# Patient Record
Sex: FEMALE | Race: BLACK OR AFRICAN AMERICAN | NOT HISPANIC OR LATINO | Employment: OTHER | RURAL
[De-identification: names, ages, dates, MRNs, and addresses within clinical notes are randomized per-mention and may not be internally consistent; named-entity substitution may affect disease eponyms.]

---

## 2017-11-13 ENCOUNTER — HISTORICAL (OUTPATIENT)
Dept: ADMINISTRATIVE | Facility: HOSPITAL | Age: 62
End: 2017-11-13

## 2017-11-14 LAB
LAB AP CLINICAL INFORMATION: NORMAL
LAB AP DIAGNOSIS - HISTORICAL: NORMAL
LAB AP GROSS PATHOLOGY - HISTORICAL: NORMAL
LAB AP SPECIMEN SUBMITTED - HISTORICAL: NORMAL

## 2018-10-23 ENCOUNTER — HISTORICAL (OUTPATIENT)
Dept: ADMINISTRATIVE | Facility: HOSPITAL | Age: 63
End: 2018-10-23

## 2018-10-25 LAB
LAB AP GENERAL CAT - HISTORICAL: NORMAL
LAB AP INTERPRETATION/RESULT - HISTORICAL: NEGATIVE
LAB AP SPECIMEN ADEQUACY - HISTORICAL: NORMAL
LAB AP SPECIMEN SUBMITTED - HISTORICAL: NORMAL

## 2020-01-30 LAB
CHOLEST SERPL-MSCNC: 137 MG/DL (ref 0–200)
HDLC SERPL-MCNC: 37 MG/DL
LDLC SERPL CALC-MCNC: 67 MG/DL
TRIGL SERPL-MCNC: 165 MG/DL

## 2020-06-16 ENCOUNTER — HISTORICAL (OUTPATIENT)
Dept: ADMINISTRATIVE | Facility: HOSPITAL | Age: 65
End: 2020-06-16

## 2020-12-03 LAB — MICROALBUMIN/CREATININE RATIO: 31 UG/MG

## 2021-03-10 ENCOUNTER — HISTORICAL (OUTPATIENT)
Dept: ADMINISTRATIVE | Facility: HOSPITAL | Age: 66
End: 2021-03-10

## 2021-04-16 ENCOUNTER — OFFICE VISIT (OUTPATIENT)
Dept: GASTROENTEROLOGY | Facility: CLINIC | Age: 66
End: 2021-04-16
Payer: MEDICARE

## 2021-04-16 VITALS
BODY MASS INDEX: 30.36 KG/M2 | HEART RATE: 85 BPM | SYSTOLIC BLOOD PRESSURE: 123 MMHG | WEIGHT: 205 LBS | DIASTOLIC BLOOD PRESSURE: 80 MMHG | OXYGEN SATURATION: 95 % | HEIGHT: 69 IN

## 2021-04-16 DIAGNOSIS — K31.84 GASTROPARESIS: ICD-10-CM

## 2021-04-16 DIAGNOSIS — E11.69 TYPE 2 DIABETES MELLITUS WITH OTHER SPECIFIED COMPLICATION, WITHOUT LONG-TERM CURRENT USE OF INSULIN: ICD-10-CM

## 2021-04-16 DIAGNOSIS — R74.8 ELEVATED LIVER ENZYMES: Primary | ICD-10-CM

## 2021-04-16 PROBLEM — E11.9 TYPE 2 DIABETES MELLITUS: Status: ACTIVE | Noted: 2021-04-16

## 2021-04-16 PROCEDURE — 99214 PR OFFICE/OUTPT VISIT, EST, LEVL IV, 30-39 MIN: ICD-10-PCS | Mod: ,,, | Performed by: NURSE PRACTITIONER

## 2021-04-16 PROCEDURE — 99214 OFFICE O/P EST MOD 30 MIN: CPT | Mod: ,,, | Performed by: NURSE PRACTITIONER

## 2021-04-16 RX ORDER — ESTRADIOL 0.1 MG/D
1 FILM, EXTENDED RELEASE TRANSDERMAL
COMMUNITY
End: 2021-07-01 | Stop reason: SDUPTHER

## 2021-04-16 RX ORDER — METFORMIN HYDROCHLORIDE 1000 MG/1
1 TABLET ORAL 2 TIMES DAILY
COMMUNITY
Start: 2021-04-05 | End: 2021-05-27

## 2021-04-16 RX ORDER — LOSARTAN POTASSIUM 25 MG/1
1 TABLET ORAL DAILY
COMMUNITY
Start: 2021-02-22 | End: 2022-07-25

## 2021-04-16 RX ORDER — PREGABALIN 100 MG/1
100 CAPSULE ORAL 2 TIMES DAILY
COMMUNITY
End: 2021-12-07

## 2021-04-16 RX ORDER — ALBUTEROL SULFATE 90 UG/1
2 AEROSOL, METERED RESPIRATORY (INHALATION) 2 TIMES DAILY
COMMUNITY
Start: 2021-02-24 | End: 2022-03-29 | Stop reason: SDUPTHER

## 2021-04-16 RX ORDER — ASPIRIN 81 MG/1
81 TABLET ORAL DAILY
COMMUNITY
End: 2021-12-07

## 2021-04-16 RX ORDER — EZETIMIBE 10 MG/1
1 TABLET ORAL NIGHTLY
COMMUNITY
Start: 2021-02-24

## 2021-04-16 RX ORDER — CLOPIDOGREL BISULFATE 75 MG/1
1 TABLET ORAL DAILY
Status: ON HOLD | COMMUNITY
Start: 2021-02-24 | End: 2022-06-24 | Stop reason: SINTOL

## 2021-04-16 RX ORDER — PANTOPRAZOLE SODIUM 40 MG/1
1 TABLET, DELAYED RELEASE ORAL DAILY
COMMUNITY
Start: 2021-04-09 | End: 2021-11-15 | Stop reason: SDUPTHER

## 2021-04-16 RX ORDER — METOPROLOL SUCCINATE 25 MG/1
1 TABLET, EXTENDED RELEASE ORAL DAILY
COMMUNITY
Start: 2021-03-01

## 2021-04-19 ENCOUNTER — HOSPITAL ENCOUNTER (OUTPATIENT)
Dept: RADIOLOGY | Facility: HOSPITAL | Age: 66
Discharge: HOME | End: 2021-04-19
Attending: NURSE PRACTITIONER
Payer: MEDICARE

## 2021-04-19 DIAGNOSIS — R74.8 ELEVATED LIVER ENZYMES: ICD-10-CM

## 2021-04-19 PROCEDURE — 76700 US ABDOMEN COMPLETE: ICD-10-PCS | Mod: 26,59,ICN, | Performed by: RADIOLOGY

## 2021-04-19 PROCEDURE — 91200 LIVER ELASTOGRAPHY: CPT | Mod: TC

## 2021-04-19 PROCEDURE — 76700 US EXAM ABDOM COMPLETE: CPT | Mod: TC

## 2021-04-19 PROCEDURE — 76700 US EXAM ABDOM COMPLETE: CPT | Mod: 26,59,ICN, | Performed by: RADIOLOGY

## 2021-04-19 PROCEDURE — 91200 LIVER ELASTOGRAPHY: CPT | Mod: 26,,, | Performed by: RADIOLOGY

## 2021-04-19 PROCEDURE — 91200 US ELASTOGRAPHY LIVER: ICD-10-PCS | Mod: 26,,, | Performed by: RADIOLOGY

## 2021-04-20 ENCOUNTER — TELEPHONE (OUTPATIENT)
Dept: GASTROENTEROLOGY | Facility: CLINIC | Age: 66
End: 2021-04-20

## 2021-04-20 DIAGNOSIS — R93.89 ABNORMAL ULTRASOUND: Primary | ICD-10-CM

## 2021-04-22 ENCOUNTER — HOSPITAL ENCOUNTER (OUTPATIENT)
Dept: RADIOLOGY | Facility: HOSPITAL | Age: 66
Discharge: HOME | End: 2021-04-22
Payer: MEDICARE

## 2021-04-22 VITALS — BODY MASS INDEX: 30.36 KG/M2 | WEIGHT: 205 LBS | HEIGHT: 69 IN

## 2021-04-22 DIAGNOSIS — Z12.31 VISIT FOR SCREENING MAMMOGRAM: ICD-10-CM

## 2021-04-22 PROCEDURE — 77067 SCR MAMMO BI INCL CAD: CPT | Mod: 26,,, | Performed by: RADIOLOGY

## 2021-04-22 PROCEDURE — 77067 MAMMO DIGITAL SCREENING BILAT: ICD-10-PCS | Mod: 26,,, | Performed by: RADIOLOGY

## 2021-04-22 PROCEDURE — 77067 SCR MAMMO BI INCL CAD: CPT | Mod: TC

## 2021-04-29 ENCOUNTER — TELEPHONE (OUTPATIENT)
Dept: GASTROENTEROLOGY | Facility: CLINIC | Age: 66
End: 2021-04-29

## 2021-05-27 ENCOUNTER — OFFICE VISIT (OUTPATIENT)
Dept: DIABETES SERVICES | Facility: CLINIC | Age: 66
End: 2021-05-27
Payer: MEDICARE

## 2021-05-27 VITALS
HEIGHT: 69 IN | SYSTOLIC BLOOD PRESSURE: 112 MMHG | WEIGHT: 202.38 LBS | RESPIRATION RATE: 16 BRPM | HEART RATE: 83 BPM | DIASTOLIC BLOOD PRESSURE: 74 MMHG | OXYGEN SATURATION: 96 % | BODY MASS INDEX: 29.98 KG/M2

## 2021-05-27 DIAGNOSIS — K31.84 GASTROPARESIS: ICD-10-CM

## 2021-05-27 DIAGNOSIS — I10 HYPERTENSION, UNSPECIFIED TYPE: ICD-10-CM

## 2021-05-27 DIAGNOSIS — E11.9 TYPE 2 DIABETES MELLITUS WITHOUT COMPLICATION, WITHOUT LONG-TERM CURRENT USE OF INSULIN: Primary | ICD-10-CM

## 2021-05-27 DIAGNOSIS — I25.10 CORONARY ARTERY DISEASE, ANGINA PRESENCE UNSPECIFIED, UNSPECIFIED VESSEL OR LESION TYPE, UNSPECIFIED WHETHER NATIVE OR TRANSPLANTED HEART: ICD-10-CM

## 2021-05-27 LAB
GLUCOSE SERPL-MCNC: 106 MG/DL (ref 70–110)
HBA1C MFR BLD: 7 % (ref 4.5–6.6)

## 2021-05-27 PROCEDURE — 99213 OFFICE O/P EST LOW 20 MIN: CPT | Mod: S$PBB,,, | Performed by: NURSE PRACTITIONER

## 2021-05-27 PROCEDURE — 99213 PR OFFICE/OUTPT VISIT, EST, LEVL III, 20-29 MIN: ICD-10-PCS | Mod: S$PBB,,, | Performed by: NURSE PRACTITIONER

## 2021-05-27 PROCEDURE — 83036 HEMOGLOBIN GLYCOSYLATED A1C: CPT | Mod: PBBFAC | Performed by: NURSE PRACTITIONER

## 2021-05-27 PROCEDURE — 82962 GLUCOSE BLOOD TEST: CPT | Mod: PBBFAC | Performed by: NURSE PRACTITIONER

## 2021-05-27 PROCEDURE — 99215 OFFICE O/P EST HI 40 MIN: CPT | Mod: PBBFAC | Performed by: NURSE PRACTITIONER

## 2021-05-27 RX ORDER — GLIPIZIDE 2.5 MG/1
5 TABLET, EXTENDED RELEASE ORAL
Qty: 180 TABLET | Refills: 3 | Status: SHIPPED | OUTPATIENT
Start: 2021-05-27 | End: 2021-12-07 | Stop reason: SDUPTHER

## 2021-06-08 ENCOUNTER — HOSPITAL ENCOUNTER (OUTPATIENT)
Dept: RADIOLOGY | Facility: HOSPITAL | Age: 66
Discharge: HOME | End: 2021-06-08
Attending: INTERNAL MEDICINE
Payer: MEDICARE

## 2021-06-08 ENCOUNTER — OFFICE VISIT (OUTPATIENT)
Dept: INTERNAL MEDICINE | Facility: CLINIC | Age: 66
End: 2021-06-08
Payer: MEDICARE

## 2021-06-08 VITALS
BODY MASS INDEX: 30.66 KG/M2 | SYSTOLIC BLOOD PRESSURE: 136 MMHG | HEART RATE: 84 BPM | HEIGHT: 69 IN | DIASTOLIC BLOOD PRESSURE: 82 MMHG | RESPIRATION RATE: 16 BRPM | OXYGEN SATURATION: 97 % | WEIGHT: 207 LBS

## 2021-06-08 DIAGNOSIS — G89.4 CHRONIC PAIN SYNDROME: ICD-10-CM

## 2021-06-08 DIAGNOSIS — E11.42 DIABETIC POLYNEUROPATHY ASSOCIATED WITH TYPE 2 DIABETES MELLITUS: ICD-10-CM

## 2021-06-08 DIAGNOSIS — E11.9 TYPE 2 DIABETES MELLITUS WITHOUT COMPLICATION, WITH LONG-TERM CURRENT USE OF INSULIN: Primary | Chronic | ICD-10-CM

## 2021-06-08 DIAGNOSIS — M25.531 RIGHT WRIST PAIN: ICD-10-CM

## 2021-06-08 DIAGNOSIS — Z95.1 S/P CABG (CORONARY ARTERY BYPASS GRAFT): ICD-10-CM

## 2021-06-08 DIAGNOSIS — J30.9 ALLERGIC RHINITIS, UNSPECIFIED SEASONALITY, UNSPECIFIED TRIGGER: Chronic | ICD-10-CM

## 2021-06-08 DIAGNOSIS — I10 ESSENTIAL HYPERTENSION: Chronic | ICD-10-CM

## 2021-06-08 DIAGNOSIS — K21.9 GASTROESOPHAGEAL REFLUX DISEASE, UNSPECIFIED WHETHER ESOPHAGITIS PRESENT: Chronic | ICD-10-CM

## 2021-06-08 DIAGNOSIS — Z79.4 TYPE 2 DIABETES MELLITUS WITHOUT COMPLICATION, WITH LONG-TERM CURRENT USE OF INSULIN: Primary | Chronic | ICD-10-CM

## 2021-06-08 PROCEDURE — 99215 OFFICE O/P EST HI 40 MIN: CPT | Mod: PBBFAC,25 | Performed by: INTERNAL MEDICINE

## 2021-06-08 PROCEDURE — 99214 PR OFFICE/OUTPT VISIT, EST, LEVL IV, 30-39 MIN: ICD-10-PCS | Mod: S$PBB,,, | Performed by: INTERNAL MEDICINE

## 2021-06-08 PROCEDURE — 73110 XR WRIST COMPLETE 3 VIEWS RIGHT: ICD-10-PCS | Mod: 26,RT,, | Performed by: RADIOLOGY

## 2021-06-08 PROCEDURE — 99214 OFFICE O/P EST MOD 30 MIN: CPT | Mod: S$PBB,,, | Performed by: INTERNAL MEDICINE

## 2021-06-08 PROCEDURE — 73110 X-RAY EXAM OF WRIST: CPT | Mod: TC,RT

## 2021-06-08 PROCEDURE — 73110 X-RAY EXAM OF WRIST: CPT | Mod: 26,RT,, | Performed by: RADIOLOGY

## 2021-06-08 RX ORDER — PSEUDOEPHEDRINE HCL 120 MG/1
120 TABLET, FILM COATED, EXTENDED RELEASE ORAL DAILY
Qty: 30 TABLET | Refills: 2 | Status: CANCELLED | OUTPATIENT
Start: 2021-06-08

## 2021-06-08 RX ORDER — CYCLOBENZAPRINE HCL 10 MG
10 TABLET ORAL
Status: ON HOLD | COMMUNITY
Start: 2021-04-19 | End: 2022-06-26 | Stop reason: HOSPADM

## 2021-06-08 RX ORDER — ALBUTEROL SULFATE 90 UG/1
2 AEROSOL, METERED RESPIRATORY (INHALATION) 2 TIMES DAILY
Qty: 1 G | Refills: 1 | Status: CANCELLED | OUTPATIENT
Start: 2021-06-08

## 2021-07-01 ENCOUNTER — OFFICE VISIT (OUTPATIENT)
Dept: GASTROENTEROLOGY | Facility: CLINIC | Age: 66
End: 2021-07-01
Payer: MEDICARE

## 2021-07-01 ENCOUNTER — OFFICE VISIT (OUTPATIENT)
Dept: INTERNAL MEDICINE | Facility: CLINIC | Age: 66
End: 2021-07-01
Payer: MEDICARE

## 2021-07-01 VITALS
WEIGHT: 199 LBS | BODY MASS INDEX: 29.47 KG/M2 | OXYGEN SATURATION: 98 % | DIASTOLIC BLOOD PRESSURE: 72 MMHG | HEART RATE: 90 BPM | RESPIRATION RATE: 16 BRPM | SYSTOLIC BLOOD PRESSURE: 122 MMHG | HEIGHT: 69 IN

## 2021-07-01 VITALS
DIASTOLIC BLOOD PRESSURE: 69 MMHG | HEART RATE: 86 BPM | SYSTOLIC BLOOD PRESSURE: 114 MMHG | HEIGHT: 69 IN | OXYGEN SATURATION: 97 % | WEIGHT: 202 LBS | BODY MASS INDEX: 29.92 KG/M2

## 2021-07-01 DIAGNOSIS — Z79.890 HORMONE REPLACEMENT THERAPY: ICD-10-CM

## 2021-07-01 DIAGNOSIS — R74.8 ELEVATED LIVER ENZYMES: ICD-10-CM

## 2021-07-01 DIAGNOSIS — E11.9 TYPE 2 DIABETES MELLITUS WITHOUT COMPLICATION, WITHOUT LONG-TERM CURRENT USE OF INSULIN: ICD-10-CM

## 2021-07-01 DIAGNOSIS — R19.7 DIARRHEA, UNSPECIFIED TYPE: ICD-10-CM

## 2021-07-01 DIAGNOSIS — K31.84 GASTROPARESIS: Primary | ICD-10-CM

## 2021-07-01 DIAGNOSIS — K83.8 DILATED CBD, ACQUIRED: ICD-10-CM

## 2021-07-01 DIAGNOSIS — M19.90 OSTEOARTHRITIS, UNSPECIFIED OSTEOARTHRITIS TYPE, UNSPECIFIED SITE: Primary | ICD-10-CM

## 2021-07-01 PROCEDURE — 99214 OFFICE O/P EST MOD 30 MIN: CPT | Mod: ,,, | Performed by: NURSE PRACTITIONER

## 2021-07-01 PROCEDURE — 99214 PR OFFICE/OUTPT VISIT, EST, LEVL IV, 30-39 MIN: ICD-10-PCS | Mod: ,,, | Performed by: NURSE PRACTITIONER

## 2021-07-01 PROCEDURE — 99213 PR OFFICE/OUTPT VISIT, EST, LEVL III, 20-29 MIN: ICD-10-PCS | Mod: S$PBB,,, | Performed by: INTERNAL MEDICINE

## 2021-07-01 PROCEDURE — 99213 OFFICE O/P EST LOW 20 MIN: CPT | Mod: S$PBB,,, | Performed by: INTERNAL MEDICINE

## 2021-07-01 PROCEDURE — 99215 OFFICE O/P EST HI 40 MIN: CPT | Mod: PBBFAC | Performed by: INTERNAL MEDICINE

## 2021-07-01 RX ORDER — ESTRADIOL 0.1 MG/D
1 FILM, EXTENDED RELEASE TRANSDERMAL
Qty: 8 PATCH | Refills: 0 | Status: SHIPPED | OUTPATIENT
Start: 2021-07-01 | End: 2021-09-14

## 2021-07-08 ENCOUNTER — OFFICE VISIT (OUTPATIENT)
Dept: FAMILY MEDICINE | Facility: CLINIC | Age: 66
End: 2021-07-08
Payer: MEDICARE

## 2021-07-08 VITALS
HEART RATE: 97 BPM | DIASTOLIC BLOOD PRESSURE: 80 MMHG | HEIGHT: 69 IN | RESPIRATION RATE: 18 BRPM | OXYGEN SATURATION: 96 % | WEIGHT: 205 LBS | SYSTOLIC BLOOD PRESSURE: 132 MMHG | BODY MASS INDEX: 30.36 KG/M2 | TEMPERATURE: 97 F

## 2021-07-08 DIAGNOSIS — G89.29 CHRONIC PAIN OF RIGHT KNEE: ICD-10-CM

## 2021-07-08 DIAGNOSIS — R09.81 NASAL CONGESTION: ICD-10-CM

## 2021-07-08 DIAGNOSIS — J32.9 SINUSITIS, UNSPECIFIED CHRONICITY, UNSPECIFIED LOCATION: Primary | ICD-10-CM

## 2021-07-08 DIAGNOSIS — M25.561 CHRONIC PAIN OF RIGHT KNEE: ICD-10-CM

## 2021-07-08 LAB
CTP QC/QA: YES
FLUAV AG NPH QL: NEGATIVE
FLUBV AG NPH QL: NEGATIVE
SARS-COV-2 AG RESP QL IA.RAPID: NEGATIVE

## 2021-07-08 PROCEDURE — 99213 PR OFFICE/OUTPT VISIT, EST, LEVL III, 20-29 MIN: ICD-10-PCS | Mod: ,,, | Performed by: NURSE PRACTITIONER

## 2021-07-08 PROCEDURE — 87428 SARSCOV & INF VIR A&B AG IA: CPT | Mod: RHCUB | Performed by: NURSE PRACTITIONER

## 2021-07-08 PROCEDURE — 99213 OFFICE O/P EST LOW 20 MIN: CPT | Mod: ,,, | Performed by: NURSE PRACTITIONER

## 2021-07-08 RX ORDER — AMOXICILLIN 500 MG/1
500 TABLET, FILM COATED ORAL EVERY 12 HOURS
Qty: 20 TABLET | Refills: 0 | Status: SHIPPED | OUTPATIENT
Start: 2021-07-08 | End: 2021-07-18

## 2021-07-21 ENCOUNTER — HOSPITAL ENCOUNTER (OUTPATIENT)
Dept: RADIOLOGY | Facility: HOSPITAL | Age: 66
Discharge: HOME | End: 2021-07-21
Attending: ORTHOPAEDIC SURGERY
Payer: MEDICARE

## 2021-07-21 DIAGNOSIS — M25.561 RIGHT KNEE PAIN, UNSPECIFIED CHRONICITY: ICD-10-CM

## 2021-07-21 PROBLEM — M17.11 ARTHRITIS OF RIGHT KNEE: Status: ACTIVE | Noted: 2021-07-21

## 2021-07-21 PROBLEM — M18.11 ARTHRITIS OF CARPOMETACARPAL (CMC) JOINT OF RIGHT THUMB: Status: ACTIVE | Noted: 2021-07-21

## 2021-07-21 PROCEDURE — 73564 X-RAY EXAM KNEE 4 OR MORE: CPT | Mod: TC,RT

## 2021-08-17 ENCOUNTER — TELEPHONE (OUTPATIENT)
Dept: DIABETES SERVICES | Facility: CLINIC | Age: 66
End: 2021-08-17

## 2021-08-17 RX ORDER — DAPAGLIFLOZIN 10 MG/1
10 TABLET, FILM COATED ORAL DAILY
Qty: 90 TABLET | Refills: 1 | Status: SHIPPED | OUTPATIENT
Start: 2021-08-17 | End: 2022-01-31

## 2021-08-18 ENCOUNTER — TELEPHONE (OUTPATIENT)
Dept: DIABETES SERVICES | Facility: CLINIC | Age: 66
End: 2021-08-18

## 2021-08-18 RX ORDER — FLUCONAZOLE 100 MG/1
100 TABLET ORAL DAILY
Qty: 3 TABLET | Refills: 0 | Status: SHIPPED | OUTPATIENT
Start: 2021-08-18 | End: 2021-08-21

## 2021-08-18 RX ORDER — FLUCONAZOLE 100 MG/1
100 TABLET ORAL DAILY
Qty: 3 TABLET | Refills: 0 | Status: SHIPPED | OUTPATIENT
Start: 2021-08-18 | End: 2021-08-18

## 2021-09-09 DIAGNOSIS — E11.69 TYPE 2 DIABETES MELLITUS WITH OTHER SPECIFIED COMPLICATION, WITHOUT LONG-TERM CURRENT USE OF INSULIN: Primary | ICD-10-CM

## 2021-11-01 ENCOUNTER — HOSPITAL ENCOUNTER (OUTPATIENT)
Dept: RADIOLOGY | Facility: HOSPITAL | Age: 66
Discharge: HOME | End: 2021-11-01
Attending: NURSE PRACTITIONER
Payer: MEDICARE

## 2021-11-01 ENCOUNTER — OFFICE VISIT (OUTPATIENT)
Dept: GASTROENTEROLOGY | Facility: CLINIC | Age: 66
End: 2021-11-01
Payer: MEDICARE

## 2021-11-01 VITALS
HEART RATE: 72 BPM | HEIGHT: 69 IN | OXYGEN SATURATION: 96 % | BODY MASS INDEX: 30.21 KG/M2 | DIASTOLIC BLOOD PRESSURE: 83 MMHG | WEIGHT: 204 LBS | SYSTOLIC BLOOD PRESSURE: 140 MMHG

## 2021-11-01 DIAGNOSIS — R74.8 ELEVATED LIVER ENZYMES: ICD-10-CM

## 2021-11-01 DIAGNOSIS — R74.8 ELEVATED LIVER ENZYMES: Primary | ICD-10-CM

## 2021-11-01 DIAGNOSIS — K31.84 GASTROPARESIS: ICD-10-CM

## 2021-11-01 DIAGNOSIS — R19.7 DIARRHEA, UNSPECIFIED TYPE: ICD-10-CM

## 2021-11-01 DIAGNOSIS — R16.0 HEPATOMEGALY: ICD-10-CM

## 2021-11-01 PROCEDURE — 99214 OFFICE O/P EST MOD 30 MIN: CPT | Mod: ,,, | Performed by: NURSE PRACTITIONER

## 2021-11-01 PROCEDURE — 76705 ECHO EXAM OF ABDOMEN: CPT | Mod: 26,,, | Performed by: RADIOLOGY

## 2021-11-01 PROCEDURE — 76705 US ABDOMEN LIMITED: ICD-10-PCS | Mod: 26,,, | Performed by: RADIOLOGY

## 2021-11-01 PROCEDURE — 99214 PR OFFICE/OUTPT VISIT, EST, LEVL IV, 30-39 MIN: ICD-10-PCS | Mod: ,,, | Performed by: NURSE PRACTITIONER

## 2021-11-01 PROCEDURE — 76705 ECHO EXAM OF ABDOMEN: CPT | Mod: TC

## 2021-11-08 ENCOUNTER — OFFICE VISIT (OUTPATIENT)
Dept: FAMILY MEDICINE | Facility: CLINIC | Age: 66
End: 2021-11-08
Payer: MEDICARE

## 2021-11-08 ENCOUNTER — APPOINTMENT (OUTPATIENT)
Dept: RADIOLOGY | Facility: CLINIC | Age: 66
End: 2021-11-08
Attending: INTERNAL MEDICINE
Payer: MEDICARE

## 2021-11-08 VITALS
WEIGHT: 208 LBS | SYSTOLIC BLOOD PRESSURE: 161 MMHG | HEART RATE: 78 BPM | BODY MASS INDEX: 30.81 KG/M2 | DIASTOLIC BLOOD PRESSURE: 96 MMHG | RESPIRATION RATE: 18 BRPM | OXYGEN SATURATION: 94 % | HEIGHT: 69 IN

## 2021-11-08 DIAGNOSIS — Z12.11 ENCOUNTER FOR SCREENING COLONOSCOPY: ICD-10-CM

## 2021-11-08 DIAGNOSIS — I10 HYPERTENSION, UNSPECIFIED TYPE: Chronic | ICD-10-CM

## 2021-11-08 DIAGNOSIS — N39.0 URINARY TRACT INFECTION WITHOUT HEMATURIA, SITE UNSPECIFIED: Primary | ICD-10-CM

## 2021-11-08 DIAGNOSIS — M79.602 LEFT ARM PAIN: ICD-10-CM

## 2021-11-08 DIAGNOSIS — Z23 ENCOUNTER FOR IMMUNIZATION: ICD-10-CM

## 2021-11-08 DIAGNOSIS — R10.9 ABDOMINAL PAIN, UNSPECIFIED ABDOMINAL LOCATION: ICD-10-CM

## 2021-11-08 DIAGNOSIS — M54.10 RADICULOPATHY, UNSPECIFIED SPINAL REGION: ICD-10-CM

## 2021-11-08 LAB
BILIRUB UR QL STRIP: NEGATIVE
CLARITY UR: CLEAR
COLOR UR: YELLOW
GLUCOSE UR STRIP-MCNC: >=1000 MG/DL
KETONES UR STRIP-SCNC: NEGATIVE MG/DL
LEUKOCYTE ESTERASE UR QL STRIP: NEGATIVE
NITRITE UR QL STRIP: NEGATIVE
PH UR STRIP: 6 PH UNITS
PROT UR QL STRIP: NEGATIVE
RBC # UR STRIP: NEGATIVE /UL
SP GR UR STRIP: 1.01
UROBILINOGEN UR STRIP-ACNC: 0.2 MG/DL

## 2021-11-08 PROCEDURE — G0008 FLU VACCINE - QUADRIVALENT - HIGH DOSE (65+) PRESERVATIVE FREE IM: ICD-10-PCS | Mod: ,,, | Performed by: INTERNAL MEDICINE

## 2021-11-08 PROCEDURE — 90662 IIV NO PRSV INCREASED AG IM: CPT | Mod: ,,, | Performed by: INTERNAL MEDICINE

## 2021-11-08 PROCEDURE — 99214 PR OFFICE/OUTPT VISIT, EST, LEVL IV, 30-39 MIN: ICD-10-PCS | Mod: ,,, | Performed by: INTERNAL MEDICINE

## 2021-11-08 PROCEDURE — 81003 URINALYSIS, REFLEX TO URINE CULTURE: ICD-10-PCS | Mod: QW,,, | Performed by: CLINICAL MEDICAL LABORATORY

## 2021-11-08 PROCEDURE — 90662 FLU VACCINE - QUADRIVALENT - HIGH DOSE (65+) PRESERVATIVE FREE IM: ICD-10-PCS | Mod: ,,, | Performed by: INTERNAL MEDICINE

## 2021-11-08 PROCEDURE — 73030 X-RAY EXAM OF SHOULDER: CPT | Mod: TC,RHCUB,LT | Performed by: INTERNAL MEDICINE

## 2021-11-08 PROCEDURE — G0008 ADMIN INFLUENZA VIRUS VAC: HCPCS | Mod: ,,, | Performed by: INTERNAL MEDICINE

## 2021-11-08 PROCEDURE — 81003 URINALYSIS AUTO W/O SCOPE: CPT | Mod: QW,,, | Performed by: CLINICAL MEDICAL LABORATORY

## 2021-11-08 PROCEDURE — 99214 OFFICE O/P EST MOD 30 MIN: CPT | Mod: ,,, | Performed by: INTERNAL MEDICINE

## 2021-11-08 RX ORDER — NITROFURANTOIN 25; 75 MG/1; MG/1
100 CAPSULE ORAL 2 TIMES DAILY
Qty: 14 CAPSULE | Refills: 0 | Status: SHIPPED | OUTPATIENT
Start: 2021-11-08 | End: 2021-12-07

## 2021-11-08 RX ORDER — NAPROXEN 500 MG/1
500 TABLET ORAL 2 TIMES DAILY PRN
Qty: 20 TABLET | Refills: 1 | Status: SHIPPED | OUTPATIENT
Start: 2021-11-08 | End: 2021-12-07

## 2021-11-15 ENCOUNTER — OFFICE VISIT (OUTPATIENT)
Dept: FAMILY MEDICINE | Facility: CLINIC | Age: 66
End: 2021-11-15
Payer: MEDICARE

## 2021-11-15 VITALS
SYSTOLIC BLOOD PRESSURE: 156 MMHG | WEIGHT: 205 LBS | DIASTOLIC BLOOD PRESSURE: 78 MMHG | BODY MASS INDEX: 30.36 KG/M2 | OXYGEN SATURATION: 98 % | HEIGHT: 69 IN | RESPIRATION RATE: 18 BRPM | HEART RATE: 82 BPM

## 2021-11-15 DIAGNOSIS — R10.9 ABDOMINAL PAIN, UNSPECIFIED ABDOMINAL LOCATION: ICD-10-CM

## 2021-11-15 DIAGNOSIS — M50.30 DDD (DEGENERATIVE DISC DISEASE), CERVICAL: Primary | ICD-10-CM

## 2021-11-15 DIAGNOSIS — E11.69 TYPE 2 DIABETES MELLITUS WITH OTHER SPECIFIED COMPLICATION, WITHOUT LONG-TERM CURRENT USE OF INSULIN: ICD-10-CM

## 2021-11-15 DIAGNOSIS — M15.9 OSTEOARTHRITIS OF MULTIPLE JOINTS, UNSPECIFIED OSTEOARTHRITIS TYPE: ICD-10-CM

## 2021-11-15 DIAGNOSIS — R35.0 URINARY FREQUENCY: ICD-10-CM

## 2021-11-15 DIAGNOSIS — I10 PRIMARY HYPERTENSION: ICD-10-CM

## 2021-11-15 PROCEDURE — 99214 OFFICE O/P EST MOD 30 MIN: CPT | Mod: ,,, | Performed by: INTERNAL MEDICINE

## 2021-11-15 PROCEDURE — 99214 PR OFFICE/OUTPT VISIT, EST, LEVL IV, 30-39 MIN: ICD-10-PCS | Mod: ,,, | Performed by: INTERNAL MEDICINE

## 2021-11-15 RX ORDER — PANTOPRAZOLE SODIUM 40 MG/1
40 TABLET, DELAYED RELEASE ORAL DAILY
Qty: 90 TABLET | Refills: 1 | Status: SHIPPED | OUTPATIENT
Start: 2021-11-15 | End: 2022-03-14 | Stop reason: SDUPTHER

## 2021-11-19 ENCOUNTER — HOSPITAL ENCOUNTER (OUTPATIENT)
Dept: RADIOLOGY | Facility: HOSPITAL | Age: 66
Discharge: HOME | End: 2021-11-19
Attending: INTERNAL MEDICINE
Payer: MEDICARE

## 2021-11-19 DIAGNOSIS — R10.9 ABDOMINAL PAIN, UNSPECIFIED ABDOMINAL LOCATION: ICD-10-CM

## 2021-11-19 LAB — CREAT SERPL-MCNC: 0.8 MG/DL (ref 0.6–1.3)

## 2021-11-19 PROCEDURE — 25500020 PHARM REV CODE 255: Performed by: INTERNAL MEDICINE

## 2021-11-19 PROCEDURE — 82565 ASSAY OF CREATININE: CPT

## 2021-11-19 PROCEDURE — 74177 CT ABDOMEN PELVIS WITH CONTRAST: ICD-10-PCS | Mod: 26,,, | Performed by: RADIOLOGY

## 2021-11-19 PROCEDURE — 74177 CT ABD & PELVIS W/CONTRAST: CPT | Mod: TC

## 2021-11-19 PROCEDURE — 74177 CT ABD & PELVIS W/CONTRAST: CPT | Mod: 26,,, | Performed by: RADIOLOGY

## 2021-11-19 RX ADMIN — IOPAMIDOL 100 ML: 755 INJECTION, SOLUTION INTRAVENOUS at 10:11

## 2021-11-29 DIAGNOSIS — Z12.11 ENCOUNTER FOR SCREENING COLONOSCOPY: Primary | ICD-10-CM

## 2021-12-07 ENCOUNTER — OFFICE VISIT (OUTPATIENT)
Dept: DIABETES SERVICES | Facility: CLINIC | Age: 66
End: 2021-12-07
Payer: MEDICARE

## 2021-12-07 VITALS
OXYGEN SATURATION: 98 % | SYSTOLIC BLOOD PRESSURE: 112 MMHG | WEIGHT: 203.81 LBS | BODY MASS INDEX: 30.19 KG/M2 | RESPIRATION RATE: 16 BRPM | HEART RATE: 87 BPM | DIASTOLIC BLOOD PRESSURE: 78 MMHG | HEIGHT: 69 IN

## 2021-12-07 DIAGNOSIS — E11.9 TYPE 2 DIABETES MELLITUS WITHOUT COMPLICATION, WITHOUT LONG-TERM CURRENT USE OF INSULIN: Primary | ICD-10-CM

## 2021-12-07 DIAGNOSIS — I10 PRIMARY HYPERTENSION: ICD-10-CM

## 2021-12-07 DIAGNOSIS — I25.10 CORONARY ARTERY DISEASE, UNSPECIFIED VESSEL OR LESION TYPE, UNSPECIFIED WHETHER ANGINA PRESENT, UNSPECIFIED WHETHER NATIVE OR TRANSPLANTED HEART: ICD-10-CM

## 2021-12-07 LAB
GLUCOSE SERPL-MCNC: 165 MG/DL (ref 70–110)
HBA1C MFR BLD: 7.8 % (ref 4.5–6.6)

## 2021-12-07 PROCEDURE — 99213 OFFICE O/P EST LOW 20 MIN: CPT | Mod: S$PBB,,, | Performed by: NURSE PRACTITIONER

## 2021-12-07 PROCEDURE — 99215 OFFICE O/P EST HI 40 MIN: CPT | Mod: PBBFAC | Performed by: NURSE PRACTITIONER

## 2021-12-07 PROCEDURE — 83036 HEMOGLOBIN GLYCOSYLATED A1C: CPT | Mod: PBBFAC | Performed by: NURSE PRACTITIONER

## 2021-12-07 PROCEDURE — 99213 PR OFFICE/OUTPT VISIT, EST, LEVL III, 20-29 MIN: ICD-10-PCS | Mod: S$PBB,,, | Performed by: NURSE PRACTITIONER

## 2021-12-07 PROCEDURE — 82962 GLUCOSE BLOOD TEST: CPT | Mod: PBBFAC | Performed by: NURSE PRACTITIONER

## 2021-12-07 RX ORDER — GLIPIZIDE 2.5 MG/1
5 TABLET, EXTENDED RELEASE ORAL
Qty: 180 TABLET | Refills: 3 | Status: SHIPPED | OUTPATIENT
Start: 2021-12-07 | End: 2022-07-25

## 2022-01-04 ENCOUNTER — PATIENT OUTREACH (OUTPATIENT)
Dept: FAMILY MEDICINE | Facility: CLINIC | Age: 67
End: 2022-01-04
Payer: MEDICARE

## 2022-02-18 ENCOUNTER — OFFICE VISIT (OUTPATIENT)
Dept: FAMILY MEDICINE | Facility: CLINIC | Age: 67
End: 2022-02-18
Payer: MEDICARE

## 2022-02-18 VITALS
BODY MASS INDEX: 30.86 KG/M2 | OXYGEN SATURATION: 98 % | HEART RATE: 83 BPM | SYSTOLIC BLOOD PRESSURE: 153 MMHG | HEIGHT: 69 IN | TEMPERATURE: 97 F | RESPIRATION RATE: 18 BRPM | WEIGHT: 208.38 LBS | DIASTOLIC BLOOD PRESSURE: 94 MMHG

## 2022-02-18 DIAGNOSIS — J30.89 SEASONAL ALLERGIC RHINITIS DUE TO OTHER ALLERGIC TRIGGER: ICD-10-CM

## 2022-02-18 DIAGNOSIS — A60.04 HERPES SIMPLEX VULVOVAGINITIS: ICD-10-CM

## 2022-02-18 DIAGNOSIS — R51.9 ACUTE NONINTRACTABLE HEADACHE, UNSPECIFIED HEADACHE TYPE: ICD-10-CM

## 2022-02-18 DIAGNOSIS — R49.0 HOARSENESS: ICD-10-CM

## 2022-02-18 DIAGNOSIS — I10 PRIMARY HYPERTENSION: Primary | ICD-10-CM

## 2022-02-18 PROCEDURE — 99214 OFFICE O/P EST MOD 30 MIN: CPT | Mod: ,,, | Performed by: INTERNAL MEDICINE

## 2022-02-18 PROCEDURE — 99214 PR OFFICE/OUTPT VISIT, EST, LEVL IV, 30-39 MIN: ICD-10-PCS | Mod: ,,, | Performed by: INTERNAL MEDICINE

## 2022-02-18 RX ORDER — LORATADINE 10 MG/1
10 TABLET ORAL DAILY
Qty: 20 TABLET | Refills: 1 | Status: SHIPPED | OUTPATIENT
Start: 2022-02-18 | End: 2022-02-26 | Stop reason: SDUPTHER

## 2022-02-18 RX ORDER — VALACYCLOVIR HYDROCHLORIDE 500 MG/1
500 TABLET, FILM COATED ORAL 3 TIMES DAILY
Qty: 15 TABLET | Refills: 6 | Status: SHIPPED | OUTPATIENT
Start: 2022-02-18 | End: 2022-06-26

## 2022-02-18 NOTE — PROGRESS NOTES
Subjective:       Patient ID: Olga Stephenson is a 66 y.o. female.    Chief Complaint: Headache, Sinus Problem, and Anxiety    Patient is here today for check up evaluation. Patient is complaining of pressure headache and hoarseness at night. No pain on palpation of sinuses. Patient also states her bilateral ears have felt 'itchy' and has noticed pressure in them. Bilateral ears normal on exam. Pressure is increased today and states she has not been following a healthy diet. Will order Claritin 10 mg PO QD for allergies and follow in 3 weeks for pressure recheck.       Current Medications:    Current Outpatient Medications:     albuterol (PROVENTIL/VENTOLIN HFA) 90 mcg/actuation inhaler, Inhale 2 puffs into the lungs 2 (two) times a day., Disp: , Rfl:     blood sugar diagnostic (FREESTYLE LITE STRIPS) Strp, Use one strip daily to monitor glucose, Disp: 100 each, Rfl: 3    clopidogreL (PLAVIX) 75 mg tablet, Take 1 tablet by mouth once daily., Disp: , Rfl:     cyclobenzaprine (FLEXERIL) 10 MG tablet, Take 10 mg by mouth. Twice a week, Disp: , Rfl:     estradioL (VIVELLE-DOT) 0.025 mg/24 hr, Place 1 patch onto the skin twice a week., Disp: , Rfl:     ezetimibe (ZETIA) 10 mg tablet, Take 1 tablet by mouth once daily., Disp: , Rfl:     FARXIGA 10 mg tablet, TAKE 1 TABLET DAILY, Disp: 90 tablet, Rfl: 3    fluconazole (DIFLUCAN) 100 MG tablet, Take 1 tablet (100 mg total) by mouth once daily., Disp: 3 tablet, Rfl: 0    glipiZIDE (GLUCOTROL) 2.5 MG TR24, Take 2 tablets (5 mg total) by mouth daily with breakfast., Disp: 180 tablet, Rfl: 3    loratadine (CLARITIN) 10 mg tablet, Take 1 tablet (10 mg total) by mouth once daily., Disp: 20 tablet, Rfl: 1    losartan (COZAAR) 25 MG tablet, Take 1 tablet by mouth once daily., Disp: , Rfl:     metoprolol succinate (TOPROL-XL) 25 MG 24 hr tablet, Take 1 tablet by mouth once daily., Disp: , Rfl:     pantoprazole (PROTONIX) 40 MG tablet, Take 1 tablet (40 mg total) by  mouth once daily., Disp: 90 tablet, Rfl: 1    pregabalin (LYRICA) 75 MG capsule, Take 75 mg by mouth. 1 in the morning and 2 at night, Disp: , Rfl:     valACYclovir (VALTREX) 500 MG tablet, Take 1 tablet (500 mg total) by mouth 3 (three) times daily., Disp: 15 tablet, Rfl: 6    Last Labs:     No visits with results within 1 Month(s) from this visit.   Latest known visit with results is:   Lab Visit on 01/05/2022   Component Date Value    Triglycerides 01/05/2022 132     Cholesterol 01/05/2022 153     HDL Cholesterol 01/05/2022 35*    Cholesterol/HDL Ratio (R* 01/05/2022 4.4     Non-HDL 01/05/2022 118     LDL Calculated 01/05/2022 92     LDL/HDL 01/05/2022 2.6     VLDL 01/05/2022 26        Last Imaging:  CT Abdomen Pelvis With Contrast  Narrative: EXAMINATION:  CT ABDOMEN PELVIS WITH CONTRAST    CLINICAL HISTORY:  Abdominal pain, acute, nonlocalized; Unspecified abdominal pain    TECHNIQUE:  Axial CT imaging of the abdomen and pelvis is performed with intravenous and oral contrast. Contrast dose is 100 cc Isovue 370.    CT dose reduction technique used - Dose Rite and tube current modulation.    COMPARISON:  16 June 2020    FINDINGS:  Cardiac and lung bases are within normal limits    CT abdomen: The liver is diffusely decreased in density without focal abnormality.  The gallbladder has been removed.  Spleen, pancreas and adrenal glands are normal in size and enhancement.  No evidence of focal lesion is demonstrated in these solid organs.    Kidneys are normal in size and enhancement.  No evidence of hydronephrosis or nephrolithiasis is seen.    The bowel caliber is normal and no wall thickening or adjacent inflammatory change is seen.  No evidence of free fluid or free air is present.  Appendix is not seen..    CT pelvis: The pelvic bowel appears within normal limits.  Bladder shows no evidence of abnormality.  The uterus and ovaries are not identified.  Impression: No evidence of acute process.  Fatty  liver infiltration.    Electronically signed by: Ervin Fonseca  Date:    11/19/2021  Time:    10:17         Review of Systems   HENT: Positive for sinus pressure/congestion and voice change.    Genitourinary: Positive for genital sores.   All other systems reviewed and are negative.        Objective:      Physical Exam  Vitals reviewed.   Constitutional:       Appearance: Normal appearance.   Cardiovascular:      Rate and Rhythm: Normal rate and regular rhythm.      Pulses: Normal pulses.      Heart sounds: Normal heart sounds.   Pulmonary:      Effort: Pulmonary effort is normal.      Breath sounds: Normal breath sounds.   Abdominal:      General: Abdomen is flat. Bowel sounds are normal.      Palpations: Abdomen is soft.   Musculoskeletal:         General: Normal range of motion.      Cervical back: Normal range of motion and neck supple.   Skin:     General: Skin is warm and dry.   Neurological:      General: No focal deficit present.      Mental Status: She is alert and oriented to person, place, and time. Mental status is at baseline.         Assessment:       1. Primary hypertension      unstable   2. Acute nonintractable headache, unspecified headache type     3. Hoarseness     4. Seasonal allergic rhinitis due to other allergic trigger     5. Herpes simplex vulvovaginitis          Plan:         Olga was seen today for headache, sinus problem and anxiety.    Diagnoses and all orders for this visit:    Primary hypertension  Comments:  unstable    Acute nonintractable headache, unspecified headache type    Hoarseness    Seasonal allergic rhinitis due to other allergic trigger    Herpes simplex vulvovaginitis    Other orders  -     loratadine (CLARITIN) 10 mg tablet; Take 1 tablet (10 mg total) by mouth once daily.  -     valACYclovir (VALTREX) 500 MG tablet; Take 1 tablet (500 mg total) by mouth 3 (three) times daily.

## 2022-02-18 NOTE — PATIENT INSTRUCTIONS
Olga was seen today for headache, sinus problem and anxiety.    Diagnoses and all orders for this visit:    Primary hypertension  Comments:  unstable    Acute nonintractable headache, unspecified headache type    Hoarseness    Seasonal allergic rhinitis due to other allergic trigger    Herpes simplex vulvovaginitis    Other orders  -     loratadine (CLARITIN) 10 mg tablet; Take 1 tablet (10 mg total) by mouth once daily.  -     valACYclovir (VALTREX) 500 MG tablet; Take 1 tablet (500 mg total) by mouth 3 (three) times daily.

## 2022-02-26 ENCOUNTER — HOSPITAL ENCOUNTER (EMERGENCY)
Facility: HOSPITAL | Age: 67
Discharge: HOME | End: 2022-02-26
Payer: MEDICARE

## 2022-02-26 VITALS
HEIGHT: 69 IN | OXYGEN SATURATION: 93 % | SYSTOLIC BLOOD PRESSURE: 133 MMHG | HEART RATE: 78 BPM | RESPIRATION RATE: 20 BRPM | WEIGHT: 204 LBS | BODY MASS INDEX: 30.21 KG/M2 | DIASTOLIC BLOOD PRESSURE: 80 MMHG | TEMPERATURE: 98 F

## 2022-02-26 DIAGNOSIS — M94.0 COSTOCHONDRITIS: Primary | ICD-10-CM

## 2022-02-26 DIAGNOSIS — R07.9 CHEST PAIN: ICD-10-CM

## 2022-02-26 LAB
ALBUMIN SERPL BCP-MCNC: 3.8 G/DL (ref 3.5–5)
ALBUMIN/GLOB SERPL: 0.8 {RATIO}
ALP SERPL-CCNC: 81 U/L (ref 55–142)
ALT SERPL W P-5'-P-CCNC: 36 U/L (ref 13–56)
ANION GAP SERPL CALCULATED.3IONS-SCNC: 14 MMOL/L (ref 7–16)
APTT PPP: 25.4 SECONDS (ref 25.2–37.3)
AST SERPL W P-5'-P-CCNC: 23 U/L (ref 15–37)
BASOPHILS # BLD AUTO: 0.06 K/UL (ref 0–0.2)
BASOPHILS NFR BLD AUTO: 0.7 % (ref 0–1)
BILIRUB SERPL-MCNC: 0.3 MG/DL (ref 0–1.2)
BUN SERPL-MCNC: 11 MG/DL (ref 7–18)
BUN/CREAT SERPL: 10 (ref 6–20)
CALCIUM SERPL-MCNC: 9.4 MG/DL (ref 8.5–10.1)
CHLORIDE SERPL-SCNC: 103 MMOL/L (ref 98–107)
CO2 SERPL-SCNC: 26 MMOL/L (ref 21–32)
CREAT SERPL-MCNC: 1.06 MG/DL (ref 0.55–1.02)
DIFFERENTIAL METHOD BLD: ABNORMAL
EOSINOPHIL # BLD AUTO: 0.08 K/UL (ref 0–0.5)
EOSINOPHIL NFR BLD AUTO: 0.9 % (ref 1–4)
ERYTHROCYTE [DISTWIDTH] IN BLOOD BY AUTOMATED COUNT: 13.9 % (ref 11.5–14.5)
GLOBULIN SER-MCNC: 4.6 G/DL (ref 2–4)
GLUCOSE SERPL-MCNC: 145 MG/DL (ref 74–106)
HCT VFR BLD AUTO: 43.7 % (ref 38–47)
HGB BLD-MCNC: 14.4 G/DL (ref 12–16)
INR BLD: 1 (ref 0.9–1.1)
LYMPHOCYTES # BLD AUTO: 3.84 K/UL (ref 1–4.8)
LYMPHOCYTES NFR BLD AUTO: 44.7 % (ref 27–41)
MAGNESIUM SERPL-MCNC: 2.4 MG/DL (ref 1.7–2.3)
MCH RBC QN AUTO: 29.5 PG (ref 27–31)
MCHC RBC AUTO-ENTMCNC: 33 G/DL (ref 32–36)
MCV RBC AUTO: 89.5 FL (ref 80–96)
MONOCYTES # BLD AUTO: 0.79 K/UL (ref 0–0.8)
MONOCYTES NFR BLD AUTO: 9.2 % (ref 2–6)
MPC BLD CALC-MCNC: 12.5 FL (ref 9.4–12.4)
NEUTROPHILS # BLD AUTO: 3.83 K/UL (ref 1.8–7.7)
NEUTROPHILS NFR BLD AUTO: 44.5 % (ref 53–65)
NT-PROBNP SERPL-MCNC: 20 PG/ML (ref 1–125)
PLATELET # BLD AUTO: 241 K/UL (ref 150–400)
POTASSIUM SERPL-SCNC: 4.2 MMOL/L (ref 3.5–5.1)
PROT SERPL-MCNC: 8.4 G/DL (ref 6.4–8.2)
PROTHROMBIN TIME: 13 SECONDS (ref 11.7–14.7)
RBC # BLD AUTO: 4.88 M/UL (ref 4.2–5.4)
SODIUM SERPL-SCNC: 139 MMOL/L (ref 136–145)
TROPONIN I SERPL HS-MCNC: <4 PG/ML
WBC # BLD AUTO: 8.6 K/UL (ref 4.5–11)

## 2022-02-26 PROCEDURE — 99285 EMERGENCY DEPT VISIT HI MDM: CPT | Mod: 25

## 2022-02-26 PROCEDURE — 83735 ASSAY OF MAGNESIUM: CPT | Performed by: NURSE PRACTITIONER

## 2022-02-26 PROCEDURE — 36415 COLL VENOUS BLD VENIPUNCTURE: CPT | Performed by: NURSE PRACTITIONER

## 2022-02-26 PROCEDURE — 93010 ELECTROCARDIOGRAM REPORT: CPT | Performed by: FAMILY MEDICINE

## 2022-02-26 PROCEDURE — 99283 EMERGENCY DEPT VISIT LOW MDM: CPT | Mod: GF | Performed by: NURSE PRACTITIONER

## 2022-02-26 PROCEDURE — 85610 PROTHROMBIN TIME: CPT | Performed by: NURSE PRACTITIONER

## 2022-02-26 PROCEDURE — 63600175 PHARM REV CODE 636 W HCPCS: Performed by: NURSE PRACTITIONER

## 2022-02-26 PROCEDURE — 80053 COMPREHEN METABOLIC PANEL: CPT | Performed by: NURSE PRACTITIONER

## 2022-02-26 PROCEDURE — 84484 ASSAY OF TROPONIN QUANT: CPT | Performed by: NURSE PRACTITIONER

## 2022-02-26 PROCEDURE — 85730 THROMBOPLASTIN TIME PARTIAL: CPT | Performed by: NURSE PRACTITIONER

## 2022-02-26 PROCEDURE — 96374 THER/PROPH/DIAG INJ IV PUSH: CPT

## 2022-02-26 PROCEDURE — 85025 COMPLETE CBC W/AUTO DIFF WBC: CPT | Performed by: NURSE PRACTITIONER

## 2022-02-26 PROCEDURE — 83880 ASSAY OF NATRIURETIC PEPTIDE: CPT | Performed by: NURSE PRACTITIONER

## 2022-02-26 PROCEDURE — 93005 ELECTROCARDIOGRAM TRACING: CPT

## 2022-02-26 RX ORDER — EZETIMIBE 10 MG/1
TABLET ORAL
Status: ON HOLD | COMMUNITY
End: 2022-06-26 | Stop reason: HOSPADM

## 2022-02-26 RX ORDER — DAPAGLIFLOZIN 10 MG/1
TABLET, FILM COATED ORAL
Status: ON HOLD | COMMUNITY
End: 2022-06-26 | Stop reason: HOSPADM

## 2022-02-26 RX ORDER — CLOPIDOGREL BISULFATE 75 MG/1
TABLET ORAL
Status: ON HOLD | COMMUNITY
End: 2022-06-24 | Stop reason: SINTOL

## 2022-02-26 RX ORDER — PREGABALIN 75 MG/1
1 CAPSULE ORAL
Status: ON HOLD | COMMUNITY
End: 2022-06-26 | Stop reason: HOSPADM

## 2022-02-26 RX ORDER — KETOROLAC TROMETHAMINE 30 MG/ML
30 INJECTION, SOLUTION INTRAMUSCULAR; INTRAVENOUS
Status: COMPLETED | OUTPATIENT
Start: 2022-02-26 | End: 2022-02-26

## 2022-02-26 RX ORDER — HYDROCORTISONE 25 MG/G
CREAM TOPICAL
COMMUNITY
Start: 2021-10-14 | End: 2022-06-26

## 2022-02-26 RX ORDER — METFORMIN HYDROCHLORIDE 1000 MG/1
1000 TABLET ORAL NIGHTLY
COMMUNITY
Start: 2022-01-02 | End: 2023-09-05 | Stop reason: SDUPTHER

## 2022-02-26 RX ORDER — NAPROXEN 500 MG/1
500 TABLET ORAL 2 TIMES DAILY WITH MEALS
Qty: 60 TABLET | Refills: 0 | Status: SHIPPED | OUTPATIENT
Start: 2022-02-26 | End: 2022-06-08 | Stop reason: HOSPADM

## 2022-02-26 RX ORDER — METOPROLOL SUCCINATE 25 MG/1
TABLET, EXTENDED RELEASE ORAL
Status: ON HOLD | COMMUNITY
End: 2022-06-26 | Stop reason: HOSPADM

## 2022-02-26 RX ORDER — CYCLOBENZAPRINE HCL 10 MG
TABLET ORAL
Status: ON HOLD | COMMUNITY
End: 2022-06-26 | Stop reason: HOSPADM

## 2022-02-26 RX ORDER — PANTOPRAZOLE SODIUM 40 MG/1
TABLET, DELAYED RELEASE ORAL
COMMUNITY
End: 2022-03-14 | Stop reason: SDUPTHER

## 2022-02-26 RX ADMIN — KETOROLAC TROMETHAMINE 30 MG: 30 INJECTION, SOLUTION INTRAMUSCULAR at 01:02

## 2022-02-26 NOTE — ED PROVIDER NOTES
Encounter Date: 2022       History     Chief Complaint   Patient presents with    Chest Pain    Nausea     ONSET 2 DAYS AGO. CONSTANT MIDSTERNAL CHEST PAIN WITH OUT RADIATION, PAOSITIVE NAUSEA AND SOB, REST MAKES EASIER ACTIVITY MAKES WORSE     67 y/o BF with a PMH HTN, HLD, DM, CAD, and previous MI in  presents to the ED with complaints of a 2-3 day history of mid sternal constant chest pain. Reports pain is worsened with movement such as twisting into her backseat. She reports some intermittent shortness of breath that only last a few seconds at a time. Denies any shortness of breath with walking or ambulating. States the chest pain has not interfered with her day to day life. States this pain feels like the typical pain she has when she twists her upper body the wrong way. She sees Dr. De Jesus.         Review of patient's allergies indicates:   Allergen Reactions    Jardiance [empagliflozin] Anaphylaxis     Headaches     Glipizide (bulk)     Trulicity [dulaglutide]      Abdominal pain     Past Medical History:   Diagnosis Date    Asthma     Coronary artery disease     Diabetes mellitus     Heart attack     Hypertension     Thyroid disease      Past Surgical History:   Procedure Laterality Date    CARDIAC SURGERY       SECTION      COLONOSCOPY  2017    repeat in 3 years    ESOPHAGOGASTRODUODENOSCOPY  03/10/2021    HYSTERECTOMY       Family History   Problem Relation Age of Onset    Diabetes Mother     Heart disease Mother     Dementia Mother     No Known Problems Father     Diabetes Brother      Social History     Tobacco Use    Smoking status: Former Smoker    Smokeless tobacco: Never Used   Substance Use Topics    Alcohol use: Not Currently    Drug use: Never     Review of Systems   Constitutional: Negative.    HENT: Negative.    Eyes: Negative.    Respiratory: Positive for shortness of breath.    Cardiovascular: Positive for chest pain. Negative for  palpitations and leg swelling.   Gastrointestinal: Negative.  Negative for diarrhea, nausea and vomiting.   Endocrine: Negative.    Genitourinary: Negative.    Musculoskeletal: Negative.  Negative for arthralgias, back pain and neck pain.   Skin: Negative.    Neurological: Negative.    Psychiatric/Behavioral: Negative.    All other systems reviewed and are negative.      Physical Exam     Initial Vitals [02/26/22 1227]   BP Pulse Resp Temp SpO2   (!) 157/88 82 18 98.4 °F (36.9 °C) 96 %      MAP       --         Physical Exam    Nursing note and vitals reviewed.  Constitutional: Vital signs are normal. She appears well-developed and well-nourished.   HENT:   Head: Normocephalic and atraumatic.   Eyes: Conjunctivae, EOM and lids are normal. Pupils are equal, round, and reactive to light.   Neck: Trachea normal. Neck supple.   Normal range of motion.  Cardiovascular: Normal rate, regular rhythm, S1 normal, S2 normal and normal heart sounds.   Pulmonary/Chest: Breath sounds normal. No respiratory distress. She has no wheezes. She has no rales. She exhibits tenderness (over stenum).   Abdominal: Abdomen is soft. Bowel sounds are normal.   Musculoskeletal:         General: Normal range of motion.      Cervical back: Normal range of motion and neck supple.      Comments: FROM     Neurological: She is alert.   Skin: Skin is warm and dry.   Psychiatric: She has a normal mood and affect.         Medical Screening Exam   See Full Note    ED Course   Procedures  Labs Reviewed   COMPREHENSIVE METABOLIC PANEL - Abnormal; Notable for the following components:       Result Value    Glucose 145 (*)     Creatinine 1.06 (*)     Total Protein 8.4 (*)     Globulin 4.6 (*)     eGFR 55 (*)     All other components within normal limits   MAGNESIUM - Abnormal; Notable for the following components:    Magnesium 2.4 (*)     All other components within normal limits   CBC WITH DIFFERENTIAL - Abnormal; Notable for the following components:     MPV 12.5 (*)     Neutrophils % 44.5 (*)     Lymphocytes % 44.7 (*)     Monocytes % 9.2 (*)     Eosinophils % 0.9 (*)     All other components within normal limits   TROPONIN I - Normal   PROTIME-INR - Normal   APTT - Normal   NT-PRO NATRIURETIC PEPTIDE - Normal   CBC W/ AUTO DIFFERENTIAL    Narrative:     The following orders were created for panel order CBC Auto Differential.  Procedure                               Abnormality         Status                     ---------                               -----------         ------                     CBC with Differential[[id number]]        Abnormal            Final result                 Please view results for these tests on the individual orders.        ECG Results          EKG 12-lead (In process)  Result time 02/26/22 12:47:03    In process by Interface, Lab In [facility] (02/26/22 12:47:03)                 Narrative:    Test Reason : R07.9,    Vent. Rate : 079 BPM     Atrial Rate : 079 BPM     P-R Int : 154 ms          QRS Dur : 080 ms      QT Int : 372 ms       P-R-T Axes : 038 000 033 degrees     QTc Int : 426 ms    Sinus rhythm with marked sinus arrythmia  Minimal voltage criteria for LVH, may be normal variant  Borderline Abnormal ECG  No previous ECGs available    Referred By: [email or web address]   SELF           Confirmed By:                             Imaging Results          X-Ray Chest 1 View (Final result)  Result time 02/26/22 13:03:40    Final result by Lj Gonzalez DO (02/26/22 13:03:40)                 Impression:      No acute cardiopulmonary process demonstrated.    Point of Service: [facility]      Electronically signed by: Lj Gonzalez  Date:    02/26/2022  Time:    13:03             Narrative:    EXAMINATION:  XR CHEST 1 VIEW    CLINICAL HISTORY:  Chest pain, unspecified    COMPARISON:  Chest x-ray July 19, 2017    TECHNIQUE:  Frontal view/views of the chest.    FINDINGS:  The cardiomediastinal silhouette is stable in configuration.   Surgical clips project over the left aortic knob, left hilum, and left heart border.  Chronic change of the lungs without focal consolidation, pleural effusion, or pneumothorax.  Visualized osseous and surrounding soft tissue structures appear grossly unchanged.                                 Medications   ketorolac injection 30 mg (30 mg Intravenous Given 2/26/22 1322)     Medical Decision Making:   Clinical Tests:   Lab Tests: Ordered and Reviewed  Radiological Study: Ordered and Reviewed  Medical Tests: Ordered and Reviewed  ED Management:  Symptoms are consistent with costochondritis. States she did twist in her back seat several days ago prior to symptom onset. Pt cardiac workup is negative. Discussed case with [facility] cardiologist bruce. He reviewed her old records and concurred pt could be discharged andfollow up outpatient.              ED Course as of 02/26/22 1326   Sat Feb 26, 2022   1314 Discussed the case with [facility] Cardiologist on call. He reviewed pts records from there and stated pt had a normal perfusion study in December of last year and he felt she was experiencing some costochondritis. Will treat pt with Toradol here and give RC for Naproxen on DC> Pt is to follow up with PCP early next week.  [SK]      ED Course User Index  [SK] PHILIP Gonzáles          Clinical Impression:   Final diagnoses:  [R07.9] Chest pain  [M94.0] Costochondritis (Primary)          ED Disposition Condition    Discharge Stable        ED Prescriptions     Medication Sig Dispense Start Date End Date Auth. Provider    naproxen (NAPROSYN) 500 MG tablet Take 1 tablet (500 mg total) by mouth 2 (two) times daily with meals. 60 tablet 2/26/2022  PHILIP Gonzáles        Follow-up Information     Follow up With Specialties Details Why Contact Info    Lv Cartagena MD Internal Medicine, Family Medicine In 2 days  [address] [facility] [place] [address]  [phone]             PHILIP Gonzáles  02/26/22  [id number]

## 2022-02-27 ENCOUNTER — TELEPHONE (OUTPATIENT)
Dept: EMERGENCY MEDICINE | Facility: HOSPITAL | Age: 67
End: 2022-02-27
Payer: MEDICARE

## 2022-03-09 ENCOUNTER — HOSPITAL ENCOUNTER (OUTPATIENT)
Dept: RADIOLOGY | Facility: HOSPITAL | Age: 67
Discharge: HOME | End: 2022-03-09
Attending: ORTHOPAEDIC SURGERY
Payer: MEDICARE

## 2022-03-09 DIAGNOSIS — M79.641 BILATERAL HAND PAIN: ICD-10-CM

## 2022-03-09 DIAGNOSIS — M79.642 BILATERAL HAND PAIN: ICD-10-CM

## 2022-03-09 PROCEDURE — 73130 X-RAY EXAM OF HAND: CPT | Mod: TC,50

## 2022-03-11 DIAGNOSIS — Z71.89 COMPLEX CARE COORDINATION: ICD-10-CM

## 2022-03-14 ENCOUNTER — OFFICE VISIT (OUTPATIENT)
Dept: FAMILY MEDICINE | Facility: CLINIC | Age: 67
End: 2022-03-14
Payer: MEDICARE

## 2022-03-14 VITALS
HEART RATE: 80 BPM | TEMPERATURE: 98 F | RESPIRATION RATE: 17 BRPM | SYSTOLIC BLOOD PRESSURE: 140 MMHG | BODY MASS INDEX: 29.33 KG/M2 | OXYGEN SATURATION: 98 % | DIASTOLIC BLOOD PRESSURE: 90 MMHG | WEIGHT: 198 LBS | HEIGHT: 69 IN

## 2022-03-14 DIAGNOSIS — K21.9 GASTROESOPHAGEAL REFLUX DISEASE WITHOUT ESOPHAGITIS: ICD-10-CM

## 2022-03-14 DIAGNOSIS — E11.69 TYPE 2 DIABETES MELLITUS WITH OTHER SPECIFIED COMPLICATION, WITHOUT LONG-TERM CURRENT USE OF INSULIN: Primary | ICD-10-CM

## 2022-03-14 DIAGNOSIS — I10 PRIMARY HYPERTENSION: ICD-10-CM

## 2022-03-14 DIAGNOSIS — R13.19 ESOPHAGEAL DYSPHAGIA: ICD-10-CM

## 2022-03-14 PROCEDURE — 99214 PR OFFICE/OUTPT VISIT, EST, LEVL IV, 30-39 MIN: ICD-10-PCS | Mod: ,,, | Performed by: INTERNAL MEDICINE

## 2022-03-14 PROCEDURE — 99214 OFFICE O/P EST MOD 30 MIN: CPT | Mod: ,,, | Performed by: INTERNAL MEDICINE

## 2022-03-14 RX ORDER — PANTOPRAZOLE SODIUM 40 MG/1
40 TABLET, DELAYED RELEASE ORAL 2 TIMES DAILY
Qty: 60 TABLET | Refills: 6 | Status: SHIPPED | OUTPATIENT
Start: 2022-03-14 | End: 2022-06-08 | Stop reason: SDUPTHER

## 2022-03-14 RX ORDER — ESTRADIOL 0.1 MG/D
FILM, EXTENDED RELEASE TRANSDERMAL
Status: ON HOLD | COMMUNITY
Start: 2021-07-01 | End: 2022-06-26 | Stop reason: HOSPADM

## 2022-03-14 RX ORDER — LORATADINE 10 MG/1
1 TABLET ORAL DAILY
COMMUNITY
Start: 2022-02-26 | End: 2022-07-25 | Stop reason: SDUPTHER

## 2022-03-14 RX ORDER — LORATADINE 10 MG/1
TABLET ORAL
Status: ON HOLD | COMMUNITY
Start: 2022-02-18 | End: 2023-02-24 | Stop reason: HOSPADM

## 2022-03-15 NOTE — PATIENT INSTRUCTIONS
Olga was seen today for hypertension and results.    Diagnoses and all orders for this visit:    Type 2 diabetes mellitus with other specified complication, without long-term current use of insulin    Primary hypertension    Esophageal dysphagia  -     Ambulatory referral/consult to Gastroenterology; Future    Gastroesophageal reflux disease without esophagitis    Other orders  -     pantoprazole (PROTONIX) 40 MG tablet; Take 1 tablet (40 mg total) by mouth 2 (two) times daily.

## 2022-03-15 NOTE — PROGRESS NOTES
Subjective:       Patient ID: Olga Stephenson is a 66 y.o. female.    Chief Complaint: Hypertension and Results (labs)    Patient is here today for check up evaluation. Patient labs reviewed and shows glucose getting better. States glucose at home is usually 140-170. All other labs reviewed and within normal limits and negative for CHF. Pressure is stable today. Recent chest xray reviewed and normal. Patient states she feels her food does not go down easily and gets stuck. Denies pain and denies discomfort. States does not feet Protonix is doing its job. States missed her scope. Will refer to Dr Gomez for EGD STAT. Will increase Protonix to 40 mg PO BID. Will follow in 2 months.       Current Medications:    Current Outpatient Medications:     ALLERGY RELIEF, LORATADINE, 10 mg tablet, Take 1 tablet by mouth once daily., Disp: , Rfl:     blood sugar diagnostic (FREESTYLE TEST) Strp, , Disp: , Rfl:     estradioL (VIVELLE-DOT) 0.1 mg/24 hr PTSW, , Disp: , Rfl:     loratadine (CLARITIN) 10 mg tablet, , Disp: , Rfl:     albuterol (PROVENTIL/VENTOLIN HFA) 90 mcg/actuation inhaler, Inhale 2 puffs into the lungs 2 (two) times a day., Disp: , Rfl:     blood sugar diagnostic (FREESTYLE LITE STRIPS) Strp, Use one strip daily to monitor glucose, Disp: 100 each, Rfl: 3    clopidogreL (PLAVIX) 75 mg tablet, Take 1 tablet by mouth once daily., Disp: , Rfl:     clopidogreL (PLAVIX) 75 mg tablet, Take by mouth., Disp: , Rfl:     cyclobenzaprine (FLEXERIL) 10 MG tablet, Take 10 mg by mouth. Twice a week, Disp: , Rfl:     cyclobenzaprine (FLEXERIL) 10 MG tablet, 1 tablet at bedtime as needed, Disp: , Rfl:     dapagliflozin (FARXIGA) 10 mg tablet, 1 tablet, Disp: , Rfl:     estradioL (VIVELLE-DOT) 0.025 mg/24 hr, Place 1 patch onto the skin twice a week., Disp: , Rfl:     ezetimibe (ZETIA) 10 mg tablet, Take 1 tablet by mouth once daily., Disp: , Rfl:     ezetimibe (ZETIA) 10 mg tablet, 1 tablet, Disp: , Rfl:      FARXIGA 10 mg tablet, TAKE 1 TABLET DAILY, Disp: 90 tablet, Rfl: 3    fluconazole (DIFLUCAN) 100 MG tablet, Take 1 tablet (100 mg total) by mouth once daily., Disp: 3 tablet, Rfl: 0    glipiZIDE (GLUCOTROL) 2.5 MG TR24, Take 2 tablets (5 mg total) by mouth daily with breakfast., Disp: 180 tablet, Rfl: 3    hydrocortisone 2.5 % cream, , Disp: , Rfl:     losartan (COZAAR) 25 MG tablet, Take 1 tablet by mouth once daily., Disp: , Rfl:     metFORMIN (GLUCOPHAGE) 1000 MG tablet, , Disp: , Rfl:     metoprolol succinate (TOPROL-XL) 25 MG 24 hr tablet, Take 1 tablet by mouth once daily., Disp: , Rfl:     metoprolol succinate (TOPROL-XL) 25 MG 24 hr tablet, 1 tablet, Disp: , Rfl:     naproxen (NAPROSYN) 500 MG tablet, Take 1 tablet (500 mg total) by mouth 2 (two) times daily with meals., Disp: 60 tablet, Rfl: 0    pantoprazole (PROTONIX) 40 MG tablet, Take 1 tablet (40 mg total) by mouth 2 (two) times daily., Disp: 60 tablet, Rfl: 6    pregabalin (LYRICA) 75 MG capsule, Take 75 mg by mouth. 1 in the morning and 2 at night, Disp: , Rfl:     pregabalin (LYRICA) 75 MG capsule, Take 1 capsule by mouth., Disp: , Rfl:     valACYclovir (VALTREX) 500 MG tablet, Take 1 tablet (500 mg total) by mouth 3 (three) times daily., Disp: 15 tablet, Rfl: 6    Last Labs:     Admission on 02/26/2022, Discharged on 02/26/2022   Component Date Value    Sodium 02/26/2022 139     Potassium 02/26/2022 4.2     Chloride 02/26/2022 103     CO2 02/26/2022 26     Anion Gap 02/26/2022 14     Glucose 02/26/2022 145 (A)    BUN 02/26/2022 11     Creatinine 02/26/2022 1.06 (A)    BUN/Creatinine Ratio 02/26/2022 10     Calcium 02/26/2022 9.4     Total Protein 02/26/2022 8.4 (A)    Albumin 02/26/2022 3.8     Globulin 02/26/2022 4.6 (A)    A/G Ratio 02/26/2022 0.8     Bilirubin, Total 02/26/2022 0.3     Alk Phos 02/26/2022 81     ALT 02/26/2022 36     AST 02/26/2022 23     eGFR 02/26/2022 55 (A)    Magnesium 02/26/2022 2.4 (A)     Troponin I High Sensitiv* 02/26/2022 <4.0     PT 02/26/2022 13.0     INR 02/26/2022 1.00     PTT 02/26/2022 25.4     ProBNP 02/26/2022 20     WBC 02/26/2022 8.60     RBC 02/26/2022 4.88     Hemoglobin 02/26/2022 14.4     Hematocrit 02/26/2022 43.7     MCV 02/26/2022 89.5     MCH 02/26/2022 29.5     MCHC 02/26/2022 33.0     RDW 02/26/2022 13.9     Platelet Count 02/26/2022 241     MPV 02/26/2022 12.5 (A)    Neutrophils % 02/26/2022 44.5 (A)    Lymphocytes % 02/26/2022 44.7 (A)    Neutrophils, Abs 02/26/2022 3.83     Lymphocytes, Absolute 02/26/2022 3.84     Diff Type 02/26/2022 Auto     Monocytes % 02/26/2022 9.2 (A)    Eosinophils % 02/26/2022 0.9 (A)    Basophils % 02/26/2022 0.7     Monocytes, Absolute 02/26/2022 0.79     Eosinophils, Absolute 02/26/2022 0.08     Basophils, Absolute 02/26/2022 0.06        Last Imaging:  X-Ray Hand 3 View Bilateral  See Procedure Notes for results.     IMPRESSION: Please see Ortho procedure notes for report.      This procedure was auto-finalized by: Virtual Radiologist         Review of Systems   HENT: Positive for trouble swallowing.    Gastrointestinal: Positive for reflux.   All other systems reviewed and are negative.        Objective:      Physical Exam  Vitals reviewed.   Constitutional:       Appearance: Normal appearance.   Cardiovascular:      Rate and Rhythm: Normal rate and regular rhythm.      Pulses: Normal pulses.      Heart sounds: Normal heart sounds.   Pulmonary:      Effort: Pulmonary effort is normal.      Breath sounds: Normal breath sounds.   Abdominal:      General: Abdomen is flat. Bowel sounds are normal.      Palpations: Abdomen is soft.   Musculoskeletal:         General: Normal range of motion.      Cervical back: Normal range of motion and neck supple.   Skin:     General: Skin is warm and dry.   Neurological:      General: No focal deficit present.      Mental Status: She is alert and oriented to person, place, and time. Mental  status is at baseline.         Assessment:       1. Type 2 diabetes mellitus with other specified complication, without long-term current use of insulin     2. Primary hypertension     3. Esophageal dysphagia  Ambulatory referral/consult to Gastroenterology   4. Gastroesophageal reflux disease without esophagitis          Plan:         Olga was seen today for hypertension and results.    Diagnoses and all orders for this visit:    Type 2 diabetes mellitus with other specified complication, without long-term current use of insulin    Primary hypertension    Esophageal dysphagia  -     Ambulatory referral/consult to Gastroenterology; Future    Gastroesophageal reflux disease without esophagitis    Other orders  -     pantoprazole (PROTONIX) 40 MG tablet; Take 1 tablet (40 mg total) by mouth 2 (two) times daily.

## 2022-03-25 RX ORDER — ALBUTEROL SULFATE 90 UG/1
2 AEROSOL, METERED RESPIRATORY (INHALATION) 2 TIMES DAILY
Qty: 18 G | Refills: 3 | Status: CANCELLED | OUTPATIENT
Start: 2022-03-25

## 2022-03-25 NOTE — TELEPHONE ENCOUNTER
----- Message from Beata Crockett sent at 3/25/2022  9:19 AM CDT -----  Patient called requesting a refill on her inhaler     [facility]         Pt needs refills on inhaler. She says she is completely out. Informed her that Dr. Cartagena is out of clinic today but will re-order med, send to him so he can send to the pharmacy. Voiced understanding.

## 2022-03-29 RX ORDER — ALBUTEROL SULFATE 90 UG/1
2 AEROSOL, METERED RESPIRATORY (INHALATION) 2 TIMES DAILY
Qty: 18 G | Refills: 0 | Status: SHIPPED | OUTPATIENT
Start: 2022-03-29 | End: 2022-10-10 | Stop reason: SDUPTHER

## 2022-03-29 NOTE — TELEPHONE ENCOUNTER
----- Message from Beata Crockett sent at 3/29/2022 10:33 AM CDT -----  Dr Cartagena pt called requesting for her inhaler to be sent in     [facility]     [phone]

## 2022-04-27 ENCOUNTER — HOSPITAL ENCOUNTER (OUTPATIENT)
Dept: RADIOLOGY | Facility: HOSPITAL | Age: 67
Discharge: HOME | End: 2022-04-27
Attending: ORTHOPAEDIC SURGERY
Payer: MEDICARE

## 2022-04-27 DIAGNOSIS — M17.11 ARTHRITIS OF RIGHT KNEE: ICD-10-CM

## 2022-04-27 PROCEDURE — 73560 X-RAY EXAM OF KNEE 1 OR 2: CPT | Mod: TC,RT

## 2022-05-02 ENCOUNTER — HOSPITAL ENCOUNTER (OUTPATIENT)
Dept: RADIOLOGY | Facility: HOSPITAL | Age: 67
Discharge: HOME | End: 2022-05-02
Attending: NURSE PRACTITIONER
Payer: MEDICARE

## 2022-05-02 ENCOUNTER — OFFICE VISIT (OUTPATIENT)
Dept: GASTROENTEROLOGY | Facility: CLINIC | Age: 67
End: 2022-05-02
Payer: MEDICARE

## 2022-05-02 VITALS
SYSTOLIC BLOOD PRESSURE: 137 MMHG | OXYGEN SATURATION: 96 % | DIASTOLIC BLOOD PRESSURE: 81 MMHG | HEART RATE: 82 BPM | BODY MASS INDEX: 30.81 KG/M2 | WEIGHT: 208 LBS | HEIGHT: 69 IN

## 2022-05-02 DIAGNOSIS — R16.0 HEPATOMEGALY: ICD-10-CM

## 2022-05-02 DIAGNOSIS — K76.0 FATTY LIVER: ICD-10-CM

## 2022-05-02 DIAGNOSIS — R74.8 ELEVATED LIVER ENZYMES: Primary | ICD-10-CM

## 2022-05-02 DIAGNOSIS — K21.9 GASTROESOPHAGEAL REFLUX DISEASE, UNSPECIFIED WHETHER ESOPHAGITIS PRESENT: ICD-10-CM

## 2022-05-02 DIAGNOSIS — R74.8 ELEVATED LIVER ENZYMES: ICD-10-CM

## 2022-05-02 DIAGNOSIS — K31.84 GASTROPARESIS: ICD-10-CM

## 2022-05-02 DIAGNOSIS — R13.10 DYSPHAGIA, UNSPECIFIED TYPE: ICD-10-CM

## 2022-05-02 PROCEDURE — 76705 ECHO EXAM OF ABDOMEN: CPT | Mod: TC

## 2022-05-02 PROCEDURE — 99214 PR OFFICE/OUTPT VISIT, EST, LEVL IV, 30-39 MIN: ICD-10-PCS | Mod: ,,, | Performed by: NURSE PRACTITIONER

## 2022-05-02 PROCEDURE — 99214 OFFICE O/P EST MOD 30 MIN: CPT | Mod: ,,, | Performed by: NURSE PRACTITIONER

## 2022-05-02 PROCEDURE — 76705 ECHO EXAM OF ABDOMEN: CPT | Mod: 26,,, | Performed by: RADIOLOGY

## 2022-05-02 PROCEDURE — 76705 US ABDOMEN LIMITED: ICD-10-PCS | Mod: 26,,, | Performed by: RADIOLOGY

## 2022-05-02 NOTE — PROGRESS NOTES
Olga Stephenson is a 66 y.o. female here for Follow-up        PCP: Lv Cartagena  Referring Provider: No referring provider defined for this encounter.     HPI:  Presents for 6 month follow up of fatty liver. Liver ultrasound today fatty liver. Last CT abd and pelvis, 21 reviewed, liver decreased in density. Today she is reporting dysphagia with solid foods and also increase reflux. Does have gastroparesis. Denies nausea and vomiting. No weight loss.        ROS:  Review of Systems   Constitutional: Negative for appetite change, fatigue, fever and unexpected weight change.   HENT: Positive for trouble swallowing.    Respiratory: Negative for shortness of breath and wheezing.    Cardiovascular: Negative for chest pain and palpitations.   Gastrointestinal: Positive for reflux. Negative for abdominal pain, anal bleeding, blood in stool, change in bowel habit, constipation, diarrhea, nausea, rectal pain, vomiting and change in bowel habit.   Genitourinary: Negative for dysuria.   Musculoskeletal: Negative for back pain, gait problem and joint swelling.   Integumentary:  Negative for pallor.   Allergic/Immunologic: Negative for food allergies and immunocompromised state.   Neurological: Negative for dizziness, weakness and light-headedness.   Hematological: Does not bruise/bleed easily.   Psychiatric/Behavioral: The patient is not nervous/anxious.           PMHX:  has a past medical history of Asthma, Coronary artery disease, Diabetes mellitus, Heart attack (), Hypertension, and Thyroid disease.    PSHX:  has a past surgical history that includes Esophagogastroduodenoscopy (03/10/2021); Colonoscopy (2017); Hysterectomy;  section; and Cardiac surgery.    PFHX: family history includes Dementia in her mother; Diabetes in her brother and mother; Heart disease in her mother; No Known Problems in her father.    PSlHX:  reports that she has quit smoking. She has never used smokeless tobacco.  She reports previous alcohol use. She reports that she does not use drugs.        Review of patient's allergies indicates:   Allergen Reactions    Jardiance [empagliflozin] Anaphylaxis     Headaches     Trulicity [dulaglutide]      Abdominal pain       Medication List with Changes/Refills   Current Medications    ALBUTEROL (PROVENTIL/VENTOLIN HFA) 90 MCG/ACTUATION INHALER    Inhale 2 puffs into the lungs 2 (two) times a day.    ALLERGY RELIEF, LORATADINE, 10 MG TABLET    Take 1 tablet by mouth once daily.    BLOOD SUGAR DIAGNOSTIC (FREESTYLE LITE STRIPS) STRP    Use one strip daily to monitor glucose    BLOOD SUGAR DIAGNOSTIC STRP        CLOPIDOGREL (PLAVIX) 75 MG TABLET    Take 1 tablet by mouth once daily.    CLOPIDOGREL (PLAVIX) 75 MG TABLET    Take by mouth.    CYCLOBENZAPRINE (FLEXERIL) 10 MG TABLET    Take 10 mg by mouth. Twice a week    CYCLOBENZAPRINE (FLEXERIL) 10 MG TABLET    1 tablet at bedtime as needed    DAPAGLIFLOZIN (FARXIGA) 10 MG TABLET    1 tablet    ESTRADIOL (VIVELLE-DOT) 0.025 MG/24 HR    Place 1 patch onto the skin twice a week.    ESTRADIOL (VIVELLE-DOT) 0.1 MG/24 HR PTSW        EZETIMIBE (ZETIA) 10 MG TABLET    Take 1 tablet by mouth once daily.    EZETIMIBE (ZETIA) 10 MG TABLET    1 tablet    FARXIGA 10 MG TABLET    TAKE 1 TABLET DAILY    FLUCONAZOLE (DIFLUCAN) 100 MG TABLET    Take 1 tablet (100 mg total) by mouth once daily.    GLIPIZIDE (GLUCOTROL) 2.5 MG TR24    Take 2 tablets (5 mg total) by mouth daily with breakfast.    HYDROCORTISONE 2.5 % CREAM        LORATADINE (CLARITIN) 10 MG TABLET        LOSARTAN (COZAAR) 25 MG TABLET    Take 1 tablet by mouth once daily.    METFORMIN (GLUCOPHAGE) 1000 MG TABLET        METOPROLOL SUCCINATE (TOPROL-XL) 25 MG 24 HR TABLET    Take 1 tablet by mouth once daily.    METOPROLOL SUCCINATE (TOPROL-XL) 25 MG 24 HR TABLET    1 tablet    NAPROXEN (NAPROSYN) 500 MG TABLET    Take 1 tablet (500 mg total) by mouth 2 (two) times daily with meals.     "PANTOPRAZOLE (PROTONIX) 40 MG TABLET    Take 1 tablet (40 mg total) by mouth 2 (two) times daily.    PREGABALIN (LYRICA) 75 MG CAPSULE    Take 75 mg by mouth. 1 in the morning and 2 at night    PREGABALIN (LYRICA) 75 MG CAPSULE    Take 1 capsule by mouth.    VALACYCLOVIR (VALTREX) 500 MG TABLET    Take 1 tablet (500 mg total) by mouth 3 (three) times daily.        Objective Findings:  Vital Signs:  /81   Pulse 82   Ht 5' 9" (1.753 m)   Wt 94.3 kg (208 lb)   SpO2 96%   BMI 30.72 kg/m²  Body mass index is 30.72 kg/m².    Physical Exam:  Physical Exam  Vitals and nursing note reviewed.   Constitutional:       General: She is not in acute distress.     Appearance: Normal appearance. She is not ill-appearing.   HENT:      Mouth/Throat:      Mouth: Mucous membranes are moist.   Cardiovascular:      Rate and Rhythm: Normal rate and regular rhythm.      Heart sounds: No murmur heard.  Pulmonary:      Breath sounds: No wheezing, rhonchi or rales.   Abdominal:      General: Bowel sounds are normal. There is no distension.      Palpations: Abdomen is soft. There is no mass.      Tenderness: There is no abdominal tenderness. There is no guarding or rebound.      Hernia: No hernia is present.   Musculoskeletal:      Right lower leg: No edema.      Left lower leg: No edema.   Skin:     General: Skin is warm and dry.      Coloration: Skin is not jaundiced or pale.      Findings: No bruising or rash.   Neurological:      Mental Status: She is alert and oriented to person, place, and time.   Psychiatric:         Mood and Affect: Mood normal.          Labs:  Lab Results   Component Value Date    WBC 8.60 02/26/2022    HGB 14.4 02/26/2022    HCT 43.7 02/26/2022    MCV 89.5 02/26/2022    RDW 13.9 02/26/2022     02/26/2022    LYMPH 44.7 (H) 02/26/2022    LYMPH 3.84 02/26/2022    MONO 9.2 (H) 02/26/2022    EOS 0.08 02/26/2022    BASO 0.06 02/26/2022     Lab Results   Component Value Date     02/26/2022    K 4.2 " 02/26/2022     02/26/2022    CO2 26 02/26/2022     (H) 02/26/2022    BUN 11 02/26/2022    CREATININE 1.06 (H) 02/26/2022    CALCIUM 9.4 02/26/2022    PROT 8.4 (H) 02/26/2022    ALBUMIN 3.8 02/26/2022    BILITOT 0.3 02/26/2022    ALKPHOS 81 02/26/2022    AST 23 02/26/2022    ALT 36 02/26/2022         Imaging: X-Ray Knee 1 or 2 View Right    Result Date: 4/27/2022  See Procedure Notes for results. IMPRESSION: Please see Ortho procedure notes for report.  This procedure was auto-finalized by: Virtual Radiologist        Assessment:  Olga Stephenson is a 66 y.o. female here with:  1. Elevated liver enzymes    2. Fatty liver    3. Hepatomegaly    4. Gastroesophageal reflux disease, unspecified whether esophagitis present    5. Dysphagia, unspecified type    6. Gastroparesis          Recommendations:  1. Repeat liver ultrasound and CBC, CMP in 6 months  2. Schedule EGD/Dilation for GERD and dysphagia  3. CBC, CMP today  4. Avoid spicy, greasy foods  Avoid caffeine, citric acid, chocolate, peppermint, and carbonated drinks  Do not lay down within 3 hours of eating  Exercise 150 minutes per week  Increase fluid to 64 ounces daily  Avoid antiinflammatory medications such as motrin, advil, aleve, ibuprofen, and BC powder  5. Low residue, small frequent meals diet        Follow up in about 6 months (around 11/2/2022).      Order summary:  Orders Placed This Encounter    CBC Auto Differential    Comprehensive Metabolic Panel    EGD w Dilation       Thank you for allowing me to participate in the care of Olga Stephenson.      KYLE Rios

## 2022-05-12 ENCOUNTER — TELEPHONE (OUTPATIENT)
Dept: GASTROENTEROLOGY | Facility: CLINIC | Age: 67
End: 2022-05-12
Payer: MEDICARE

## 2022-05-12 NOTE — TELEPHONE ENCOUNTER
Called results and recommendations. Patient verbalized good understanding.      ----- Message from PHILIP Barreto sent at 5/2/2022 12:41 PM CDT -----  Fatty liver on ultrasound. No other abdnormality

## 2022-05-23 ENCOUNTER — HOSPITAL ENCOUNTER (OUTPATIENT)
Dept: RADIOLOGY | Facility: HOSPITAL | Age: 67
Discharge: HOME | End: 2022-05-23
Payer: MEDICARE

## 2022-05-23 DIAGNOSIS — Z12.31 VISIT FOR SCREENING MAMMOGRAM: ICD-10-CM

## 2022-05-23 PROCEDURE — 77067 SCR MAMMO BI INCL CAD: CPT | Mod: 26,,, | Performed by: RADIOLOGY

## 2022-05-23 PROCEDURE — 77067 MAMMO DIGITAL SCREENING BILAT: ICD-10-PCS | Mod: 26,,, | Performed by: RADIOLOGY

## 2022-05-23 PROCEDURE — 77067 SCR MAMMO BI INCL CAD: CPT | Mod: TC

## 2022-06-08 ENCOUNTER — HOSPITAL ENCOUNTER (OUTPATIENT)
Dept: GASTROENTEROLOGY | Facility: HOSPITAL | Age: 67
Discharge: HOME | End: 2022-06-08
Attending: NURSE PRACTITIONER
Payer: MEDICARE

## 2022-06-08 ENCOUNTER — ANESTHESIA (OUTPATIENT)
Dept: GASTROENTEROLOGY | Facility: HOSPITAL | Age: 67
End: 2022-06-08
Payer: MEDICARE

## 2022-06-08 ENCOUNTER — ANESTHESIA EVENT (OUTPATIENT)
Dept: GASTROENTEROLOGY | Facility: HOSPITAL | Age: 67
End: 2022-06-08
Payer: MEDICARE

## 2022-06-08 VITALS
WEIGHT: 204 LBS | BODY MASS INDEX: 30.21 KG/M2 | SYSTOLIC BLOOD PRESSURE: 143 MMHG | HEART RATE: 67 BPM | TEMPERATURE: 97 F | DIASTOLIC BLOOD PRESSURE: 80 MMHG | OXYGEN SATURATION: 97 % | RESPIRATION RATE: 13 BRPM | HEIGHT: 69 IN

## 2022-06-08 DIAGNOSIS — R13.10 DYSPHAGIA, UNSPECIFIED TYPE: ICD-10-CM

## 2022-06-08 DIAGNOSIS — K29.00 ACUTE SUPERFICIAL GASTRITIS WITHOUT HEMORRHAGE: ICD-10-CM

## 2022-06-08 DIAGNOSIS — R10.13 EPIGASTRIC PAIN: ICD-10-CM

## 2022-06-08 DIAGNOSIS — K31.84 GASTROPARESIS: Primary | ICD-10-CM

## 2022-06-08 PROCEDURE — 63600175 PHARM REV CODE 636 W HCPCS: Performed by: NURSE ANESTHETIST, CERTIFIED REGISTERED

## 2022-06-08 PROCEDURE — D9220A PRA ANESTHESIA: ICD-10-PCS | Mod: ,,, | Performed by: NURSE ANESTHETIST, CERTIFIED REGISTERED

## 2022-06-08 PROCEDURE — 27201423 OPTIME MED/SURG SUP & DEVICES STERILE SUPPLY

## 2022-06-08 PROCEDURE — 37000008 HC ANESTHESIA 1ST 15 MINUTES

## 2022-06-08 PROCEDURE — 25000003 PHARM REV CODE 250: Performed by: NURSE ANESTHETIST, CERTIFIED REGISTERED

## 2022-06-08 PROCEDURE — D9220A PRA ANESTHESIA: Mod: ,,, | Performed by: NURSE ANESTHETIST, CERTIFIED REGISTERED

## 2022-06-08 PROCEDURE — 27000284 HC CANNULA NASAL: Performed by: NURSE ANESTHETIST, CERTIFIED REGISTERED

## 2022-06-08 PROCEDURE — 43239 EGD BIOPSY SINGLE/MULTIPLE: CPT

## 2022-06-08 PROCEDURE — C1889 IMPLANT/INSERT DEVICE, NOC: HCPCS

## 2022-06-08 PROCEDURE — 37000009 HC ANESTHESIA EA ADD 15 MINS

## 2022-06-08 RX ORDER — LIDOCAINE HYDROCHLORIDE 20 MG/ML
INJECTION, SOLUTION EPIDURAL; INFILTRATION; INTRACAUDAL; PERINEURAL
Status: DISCONTINUED | OUTPATIENT
Start: 2022-06-08 | End: 2022-06-08

## 2022-06-08 RX ORDER — SODIUM CHLORIDE 0.9 % (FLUSH) 0.9 %
10 SYRINGE (ML) INJECTION
Status: DISCONTINUED | OUTPATIENT
Start: 2022-06-08 | End: 2022-06-09 | Stop reason: HOSPADM

## 2022-06-08 RX ORDER — SODIUM CHLORIDE 9 MG/ML
INJECTION, SOLUTION INTRAVENOUS CONTINUOUS PRN
Status: DISCONTINUED | OUTPATIENT
Start: 2022-06-08 | End: 2022-06-08

## 2022-06-08 RX ORDER — PANTOPRAZOLE SODIUM 40 MG/1
40 TABLET, DELAYED RELEASE ORAL 2 TIMES DAILY
Qty: 60 TABLET | Refills: 6 | Status: SHIPPED | OUTPATIENT
Start: 2022-06-08 | End: 2022-07-06

## 2022-06-08 RX ORDER — PROPOFOL 10 MG/ML
VIAL (ML) INTRAVENOUS
Status: DISCONTINUED | OUTPATIENT
Start: 2022-06-08 | End: 2022-06-08

## 2022-06-08 RX ORDER — FENTANYL CITRATE 50 UG/ML
INJECTION, SOLUTION INTRAMUSCULAR; INTRAVENOUS
Status: DISCONTINUED | OUTPATIENT
Start: 2022-06-08 | End: 2022-06-08

## 2022-06-08 RX ADMIN — SODIUM CHLORIDE: 9 INJECTION, SOLUTION INTRAVENOUS at 11:06

## 2022-06-08 RX ADMIN — PROPOFOL 50 MG: 10 INJECTION, EMULSION INTRAVENOUS at 11:06

## 2022-06-08 RX ADMIN — LIDOCAINE HYDROCHLORIDE 100 MG: 20 INJECTION, SOLUTION INTRAVENOUS at 11:06

## 2022-06-08 RX ADMIN — FENTANYL CITRATE 100 MCG: 50 INJECTION INTRAMUSCULAR; INTRAVENOUS at 11:06

## 2022-06-08 NOTE — DISCHARGE INSTRUCTIONS
Procedure Date  6/8/22     Impression  Overall Impression: Esophageal mucosa was normal. Distal esophageal biopsies were obtained. The stomach had erosive gastritis, biopsies were obtained. Mucosa of the duodenum was normal.     Recommendation  Await pathology results; avoid nsaids; ppi 1/AM; schedule colonoscopy for  hx of colon polyps.  NO DRIVING, OPERATING EQUIPMENT, OR SIGNING LEGAL DOCUMENTS FOR 24 HOURS.   THE NURSE WILL CALL YOU WITH YOUR BIOPSY RESULTS IN A FEW DAYS.   Colonoscopy: 6/15/22 @)898.

## 2022-06-08 NOTE — ANESTHESIA POSTPROCEDURE EVALUATION
Anesthesia Post Evaluation    Patient: Olga Stephenson    Procedure(s) Performed: * No procedures listed *    Final Anesthesia Type: general      Patient location during evaluation: GI PACU  Patient participation: Yes- Able to Participate  Level of consciousness: awake and alert  Post-procedure vital signs: reviewed and stable  Pain management: adequate  Airway patency: patent    PONV status at discharge: No PONV  Anesthetic complications: no      Cardiovascular status: blood pressure returned to baseline and hemodynamically stable  Respiratory status: spontaneous ventilation, unassisted and nasal cannula  Hydration status: euvolemic  Follow-up not needed.          Vitals Value Taken Time   /67 06/08/22 1156   Temp 36.1 °C (97 °F) 06/08/22 1156   Pulse 75 06/08/22 1156   Resp 21 06/08/22 1156   SpO2 99 % 06/08/22 1156         No case tracking events are documented in the log.      Pain/Daniel Score: No data recorded

## 2022-06-08 NOTE — H&P
[facility]  Gastroenterology  H&P    Patient Name: Olga Stephenson  MRN: [id number]  Admission Date: 2022  Code Status: Full Code    Attending Provider: Maurice Gomez MD  Primary Care Physician: Lv Cartagena MD  Principal Problem:<principal problem not specified>    Subjective:     History of Present Illness: Pt c/o epigastric pain and denies esophageal dysphagia, today.    Past Medical History:   Diagnosis Date    Asthma     Coronary artery disease     Diabetes mellitus     Heart attack     Hypertension     Thyroid disease        Past Surgical History:   Procedure Laterality Date    CARDIAC SURGERY       SECTION      COLONOSCOPY  2017    repeat in 3 years    ESOPHAGOGASTRODUODENOSCOPY  03/10/2021    HYSTERECTOMY         Review of patient's allergies indicates:   Allergen Reactions    Jardiance [empagliflozin] Anaphylaxis     Headaches     Trulicity [dulaglutide]      Abdominal pain     Family History     Problem Relation (Age of Onset)    Dementia Mother    Diabetes Mother, Brother    Heart disease Mother    No Known Problems Father        Tobacco Use    Smoking status: Former Smoker    Smokeless tobacco: Never Used   Substance and Sexual Activity    Alcohol use: Not Currently    Drug use: Never    Sexual activity: Not Currently     Review of Systems   Respiratory: Negative.    Cardiovascular: Negative.    Gastrointestinal: Positive for abdominal pain.     Objective:     Vital Signs (Most Recent):  Temp: 97.9 °F (36.6 °C) (22 1048)  Pulse: 78 (22 1048)  Resp: 11 (22 1048)  BP: (!) 145/80 (22 1048)  SpO2: 99 % (22 1048) Vital Signs (24h Range):  Temp:  [97.9 °F (36.6 °C)] 97.9 °F (36.6 °C)  Pulse:  [78] 78  Resp:  [11] 11  SpO2:  [99 %] 99 %  BP: (145)/(80) 145/80     Weight: 92.5 kg (204 lb) (22 1039)  Body mass index is 30.13 kg/m².    No intake or output data in the 24 hours ending 22  1150    Lines/Drains/Airways     Peripheral Intravenous Line  Duration                Peripheral IV - Single Lumen 06/08/22 1049 22 G Anterior;Left Hand <1 day                Physical Exam  Vitals reviewed.   Constitutional:       General: She is not in acute distress.     Appearance: Normal appearance. She is well-developed. She is not ill-appearing.   HENT:      Head: Normocephalic and atraumatic.      Nose: Nose normal.   Eyes:      Pupils: Pupils are equal, round, and reactive to light.   Cardiovascular:      Rate and Rhythm: Normal rate and regular rhythm.   Pulmonary:      Effort: Pulmonary effort is normal.      Breath sounds: Normal breath sounds. No wheezing.   Abdominal:      General: Abdomen is flat. Bowel sounds are normal. There is no distension.      Palpations: Abdomen is soft.      Tenderness: There is no abdominal tenderness. There is no guarding.   Skin:     General: Skin is warm and dry.      Coloration: Skin is not jaundiced.   Neurological:      Mental Status: She is alert.   Psychiatric:         Attention and Perception: Attention normal.         Mood and Affect: Affect normal.         Speech: Speech normal.         Behavior: Behavior is cooperative.      Comments: Pt was calm while speaking.         Significant Labs:  CBC: No results for input(s): WBC, HGB, HCT, PLT in the last 48 hours.  CMP: No results for input(s): GLU, CALCIUM, ALBUMIN, PROT, NA, K, CO2, CL, BUN, CREATININE, ALKPHOS, ALT, AST, BILITOT in the last 48 hours.    Significant Imaging:  Imaging results within the past 24 hours have been reviewed.    Assessment/Plan:     There are no hospital problems to display for this patient.        Imp: epigastric pain  Plan: egd    Maurice Gomez MD  Gastroenterology  [facility]

## 2022-06-08 NOTE — TRANSFER OF CARE
"Anesthesia Transfer of Care Note    Patient: Olga Stephenson    Procedure(s) Performed: * No procedures listed *    Patient location: GI    Anesthesia Type: general    Transport from OR: Transported from OR on room air with adequate spontaneous ventilation    Post pain: adequate analgesia    Post assessment: no apparent anesthetic complications    Post vital signs: stable    Level of consciousness: responds to stimulation    Nausea/Vomiting: no nausea/vomiting    Complications: none    Transfer of care protocol was followed      Last vitals:   Visit Vitals  /67 (BP Location: Right arm, Patient Position: Lying)   Pulse 75   Temp 36.1 °C (97 °F) (Oral)   Resp (!) 21   Ht 5' 9" (1.753 m)   Wt 92.5 kg (204 lb)   SpO2 99%   Breastfeeding No   BMI 30.13 kg/m²     "

## 2022-06-08 NOTE — ANESTHESIA PREPROCEDURE EVALUATION
06/08/2022  Olga Stephenson is a 67 y.o., female.      Pre-op Assessment    I have reviewed the Patient Summary Reports.     I have reviewed the Nursing Notes. I have reviewed the NPO Status.   I have reviewed the Medications.     Review of Systems  Anesthesia Hx:  No problems with previous Anesthesia    Social:  Non-Smoker, No Alcohol Use    Hematology/Oncology:  Hematology Normal   Oncology Normal     EENT/Dental:EENT/Dental Normal   Cardiovascular:   Hypertension Past MI CAD      Pulmonary:   Asthma    Renal/:  Renal/ Normal     Hepatic/GI:   GERD Liver Disease,    Musculoskeletal:  Musculoskeletal Normal    Neurological:  Neurology Normal    Endocrine:   Diabetes  Obesity / BMI > 30  Dermatological:  Skin Normal    Psych:  Psychiatric Normal           Physical Exam  General: Well nourished, Cooperative, Alert and Oriented    Airway:  Mallampati: II   Mouth Opening: Normal  TM Distance: Normal  Tongue: Normal  Neck ROM: Normal ROM    Dental:  Intact    Chest/Lungs:  Clear to auscultation, Normal Respiratory Rate    Heart:  Rate: Normal  Rhythm: Regular Rhythm  Sounds: Normal    Abdomen:  Normal, Soft, Nontender        Anesthesia Plan  Type of Anesthesia, risks & benefits discussed:    Anesthesia Type: Gen Natural Airway, MAC  Intra-op Monitoring Plan: Standard ASA Monitors  Post Op Pain Control Plan: multimodal analgesia and IV/PO Opioids PRN  Induction:  IV  Informed Consent: Informed consent signed with the Patient and all parties understand the risks and agree with anesthesia plan.  All questions answered.   ASA Score: 3  Day of Surgery Review of History & Physical: I have interviewed and examined the patient. I have reviewed the patient's H&P dated:     Ready For Surgery From Anesthesia Perspective.     .

## 2022-06-09 LAB
ESTROGEN SERPL-MCNC: NORMAL PG/ML
INSULIN SERPL-ACNC: NORMAL U[IU]/ML
LAB AP GROSS DESCRIPTION: NORMAL
LAB AP LABORATORY NOTES: NORMAL
T3RU NFR SERPL: NORMAL %

## 2022-06-10 ENCOUNTER — TELEPHONE (OUTPATIENT)
Dept: FAMILY MEDICINE | Facility: CLINIC | Age: 67
End: 2022-06-10
Payer: MEDICARE

## 2022-06-10 NOTE — TELEPHONE ENCOUNTER
----- Message from Lv Cartagena MD sent at 6/8/2022  4:34 PM CDT -----  Need to see next  week abnl  egd     Patient already has appointment scheduled for next week

## 2022-06-14 ENCOUNTER — TELEPHONE (OUTPATIENT)
Dept: GASTROENTEROLOGY | Facility: CLINIC | Age: 67
End: 2022-06-14
Payer: MEDICARE

## 2022-06-14 NOTE — TELEPHONE ENCOUNTER
Called patient to discuss results and verbalized understanding.        ----- Message from Maurice Gomez MD sent at 6/9/2022  4:51 PM CDT -----  EGD bx shows gastritis w/o H.pylori.

## 2022-06-15 ENCOUNTER — HOSPITAL ENCOUNTER (OUTPATIENT)
Dept: GASTROENTEROLOGY | Facility: HOSPITAL | Age: 67
Discharge: HOME | End: 2022-06-15
Attending: INTERNAL MEDICINE
Payer: MEDICARE

## 2022-06-15 ENCOUNTER — ANESTHESIA (OUTPATIENT)
Dept: GASTROENTEROLOGY | Facility: HOSPITAL | Age: 67
End: 2022-06-15
Payer: MEDICARE

## 2022-06-15 ENCOUNTER — ANESTHESIA EVENT (OUTPATIENT)
Dept: GASTROENTEROLOGY | Facility: HOSPITAL | Age: 67
End: 2022-06-15
Payer: MEDICARE

## 2022-06-15 VITALS
HEART RATE: 73 BPM | SYSTOLIC BLOOD PRESSURE: 110 MMHG | OXYGEN SATURATION: 98 % | DIASTOLIC BLOOD PRESSURE: 71 MMHG | RESPIRATION RATE: 22 BRPM | TEMPERATURE: 99 F

## 2022-06-15 DIAGNOSIS — K63.5 POLYP OF HEPATIC FLEXURE OF COLON: ICD-10-CM

## 2022-06-15 DIAGNOSIS — D12.3 ADENOMATOUS POLYP OF TRANSVERSE COLON: ICD-10-CM

## 2022-06-15 DIAGNOSIS — D12.0 ADENOMATOUS POLYP OF CECUM: ICD-10-CM

## 2022-06-15 DIAGNOSIS — K31.84 GASTROPARESIS: ICD-10-CM

## 2022-06-15 DIAGNOSIS — Z12.11 SCREENING FOR COLON CANCER: ICD-10-CM

## 2022-06-15 DIAGNOSIS — Z86.010 HISTORY OF COLON POLYPS: ICD-10-CM

## 2022-06-15 DIAGNOSIS — K57.30 DIVERTICULA, COLON: ICD-10-CM

## 2022-06-15 PROBLEM — Z86.0100 HISTORY OF COLON POLYPS: Status: ACTIVE | Noted: 2022-06-15

## 2022-06-15 LAB
GLUCOSE SERPL-MCNC: 160 MG/DL (ref 70–105)
GLUCOSE SERPL-MCNC: 160 MG/DL (ref 70–110)

## 2022-06-15 PROCEDURE — D9220A PRA ANESTHESIA: ICD-10-PCS | Mod: PT,,, | Performed by: NURSE ANESTHETIST, CERTIFIED REGISTERED

## 2022-06-15 PROCEDURE — 27201423 OPTIME MED/SURG SUP & DEVICES STERILE SUPPLY

## 2022-06-15 PROCEDURE — 45380 COLONOSCOPY AND BIOPSY: CPT

## 2022-06-15 PROCEDURE — 63600175 PHARM REV CODE 636 W HCPCS: Performed by: NURSE ANESTHETIST, CERTIFIED REGISTERED

## 2022-06-15 PROCEDURE — C1889 IMPLANT/INSERT DEVICE, NOC: HCPCS

## 2022-06-15 PROCEDURE — 37000009 HC ANESTHESIA EA ADD 15 MINS

## 2022-06-15 PROCEDURE — 82962 GLUCOSE BLOOD TEST: CPT

## 2022-06-15 PROCEDURE — 37000008 HC ANESTHESIA 1ST 15 MINUTES

## 2022-06-15 PROCEDURE — 45385 COLONOSCOPY W/LESION REMOVAL: CPT | Mod: 59,PT

## 2022-06-15 PROCEDURE — 45388 COLONOSCOPY W/ABLATION: CPT | Mod: PT

## 2022-06-15 PROCEDURE — D9220A PRA ANESTHESIA: Mod: PT,,, | Performed by: NURSE ANESTHETIST, CERTIFIED REGISTERED

## 2022-06-15 PROCEDURE — 25000003 PHARM REV CODE 250: Performed by: NURSE ANESTHETIST, CERTIFIED REGISTERED

## 2022-06-15 RX ORDER — PROPOFOL 10 MG/ML
VIAL (ML) INTRAVENOUS
Status: DISCONTINUED | OUTPATIENT
Start: 2022-06-15 | End: 2022-06-15

## 2022-06-15 RX ORDER — SODIUM CHLORIDE 0.9 % (FLUSH) 0.9 %
10 SYRINGE (ML) INJECTION
Status: DISCONTINUED | OUTPATIENT
Start: 2022-06-15 | End: 2022-06-16 | Stop reason: HOSPADM

## 2022-06-15 RX ORDER — LIDOCAINE HYDROCHLORIDE 20 MG/ML
INJECTION INTRAVENOUS
Status: DISCONTINUED | OUTPATIENT
Start: 2022-06-15 | End: 2022-06-15

## 2022-06-15 RX ADMIN — PROPOFOL 100 MG: 10 INJECTION, EMULSION INTRAVENOUS at 09:06

## 2022-06-15 RX ADMIN — LIDOCAINE HYDROCHLORIDE 100 MG: 20 INJECTION, SOLUTION INTRAVENOUS at 09:06

## 2022-06-15 NOTE — H&P
[facility]  Gastroenterology  H&P    Patient Name: Olga Stephenson  MRN: [id number]  Admission Date: 6/15/2022  Code Status: Prior    Attending Provider: Maurice Gomez MD  Primary Care Physician: Lv Cartagena MD  Principal Problem:<principal problem not specified>    Subjective:     History of Present Illness: Pt has personal hx of colon polyps. Her last colonoscopy was , no pathology report is available because HAC software is not working.    Past Medical History:   Diagnosis Date    Acute superficial gastritis without hemorrhage 2022    Asthma     Coronary artery disease     Diabetes mellitus     Epigastric pain 2022    Heart attack     Hypertension     Thyroid disease        Past Surgical History:   Procedure Laterality Date    CARDIAC SURGERY       SECTION      COLONOSCOPY  2017    repeat in 3 years    ESOPHAGOGASTRODUODENOSCOPY  03/10/2021    HYSTERECTOMY         Review of patient's allergies indicates:   Allergen Reactions    Jardiance [empagliflozin] Anaphylaxis     Headaches     Trulicity [dulaglutide]      Abdominal pain     Family History     Problem Relation (Age of Onset)    Dementia Mother    Diabetes Mother, Brother    Heart disease Mother    No Known Problems Father        Tobacco Use    Smoking status: Former Smoker    Smokeless tobacco: Never Used   Substance and Sexual Activity    Alcohol use: Not Currently    Drug use: Never    Sexual activity: Not Currently     Review of Systems   Respiratory: Negative.    Cardiovascular: Negative.    Gastrointestinal: Negative.      Objective:     Vital Signs (Most Recent):  Pulse: 77 (06/15/22 0841)  Resp: 20 (06/15/22 0841)  BP: (!) 143/83 (06/15/22 0841)  SpO2: 98 % (06/15/22 0841) Vital Signs (24h Range):  Pulse:  [77] 77  Resp:  [20] 20  SpO2:  [98 %] 98 %  BP: (143)/(83) 143/83        There is no height or weight on file to calculate BMI.    No intake or output data in the 24 hours  ending 06/15/22 0925    Lines/Drains/Airways     Peripheral Intravenous Line  Duration                Peripheral IV - Single Lumen 06/15/22 0854 22 G Left Hand <1 day                Physical Exam  Vitals reviewed.   Constitutional:       General: She is not in acute distress.     Appearance: Normal appearance. She is well-developed. She is not ill-appearing.   HENT:      Head: Normocephalic and atraumatic.      Nose: Nose normal.   Eyes:      Pupils: Pupils are equal, round, and reactive to light.   Cardiovascular:      Rate and Rhythm: Normal rate and regular rhythm.   Pulmonary:      Effort: Pulmonary effort is normal.      Breath sounds: Normal breath sounds. No wheezing.   Abdominal:      General: Abdomen is flat. Bowel sounds are normal. There is no distension.      Palpations: Abdomen is soft.      Tenderness: There is no abdominal tenderness. There is no guarding.   Skin:     General: Skin is warm and dry.      Coloration: Skin is not jaundiced.   Neurological:      Mental Status: She is alert.   Psychiatric:         Attention and Perception: Attention normal.         Mood and Affect: Affect normal.         Speech: Speech normal.         Behavior: Behavior is cooperative.      Comments: Pt was calm while speaking.         Significant Labs:  CBC: No results for input(s): WBC, HGB, HCT, PLT in the last 48 hours.  CMP: No results for input(s): GLU, CALCIUM, ALBUMIN, PROT, NA, K, CO2, CL, BUN, CREATININE, ALKPHOS, ALT, AST, BILITOT in the last 48 hours.    Significant Imaging:  Imaging results within the past 24 hours have been reviewed.    Assessment/Plan:     There are no hospital problems to display for this patient.        Imp: History of colon polyps  Plan: colonoscopy    Maurice Gomez MD  Gastroenterology  [facility]

## 2022-06-15 NOTE — TRANSFER OF CARE
Anesthesia Transfer of Care Note    Patient: Olga Stephenson    Procedure(s) Performed: * No procedures listed *    Patient location: GI    Anesthesia Type: general    Transport from OR: Transported from OR on room air with adequate spontaneous ventilation    Post pain: adequate analgesia    Post assessment: no apparent anesthetic complications    Post vital signs: stable    Level of consciousness: responds to stimulation    Nausea/Vomiting: no nausea/vomiting    Complications: none    Transfer of care protocol was followed      Last vitals:   Visit Vitals  BP (!) 107/54 (BP Location: Right arm, Patient Position: Lying)   Pulse 75   Temp 37 °C (98.6 °F) (Oral)   Resp 18   SpO2 99%   Breastfeeding No

## 2022-06-15 NOTE — DISCHARGE INSTRUCTIONS
Procedure Date  6/15/22     Impression  Overall Impression: Some retained opaque liquid was present. Polyps were removed from the cecum (1), hepatic flexure (1), and transverse colon (3). The residual cecum polyp was ablated with APC.     Recommendation  Await pathology results  Repeat colonoscopy in 3 years; high fiber diet.   NO DRIVING, OPERATING EQUIPMENT, OR SIGNING LEGAL DOCUMENTS FOR 24 HOURS. THE NURSE WILL CALL YOU WITH YOUR BIOPSY RESULTS IN A FEW DAYS.

## 2022-06-15 NOTE — ANESTHESIA PREPROCEDURE EVALUATION
06/15/2022  Olga Stephenson is a 67 y.o., female.      Pre-op Assessment    I have reviewed the Patient Summary Reports.     I have reviewed the Nursing Notes. I have reviewed the NPO Status.   I have reviewed the Medications.     Review of Systems  Cardiovascular:   Hypertension Past MI CAD      Pulmonary:   Asthma    Hepatic/GI:   GERD Liver Disease,    Endocrine:   Diabetes, type 2        Physical Exam  General: Well nourished, Cooperative, Alert and Oriented    Airway:  Mallampati: II   Mouth Opening: Normal  TM Distance: Normal  Tongue: Normal  Neck ROM: Normal ROM        Anesthesia Plan  Type of Anesthesia, risks & benefits discussed:    Anesthesia Type: Gen Natural Airway, MAC  Intra-op Monitoring Plan: Standard ASA Monitors  Post Op Pain Control Plan: multimodal analgesia  Induction:  IV  Informed Consent: Informed consent signed with the Patient and all parties understand the risks and agree with anesthesia plan.  All questions answered. Patient consented to blood products? Yes  ASA Score: 3  Day of Surgery Review of History & Physical: H&P Update referred to the surgeon/provider.I have interviewed and examined the patient. I have reviewed the patient's H&P dated: There are no significant changes.     Ready For Surgery From Anesthesia Perspective.     .

## 2022-06-15 NOTE — ANESTHESIA POSTPROCEDURE EVALUATION
Anesthesia Post Evaluation    Patient: Olga Stephenson    Procedure(s) Performed: * No procedures listed *    Final Anesthesia Type: general      Patient location during evaluation: GI PACU  Patient participation: Yes- Able to Participate  Level of consciousness: responds to stimulation  Post-procedure vital signs: reviewed and stable  Pain management: adequate  Airway patency: patent  AKANKSHA mitigation strategies: Multimodal analgesia  PONV status at discharge: No PONV  Anesthetic complications: no      Cardiovascular status: blood pressure returned to baseline  Respiratory status: unassisted and spontaneous ventilation  Hydration status: euvolemic  Follow-up not needed.          Vitals Value Taken Time       No case tracking events are documented in the log.      Pain/Daniel Score: No data recorded

## 2022-06-15 NOTE — ANESTHESIA RELEASE NOTE
Anesthesia Release from PACU Note    Patient: Olga Stephenson    Procedure(s) Performed: * No procedures listed *    Anesthesia type: general    Post pain: Adequate analgesia    Post assessment: no apparent anesthetic complications    Last Vitals:   Visit Vitals  BP (!) 107/54 (BP Location: Right arm, Patient Position: Lying)   Pulse 75   Temp 37 °C (98.6 °F) (Oral)   Resp 18   SpO2 99%   Breastfeeding No       Post vital signs: stable    Level of consciousness: awake    Nausea/Vomiting: no nausea/no vomiting    Complications: none    Airway Patency: patent    Respiratory: unassisted    Cardiovascular: stable and blood pressure at baseline    Hydration: euvolemic

## 2022-06-16 ENCOUNTER — TELEPHONE (OUTPATIENT)
Dept: GASTROENTEROLOGY | Facility: CLINIC | Age: 67
End: 2022-06-16
Payer: MEDICARE

## 2022-06-16 NOTE — TELEPHONE ENCOUNTER
Called patient to discuss results and verbalized understanding. Also reminded patient about appt in Nov        ----- Message from Maurice Gomez MD sent at 6/16/2022  9:39 AM CDT -----  Place pt on list for colonoscopy in 3 years; multiple ta polyps were removed at colonoscopy.

## 2022-06-24 ENCOUNTER — ANESTHESIA EVENT (OUTPATIENT)
Dept: GASTROENTEROLOGY | Facility: HOSPITAL | Age: 67
DRG: 395 | End: 2022-06-24
Payer: MEDICARE

## 2022-06-24 ENCOUNTER — ANESTHESIA (OUTPATIENT)
Dept: GASTROENTEROLOGY | Facility: HOSPITAL | Age: 67
DRG: 395 | End: 2022-06-24
Payer: MEDICARE

## 2022-06-24 ENCOUNTER — HOSPITAL ENCOUNTER (EMERGENCY)
Facility: HOSPITAL | Age: 67
Discharge: OTHER INSTITUTION | End: 2022-06-24
Payer: MEDICARE

## 2022-06-24 ENCOUNTER — HOSPITAL ENCOUNTER (INPATIENT)
Facility: HOSPITAL | Age: 67
LOS: 2 days | Discharge: HOME | DRG: 395 | End: 2022-06-26
Attending: INTERNAL MEDICINE | Admitting: INTERNAL MEDICINE
Payer: MEDICARE

## 2022-06-24 VITALS
TEMPERATURE: 98 F | HEART RATE: 104 BPM | WEIGHT: 204 LBS | DIASTOLIC BLOOD PRESSURE: 71 MMHG | RESPIRATION RATE: 20 BRPM | HEIGHT: 69 IN | SYSTOLIC BLOOD PRESSURE: 110 MMHG | BODY MASS INDEX: 30.21 KG/M2 | OXYGEN SATURATION: 96 %

## 2022-06-24 VITALS — SYSTOLIC BLOOD PRESSURE: 143 MMHG | DIASTOLIC BLOOD PRESSURE: 95 MMHG | OXYGEN SATURATION: 100 % | HEART RATE: 120 BPM

## 2022-06-24 DIAGNOSIS — I25.10 CAD (CORONARY ARTERY DISEASE): ICD-10-CM

## 2022-06-24 DIAGNOSIS — K62.5 RECTAL BLEEDING: Primary | ICD-10-CM

## 2022-06-24 DIAGNOSIS — K29.00 ACUTE SUPERFICIAL GASTRITIS WITHOUT HEMORRHAGE: ICD-10-CM

## 2022-06-24 DIAGNOSIS — K92.2 GIB (GASTROINTESTINAL BLEEDING): ICD-10-CM

## 2022-06-24 DIAGNOSIS — R55 NEAR SYNCOPE: ICD-10-CM

## 2022-06-24 DIAGNOSIS — R07.9 CHEST PAIN: ICD-10-CM

## 2022-06-24 DIAGNOSIS — K92.2 GASTROINTESTINAL HEMORRHAGE, UNSPECIFIED GASTROINTESTINAL HEMORRHAGE TYPE: ICD-10-CM

## 2022-06-24 LAB
ALBUMIN SERPL BCP-MCNC: 3.4 G/DL (ref 3.5–5)
ALBUMIN/GLOB SERPL: 1 {RATIO}
ALP SERPL-CCNC: 61 U/L (ref 55–142)
ALT SERPL W P-5'-P-CCNC: 44 U/L (ref 13–56)
ANION GAP SERPL CALCULATED.3IONS-SCNC: 15 MMOL/L (ref 7–16)
ANISOCYTOSIS BLD QL SMEAR: NORMAL
APTT PPP: 23.7 SECONDS (ref 25.2–37.3)
AST SERPL W P-5'-P-CCNC: 30 U/L (ref 15–37)
BASOPHILS # BLD AUTO: 0.06 K/UL (ref 0–0.2)
BASOPHILS # BLD AUTO: 0.07 K/UL (ref 0–0.2)
BASOPHILS NFR BLD AUTO: 0.4 % (ref 0–1)
BASOPHILS NFR BLD AUTO: 0.4 % (ref 0–1)
BILIRUB SERPL-MCNC: 0.1 MG/DL (ref 0–1.2)
BUN SERPL-MCNC: 18 MG/DL (ref 7–18)
BUN/CREAT SERPL: 17 (ref 6–20)
CALCIUM SERPL-MCNC: 8.7 MG/DL (ref 8.5–10.1)
CHLORIDE SERPL-SCNC: 107 MMOL/L (ref 98–107)
CO2 SERPL-SCNC: 25 MMOL/L (ref 21–32)
CREAT SERPL-MCNC: 1.05 MG/DL (ref 0.55–1.02)
DIFFERENTIAL METHOD BLD: ABNORMAL
DIFFERENTIAL METHOD BLD: ABNORMAL
EOSINOPHIL # BLD AUTO: 0.03 K/UL (ref 0–0.5)
EOSINOPHIL # BLD AUTO: 0.09 K/UL (ref 0–0.5)
EOSINOPHIL NFR BLD AUTO: 0.2 % (ref 1–4)
EOSINOPHIL NFR BLD AUTO: 0.5 % (ref 1–4)
EOSINOPHIL NFR BLD MANUAL: 3 % (ref 1–4)
ERYTHROCYTE [DISTWIDTH] IN BLOOD BY AUTOMATED COUNT: 12.9 % (ref 11.5–14.5)
ERYTHROCYTE [DISTWIDTH] IN BLOOD BY AUTOMATED COUNT: 13.4 % (ref 11.5–14.5)
EST. AVERAGE GLUCOSE BLD GHB EST-MCNC: 147 MG/DL
GLOBULIN SER-MCNC: 3.4 G/DL (ref 2–4)
GLUCOSE SERPL-MCNC: 183 MG/DL (ref 70–110)
GLUCOSE SERPL-MCNC: 227 MG/DL (ref 74–106)
HBA1C MFR BLD HPLC: 7 % (ref 4.5–6.6)
HCT VFR BLD AUTO: 26 % (ref 38–47)
HCT VFR BLD AUTO: 26.2 % (ref 38–47)
HCT VFR BLD AUTO: 31.8 % (ref 38–47)
HCT VFR BLD AUTO: 35.9 % (ref 38–47)
HGB BLD-MCNC: 10 G/DL (ref 12–16)
HGB BLD-MCNC: 11.6 G/DL (ref 12–16)
HGB BLD-MCNC: 8.5 G/DL (ref 12–16)
HGB BLD-MCNC: 8.5 G/DL (ref 12–16)
HYPOCHROMIA BLD QL SMEAR: ABNORMAL
INR BLD: 1.12 (ref 0.9–1.1)
LYMPHOCYTES # BLD AUTO: 4.21 K/UL (ref 1–4.8)
LYMPHOCYTES # BLD AUTO: 5.44 K/UL (ref 1–4.8)
LYMPHOCYTES NFR BLD AUTO: 29.8 % (ref 27–41)
LYMPHOCYTES NFR BLD AUTO: 32.4 % (ref 27–41)
LYMPHOCYTES NFR BLD MANUAL: 30 % (ref 27–41)
LYMPHOCYTES NFR BLD MANUAL: 36 % (ref 27–41)
MCH RBC QN AUTO: 29.9 PG (ref 27–31)
MCH RBC QN AUTO: 29.9 PG (ref 27–31)
MCHC RBC AUTO-ENTMCNC: 31.4 G/DL (ref 32–36)
MCHC RBC AUTO-ENTMCNC: 32.3 G/DL (ref 32–36)
MCV RBC AUTO: 92.5 FL (ref 80–96)
MCV RBC AUTO: 95.2 FL (ref 80–96)
METAMYELOCYTES NFR BLD MANUAL: 1 %
MONOCYTES # BLD AUTO: 0.79 K/UL (ref 0–0.8)
MONOCYTES # BLD AUTO: 1.04 K/UL (ref 0–0.8)
MONOCYTES NFR BLD AUTO: 5.6 % (ref 2–6)
MONOCYTES NFR BLD AUTO: 6.2 % (ref 2–6)
MONOCYTES NFR BLD MANUAL: 5 % (ref 2–6)
MONOCYTES NFR BLD MANUAL: 6 % (ref 2–6)
MPC BLD CALC-MCNC: 12.4 FL (ref 9.4–12.4)
MPC BLD CALC-MCNC: 12.6 FL (ref 9.4–12.4)
NEUTROPHILS # BLD AUTO: 10.15 K/UL (ref 1.8–7.7)
NEUTROPHILS # BLD AUTO: 9.03 K/UL (ref 1.8–7.7)
NEUTROPHILS NFR BLD AUTO: 60.5 % (ref 53–65)
NEUTROPHILS NFR BLD AUTO: 64 % (ref 53–65)
NEUTS BAND NFR BLD MANUAL: 1 % (ref 1–5)
NEUTS SEG NFR BLD MANUAL: 53 % (ref 50–62)
NEUTS SEG NFR BLD MANUAL: 65 % (ref 50–62)
NRBC BLD MANUAL-RTO: ABNORMAL %
NRBC BLD MANUAL-RTO: NORMAL %
OCCULT BLOOD: POSITIVE
PLATELET # BLD AUTO: 246 K/UL (ref 150–400)
PLATELET # BLD AUTO: 263 K/UL (ref 150–400)
PLATELET MORPHOLOGY: NORMAL
PLATELET MORPHOLOGY: NORMAL
POIKILOCYTOSIS BLD QL SMEAR: NORMAL
POTASSIUM SERPL-SCNC: 4.7 MMOL/L (ref 3.5–5.1)
PROT SERPL-MCNC: 6.8 G/DL (ref 6.4–8.2)
PROTHROMBIN TIME: 14 SECONDS (ref 11.7–14.7)
RBC # BLD AUTO: 3.34 M/UL (ref 4.2–5.4)
RBC # BLD AUTO: 3.88 M/UL (ref 4.2–5.4)
REACTIVE LYMPHOCYTES: NORMAL
SODIUM SERPL-SCNC: 142 MMOL/L (ref 136–145)
TROPONIN I SERPL HS-MCNC: 4.2 PG/ML
WBC # BLD AUTO: 14.12 K/UL (ref 4.5–11)
WBC # BLD AUTO: 16.79 K/UL (ref 4.5–11)

## 2022-06-24 PROCEDURE — 99285 EMERGENCY DEPT VISIT HI MDM: CPT | Mod: GF | Performed by: PHYSICIAN ASSISTANT

## 2022-06-24 PROCEDURE — D9220A PRA ANESTHESIA: Mod: ,,, | Performed by: NURSE ANESTHETIST, CERTIFIED REGISTERED

## 2022-06-24 PROCEDURE — 25000003 PHARM REV CODE 250: Performed by: FAMILY MEDICINE

## 2022-06-24 PROCEDURE — 80053 COMPREHEN METABOLIC PANEL: CPT | Performed by: PHYSICIAN ASSISTANT

## 2022-06-24 PROCEDURE — 36415 COLL VENOUS BLD VENIPUNCTURE: CPT | Performed by: FAMILY MEDICINE

## 2022-06-24 PROCEDURE — 93005 ELECTROCARDIOGRAM TRACING: CPT

## 2022-06-24 PROCEDURE — 25000003 PHARM REV CODE 250: Performed by: NURSE ANESTHETIST, CERTIFIED REGISTERED

## 2022-06-24 PROCEDURE — 96375 TX/PRO/DX INJ NEW DRUG ADDON: CPT

## 2022-06-24 PROCEDURE — 99223 1ST HOSP IP/OBS HIGH 75: CPT | Mod: ,,, | Performed by: FAMILY MEDICINE

## 2022-06-24 PROCEDURE — 63600175 PHARM REV CODE 636 W HCPCS: Performed by: INTERNAL MEDICINE

## 2022-06-24 PROCEDURE — 27000716 HC OXISENSOR PROBE, ANY SIZE: Performed by: NURSE ANESTHETIST, CERTIFIED REGISTERED

## 2022-06-24 PROCEDURE — 85610 PROTHROMBIN TIME: CPT | Performed by: FAMILY MEDICINE

## 2022-06-24 PROCEDURE — C9113 INJ PANTOPRAZOLE SODIUM, VIA: HCPCS | Performed by: FAMILY MEDICINE

## 2022-06-24 PROCEDURE — 43235 EGD DIAGNOSTIC BRUSH WASH: CPT

## 2022-06-24 PROCEDURE — 85014 HEMATOCRIT: CPT | Performed by: FAMILY MEDICINE

## 2022-06-24 PROCEDURE — 82962 GLUCOSE BLOOD TEST: CPT

## 2022-06-24 PROCEDURE — 99223 PR INITIAL HOSPITAL CARE,LEVL III: ICD-10-PCS | Mod: ,,, | Performed by: FAMILY MEDICINE

## 2022-06-24 PROCEDURE — 63600175 PHARM REV CODE 636 W HCPCS: Performed by: FAMILY MEDICINE

## 2022-06-24 PROCEDURE — 83036 HEMOGLOBIN GLYCOSYLATED A1C: CPT | Performed by: FAMILY MEDICINE

## 2022-06-24 PROCEDURE — C9113 INJ PANTOPRAZOLE SODIUM, VIA: HCPCS | Performed by: INTERNAL MEDICINE

## 2022-06-24 PROCEDURE — D9220A PRA ANESTHESIA: ICD-10-PCS | Mod: ,,, | Performed by: NURSE ANESTHETIST, CERTIFIED REGISTERED

## 2022-06-24 PROCEDURE — 27000284 HC CANNULA NASAL: Performed by: NURSE ANESTHETIST, CERTIFIED REGISTERED

## 2022-06-24 PROCEDURE — 63600175 PHARM REV CODE 636 W HCPCS: Performed by: NURSE ANESTHETIST, CERTIFIED REGISTERED

## 2022-06-24 PROCEDURE — 84484 ASSAY OF TROPONIN QUANT: CPT | Performed by: PHYSICIAN ASSISTANT

## 2022-06-24 PROCEDURE — 93010 ELECTROCARDIOGRAM REPORT: CPT | Performed by: FAMILY MEDICINE

## 2022-06-24 PROCEDURE — 85025 COMPLETE CBC W/AUTO DIFF WBC: CPT | Performed by: PHYSICIAN ASSISTANT

## 2022-06-24 PROCEDURE — 25000003 PHARM REV CODE 250

## 2022-06-24 PROCEDURE — 11000001 HC ACUTE MED/SURG PRIVATE ROOM

## 2022-06-24 PROCEDURE — 25000003 PHARM REV CODE 250: Performed by: INTERNAL MEDICINE

## 2022-06-24 PROCEDURE — 36415 COLL VENOUS BLD VENIPUNCTURE: CPT | Performed by: PHYSICIAN ASSISTANT

## 2022-06-24 PROCEDURE — 96374 THER/PROPH/DIAG INJ IV PUSH: CPT

## 2022-06-24 PROCEDURE — 99285 EMERGENCY DEPT VISIT HI MDM: CPT | Mod: 25

## 2022-06-24 PROCEDURE — 85730 THROMBOPLASTIN TIME PARTIAL: CPT | Performed by: FAMILY MEDICINE

## 2022-06-24 PROCEDURE — 85025 COMPLETE CBC W/AUTO DIFF WBC: CPT | Mod: 91 | Performed by: FAMILY MEDICINE

## 2022-06-24 PROCEDURE — 82271 OCCULT BLOOD OTHER SOURCES: CPT | Performed by: PHYSICIAN ASSISTANT

## 2022-06-24 RX ORDER — SODIUM CHLORIDE 450 MG/100ML
INJECTION, SOLUTION INTRAVENOUS CONTINUOUS
Status: DISCONTINUED | OUTPATIENT
Start: 2022-06-24 | End: 2022-06-26 | Stop reason: HOSPADM

## 2022-06-24 RX ORDER — POLYETHYLENE GLYCOL 3350, SODIUM SULFATE ANHYDROUS, SODIUM BICARBONATE, SODIUM CHLORIDE, POTASSIUM CHLORIDE 236; 22.74; 6.74; 5.86; 2.97 G/4L; G/4L; G/4L; G/4L; G/4L
4000 POWDER, FOR SOLUTION ORAL
Status: DISCONTINUED | OUTPATIENT
Start: 2022-06-24 | End: 2022-06-24 | Stop reason: HOSPADM

## 2022-06-24 RX ORDER — INSULIN ASPART 100 [IU]/ML
0-5 INJECTION, SOLUTION INTRAVENOUS; SUBCUTANEOUS
Status: DISCONTINUED | OUTPATIENT
Start: 2022-06-24 | End: 2022-06-26 | Stop reason: HOSPADM

## 2022-06-24 RX ORDER — NALOXONE HCL 0.4 MG/ML
0.02 VIAL (ML) INJECTION
Status: DISCONTINUED | OUTPATIENT
Start: 2022-06-24 | End: 2022-06-26 | Stop reason: HOSPADM

## 2022-06-24 RX ORDER — ACETAMINOPHEN 325 MG/1
650 TABLET ORAL EVERY 8 HOURS PRN
Status: DISCONTINUED | OUTPATIENT
Start: 2022-06-24 | End: 2022-06-26 | Stop reason: HOSPADM

## 2022-06-24 RX ORDER — POLYETHYLENE GLYCOL 3350, SODIUM SULFATE ANHYDROUS, SODIUM BICARBONATE, SODIUM CHLORIDE, POTASSIUM CHLORIDE 236; 22.74; 6.74; 5.86; 2.97 G/4L; G/4L; G/4L; G/4L; G/4L
4000 POWDER, FOR SOLUTION ORAL ONCE
Status: COMPLETED | OUTPATIENT
Start: 2022-06-24 | End: 2022-06-24

## 2022-06-24 RX ORDER — TALC
6 POWDER (GRAM) TOPICAL NIGHTLY PRN
Status: DISCONTINUED | OUTPATIENT
Start: 2022-06-24 | End: 2022-06-26 | Stop reason: HOSPADM

## 2022-06-24 RX ORDER — SODIUM CHLORIDE 9 MG/ML
INJECTION, SOLUTION INTRAVENOUS
Status: COMPLETED
Start: 2022-06-24 | End: 2022-06-24

## 2022-06-24 RX ORDER — SODIUM CHLORIDE 9 MG/ML
INJECTION, SOLUTION INTRAVENOUS CONTINUOUS
Status: DISCONTINUED | OUTPATIENT
Start: 2022-06-24 | End: 2022-06-24 | Stop reason: HOSPADM

## 2022-06-24 RX ORDER — PANTOPRAZOLE SODIUM 40 MG/10ML
40 INJECTION, POWDER, LYOPHILIZED, FOR SOLUTION INTRAVENOUS
Status: COMPLETED | OUTPATIENT
Start: 2022-06-24 | End: 2022-06-24

## 2022-06-24 RX ORDER — PANTOPRAZOLE SODIUM 40 MG/10ML
40 INJECTION, POWDER, LYOPHILIZED, FOR SOLUTION INTRAVENOUS DAILY
Status: DISCONTINUED | OUTPATIENT
Start: 2022-06-24 | End: 2022-06-26 | Stop reason: HOSPADM

## 2022-06-24 RX ORDER — ACETAMINOPHEN 325 MG/1
650 TABLET ORAL EVERY 8 HOURS PRN
Status: CANCELLED | OUTPATIENT
Start: 2022-06-24

## 2022-06-24 RX ORDER — ALBUTEROL SULFATE 90 UG/1
2 AEROSOL, METERED RESPIRATORY (INHALATION) EVERY 6 HOURS PRN
Status: DISCONTINUED | OUTPATIENT
Start: 2022-06-24 | End: 2022-06-26 | Stop reason: HOSPADM

## 2022-06-24 RX ORDER — LIDOCAINE HYDROCHLORIDE 20 MG/ML
INJECTION, SOLUTION EPIDURAL; INFILTRATION; INTRACAUDAL; PERINEURAL
Status: DISCONTINUED | OUTPATIENT
Start: 2022-06-24 | End: 2022-06-24

## 2022-06-24 RX ORDER — ONDANSETRON 2 MG/ML
4 INJECTION INTRAMUSCULAR; INTRAVENOUS
Status: COMPLETED | OUTPATIENT
Start: 2022-06-24 | End: 2022-06-24

## 2022-06-24 RX ORDER — SODIUM CHLORIDE 0.9 % (FLUSH) 0.9 %
10 SYRINGE (ML) INJECTION
Status: CANCELLED | OUTPATIENT
Start: 2022-06-24

## 2022-06-24 RX ORDER — ALBUTEROL SULFATE 90 UG/1
2 AEROSOL, METERED RESPIRATORY (INHALATION) 2 TIMES DAILY
Status: DISCONTINUED | OUTPATIENT
Start: 2022-06-24 | End: 2022-06-24

## 2022-06-24 RX ORDER — GLUCAGON 1 MG
1 KIT INJECTION
Status: DISCONTINUED | OUTPATIENT
Start: 2022-06-24 | End: 2022-06-26 | Stop reason: HOSPADM

## 2022-06-24 RX ORDER — PROPOFOL 10 MG/ML
VIAL (ML) INTRAVENOUS
Status: DISCONTINUED | OUTPATIENT
Start: 2022-06-24 | End: 2022-06-24

## 2022-06-24 RX ORDER — ONDANSETRON 2 MG/ML
INJECTION INTRAMUSCULAR; INTRAVENOUS
Status: DISCONTINUED
Start: 2022-06-24 | End: 2022-06-24 | Stop reason: HOSPADM

## 2022-06-24 RX ORDER — MAG HYDROX/ALUMINUM HYD/SIMETH 200-200-20
30 SUSPENSION, ORAL (FINAL DOSE FORM) ORAL 4 TIMES DAILY PRN
Status: DISCONTINUED | OUTPATIENT
Start: 2022-06-24 | End: 2022-06-26 | Stop reason: HOSPADM

## 2022-06-24 RX ORDER — SODIUM CHLORIDE 9 MG/ML
INJECTION, SOLUTION INTRAVENOUS CONTINUOUS
Status: DISCONTINUED | OUTPATIENT
Start: 2022-06-24 | End: 2022-06-24

## 2022-06-24 RX ORDER — POLYETHYLENE GLYCOL 3350 17 G/17G
17 POWDER, FOR SOLUTION ORAL DAILY
Status: DISCONTINUED | OUTPATIENT
Start: 2022-06-24 | End: 2022-06-24

## 2022-06-24 RX ADMIN — SODIUM CHLORIDE: 4.5 INJECTION, SOLUTION INTRAVENOUS at 06:06

## 2022-06-24 RX ADMIN — POLYETHYLENE GLYCOL 3350, SODIUM SULFATE ANHYDROUS, SODIUM BICARBONATE, SODIUM CHLORIDE, POTASSIUM CHLORIDE 4000 ML: 236; 22.74; 6.74; 5.86; 2.97 POWDER, FOR SOLUTION ORAL at 10:06

## 2022-06-24 RX ADMIN — PANTOPRAZOLE SODIUM 40 MG: 40 INJECTION, POWDER, FOR SOLUTION INTRAVENOUS at 06:06

## 2022-06-24 RX ADMIN — SODIUM CHLORIDE: 9 INJECTION, SOLUTION INTRAVENOUS at 06:06

## 2022-06-24 RX ADMIN — LIDOCAINE HYDROCHLORIDE 65 MG: 20 INJECTION, SOLUTION INTRAVENOUS at 04:06

## 2022-06-24 RX ADMIN — PANTOPRAZOLE SODIUM 40 MG: 40 INJECTION, POWDER, FOR SOLUTION INTRAVENOUS at 09:06

## 2022-06-24 RX ADMIN — PROPOFOL 30 MG: 10 INJECTION, EMULSION INTRAVENOUS at 04:06

## 2022-06-24 RX ADMIN — ONDANSETRON 4 MG: 2 INJECTION INTRAMUSCULAR; INTRAVENOUS at 08:06

## 2022-06-24 RX ADMIN — SODIUM CHLORIDE: 9 INJECTION, SOLUTION INTRAVENOUS at 04:06

## 2022-06-24 RX ADMIN — SODIUM CHLORIDE 200 MG: 9 INJECTION, SOLUTION INTRAVENOUS at 10:06

## 2022-06-24 RX ADMIN — PROPOFOL 70 MG: 10 INJECTION, EMULSION INTRAVENOUS at 04:06

## 2022-06-24 RX ADMIN — SODIUM CHLORIDE 25 MG: 9 INJECTION, SOLUTION INTRAVENOUS at 06:06

## 2022-06-24 NOTE — TRANSFER OF CARE
"Anesthesia Transfer of Care Note    Patient: Olga Stephenson    Procedure(s) Performed: * No procedures listed *    Patient location: GI    Anesthesia Type: general    Transport from OR: Transported from OR on room air with adequate spontaneous ventilation. Continuous ECG monitoring in transport. Continuous SpO2 monitoring in transport    Post pain: adequate analgesia    Post assessment: no apparent anesthetic complications    Post vital signs: stable    Level of consciousness: sedated and responds to stimulation    Nausea/Vomiting: no nausea/vomiting    Complications: none    Transfer of care protocol was followedComments: Good SV continue, NAD, VSS, RTRN      Last vitals:   Visit Vitals  /76 (BP Location: Left arm, Patient Position: Lying)   Pulse (!) 114   Temp 36.7 °C (98 °F)   Resp (!) 22   Ht 5' 9" (1.753 m)   Wt 90.2 kg (198 lb 13.7 oz)   SpO2 100%   Breastfeeding No   BMI 29.37 kg/m²     "

## 2022-06-24 NOTE — DISCHARGE INSTRUCTIONS
Procedure Date  6/24/22     Impression  Overall Impression: No bleeding was found in the upper GI tract. Mucosa of the esophagus is normal. The stomach has gastritis without ulcers or bleeding. No biopsies were obtained due to plavix therapy. Mucosa of the duodenum is normal.     Recommendation  Schedule follow-up procedure for continued treatment; IV iron today; continue ppi, serial HgB; prep for colonoscopy in AM with .    Indication  GI bleed, blood loss anemia    NO DRIVING, OPERATING EQUIPMENT, OR SIGNING LEGAL DOCUMENTS FOR 24 HOURS.

## 2022-06-24 NOTE — ED NOTES
Connected with  and spoke with Naay and she was made aware of all changes in this patient. She states she will call us back after touching base with accepting md   minimum assist (75% patients effort)

## 2022-06-24 NOTE — ED TRIAGE NOTES
Pt presents to ED with rectal bleeding. Pt states she had colonoscopy last week and had polyps removed. Pt states she started having dark red bleeding a few hours ago. Pt is very weak.

## 2022-06-24 NOTE — ASSESSMENT & PLAN NOTE
Suspect upper GI source given darker nature of stools. D/W Dr. Gomez will perform EGD shortly.  If source not seen will prep for colonoscopy tomorrow.   Protonix infusion, serial h/h, transfusion support as necessary.  D/yari plavix.

## 2022-06-24 NOTE — H&P
[address] Medicine  History & Physical    Patient Name: Olga Stephenson  MRN: [id number]  Patient Class: IP- Inpatient  Admission Date: 2022  Attending Physician: Lv Ludwig Jr., MD   Primary Care Provider: Lv Cartagena MD         Patient information was obtained from patient, past medical records and ER records.     Subjective:     Principal Problem:<principal problem not specified>    Chief Complaint:   Chief Complaint   Patient presents with    GI Bleeding        HPI: 68 y/o AA female was in normal state of health until yesterday morning. Noted mild abdominal pain and bloody to dark colored bowel movements. Total of 7 of these Bms in past 24 hours.  She has nausea and occasional vomiting accompany these Bms. Some pink tinged vomitus.  No fever or chills.  She has felt a bit dizzy and weak.  Sought care at [facility], accepted by [facility] but bed just became available and patient transferred in.  Of note patient is on plavix, last dose yesterday.    Patient had  colonoscopy 1 week ago with multiple polyp removal, Had EGD a week before that with erosive gastritis.       Past Medical History:   Diagnosis Date    Acute superficial gastritis without hemorrhage 2022    Adenomatous polyp of cecum 6/15/2022    Adenomatous polyp of transverse colon 6/15/2022    Asthma     Coronary artery disease     Diabetes mellitus     Diverticula, colon 6/15/2022    Epigastric pain 2022    Heart attack     History of colon polyps 6/15/2022    Hypertension     Polyp of hepatic flexure of colon 6/15/2022    Screening for colon cancer 6/15/2022    Thyroid disease        Past Surgical History:   Procedure Laterality Date    CARDIAC SURGERY       SECTION      COLONOSCOPY  2017    repeat in 3 years    ESOPHAGOGASTRODUODENOSCOPY  03/10/2021    HYSTERECTOMY         Review of patient's allergies indicates:   Allergen Reactions    Jardiance  [empagliflozin] Anaphylaxis     Headaches     Trulicity [dulaglutide]      Abdominal pain       Current Facility-Administered Medications on File Prior to Encounter   Medication    [COMPLETED] ondansetron injection 4 mg    [COMPLETED] pantoprazole injection 40 mg    [DISCONTINUED] 0.9%  NaCl infusion    [DISCONTINUED] 0.9%  NaCl infusion    [DISCONTINUED] polyethylene glycol (GoLYTELY) solution    [DISCONTINUED] polyethylene glycol packet 17 g     Current Outpatient Medications on File Prior to Encounter   Medication Sig    albuterol (PROVENTIL/VENTOLIN HFA) 90 mcg/actuation inhaler Inhale 2 puffs into the lungs 2 (two) times a day.    ALLERGY RELIEF, LORATADINE, 10 mg tablet Take 1 tablet by mouth once daily.    blood sugar diagnostic (FREESTYLE LITE STRIPS) Strp Use one strip daily to monitor glucose    blood sugar diagnostic Strp     dapagliflozin (FARXIGA) 10 mg tablet 1 tablet    estradioL (VIVELLE-DOT) 0.025 mg/24 hr Place 1 patch onto the skin twice a week.    estradioL (VIVELLE-DOT) 0.1 mg/24 hr PTSW     ezetimibe (ZETIA) 10 mg tablet Take 1 tablet by mouth once daily.    ezetimibe (ZETIA) 10 mg tablet 1 tablet    FARXIGA 10 mg tablet TAKE 1 TABLET DAILY    metoprolol succinate (TOPROL-XL) 25 MG 24 hr tablet Take 1 tablet by mouth once daily.    pantoprazole (PROTONIX) 40 MG tablet Take 1 tablet (40 mg total) by mouth 2 (two) times daily.    cyclobenzaprine (FLEXERIL) 10 MG tablet Take 10 mg by mouth. Twice a week    cyclobenzaprine (FLEXERIL) 10 MG tablet 1 tablet at bedtime as needed    fluconazole (DIFLUCAN) 100 MG tablet Take 1 tablet (100 mg total) by mouth once daily.    glipiZIDE (GLUCOTROL) 2.5 MG TR24 Take 2 tablets (5 mg total) by mouth daily with breakfast.    hydrocortisone 2.5 % cream     loratadine (CLARITIN) 10 mg tablet     losartan (COZAAR) 25 MG tablet Take 1 tablet by mouth once daily.    metFORMIN (GLUCOPHAGE) 1000 MG tablet     metoprolol succinate (TOPROL-XL)  25 MG 24 hr tablet 1 tablet    pregabalin (LYRICA) 75 MG capsule Take 75 mg by mouth. 1 in the morning and 2 at night    pregabalin (LYRICA) 75 MG capsule Take 1 capsule by mouth.    valACYclovir (VALTREX) 500 MG tablet Take 1 tablet (500 mg total) by mouth 3 (three) times daily.    [DISCONTINUED] clopidogreL (PLAVIX) 75 mg tablet Take 1 tablet by mouth once daily.    [DISCONTINUED] clopidogreL (PLAVIX) 75 mg tablet Take by mouth.     Family History       Problem Relation (Age of Onset)    Dementia Mother    Diabetes Mother, Brother    Heart disease Mother    No Known Problems Father          Tobacco Use    Smoking status: Former Smoker    Smokeless tobacco: Never Used   Substance and Sexual Activity    Alcohol use: Not Currently    Drug use: Never    Sexual activity: Not Currently     Review of Systems   Constitutional:  Negative for chills and fever.   HENT:  Negative for sinus pain and sore throat.    Eyes:  Negative for pain and redness.   Respiratory:  Negative for cough and shortness of breath.    Cardiovascular:  Positive for chest pain. Negative for palpitations and leg swelling.   Gastrointestinal:  Positive for abdominal pain, anal bleeding, blood in stool, nausea and vomiting. Negative for abdominal distention, constipation, diarrhea and rectal pain.   Endocrine: Negative for cold intolerance and heat intolerance.   Genitourinary:  Negative for dysuria and hematuria.   Musculoskeletal:  Positive for arthralgias.        R knee chronically painful, under care of Dr. Tolliver.    Skin:  Negative for color change, pallor and rash.   Neurological:  Positive for light-headedness. Negative for syncope.   Hematological:  Negative for adenopathy. Does not bruise/bleed easily.   Psychiatric/Behavioral:  Negative for agitation and confusion.    Objective:     Vital Signs (Most Recent):  Temp: 98 °F (36.7 °C) (06/24/22 1322)  Pulse: 104 (06/24/22 1322)  Resp: 18 (06/24/22 1322)  BP: 104/76 (06/24/22  1322)  SpO2: 97 % (06/24/22 1322) Vital Signs (24h Range):  Temp:  [97.7 °F (36.5 °C)-98 °F (36.7 °C)] 98 °F (36.7 °C)  Pulse:  [] 104  Resp:  [18-24] 18  SpO2:  [94 %-100 %] 97 %  BP: ()/(55-92) 104/76     Weight: 90.2 kg (198 lb 13.7 oz)  Body mass index is 29.37 kg/m².    Physical Exam  Vitals reviewed.   Constitutional:       General: She is not in acute distress.     Appearance: She is not ill-appearing.   HENT:      Head: Normocephalic and atraumatic.   Eyes:      General: No scleral icterus.  Neck:      Vascular: No carotid bruit.   Cardiovascular:      Rate and Rhythm: Normal rate and regular rhythm.      Heart sounds: Normal heart sounds.   Pulmonary:      Effort: Pulmonary effort is normal. No respiratory distress.      Breath sounds: Normal breath sounds. No wheezing.   Abdominal:      General: Abdomen is flat. Bowel sounds are normal. There is no distension.      Palpations: Abdomen is soft. There is no mass.      Tenderness: There is abdominal tenderness. There is no guarding or rebound.      Hernia: No hernia is present.   Musculoskeletal:      Right lower leg: No edema.      Left lower leg: No edema.   Lymphadenopathy:      Cervical: No cervical adenopathy.   Skin:     General: Skin is warm and dry.      Capillary Refill: Capillary refill takes less than 2 seconds.   Neurological:      General: No focal deficit present.      Mental Status: She is alert and oriented to person, place, and time.   Psychiatric:         Mood and Affect: Mood normal.         Behavior: Behavior normal.           Significant Labs: All pertinent labs within the past 24 hours have been reviewed.  BMP:   Recent Labs   Lab 06/24/22  0144   *      K 4.7      CO2 25   BUN 18   CREATININE 1.05*   CALCIUM 8.7     CBC:   Recent Labs   Lab 06/24/22  0144 06/24/22  0939   WBC 16.79* 14.12*   HGB 11.6* 10.0*   HCT 35.9* 31.8*    246       Significant Imaging: I have reviewed all pertinent imaging  results/findings within the past 24 hours.    Assessment/Plan:     GIB (gastrointestinal bleeding)    Suspect upper GI source given darker nature of stools. D/W Dr. Gomez will perform EGD shortly.  If source not seen will prep for colonoscopy tomorrow.   Protonix infusion, serial h/h, transfusion support as necessary.  D/yari plavix.     Coronary artery disease    Holding meds for the time being. Consider resuming metoprolol when bleeding not an issue and pulse monitoring less valuable.  Likely to need to stop plavix indefinitely.     Type 2 diabetes mellitus  Clear liquids for now with SS insulin. Holding po DM meds.       VTE Risk Mitigation (From admission, onward)         Ordered     IP VTE LOW RISK PATIENT  Once         06/24/22 1529     Place sequential compression device  Until discontinued         06/24/22 1529                   Lv Ludwig Jr, MD  Department of Hospital Medicine   [facility] - [address] [place] [address]

## 2022-06-24 NOTE — H&P
[facility] - Endoscopy  Gastroenterology  H&P    Patient Name: Olga Stephenson  MRN: [id number]  Admission Date: 2022  Code Status: Full Code    Attending Provider: Maurice Gomez MD  Primary Care Physician: Lv Cartagena MD  Principal Problem:<principal problem not specified>    Subjective:     History of Present Illness: Pt has bright red blood per rectum last pm, then melena this AM. She c/o mid abdominal pain and she's nine days s/p multiple colon polypectomies. She has been back on plavix.    Past Medical History:   Diagnosis Date    Acute superficial gastritis without hemorrhage 2022    Adenomatous polyp of cecum 6/15/2022    Adenomatous polyp of transverse colon 6/15/2022    Asthma     Coronary artery disease     Diabetes mellitus     Diverticula, colon 6/15/2022    Epigastric pain 2022    Heart attack 2011    History of colon polyps 6/15/2022    Hypertension     Polyp of hepatic flexure of colon 6/15/2022    Screening for colon cancer 6/15/2022    Thyroid disease        Past Surgical History:   Procedure Laterality Date    CARDIAC SURGERY       SECTION      COLONOSCOPY  2017    repeat in 3 years    ESOPHAGOGASTRODUODENOSCOPY  03/10/2021    HYSTERECTOMY         Review of patient's allergies indicates:   Allergen Reactions    Jardiance [empagliflozin] Anaphylaxis     Headaches     Trulicity [dulaglutide]      Abdominal pain     Family History     Problem Relation (Age of Onset)    Dementia Mother    Diabetes Mother, Brother    Heart disease Mother    No Known Problems Father        Tobacco Use    Smoking status: Former Smoker    Smokeless tobacco: Never Used   Substance and Sexual Activity    Alcohol use: Not Currently    Drug use: Never    Sexual activity: Not Currently     Review of Systems   Respiratory: Negative.    Cardiovascular: Negative.    Gastrointestinal: Positive for abdominal pain and blood in stool.     Objective:     Vital Signs (Most  Recent):  Temp: 98 °F (36.7 °C) (06/24/22 1606)  Pulse: 97 (06/24/22 1606)  Resp: 19 (06/24/22 1606)  BP: (!) 132/59 (06/24/22 1606)  SpO2: 100 % (06/24/22 1606) Vital Signs (24h Range):  Temp:  [97.7 °F (36.5 °C)-98 °F (36.7 °C)] 98 °F (36.7 °C)  Pulse:  [] 97  Resp:  [18-24] 19  SpO2:  [94 %-100 %] 100 %  BP: ()/(55-92) 132/59     Weight: 90.2 kg (198 lb 13.7 oz) (06/24/22 1322)  Body mass index is 29.37 kg/m².    No intake or output data in the 24 hours ending 06/24/22 1613    Lines/Drains/Airways     Peripheral Intravenous Line  Duration                Peripheral IV - Single Lumen 06/24/22 0143 18 G Right Antecubital <1 day                Physical Exam  Vitals reviewed.   Constitutional:       General: She is not in acute distress.     Appearance: Normal appearance. She is well-developed. She is not ill-appearing.   HENT:      Head: Normocephalic and atraumatic.      Nose: Nose normal.   Eyes:      Pupils: Pupils are equal, round, and reactive to light.   Cardiovascular:      Rate and Rhythm: Normal rate and regular rhythm.   Pulmonary:      Effort: Pulmonary effort is normal.      Breath sounds: Normal breath sounds. No wheezing.   Abdominal:      General: Abdomen is flat. Bowel sounds are normal. There is no distension.      Palpations: Abdomen is soft.      Tenderness: There is no abdominal tenderness. There is no guarding.   Skin:     General: Skin is warm and dry.      Coloration: Skin is not jaundiced.   Neurological:      Mental Status: She is alert.   Psychiatric:         Attention and Perception: Attention normal.         Mood and Affect: Affect normal.         Speech: Speech normal.         Behavior: Behavior is cooperative.      Comments: Pt was calm while speaking.         Significant Labs:  CBC:   Recent Labs   Lab 06/24/22  0144 06/24/22  0939   WBC 16.79* 14.12*   HGB 11.6* 10.0*   HCT 35.9* 31.8*    246     CMP:   Recent Labs   Lab 06/24/22  0144   *   CALCIUM 8.7    ALBUMIN 3.4*   PROT 6.8      K 4.7   CO2 25      BUN 18   CREATININE 1.05*   ALKPHOS 61   ALT 44   AST 30   BILITOT 0.1       Significant Imaging:  Imaging results within the past 24 hours have been reviewed.    Assessment/Plan:     Active Diagnoses:    Diagnosis Date Noted POA    GIB (gastrointestinal bleeding) [K92.2] 06/24/2022 Yes    Coronary artery disease [I25.10]  Yes    Type 2 diabetes mellitus [E11.9] 04/16/2021 Yes      Problems Resolved During this Admission:       Imp: gi bleed  Plan: egd today    Maurice Gomez MD  Gastroenterology  [facility] - Endoscopy

## 2022-06-24 NOTE — ANESTHESIA PREPROCEDURE EVALUATION
2022  lOga Stephenson is a 67 y.o., female.    Past Medical History:   Diagnosis Date    Acute superficial gastritis without hemorrhage 2022    Adenomatous polyp of cecum 6/15/2022    Adenomatous polyp of transverse colon 6/15/2022    Asthma     Coronary artery disease     Diabetes mellitus     Diverticula, colon 6/15/2022    Epigastric pain 2022    Heart attack 2011    History of colon polyps 6/15/2022    Hypertension     Polyp of hepatic flexure of colon 6/15/2022    Screening for colon cancer 6/15/2022    Thyroid disease        Past Surgical History:   Procedure Laterality Date    CARDIAC SURGERY       SECTION      COLONOSCOPY  2017    repeat in 3 years    ESOPHAGOGASTRODUODENOSCOPY  03/10/2021    HYSTERECTOMY         Family History   Problem Relation Age of Onset    Diabetes Mother     Heart disease Mother     Dementia Mother     No Known Problems Father     Diabetes Brother        Social History     Socioeconomic History    Marital status:    Tobacco Use    Smoking status: Former Smoker    Smokeless tobacco: Never Used   Substance and Sexual Activity    Alcohol use: Not Currently    Drug use: Never    Sexual activity: Not Currently       Current Facility-Administered Medications   Medication Dose Route Frequency Provider Last Rate Last Admin    0.45% NaCl infusion   Intravenous Continuous Lv Ludwig Jr., MD        acetaminophen tablet 650 mg  650 mg Oral Q8H PRN vL Ludwig Jr., MD        albuterol inhaler 2 puff  2 puff Inhalation Q6H PRN Lv Ludwig Jr., MD        aluminum-magnesium hydroxide-simethicone 200-200-20 mg/5 mL suspension 30 mL  30 mL Oral QID PRN Lv Ludwig Jr., MD        dextrose 50% injection 12.5 g  12.5 g Intravenous PRN Lv Ludwig Jr., MD        dextrose 50% injection 25 g  25 g  Intravenous PRN Lv Ludwig Jr., MD        glucagon (human recombinant) injection 1 mg  1 mg Intramuscular PRN Lv Ludwig Jr., MD        insulin aspart U-100 injection 0-5 Units  0-5 Units Subcutaneous QID (AC + HS) PRN Lv Ludwig Jr., MD        melatonin tablet 6 mg  6 mg Oral Nightly PRN Lv Ludwig Jr., MD        naloxone 0.4 mg/mL injection 0.02 mg  0.02 mg Intravenous PRN Lv Ludwig Jr., MD        pantoprazole (PROTONIX) 40 mg in sodium chloride 0.9 % 100 mL IVPB (MB+)  8 mg/hr Intravenous Continuous Lv Ludwig Jr., MD           Review of patient's allergies indicates:   Allergen Reactions    Jardiance [empagliflozin] Anaphylaxis     Headaches     Trulicity [dulaglutide]      Abdominal pain       Pre-op Assessment    I have reviewed the Patient Summary Reports.     I have reviewed the Nursing Notes. I have reviewed the NPO Status.   I have reviewed the Medications.     Review of Systems  Anesthesia Hx:  No problems with previous Anesthesia  Denies Family Hx of Anesthesia complications.   Denies Personal Hx of Anesthesia complications.   Hematology/Oncology:     Oncology Normal     EENT/Dental:EENT/Dental Normal   Cardiovascular:   Hypertension Past MI CAD   hyperlipidemia    Pulmonary:   Asthma    Renal/:  Renal/ Normal     Hepatic/GI:   GERD Liver Disease,    Musculoskeletal:  Musculoskeletal Normal    Neurological:  Neurology Normal    Endocrine:   Diabetes, type 2    Dermatological:  Skin Normal    Psych:  Psychiatric Normal           Physical Exam  General: Well nourished, Alert, Oriented and Cooperative    Airway:  Mouth Opening: Normal  Neck ROM: Normal ROM    Chest/Lungs:  Normal Respiratory Rate    Heart:  Rate: Normal        Anesthesia Plan  Type of Anesthesia, risks & benefits discussed:    Anesthesia Type: Gen Natural Airway, MAC  Intra-op Monitoring Plan: Standard ASA Monitors  Post Op Pain Control Plan: multimodal analgesia and IV/PO Opioids  PRN  Induction:  IV  Informed Consent: Informed consent signed with the Patient and all parties understand the risks and agree with anesthesia plan.  All questions answered. Patient consented to blood products? Yes  ASA Score: 3  Day of Surgery Review of History & Physical: I have interviewed and examined the patient. I have reviewed the patient's H&P dated: There are no significant changes.     Ready For Surgery From Anesthesia Perspective.     .

## 2022-06-24 NOTE — ASSESSMENT & PLAN NOTE
Holding meds for the time being. Consider resuming metoprolol when bleeding not an issue and pulse monitoring less valuable.  Likely to need to stop plavix indefinitely.

## 2022-06-24 NOTE — SUBJECTIVE & OBJECTIVE
Past Medical History:   Diagnosis Date    Acute superficial gastritis without hemorrhage 2022    Adenomatous polyp of cecum 6/15/2022    Adenomatous polyp of transverse colon 6/15/2022    Asthma     Coronary artery disease     Diabetes mellitus     Diverticula, colon 6/15/2022    Epigastric pain 2022    Heart attack 2011    History of colon polyps 6/15/2022    Hypertension     Polyp of hepatic flexure of colon 6/15/2022    Screening for colon cancer 6/15/2022    Thyroid disease        Past Surgical History:   Procedure Laterality Date    CARDIAC SURGERY       SECTION      COLONOSCOPY  2017    repeat in 3 years    ESOPHAGOGASTRODUODENOSCOPY  03/10/2021    HYSTERECTOMY         Review of patient's allergies indicates:   Allergen Reactions    Jardiance [empagliflozin] Anaphylaxis     Headaches     Trulicity [dulaglutide]      Abdominal pain       Current Facility-Administered Medications on File Prior to Encounter   Medication    [COMPLETED] ondansetron injection 4 mg    [COMPLETED] pantoprazole injection 40 mg    [DISCONTINUED] 0.9%  NaCl infusion    [DISCONTINUED] 0.9%  NaCl infusion    [DISCONTINUED] polyethylene glycol (GoLYTELY) solution    [DISCONTINUED] polyethylene glycol packet 17 g     Current Outpatient Medications on File Prior to Encounter   Medication Sig    albuterol (PROVENTIL/VENTOLIN HFA) 90 mcg/actuation inhaler Inhale 2 puffs into the lungs 2 (two) times a day.    ALLERGY RELIEF, LORATADINE, 10 mg tablet Take 1 tablet by mouth once daily.    blood sugar diagnostic (FREESTYLE LITE STRIPS) Strp Use one strip daily to monitor glucose    blood sugar diagnostic Strp     dapagliflozin (FARXIGA) 10 mg tablet 1 tablet    estradioL (VIVELLE-DOT) 0.025 mg/24 hr Place 1 patch onto the skin twice a week.    estradioL (VIVELLE-DOT) 0.1 mg/24 hr PTSW     ezetimibe (ZETIA) 10 mg tablet Take 1 tablet by mouth once daily.    ezetimibe (ZETIA) 10 mg tablet 1 tablet    FARXIGA 10 mg tablet TAKE 1  TABLET DAILY    metoprolol succinate (TOPROL-XL) 25 MG 24 hr tablet Take 1 tablet by mouth once daily.    pantoprazole (PROTONIX) 40 MG tablet Take 1 tablet (40 mg total) by mouth 2 (two) times daily.    cyclobenzaprine (FLEXERIL) 10 MG tablet Take 10 mg by mouth. Twice a week    cyclobenzaprine (FLEXERIL) 10 MG tablet 1 tablet at bedtime as needed    fluconazole (DIFLUCAN) 100 MG tablet Take 1 tablet (100 mg total) by mouth once daily.    glipiZIDE (GLUCOTROL) 2.5 MG TR24 Take 2 tablets (5 mg total) by mouth daily with breakfast.    hydrocortisone 2.5 % cream     loratadine (CLARITIN) 10 mg tablet     losartan (COZAAR) 25 MG tablet Take 1 tablet by mouth once daily.    metFORMIN (GLUCOPHAGE) 1000 MG tablet     metoprolol succinate (TOPROL-XL) 25 MG 24 hr tablet 1 tablet    pregabalin (LYRICA) 75 MG capsule Take 75 mg by mouth. 1 in the morning and 2 at night    pregabalin (LYRICA) 75 MG capsule Take 1 capsule by mouth.    valACYclovir (VALTREX) 500 MG tablet Take 1 tablet (500 mg total) by mouth 3 (three) times daily.    [DISCONTINUED] clopidogreL (PLAVIX) 75 mg tablet Take 1 tablet by mouth once daily.    [DISCONTINUED] clopidogreL (PLAVIX) 75 mg tablet Take by mouth.     Family History       Problem Relation (Age of Onset)    Dementia Mother    Diabetes Mother, Brother    Heart disease Mother    No Known Problems Father          Tobacco Use    Smoking status: Former Smoker    Smokeless tobacco: Never Used   Substance and Sexual Activity    Alcohol use: Not Currently    Drug use: Never    Sexual activity: Not Currently     Review of Systems   Constitutional:  Negative for chills and fever.   HENT:  Negative for sinus pain and sore throat.    Eyes:  Negative for pain and redness.   Respiratory:  Negative for cough and shortness of breath.    Cardiovascular:  Positive for chest pain. Negative for palpitations and leg swelling.   Gastrointestinal:  Positive for abdominal pain, anal bleeding, blood in stool, nausea  and vomiting. Negative for abdominal distention, constipation, diarrhea and rectal pain.   Endocrine: Negative for cold intolerance and heat intolerance.   Genitourinary:  Negative for dysuria and hematuria.   Musculoskeletal:  Positive for arthralgias.        R knee chronically painful, under care of Dr. Tolliver.    Skin:  Negative for color change, pallor and rash.   Neurological:  Positive for light-headedness. Negative for syncope.   Hematological:  Negative for adenopathy. Does not bruise/bleed easily.   Psychiatric/Behavioral:  Negative for agitation and confusion.    Objective:     Vital Signs (Most Recent):  Temp: 98 °F (36.7 °C) (06/24/22 1322)  Pulse: 104 (06/24/22 1322)  Resp: 18 (06/24/22 1322)  BP: 104/76 (06/24/22 1322)  SpO2: 97 % (06/24/22 1322) Vital Signs (24h Range):  Temp:  [97.7 °F (36.5 °C)-98 °F (36.7 °C)] 98 °F (36.7 °C)  Pulse:  [] 104  Resp:  [18-24] 18  SpO2:  [94 %-100 %] 97 %  BP: ()/(55-92) 104/76     Weight: 90.2 kg (198 lb 13.7 oz)  Body mass index is 29.37 kg/m².    Physical Exam  Vitals reviewed.   Constitutional:       General: She is not in acute distress.     Appearance: She is not ill-appearing.   HENT:      Head: Normocephalic and atraumatic.   Eyes:      General: No scleral icterus.  Neck:      Vascular: No carotid bruit.   Cardiovascular:      Rate and Rhythm: Normal rate and regular rhythm.      Heart sounds: Normal heart sounds.   Pulmonary:      Effort: Pulmonary effort is normal. No respiratory distress.      Breath sounds: Normal breath sounds. No wheezing.   Abdominal:      General: Abdomen is flat. Bowel sounds are normal. There is no distension.      Palpations: Abdomen is soft. There is no mass.      Tenderness: There is abdominal tenderness. There is no guarding or rebound.      Hernia: No hernia is present.   Musculoskeletal:      Right lower leg: No edema.      Left lower leg: No edema.   Lymphadenopathy:      Cervical: No cervical adenopathy.   Skin:      General: Skin is warm and dry.      Capillary Refill: Capillary refill takes less than 2 seconds.   Neurological:      General: No focal deficit present.      Mental Status: She is alert and oriented to person, place, and time.   Psychiatric:         Mood and Affect: Mood normal.         Behavior: Behavior normal.           Significant Labs: All pertinent labs within the past 24 hours have been reviewed.  BMP:   Recent Labs   Lab 06/24/22  0144   *      K 4.7      CO2 25   BUN 18   CREATININE 1.05*   CALCIUM 8.7     CBC:   Recent Labs   Lab 06/24/22  0144 06/24/22  0939   WBC 16.79* 14.12*   HGB 11.6* 10.0*   HCT 35.9* 31.8*    246       Significant Imaging: I have reviewed all pertinent imaging results/findings within the past 24 hours.

## 2022-06-24 NOTE — ED PROVIDER NOTES
Encounter Date: 2022       History     Chief Complaint   Patient presents with    Rectal Bleeding     HPI  Review of patient's allergies indicates:   Allergen Reactions    Jardiance [empagliflozin] Anaphylaxis     Headaches     Trulicity [dulaglutide]      Abdominal pain     Past Medical History:   Diagnosis Date    Acute superficial gastritis without hemorrhage 2022    Adenomatous polyp of cecum 6/15/2022    Adenomatous polyp of transverse colon 6/15/2022    Asthma     Coronary artery disease     Diabetes mellitus     Diverticula, colon 6/15/2022    Epigastric pain 2022    Heart attack 2011    History of colon polyps 6/15/2022    Hypertension     Polyp of hepatic flexure of colon 6/15/2022    Screening for colon cancer 6/15/2022    Thyroid disease      Past Surgical History:   Procedure Laterality Date    CARDIAC SURGERY       SECTION      COLONOSCOPY  2017    repeat in 3 years    ESOPHAGOGASTRODUODENOSCOPY  03/10/2021    HYSTERECTOMY       Family History   Problem Relation Age of Onset    Diabetes Mother     Heart disease Mother     Dementia Mother     No Known Problems Father     Diabetes Brother      Social History     Tobacco Use    Smoking status: Former Smoker    Smokeless tobacco: Never Used   Substance Use Topics    Alcohol use: Not Currently    Drug use: Never     Review of Systems    Physical Exam     Initial Vitals [22 0134]   BP Pulse Resp Temp SpO2   (!) 114/92 96 20 97.7 °F (36.5 °C) 98 %      MAP       --         Physical Exam    Medical Screening Exam   See Full Note    ED Course   Procedures  Labs Reviewed   COMPREHENSIVE METABOLIC PANEL - Abnormal; Notable for the following components:       Result Value    Glucose 227 (*)     Creatinine 1.05 (*)     Albumin 3.4 (*)     eGFR 56 (*)     All other components within normal limits   OCCULT BLOOD X 1, STOOL - Abnormal; Notable for the following components:    Occult Blood Positive (*)      All other components within normal limits   CBC WITH DIFFERENTIAL - Abnormal; Notable for the following components:    WBC 16.79 (*)     RBC 3.88 (*)     Hemoglobin 11.6 (*)     Hematocrit 35.9 (*)     MPV 12.6 (*)     Neutrophils, Abs 10.15 (*)     Lymphocytes, Absolute 5.44 (*)     Monocytes % 6.2 (*)     Eosinophils % 0.5 (*)     Monocytes, Absolute 1.04 (*)     All other components within normal limits   CBC WITH DIFFERENTIAL - Abnormal; Notable for the following components:    WBC 14.12 (*)     RBC 3.34 (*)     Hemoglobin 10.0 (*)     Hematocrit 31.8 (*)     MCHC 31.4 (*)     Neutrophils, Abs 9.03 (*)     Eosinophils % 0.2 (*)     All other components within normal limits   MANUAL DIFFERENTIAL - Abnormal; Notable for the following components:    Segmented Neutrophils, Man % 65 (*)     All other components within normal limits   TROPONIN I - Normal   CBC W/ AUTO DIFFERENTIAL    Narrative:     The following orders were created for panel order CBC auto differential.  Procedure                               Abnormality         Status                     ---------                               -----------         ------                     CBC with Differential[[id number]]        Abnormal            Final result               Manual Differential[[id number]]                              Final result                 Please view results for these tests on the individual orders.   MANUAL DIFFERENTIAL   CBC W/ AUTO DIFFERENTIAL    Narrative:     The following orders were created for panel order CBC auto differential.  Procedure                               Abnormality         Status                     ---------                               -----------         ------                     CBC with Differential[[id number]]        Abnormal            Final result               Manual Differential[[id number]]          Abnormal            Final result                 Please view results for these tests on the individual orders.         ECG Results          EKG 12-lead (In process)  Result time 06/24/22 02:03:51    In process by Interface, Lab In [facility] (06/24/22 02:03:51)                 Narrative:    Test Reason : R55,    Vent. Rate : 082 BPM     Atrial Rate : 082 BPM     P-R Int : 146 ms          QRS Dur : 076 ms      QT Int : 398 ms       P-R-T Axes : 046 009 032 degrees     QTc Int : 464 ms    Normal sinus rhythm  Cannot rule out Anterior infarct ,age undetermined  Abnormal ECG  When compared with ECG of 26-FEB-2022 12:25,  No significant change was found    Referred By: GLENYS GUEVARA           Confirmed By:                             Imaging Results    None          Medications   polyethylene glycol (GoLYTELY) solution (has no administration in time range)   0.9%  NaCl infusion ( Intravenous New Bag 6/24/22 0627)   ondansetron injection 4 mg (4 mg Intravenous Given 6/24/22 0842)   pantoprazole injection 40 mg (40 mg Intravenous Given 6/24/22 0937)                 ED Course as of 06/24/22 1032   Fri Jun 24, 2022   0210 Patient has gross rectal bleeding, I will put in request for transfer for GI into the [facility]. [CB]   0227 Pt is now complaining of abdominal pain as well, I will get CT scan of the abdomen and pelvis with contrast. [CB]   0305 I spoke with Dr. Gomez and he [facility] admitted [facility], given clear fluids, and go lightly, then get a colonoscopy tomorrow. [CB]   0608 Care over to Daina. [CB]      ED Course User Index  [CB] ANTHONY Amaya          Clinical Impression:   Final diagnoses:  [K62.5] Rectal bleeding (Primary)          ED Disposition Condition    Transfer to Another Facility Stable      Patient with continued GI bleed.  She will be going to room 665 at [facility].  To the care of Dr. Caceres for further evaluation of GI bleed and stabilization        Juan Lama, DO  06/24/22 1032

## 2022-06-24 NOTE — CONSULTS
[facility] - Endoscopy  Gastroenterology  Consult Note    Patient Name: Olga Stephenson  MRN: [id number]  Admission Date: 2022  Hospital Length of Stay: 0 days  Code Status: Full Code   Attending Provider: Maurice Gomez MD  Consulting Provider: Maurice Gomez MD  Primary Care Physician: Lv Cartagena MD  Principal Problem:<principal problem not specified>    Inpatient consult to Gastroenterology  Consult performed by: Maurice Gomez MD  Consult ordered by: Lv Ludwig Jr., MD        Subjective:     HPI: GI consult was received re: gi bleeding. I received a call at 0300 today from [facility] ED regarding this patient who is nine days status-post colonoscopy with multiple polypectomies. I recommend ED to ED transfer and golytle bowel prep such that outpatient colonoscopy could be performed today. Unfortunately, this could not be done due to hospital staffing issues, so the patient arrived at [facility] this afternoon with c/o initial bright red blood per rectum last pm, then melena today. She's been on plavix and c/o some mid epigastric pain. She's had a mild drop in HgB.  EGD today shows gastritis with no upper GI bleeding.    Past Medical History:   Diagnosis Date    Acute superficial gastritis without hemorrhage 2022    Adenomatous polyp of cecum 6/15/2022    Adenomatous polyp of transverse colon 6/15/2022    Asthma     Coronary artery disease     Diabetes mellitus     Diverticula, colon 6/15/2022    Epigastric pain 2022    Heart attack     History of colon polyps 6/15/2022    Hypertension     Polyp of hepatic flexure of colon 6/15/2022    Screening for colon cancer 6/15/2022    Thyroid disease        Past Surgical History:   Procedure Laterality Date    CARDIAC SURGERY       SECTION      COLONOSCOPY  2017    repeat in 3 years    ESOPHAGOGASTRODUODENOSCOPY  03/10/2021    HYSTERECTOMY         Review of patient's allergies indicates:   Allergen Reactions     Jardiance [empagliflozin] Anaphylaxis     Headaches     Trulicity [dulaglutide]      Abdominal pain     Family History     Problem Relation (Age of Onset)    Dementia Mother    Diabetes Mother, Brother    Heart disease Mother    No Known Problems Father        Tobacco Use    Smoking status: Former Smoker    Smokeless tobacco: Never Used   Substance and Sexual Activity    Alcohol use: Not Currently    Drug use: Never    Sexual activity: Not Currently     Review of Systems  Objective:     Vital Signs (Most Recent):  Temp: 98 °F (36.7 °C) (06/24/22 1606)  Pulse: (!) 114 (06/24/22 1622)  Resp: (!) 22 (06/24/22 1622)  BP: 120/76 (06/24/22 1622)  SpO2: 100 % (06/24/22 1622) Vital Signs (24h Range):  Temp:  [97.7 °F (36.5 °C)-98 °F (36.7 °C)] 98 °F (36.7 °C)  Pulse:  [] 114  Resp:  [18-24] 22  SpO2:  [94 %-100 %] 100 %  BP: ()/(55-95) 120/76     Weight: 90.2 kg (198 lb 13.7 oz) (06/24/22 1322)  Body mass index is 29.37 kg/m².      Intake/Output Summary (Last 24 hours) at 6/24/2022 1628  Last data filed at 6/24/2022 1621  Gross per 24 hour   Intake 50 ml   Output --   Net 50 ml       Lines/Drains/Airways     Peripheral Intravenous Line  Duration                Peripheral IV - Single Lumen 06/24/22 0143 18 G Right Antecubital <1 day                Physical Exam    Significant Labs:  CBC:   Recent Labs   Lab 06/24/22  0144 06/24/22  0939   WBC 16.79* 14.12*   HGB 11.6* 10.0*   HCT 35.9* 31.8*    246     CMP:   Recent Labs   Lab 06/24/22  0144   *   CALCIUM 8.7   ALBUMIN 3.4*   PROT 6.8      K 4.7   CO2 25      BUN 18   CREATININE 1.05*   ALKPHOS 61   ALT 44   AST 30   BILITOT 0.1       Significant Imaging:  Imaging results within the past 24 hours have been reviewed.    Assessment/Plan:     Active Diagnoses:    Diagnosis Date Noted POA    GIB (gastrointestinal bleeding) [K92.2] 06/24/2022 Yes    Coronary artery disease [I25.10]  Yes    Type 2 diabetes mellitus [E11.9] 04/16/2021  Yes      Problems Resolved During this Admission:       Imp: GI bleed with blood loss anemia, likely associated with polypectomy ulcers in the colon (including the cecum), plavix therapy, gastritis.  Rec: Continue serial HgB, IV hydration, prep for colonoscopy in AM; IV iron supplement.  will assume GI care in my absence.    Thank you for your consult. I will sign off. Please contact us if you have any additional questions.    Maurice Gomez MD  Gastroenterology  [facility] - Endoscopy

## 2022-06-24 NOTE — ANESTHESIA POSTPROCEDURE EVALUATION
Anesthesia Post Evaluation    Patient: Olga Stephenson    Procedure(s) Performed: * No procedures listed *    Final Anesthesia Type: general      Patient location during evaluation: GI PACU  Patient participation: Yes- Able to Participate  Level of consciousness: awake and alert  Post-procedure vital signs: reviewed and stable  Pain management: adequate  Airway patency: patent    PONV status at discharge: No PONV  Anesthetic complications: no      Cardiovascular status: blood pressure returned to baseline and hemodynamically stable  Respiratory status: spontaneous ventilation  Hydration status: euvolemic  Follow-up not needed.  Comments: Pt voices appreciation for care          Vitals Value Taken Time   /77 06/24/22 1640   Temp 97 F 06/24/22 1642   Pulse 98 06/24/22 1641   Resp 19 06/24/22 1641   SpO2 100 % 06/24/22 1641   Vitals shown include unvalidated device data.      No case tracking events are documented in the log.      Pain/Daniel Score: Daniel Score: 10 (6/24/2022  4:26 PM)

## 2022-06-24 NOTE — HPI
68 y/o AA female was in normal state of health until yesterday morning. Noted mild abdominal pain and bloody to dark colored bowel movements. Total of 7 of these Bms in past 24 hours.  She has nausea and occasional vomiting accompany these Bms. Some pink tinged vomitus.  No fever or chills.  She has felt a bit dizzy and weak.  Sought care at [facility], accepted by [facility] but bed just became available and patient transferred in.  Of note patient is on plavix, last dose yesterday.    Patient had  colonoscopy 1 week ago with multiple polyp removal, Had EGD a week before that with erosive gastritis.

## 2022-06-24 NOTE — ED PROVIDER NOTES
Encounter Date: 2022       History     Chief Complaint   Patient presents with    Rectal Bleeding     Patient is a 67-year-old female with history rectal bleeding starting today with 6 episodes of bloody diarrhea, she had a colonoscopy a week ago and had 7 polyps removed.  She states she began to become really tired this evening, like she could pass out.  She has a past medical history of diabetes, hypertension, asthma, coronary artery disease, previous myocardial infarction, thyroid disease, diverticulitis.  She has a past surgical history of hysterectomy, cardiac catheterization, and colonoscopy          Review of patient's allergies indicates:   Allergen Reactions    Jardiance [empagliflozin] Anaphylaxis     Headaches     Trulicity [dulaglutide]      Abdominal pain     Past Medical History:   Diagnosis Date    Acute superficial gastritis without hemorrhage 2022    Adenomatous polyp of cecum 6/15/2022    Adenomatous polyp of transverse colon 6/15/2022    Asthma     Coronary artery disease     Diabetes mellitus     Diverticula, colon 6/15/2022    Epigastric pain 2022    Heart attack 2011    History of colon polyps 6/15/2022    Hypertension     Polyp of hepatic flexure of colon 6/15/2022    Screening for colon cancer 6/15/2022    Thyroid disease      Past Surgical History:   Procedure Laterality Date    CARDIAC SURGERY       SECTION      COLONOSCOPY  2017    repeat in 3 years    ESOPHAGOGASTRODUODENOSCOPY  03/10/2021    HYSTERECTOMY       Family History   Problem Relation Age of Onset    Diabetes Mother     Heart disease Mother     Dementia Mother     No Known Problems Father     Diabetes Brother      Social History     Tobacco Use    Smoking status: Former Smoker    Smokeless tobacco: Never Used   Substance Use Topics    Alcohol use: Not Currently    Drug use: Never     Review of Systems   Gastrointestinal:        Rectal bleeding   Neurological: Positive for  dizziness.        Near syncope   All other systems reviewed and are negative.      Physical Exam     Initial Vitals [06/24/22 0134]   BP Pulse Resp Temp SpO2   (!) 114/92 96 20 97.7 °F (36.5 °C) 98 %      MAP       --         Physical Exam    Nursing note and vitals reviewed.  Constitutional: She appears well-developed and well-nourished. No distress.   Appears to be very tired   HENT:   Head: Normocephalic and atraumatic.   Eyes: EOM are normal.   Neck: Neck supple.   Cardiovascular: Normal rate, regular rhythm and normal heart sounds.   Pulmonary/Chest: Breath sounds normal.   Abdominal: Abdomen is soft. Bowel sounds are normal.   Genitourinary:    Genitourinary Comments: Gross blood on physical exam maroon in color     Musculoskeletal:         General: Normal range of motion.      Cervical back: Neck supple.     Neurological: She is alert and oriented to person, place, and time. She has normal strength. She displays normal reflexes. No cranial nerve deficit or sensory deficit.   Skin: Skin is dry.   Psychiatric: She has a normal mood and affect.         Medical Screening Exam   See Full Note    ED Course   Procedures  Labs Reviewed   COMPREHENSIVE METABOLIC PANEL - Abnormal; Notable for the following components:       Result Value    Glucose 227 (*)     Creatinine 1.05 (*)     Albumin 3.4 (*)     eGFR 56 (*)     All other components within normal limits   OCCULT BLOOD X 1, STOOL - Abnormal; Notable for the following components:    Occult Blood Positive (*)     All other components within normal limits   CBC WITH DIFFERENTIAL - Abnormal; Notable for the following components:    WBC 16.79 (*)     RBC 3.88 (*)     Hemoglobin 11.6 (*)     Hematocrit 35.9 (*)     MPV 12.6 (*)     Neutrophils, Abs 10.15 (*)     Lymphocytes, Absolute 5.44 (*)     Monocytes % 6.2 (*)     Eosinophils % 0.5 (*)     Monocytes, Absolute 1.04 (*)     All other components within normal limits   TROPONIN I - Normal   CBC W/ AUTO DIFFERENTIAL     Narrative:     The following orders were created for panel order CBC auto differential.  Procedure                               Abnormality         Status                     ---------                               -----------         ------                     CBC with Differential[[id number]]        Abnormal            Final result               Manual Differential[[id number]]                              Final result                 Please view results for these tests on the individual orders.   MANUAL DIFFERENTIAL        ECG Results          EKG 12-lead (In process)  Result time 06/24/22 02:03:51    In process by Interface, Lab In [facility] (06/24/22 02:03:51)                 Narrative:    Test Reason : R55,    Vent. Rate : 082 BPM     Atrial Rate : 082 BPM     P-R Int : 146 ms          QRS Dur : 076 ms      QT Int : 398 ms       P-R-T Axes : 046 009 032 degrees     QTc Int : 464 ms    Normal sinus rhythm  Cannot rule out Anterior infarct ,age undetermined  Abnormal ECG  When compared with ECG of 26-FEB-2022 12:25,  No significant change was found    Referred By: GLENYS GUEVARA           Confirmed By:                             Imaging Results    None          Medications - No data to display              ED Course as of 06/24/22 0307 Fri Jun 24, 2022   0210 Patient has gross rectal bleeding, I will put in request for transfer for GI into the [facility]. [CB]   0227 Pt is now complaining of abdominal pain as well, I will get CT scan of the abdomen and pelvis with contrast. [CB]   0305 I spoke with Dr. Gomez and he wants patient admitted at [facility], given clear fluids, and go lightly, then get a colonoscopy tomorrow. [CB]      ED Course User Index  [CB] ANTHONY Amaya          Clinical Impression:   Final diagnoses:  [K62.5] Rectal bleeding (Primary)                 ANTHONY Amaya  06/24/22 0307

## 2022-06-25 ENCOUNTER — ANESTHESIA (OUTPATIENT)
Dept: GASTROENTEROLOGY | Facility: HOSPITAL | Age: 67
DRG: 395 | End: 2022-06-25
Payer: MEDICARE

## 2022-06-25 ENCOUNTER — ANESTHESIA EVENT (OUTPATIENT)
Dept: GASTROENTEROLOGY | Facility: HOSPITAL | Age: 67
DRG: 395 | End: 2022-06-25
Payer: MEDICARE

## 2022-06-25 LAB
ABO + RH BLD: NORMAL
ANION GAP SERPL CALCULATED.3IONS-SCNC: 15 MMOL/L (ref 7–16)
ATYPICAL LYMPHOCYTES: ABNORMAL
BASOPHILS # BLD AUTO: 0.07 K/UL (ref 0–0.2)
BASOPHILS NFR BLD AUTO: 0.5 % (ref 0–1)
BLD PROD TYP BPU: NORMAL
BLOOD UNIT EXPIRATION DATE: NORMAL
BLOOD UNIT TYPE CODE: 7300
BUN SERPL-MCNC: 11 MG/DL (ref 7–18)
BUN/CREAT SERPL: 15 (ref 6–20)
CALCIUM SERPL-MCNC: 8.1 MG/DL (ref 8.5–10.1)
CHLORIDE SERPL-SCNC: 107 MMOL/L (ref 98–107)
CO2 SERPL-SCNC: 25 MMOL/L (ref 21–32)
CREAT SERPL-MCNC: 0.73 MG/DL (ref 0.55–1.02)
CROSSMATCH INTERPRETATION: NORMAL
DIFFERENTIAL METHOD BLD: ABNORMAL
DISPENSE STATUS: NORMAL
EOSINOPHIL # BLD AUTO: 0.1 K/UL (ref 0–0.5)
EOSINOPHIL NFR BLD AUTO: 0.8 % (ref 1–4)
EOSINOPHIL NFR BLD MANUAL: 1 % (ref 1–4)
ERYTHROCYTE [DISTWIDTH] IN BLOOD BY AUTOMATED COUNT: 13.3 % (ref 11.5–14.5)
GLUCOSE SERPL-MCNC: 153 MG/DL (ref 74–106)
GLUCOSE SERPL-MCNC: 197 MG/DL (ref 70–105)
GLUCOSE SERPL-MCNC: 207 MG/DL (ref 70–105)
GLUCOSE SERPL-MCNC: 275 MG/DL (ref 70–105)
HCT VFR BLD AUTO: 22.9 % (ref 38–47)
HCT VFR BLD AUTO: 23.8 % (ref 38–47)
HCT VFR BLD AUTO: 25.3 % (ref 38–47)
HGB BLD-MCNC: 7.2 G/DL (ref 12–16)
HGB BLD-MCNC: 7.7 G/DL (ref 12–16)
HGB BLD-MCNC: 8.6 G/DL (ref 12–16)
IMM GRANULOCYTES # BLD AUTO: 0.07 K/UL (ref 0–0.04)
IMM GRANULOCYTES NFR BLD: 0.5 % (ref 0–0.4)
INDIRECT COOMBS: NORMAL
LYMPHOCYTES # BLD AUTO: 8.05 K/UL (ref 1–4.8)
LYMPHOCYTES NFR BLD AUTO: 63.1 % (ref 27–41)
LYMPHOCYTES NFR BLD MANUAL: 66 % (ref 27–41)
MCH RBC QN AUTO: 29.1 PG (ref 27–31)
MCHC RBC AUTO-ENTMCNC: 31.4 G/DL (ref 32–36)
MCV RBC AUTO: 92.7 FL (ref 80–96)
MONOCYTES # BLD AUTO: 0.67 K/UL (ref 0–0.8)
MONOCYTES NFR BLD AUTO: 5.3 % (ref 2–6)
MONOCYTES NFR BLD MANUAL: 3 % (ref 2–6)
MPC BLD CALC-MCNC: 11.7 FL (ref 9.4–12.4)
NEUTROPHILS # BLD AUTO: 3.79 K/UL (ref 1.8–7.7)
NEUTROPHILS NFR BLD AUTO: 29.8 % (ref 53–65)
NEUTS SEG NFR BLD MANUAL: 30 % (ref 50–62)
NRBC # BLD AUTO: 0.02 X10E3/UL
NRBC, AUTO (.00): 0.2 %
PLATELET # BLD AUTO: 185 K/UL (ref 150–400)
PLATELET MORPHOLOGY: ABNORMAL
POTASSIUM SERPL-SCNC: 4.1 MMOL/L (ref 3.5–5.1)
RBC # BLD AUTO: 2.47 M/UL (ref 4.2–5.4)
RBC MORPH BLD: NORMAL
RH BLD: NORMAL
SMUDGE CELLS BLD QL SMEAR: ABNORMAL
SODIUM SERPL-SCNC: 143 MMOL/L (ref 136–145)
UNIT NUMBER: NORMAL
WBC # BLD AUTO: 12.75 K/UL (ref 4.5–11)

## 2022-06-25 PROCEDURE — 80048 BASIC METABOLIC PNL TOTAL CA: CPT | Performed by: FAMILY MEDICINE

## 2022-06-25 PROCEDURE — D9220A PRA ANESTHESIA: Mod: ANES,,, | Performed by: ANESTHESIOLOGY

## 2022-06-25 PROCEDURE — 25000003 PHARM REV CODE 250: Performed by: FAMILY MEDICINE

## 2022-06-25 PROCEDURE — 86923 COMPATIBILITY TEST ELECTRIC: CPT | Performed by: FAMILY MEDICINE

## 2022-06-25 PROCEDURE — 86901 BLOOD TYPING SEROLOGIC RH(D): CPT | Performed by: FAMILY MEDICINE

## 2022-06-25 PROCEDURE — 99232 SBSQ HOSP IP/OBS MODERATE 35: CPT | Mod: ,,, | Performed by: FAMILY MEDICINE

## 2022-06-25 PROCEDURE — 88305 TISSUE EXAM BY PATHOLOGIST: CPT | Mod: SUR | Performed by: INTERNAL MEDICINE

## 2022-06-25 PROCEDURE — 88305 TISSUE EXAM BY PATHOLOGIST: CPT | Mod: 26,XU,, | Performed by: PATHOLOGY

## 2022-06-25 PROCEDURE — C9113 INJ PANTOPRAZOLE SODIUM, VIA: HCPCS | Performed by: INTERNAL MEDICINE

## 2022-06-25 PROCEDURE — 36415 COLL VENOUS BLD VENIPUNCTURE: CPT | Performed by: FAMILY MEDICINE

## 2022-06-25 PROCEDURE — D9220A PRA ANESTHESIA: Mod: CRNA,,, | Performed by: NURSE ANESTHETIST, CERTIFIED REGISTERED

## 2022-06-25 PROCEDURE — D9220A PRA ANESTHESIA: ICD-10-PCS | Mod: ANES,,, | Performed by: ANESTHESIOLOGY

## 2022-06-25 PROCEDURE — 45385 COLONOSCOPY W/LESION REMOVAL: CPT

## 2022-06-25 PROCEDURE — 36430 TRANSFUSION BLD/BLD COMPNT: CPT

## 2022-06-25 PROCEDURE — 63600175 PHARM REV CODE 636 W HCPCS: Performed by: FAMILY MEDICINE

## 2022-06-25 PROCEDURE — P9016 RBC LEUKOCYTES REDUCED: HCPCS | Performed by: FAMILY MEDICINE

## 2022-06-25 PROCEDURE — 85014 HEMATOCRIT: CPT | Performed by: FAMILY MEDICINE

## 2022-06-25 PROCEDURE — 11000001 HC ACUTE MED/SURG PRIVATE ROOM

## 2022-06-25 PROCEDURE — 99232 PR SUBSEQUENT HOSPITAL CARE,LEVL II: ICD-10-PCS | Mod: ,,, | Performed by: FAMILY MEDICINE

## 2022-06-25 PROCEDURE — 63600175 PHARM REV CODE 636 W HCPCS: Performed by: ANESTHESIOLOGY

## 2022-06-25 PROCEDURE — D9220A PRA ANESTHESIA: ICD-10-PCS | Mod: CRNA,,, | Performed by: NURSE ANESTHETIST, CERTIFIED REGISTERED

## 2022-06-25 PROCEDURE — 82962 GLUCOSE BLOOD TEST: CPT

## 2022-06-25 PROCEDURE — 88305 SURGICAL PATHOLOGY: ICD-10-PCS | Mod: 26,XU,, | Performed by: PATHOLOGY

## 2022-06-25 PROCEDURE — 85025 COMPLETE CBC W/AUTO DIFF WBC: CPT | Performed by: FAMILY MEDICINE

## 2022-06-25 PROCEDURE — 63600175 PHARM REV CODE 636 W HCPCS: Performed by: INTERNAL MEDICINE

## 2022-06-25 RX ORDER — ONDANSETRON 2 MG/ML
4 INJECTION INTRAMUSCULAR; INTRAVENOUS EVERY 6 HOURS PRN
Status: DISCONTINUED | OUTPATIENT
Start: 2022-06-25 | End: 2022-06-26 | Stop reason: HOSPADM

## 2022-06-25 RX ORDER — PROPOFOL 10 MG/ML
VIAL (ML) INTRAVENOUS
Status: DISCONTINUED | OUTPATIENT
Start: 2022-06-25 | End: 2022-06-25

## 2022-06-25 RX ORDER — ACETAMINOPHEN 325 MG/1
650 TABLET ORAL ONCE
Status: COMPLETED | OUTPATIENT
Start: 2022-06-25 | End: 2022-06-25

## 2022-06-25 RX ORDER — HYDROCODONE BITARTRATE AND ACETAMINOPHEN 500; 5 MG/1; MG/1
TABLET ORAL
Status: DISCONTINUED | OUTPATIENT
Start: 2022-06-25 | End: 2022-06-26 | Stop reason: HOSPADM

## 2022-06-25 RX ORDER — DIPHENHYDRAMINE HYDROCHLORIDE 50 MG/ML
25 INJECTION INTRAMUSCULAR; INTRAVENOUS ONCE
Status: COMPLETED | OUTPATIENT
Start: 2022-06-25 | End: 2022-06-25

## 2022-06-25 RX ADMIN — PANTOPRAZOLE SODIUM 40 MG: 40 INJECTION, POWDER, FOR SOLUTION INTRAVENOUS at 09:06

## 2022-06-25 RX ADMIN — ACETAMINOPHEN 650 MG: 325 TABLET ORAL at 01:06

## 2022-06-25 RX ADMIN — SODIUM CHLORIDE: 4.5 INJECTION, SOLUTION INTRAVENOUS at 02:06

## 2022-06-25 RX ADMIN — PROPOFOL 100 MG: 10 INJECTION, EMULSION INTRAVENOUS at 10:06

## 2022-06-25 RX ADMIN — SODIUM CHLORIDE: 9 INJECTION, SOLUTION INTRAVENOUS at 03:06

## 2022-06-25 RX ADMIN — PROPOFOL 50 MG: 10 INJECTION, EMULSION INTRAVENOUS at 09:06

## 2022-06-25 RX ADMIN — DIPHENHYDRAMINE HYDROCHLORIDE 25 MG: 50 INJECTION INTRAMUSCULAR; INTRAVENOUS at 01:06

## 2022-06-25 NOTE — PROGRESS NOTES
[address] Medicine  Progress Note    Patient Name: Olga Stephenson  MRN: [id number]  Patient Class: IP- Inpatient   Admission Date: 6/24/2022  Length of Stay: 1 days  Attending Physician: Lv Ludwig Jr., MD  Primary Care Provider: Lv Cartagena MD        Subjective:     Principal Problem:GIB (gastrointestinal bleeding)        HPI:  66 y/o AA female was in normal state of health until yesterday morning. Noted mild abdominal pain and bloody to dark colored bowel movements. Total of 7 of these Bms in past 24 hours.  She has nausea and occasional vomiting accompany these Bms. Some pink tinged vomitus.  No fever or chills.  She has felt a bit dizzy and weak.  Sought care at [facility], accepted by [facility] but bed just became available and patient transferred in.  Of note patient is on plavix, last dose yesterday.    Patient had  colonoscopy 1 week ago with multiple polyp removal, Had EGD a week before that with erosive gastritis.       Overview/Hospital Course:  EGD done on day of admission no source bleeding, prepped last night and colonoscopy revealed 2 ulcers that were the likely source.  Receiving 1 unit PRBC.       No new subjective & objective note has been filed under this hospital service since the last note was generated.      Assessment/Plan:      * GIB (gastrointestinal bleeding)  Colon ulcers are the likely source.  Give the degree of blood loss 11 to 7 Hg and symptoms she is getting a unit of blood and will observe for rebleeding.     Coronary artery disease    Holding meds for the time being. Consider resuming metoprolol when bleeding not an issue and pulse monitoring less valuable.  Likely to need to stop plavix indefinitely.     Type 2 diabetes mellitus  Clear liquids for now with SS insulin. Holding po DM meds.       VTE Risk Mitigation (From admission, onward)         Ordered     IP VTE LOW RISK PATIENT  Once         06/24/22 1529     Place sequential  compression device  Until discontinued         06/24/22 1529                Discharge Planning   ANNETTE:      Code Status: Full Code   Is the patient medically ready for discharge?:     Reason for patient still in hospital (select all that apply): Treatment                     Lv Ludwig Jr, MD  Department of Hospital Medicine   [address]

## 2022-06-25 NOTE — PLAN OF CARE
Pt sitting in bed AAO x3 resp even and unlabored. Blood transfusion complete at this time. Pt had no adverse reaction to the transfusion. Pt also tolerated her cscope well . VS obtained. Family at the bedside. Flush bag infusing at this time. Voices no concerns or needs. Will report to oncoming shift.

## 2022-06-25 NOTE — NURSING
Pt sitting up in bed talking to family at the bedside. Resp even and unlabored. Denies pain and discomfort. Blood transfusing at this time per 18G RAC. Blood verified x3. VS obtained assessment complete. Dr rangel at the bedside talking to pt. No adverse reaction noted. Voices no concerns or needs. Will continue to monitor.

## 2022-06-25 NOTE — HOSPITAL COURSE
EGD done on day of admission (6/24 and no source bleeding found), colonoscopy on 6/25 revealed 2 ulcers that were the likely source. Previously had polypectomy on 6/18. Hemoglobin dropped from 11 to 7.2 and received 1 unit PRBC during hospitalization. Now Hg stable at 8.6.   Discharging on 81 mg ECASA. Decision on necessity of long term plavix deferred to Dr. Cartagena but GI has okayed in as soon as 1 week. F/u Osiel in 2 weeks. Will give iron for one month.

## 2022-06-25 NOTE — ASSESSMENT & PLAN NOTE
Colon ulcers are the likely source.  Give the degree of blood loss 11 to 7 Hg and symptoms she is getting a unit of blood and will observe for rebleeding.

## 2022-06-25 NOTE — ANESTHESIA PREPROCEDURE EVALUATION
06/25/2022  Olga Stephenson is a 67 y.o., female.      Pre-op Assessment    I have reviewed the Patient Summary Reports.     I have reviewed the Nursing Notes. I have reviewed the NPO Status.   I have reviewed the Medications.     Review of Systems  Anesthesia Hx:  No problems with previous Anesthesia    Social:  Non-Smoker, No Alcohol Use    Hematology/Oncology:     Oncology Normal    -- Anemia:   EENT/Dental:EENT/Dental Normal   Cardiovascular:   Hypertension Past MI CAD      Pulmonary:   Asthma    Renal/:  Renal/ Normal     Hepatic/GI:   GERD Anemia / Gi bleed   Musculoskeletal:  Musculoskeletal Normal    Neurological:  Neurology Normal    Endocrine:   Diabetes, well controlled, type 2  Morbid Obesity / BMI > 40  Dermatological:  Skin Normal    Psych:  Psychiatric Normal           Physical Exam  General: Well nourished    Airway:  Mallampati: III / III  Mouth Opening: Normal  TM Distance: > 6 cm  Tongue: Normal  Neck ROM: Normal ROM    Chest/Lungs:  Clear to auscultation, Normal Respiratory Rate    Heart:  Rate: Normal  Rhythm: Regular Rhythm        Anesthesia Plan  Type of Anesthesia, risks & benefits discussed:    Anesthesia Type: MAC  Intra-op Monitoring Plan: Standard ASA Monitors  Post Op Pain Control Plan: multimodal analgesia  Induction:  IV  Informed Consent: Informed consent signed with the Patient and all parties understand the risks and agree with anesthesia plan.  All questions answered. Patient consented to blood products? Yes  ASA Score: 3 Emergent  Day of Surgery Review of History & Physical: H&P Update referred to the surgeon/provider.I have interviewed and examined the patient. I have reviewed the patient's H&P dated: There are no significant changes. H&P completed by Anesthesiologist.    Ready For Surgery From Anesthesia Perspective.     .

## 2022-06-26 VITALS
HEIGHT: 69 IN | WEIGHT: 198.88 LBS | DIASTOLIC BLOOD PRESSURE: 65 MMHG | OXYGEN SATURATION: 96 % | BODY MASS INDEX: 29.46 KG/M2 | RESPIRATION RATE: 16 BRPM | TEMPERATURE: 99 F | SYSTOLIC BLOOD PRESSURE: 134 MMHG | HEART RATE: 72 BPM

## 2022-06-26 LAB
ANION GAP SERPL CALCULATED.3IONS-SCNC: 16 MMOL/L (ref 7–16)
BASOPHILS # BLD AUTO: 0.1 K/UL (ref 0–0.2)
BASOPHILS NFR BLD AUTO: 0.8 % (ref 0–1)
BUN SERPL-MCNC: 7 MG/DL (ref 7–18)
BUN/CREAT SERPL: 9 (ref 6–20)
CALCIUM SERPL-MCNC: 8.4 MG/DL (ref 8.5–10.1)
CHLORIDE SERPL-SCNC: 108 MMOL/L (ref 98–107)
CO2 SERPL-SCNC: 25 MMOL/L (ref 21–32)
CREAT SERPL-MCNC: 0.74 MG/DL (ref 0.55–1.02)
DIFFERENTIAL METHOD BLD: ABNORMAL
EOSINOPHIL # BLD AUTO: 0.12 K/UL (ref 0–0.5)
EOSINOPHIL NFR BLD AUTO: 1 % (ref 1–4)
ERYTHROCYTE [DISTWIDTH] IN BLOOD BY AUTOMATED COUNT: 14.7 % (ref 11.5–14.5)
GLUCOSE SERPL-MCNC: 144 MG/DL (ref 74–106)
GLUCOSE SERPL-MCNC: 156 MG/DL (ref 70–105)
HCT VFR BLD AUTO: 25.6 % (ref 38–47)
HCT VFR BLD AUTO: 27 % (ref 38–47)
HGB BLD-MCNC: 8.7 G/DL (ref 12–16)
HGB BLD-MCNC: 8.8 G/DL (ref 12–16)
IMM GRANULOCYTES # BLD AUTO: 0.13 K/UL (ref 0–0.04)
IMM GRANULOCYTES NFR BLD: 1.1 % (ref 0–0.4)
LYMPHOCYTES # BLD AUTO: 6.62 K/UL (ref 1–4.8)
LYMPHOCYTES NFR BLD AUTO: 53.7 % (ref 27–41)
LYMPHOCYTES NFR BLD MANUAL: 50 % (ref 27–41)
MCH RBC QN AUTO: 29.8 PG (ref 27–31)
MCHC RBC AUTO-ENTMCNC: 32.2 G/DL (ref 32–36)
MCV RBC AUTO: 92.5 FL (ref 80–96)
MONOCYTES # BLD AUTO: 0.76 K/UL (ref 0–0.8)
MONOCYTES NFR BLD AUTO: 6.2 % (ref 2–6)
MONOCYTES NFR BLD MANUAL: 4 % (ref 2–6)
MPC BLD CALC-MCNC: 12.2 FL (ref 9.4–12.4)
NEUTROPHILS # BLD AUTO: 4.6 K/UL (ref 1.8–7.7)
NEUTROPHILS NFR BLD AUTO: 37.2 % (ref 53–65)
NEUTS BAND NFR BLD MANUAL: 2 % (ref 1–5)
NEUTS SEG NFR BLD MANUAL: 44 % (ref 50–62)
NRBC # BLD AUTO: 0.06 X10E3/UL
NRBC, AUTO (.00): 0.5 %
PLATELET # BLD AUTO: 198 K/UL (ref 150–400)
PLATELET MORPHOLOGY: ABNORMAL
POLYCHROMASIA BLD QL SMEAR: ABNORMAL
POTASSIUM SERPL-SCNC: 3.8 MMOL/L (ref 3.5–5.1)
RBC # BLD AUTO: 2.92 M/UL (ref 4.2–5.4)
SMUDGE CELLS BLD QL SMEAR: ABNORMAL
SODIUM SERPL-SCNC: 145 MMOL/L (ref 136–145)
WBC # BLD AUTO: 12.33 K/UL (ref 4.5–11)

## 2022-06-26 PROCEDURE — C9113 INJ PANTOPRAZOLE SODIUM, VIA: HCPCS | Performed by: INTERNAL MEDICINE

## 2022-06-26 PROCEDURE — 99239 PR HOSPITAL DISCHARGE DAY,>30 MIN: ICD-10-PCS | Mod: ,,, | Performed by: FAMILY MEDICINE

## 2022-06-26 PROCEDURE — 82962 GLUCOSE BLOOD TEST: CPT

## 2022-06-26 PROCEDURE — 99239 HOSP IP/OBS DSCHRG MGMT >30: CPT | Mod: ,,, | Performed by: FAMILY MEDICINE

## 2022-06-26 PROCEDURE — 36415 COLL VENOUS BLD VENIPUNCTURE: CPT | Performed by: FAMILY MEDICINE

## 2022-06-26 PROCEDURE — 85025 COMPLETE CBC W/AUTO DIFF WBC: CPT | Performed by: FAMILY MEDICINE

## 2022-06-26 PROCEDURE — 80048 BASIC METABOLIC PNL TOTAL CA: CPT | Performed by: FAMILY MEDICINE

## 2022-06-26 PROCEDURE — 63600175 PHARM REV CODE 636 W HCPCS: Performed by: INTERNAL MEDICINE

## 2022-06-26 RX ORDER — FERROUS SULFATE 325(65) MG
325 TABLET, DELAYED RELEASE (ENTERIC COATED) ORAL 2 TIMES DAILY
Qty: 60 TABLET | Refills: 0 | Status: SHIPPED | OUTPATIENT
Start: 2022-06-26 | End: 2022-07-26

## 2022-06-26 RX ORDER — ASPIRIN 81 MG/1
81 TABLET ORAL DAILY
Qty: 30 TABLET | Refills: 11 | Status: SHIPPED | OUTPATIENT
Start: 2022-06-26 | End: 2022-07-25

## 2022-06-26 RX ADMIN — PANTOPRAZOLE SODIUM 40 MG: 40 INJECTION, POWDER, FOR SOLUTION INTRAVENOUS at 09:06

## 2022-06-26 NOTE — ASSESSMENT & PLAN NOTE
Resume home meds except plavix. Adding ECASA 81mg. Possible to resume plavix in 1 week if desired. CABG > 10 years ago.

## 2022-06-26 NOTE — ASSESSMENT & PLAN NOTE
Colon ulcers are the likely source.  Give the degree of blood loss 11 to 7 Hg and symptoms she received 1 unit PRBC. Hg stable at 8.6. No evidence rebleeding. Home today.

## 2022-06-26 NOTE — PROGRESS NOTES
"Gastroenterology Progress Note    CC: hematochezia, anemia    HPI 67 y.o. female admitted with hematochezia/blood in stool following colonoscopy with polypectomy, with unrevealing EGD. Colonoscopy yesterday with two small polyps removed (cold snare), mild sigmoid diverticulosis, and two areas of ulceration (transverse, cecum) likely representing polypectomy sites. A clip was deployed to each area.    No acute events overnight. No further bleeding. Feeling very well this morning and hungry, and hoping for discharge. Anemia stable with Hgb of 8.    Results and plan reviewed with her in detail today, and her family which was at the bedside.    Past Medical History    has a past medical history of Acute superficial gastritis without hemorrhage (6/8/2022), Adenomatous polyp of cecum (6/15/2022), Adenomatous polyp of transverse colon (6/15/2022), Asthma, Coronary artery disease, Diabetes mellitus, Diverticula, colon (6/15/2022), Epigastric pain (6/8/2022), Heart attack (2011), History of colon polyps (6/15/2022), Hypertension, Polyp of hepatic flexure of colon (6/15/2022), Screening for colon cancer (6/15/2022), and Thyroid disease.      Review of Systems  General ROS: negative for chills, fever or weight loss  Cardiovascular ROS: no chest pain or dyspnea on exertion  Gastrointestinal ROS: no abdominal pain, change in bowel habits, or black/ bloody stools    Physical Examination  /65   Pulse 72   Temp 98.6 °F (37 °C)   Resp 16   Ht 5' 9" (1.753 m)   Wt 90.2 kg (198 lb 13.7 oz)   SpO2 96%   Breastfeeding No   BMI 29.37 kg/m²   General appearance: alert, cooperative, no distress  HENT: Normocephalic, atraumatic, neck symmetrical, no nasal discharge   Lungs: clear to auscultation in all fields, symmetric chest wall expansion bilaterally, no wheeze, rale, or rhonchi  Heart: normal rate, regular rhythm without rub; palpable peripheral pulsesI   Abdomen: soft, non-tender; bowel sounds normoactive; no " organomegaly  Extremities: extremities symmetric; no clubbing, cyanosis, or edema  Neurologic: Alert and oriented X 3, normal strength, normal coordination    Labs:  Lab Results   Component Value Date    WBC 12.33 (H) 06/26/2022    HGB 8.7 (L) 06/26/2022    HCT 27.0 (L) 06/26/2022    MCV 92.5 06/26/2022     06/26/2022           Imaging:  No new imaging    Assessment:   Very pleasant 67 year old AA woman with anemia and bleeding post colonoscopy with polypectomy, with unrevealing subsequent EGD and colonoscopy with clips placed on sites of previous polypectomy but no obvious bleeding seen. Very stable today.    Plan:  -Hold plavix for a total of 3 days post endoscopy, then restart  - Diet as tolerated today  - Consider once daily oral iron for several months  - Close follow up with PCP and Dr. Gomez at discharge  - Safe for discharge from a GI standpoint today    Ashley Melendrez MD  [phone]

## 2022-06-27 ENCOUNTER — TELEPHONE (OUTPATIENT)
Dept: EMERGENCY MEDICINE | Facility: HOSPITAL | Age: 67
End: 2022-06-27
Payer: MEDICARE

## 2022-06-27 ENCOUNTER — TELEPHONE (OUTPATIENT)
Dept: FAMILY MEDICINE | Facility: CLINIC | Age: 67
End: 2022-06-27

## 2022-06-27 LAB
GLUCOSE SERPL-MCNC: 149 MG/DL (ref 70–105)
GLUCOSE SERPL-MCNC: 178 MG/DL (ref 70–105)

## 2022-06-27 NOTE — TELEPHONE ENCOUNTER
6/27/22-spoke with patient this afternoon. Reports she is doing better. Denies any blood noted in stool/abd pain. No reports of n/v. Reviewed discharge meds and states she has started. Informed of f/u appt.Instructed to bring all meds to follow up visit and to call office for questions/concerns. Also to seek medical attention for any new or worsening sx. Jeramy. TParkmanRN

## 2022-06-27 NOTE — DISCHARGE SUMMARY
[facility] - [address] [place] [address] Medicine  Discharge Summary      Patient Name: Olga Stephenson  MRN: [id number]  Patient Class: IP- Inpatient  Admission Date: 6/24/2022  Hospital Length of Stay: 2 days  Discharge Date and Time: 6/26/2022 11:48 AM  Attending Physician: No att. providers found   Discharging Provider: Chelsey Alonso Jr, MD  Primary Care Provider: Chelsey Cartagena MD      HPI:   66 y/o AA female was in normal state of health until yesterday morning. Noted mild abdominal pain and bloody to dark colored bowel movements. Total of 7 of these Bms in past 24 hours.  She has nausea and occasional vomiting accompany these Bms. Some pink tinged vomitus.  No fever or chills.  She has felt a bit dizzy and weak.  Sought care at [facility], accepted by [facility] but bed just became available and patient transferred in.  Of note patient is on plavix, last dose yesterday.    Patient had  colonoscopy 1 week ago with multiple polyp removal, Had EGD a week before that with erosive gastritis.       * No surgery found *      Hospital Course:   EGD done on day of admission (6/24 and no source bleeding found), colonoscopy on 6/25 revealed 2 ulcers that were the likely source. Previously had polypectomy on 6/18. Hemoglobin dropped from 11 to 7.2 and received 1 unit PRBC during hospitalization. Now Hg stable at 8.6.   Discharging on 81 mg ECASA. Decision on necessity of long term plavix deferred to Dr. Cartagena but GI has okayed in as soon as 1 week. F/u Osiel in 2 weeks. Will give iron for one month.        Goals of Care Treatment Preferences:  Code Status: Full Code      Consults:   Consults (From admission, onward)        Status Ordering Provider     Inpatient consult to Gastroenterology  Once        Provider:  Maurice Gomez MD    Completed CHELSEY ALONSO JR          * GIB (gastrointestinal bleeding)  Colon ulcers are the likely source.  Give the degree of blood loss 11 to 7 Hg and symptoms she  received 1 unit PRBC. Hg stable at 8.6. No evidence rebleeding. Home today.     Coronary artery disease    Resume home meds except plavix. Adding ECASA 81mg. Possible to resume plavix in 1 week if desired. CABG > 10 years ago.    Type 2 diabetes mellitus  Resume home meds at discharge.       Final Active Diagnoses:    Diagnosis Date Noted POA    PRINCIPAL PROBLEM:  GIB (gastrointestinal bleeding) [K92.2] 06/24/2022 Yes    Coronary artery disease [I25.10]  Yes    Type 2 diabetes mellitus [E11.9] 04/16/2021 Yes      Problems Resolved During this Admission:       Discharged Condition: good    Disposition: Home or Self Care    Follow Up:   Follow-up Information     Lv Cartagena MD Follow up in 2 week(s).    Specialties: Internal Medicine, Family Medicine  Contact information:  [address] [address]  [phone]                       Patient Instructions:   No discharge procedures on file.    Significant Diagnostic Studies: Labs:   BMP:   Recent Labs   Lab 06/25/22  0437 06/26/22  0235   * 144*    145   K 4.1 3.8    108*   CO2 25 25   BUN 11 7   CREATININE 0.73 0.74   CALCIUM 8.1* 8.4*       Pending Diagnostic Studies:     Procedure Component Value Units Date/Time    EKG 12-lead [[id number]]     Order Status: Sent Lab Status: No result     EKG 12-lead [[id number]]     Order Status: Sent Lab Status: No result     EXTRA TUBES [[id number]] Collected: 06/24/22 1635    Order Status: Sent Lab Status: In process Updated: 06/24/22 1635    Specimen: Blood, Venous     Narrative:      The following orders were created for panel order EXTRA TUBES.  Procedure                               Abnormality         Status                     ---------                               -----------         ------                     Lavender Top Hold[[id number]]                                In process                   Please view results for these tests on the individual orders.    Surgical Pathology  [[id number]] Collected: 06/25/22 1008    Order Status: Sent Lab Status: No result     Specimen: Tissue from Intestine Large, Transverse Colon; Tissue from Intestine Large, Cecum          Medications:  Reconciled Home Medications:      Medication List      START taking these medications    aspirin 81 MG EC tablet  Commonly known as: ECOTRIN  Take 1 tablet (81 mg total) by mouth once daily.     ferrous sulfate 325 (65 FE) MG EC tablet  Take 1 tablet (325 mg total) by mouth 2 (two) times daily.        CHANGE how you take these medications    estradioL 0.025 mg/24 hr  Commonly known as: VIVELLE-DOT  Place 1 patch onto the skin twice a week.  What changed: Another medication with the same name was removed. Continue taking this medication, and follow the directions you see here.     ezetimibe 10 mg tablet  Commonly known as: ZETIA  Take 1 tablet by mouth once daily.  What changed: Another medication with the same name was removed. Continue taking this medication, and follow the directions you see here.     FARXIGA 10 mg tablet  Generic drug: dapagliflozin  TAKE 1 TABLET DAILY  What changed: Another medication with the same name was removed. Continue taking this medication, and follow the directions you see here.     metoprolol succinate 25 MG 24 hr tablet  Commonly known as: TOPROL-XL  Take 1 tablet by mouth once daily.  What changed: Another medication with the same name was removed. Continue taking this medication, and follow the directions you see here.     pregabalin 75 MG capsule  Commonly known as: LYRICA  Take 75 mg by mouth. 1 in the morning and 2 at night  What changed: Another medication with the same name was removed. Continue taking this medication, and follow the directions you see here.        CONTINUE taking these medications    albuterol 90 mcg/actuation inhaler  Commonly known as: PROVENTIL/VENTOLIN HFA  Inhale 2 puffs into the lungs 2 (two) times a day.     * loratadine 10 mg tablet  Commonly known as:  CLARITIN     * ALLERGY RELIEF (LORATADINE) 10 mg tablet  Generic drug: loratadine  Take 1 tablet by mouth once daily.     * blood sugar diagnostic Strp  Commonly known as: FREESTYLE LITE STRIPS  Use one strip daily to monitor glucose     * blood sugar diagnostic Strp     glipiZIDE 2.5 MG Tr24  Commonly known as: GLUCOTROL  Take 2 tablets (5 mg total) by mouth daily with breakfast.     losartan 25 MG tablet  Commonly known as: COZAAR  Take 1 tablet by mouth once daily.     metFORMIN 1000 MG tablet  Commonly known as: GLUCOPHAGE     pantoprazole 40 MG tablet  Commonly known as: PROTONIX  Take 1 tablet (40 mg total) by mouth 2 (two) times daily.         * This list has 4 medication(s) that are the same as other medications prescribed for you. Read the directions carefully, and ask your doctor or other care provider to review them with you.            STOP taking these medications    cyclobenzaprine 10 MG tablet  Commonly known as: FLEXERIL     hydrocortisone 2.5 % cream     valACYclovir 500 MG tablet  Commonly known as: VALTREX            Indwelling Lines/Drains at time of discharge:   Lines/Drains/Airways     None                 Time spent on the discharge of patient: 34 minutes         Lv Ludwig Jr, MD  Department of Hospital Medicine  [address]

## 2022-06-28 LAB
DHEA SERPL-MCNC: NORMAL
ESTROGEN SERPL-MCNC: NORMAL PG/ML
INSULIN SERPL-ACNC: NORMAL U[IU]/ML
LAB AP GROSS DESCRIPTION: NORMAL
LAB AP LABORATORY NOTES: NORMAL
T3RU NFR SERPL: NORMAL %

## 2022-06-30 ENCOUNTER — TELEPHONE (OUTPATIENT)
Dept: GASTROENTEROLOGY | Facility: CLINIC | Age: 67
End: 2022-06-30
Payer: MEDICARE

## 2022-06-30 NOTE — TELEPHONE ENCOUNTER
Called patient to discuss results and verbalized understanding.        ----- Message from Maurice Gomez MD sent at 6/29/2022  9:37 AM CDT -----  Place pt on list for colonoscopy in 5 years; polyp was ta.

## 2022-07-06 ENCOUNTER — OFFICE VISIT (OUTPATIENT)
Dept: FAMILY MEDICINE | Facility: CLINIC | Age: 67
End: 2022-07-06
Payer: MEDICARE

## 2022-07-06 VITALS
DIASTOLIC BLOOD PRESSURE: 74 MMHG | HEIGHT: 69 IN | OXYGEN SATURATION: 99 % | RESPIRATION RATE: 18 BRPM | BODY MASS INDEX: 30.36 KG/M2 | SYSTOLIC BLOOD PRESSURE: 118 MMHG | WEIGHT: 205 LBS | TEMPERATURE: 98 F | HEART RATE: 91 BPM

## 2022-07-06 DIAGNOSIS — K29.00 ACUTE SUPERFICIAL GASTRITIS WITHOUT HEMORRHAGE: Primary | ICD-10-CM

## 2022-07-06 DIAGNOSIS — K57.90 DIVERTICULOSIS: ICD-10-CM

## 2022-07-06 DIAGNOSIS — D63.8 ANEMIA IN OTHER CHRONIC DISEASES CLASSIFIED ELSEWHERE: Primary | ICD-10-CM

## 2022-07-06 DIAGNOSIS — D72.829 LEUKOCYTOSIS, UNSPECIFIED TYPE: ICD-10-CM

## 2022-07-06 DIAGNOSIS — R10.84 GENERALIZED ABDOMINAL PAIN: ICD-10-CM

## 2022-07-06 DIAGNOSIS — I25.118 CORONARY ARTERY DISEASE OF NATIVE ARTERY OF NATIVE HEART WITH STABLE ANGINA PECTORIS: ICD-10-CM

## 2022-07-06 LAB
BASOPHILS # BLD AUTO: 0.09 K/UL (ref 0–0.2)
BASOPHILS NFR BLD AUTO: 0.9 % (ref 0–1)
DIFFERENTIAL METHOD BLD: ABNORMAL
EOSINOPHIL # BLD AUTO: 0.12 K/UL (ref 0–0.5)
EOSINOPHIL NFR BLD AUTO: 1.3 % (ref 1–4)
ERYTHROCYTE [DISTWIDTH] IN BLOOD BY AUTOMATED COUNT: 15.5 % (ref 11.5–14.5)
HCT VFR BLD AUTO: 34.8 % (ref 38–47)
HGB BLD-MCNC: 11.1 G/DL (ref 12–16)
IMM GRANULOCYTES # BLD AUTO: 0.04 K/UL (ref 0–0.04)
IMM GRANULOCYTES NFR BLD: 0.4 % (ref 0–0.4)
LYMPHOCYTES # BLD AUTO: 3.72 K/UL (ref 1–4.8)
LYMPHOCYTES NFR BLD AUTO: 39 % (ref 27–41)
MCH RBC QN AUTO: 30.1 PG (ref 27–31)
MCHC RBC AUTO-ENTMCNC: 31.9 G/DL (ref 32–36)
MCV RBC AUTO: 94.3 FL (ref 80–96)
MONOCYTES # BLD AUTO: 0.77 K/UL (ref 0–0.8)
MONOCYTES NFR BLD AUTO: 8.1 % (ref 2–6)
MPC BLD CALC-MCNC: 12.2 FL (ref 9.4–12.4)
NEUTROPHILS # BLD AUTO: 4.79 K/UL (ref 1.8–7.7)
NEUTROPHILS NFR BLD AUTO: 50.3 % (ref 53–65)
NRBC # BLD AUTO: 0 X10E3/UL
NRBC, AUTO (.00): 0 %
PLATELET # BLD AUTO: 357 K/UL (ref 150–400)
RBC # BLD AUTO: 3.69 M/UL (ref 4.2–5.4)
WBC # BLD AUTO: 9.53 K/UL (ref 4.5–11)

## 2022-07-06 PROCEDURE — 85025 CBC WITH DIFFERENTIAL: ICD-10-PCS | Mod: ,,, | Performed by: CLINICAL MEDICAL LABORATORY

## 2022-07-06 PROCEDURE — 99214 OFFICE O/P EST MOD 30 MIN: CPT | Mod: ,,, | Performed by: INTERNAL MEDICINE

## 2022-07-06 PROCEDURE — 85025 COMPLETE CBC W/AUTO DIFF WBC: CPT | Mod: ,,, | Performed by: CLINICAL MEDICAL LABORATORY

## 2022-07-06 PROCEDURE — 99214 PR OFFICE/OUTPT VISIT, EST, LEVL IV, 30-39 MIN: ICD-10-PCS | Mod: ,,, | Performed by: INTERNAL MEDICINE

## 2022-07-06 RX ORDER — PANTOPRAZOLE SODIUM 40 MG/1
40 TABLET, DELAYED RELEASE ORAL DAILY
Qty: 90 TABLET | Refills: 3 | Status: SHIPPED | OUTPATIENT
Start: 2022-07-06 | End: 2023-11-06 | Stop reason: SDUPTHER

## 2022-07-06 RX ORDER — CLOPIDOGREL BISULFATE 75 MG/1
75 TABLET ORAL DAILY
Qty: 30 TABLET | Refills: 1 | Status: SHIPPED | OUTPATIENT
Start: 2022-07-06 | End: 2023-09-05 | Stop reason: SDUPTHER

## 2022-07-06 NOTE — PATIENT INSTRUCTIONS
Olga was seen today for follow-up, anemia and transitional care.    Diagnoses and all orders for this visit:    Anemia in other chronic diseases classified elsewhere  -     CBC Auto Differential; Future  -     CBC Auto Differential    Coronary artery disease of native artery of native heart with stable angina pectoris  -     Ambulatory referral/consult to Cardiology; Future    Generalized abdominal pain  -     CT Abdomen Pelvis  Without Contrast; Future    Diverticulosis    Leukocytosis, unspecified type    Other orders  The following orders have not been finalized:  -     clopidogreL (PLAVIX) 75 mg tablet

## 2022-07-06 NOTE — PROGRESS NOTES
Subjective:       Patient ID: Olga Stephenson is a 67 y.o. female.    Chief Complaint: Follow-up (Abnormal colonoscopy), Anemia (Received blood transfusion), and Transitional Care    Patient is here today for check up evaluation. Patient recently underwent colonoscopy. Procedure showed diverticulosis and multiple polyps were removed. Was recommended to have follow up colonoscopy in 5 years. Proper diet discussed to prevent diverticula becoming inflamed. Discussed what foods to avoid and to eat a high fiber diet. Patient will need to discuss with Dr Brand about when and if she needs to start back on Plavix. Patient states she is still having abdominal cramping and discomfort. Will order CT of abdomen/pelvis and order CBC. Will follow in 3 weeks.     Follow-up  Associated symptoms include abdominal pain.   Anemia  Symptoms include abdominal pain.       Current Medications:    Current Outpatient Medications:     albuterol (PROVENTIL/VENTOLIN HFA) 90 mcg/actuation inhaler, Inhale 2 puffs into the lungs 2 (two) times a day., Disp: 18 g, Rfl: 0    ALLERGY RELIEF, LORATADINE, 10 mg tablet, Take 1 tablet by mouth once daily., Disp: , Rfl:     aspirin (ECOTRIN) 81 MG EC tablet, Take 1 tablet (81 mg total) by mouth once daily., Disp: 30 tablet, Rfl: 11    blood sugar diagnostic (FREESTYLE LITE STRIPS) Strp, Use one strip daily to monitor glucose, Disp: 100 each, Rfl: 3    blood sugar diagnostic Strp, , Disp: , Rfl:     estradioL (VIVELLE-DOT) 0.025 mg/24 hr, Place 1 patch onto the skin twice a week., Disp: , Rfl:     ezetimibe (ZETIA) 10 mg tablet, Take 1 tablet by mouth once daily., Disp: , Rfl:     FARXIGA 10 mg tablet, TAKE 1 TABLET DAILY, Disp: 90 tablet, Rfl: 3    ferrous sulfate 325 (65 FE) MG EC tablet, Take 1 tablet (325 mg total) by mouth 2 (two) times daily., Disp: 60 tablet, Rfl: 0    glipiZIDE (GLUCOTROL) 2.5 MG TR24, Take 2 tablets (5 mg total) by mouth daily with breakfast., Disp: 180 tablet,  Rfl: 3    loratadine (CLARITIN) 10 mg tablet, , Disp: , Rfl:     losartan (COZAAR) 25 MG tablet, Take 1 tablet by mouth once daily., Disp: , Rfl:     metFORMIN (GLUCOPHAGE) 1000 MG tablet, , Disp: , Rfl:     metoprolol succinate (TOPROL-XL) 25 MG 24 hr tablet, Take 1 tablet by mouth once daily., Disp: , Rfl:     pantoprazole (PROTONIX) 40 MG tablet, Take 1 tablet (40 mg total) by mouth 2 (two) times daily., Disp: 60 tablet, Rfl: 6    pregabalin (LYRICA) 75 MG capsule, Take 75 mg by mouth. 1 in the morning and 2 at night, Disp: , Rfl:     Last Labs:     Admission on 06/24/2022, Discharged on 06/26/2022   Component Date Value    PT 06/24/2022 14.0     INR 06/24/2022 1.12 (A)    PTT 06/24/2022 23.7 (A)    Hemoglobin A1C 06/24/2022 7.0 (A)    Estimated Average Glucose 06/24/2022 147     Hemoglobin 06/24/2022 8.5 (A)    Hematocrit 06/24/2022 26.2 (A)    POC Glucose 06/24/2022 183 (A)    Hemoglobin 06/24/2022 8.5 (A)    Hematocrit 06/24/2022 26.0 (A)    Sodium 06/25/2022 143     Potassium 06/25/2022 4.1     Chloride 06/25/2022 107     CO2 06/25/2022 25     Anion Gap 06/25/2022 15     Glucose 06/25/2022 153 (A)    BUN 06/25/2022 11     Creatinine 06/25/2022 0.73     BUN/Creatinine Ratio 06/25/2022 15     Calcium 06/25/2022 8.1 (A)    eGFR 06/25/2022 85     WBC 06/25/2022 12.75 (A)    RBC 06/25/2022 2.47 (A)    Hemoglobin 06/25/2022 7.2 (A)    Hematocrit 06/25/2022 22.9 (A)    MCV 06/25/2022 92.7     MCH 06/25/2022 29.1     MCHC 06/25/2022 31.4 (A)    RDW 06/25/2022 13.3     Platelet Count 06/25/2022 185     MPV 06/25/2022 11.7     Neutrophils % 06/25/2022 29.8 (A)    Lymphocytes % 06/25/2022 63.1 (A)    Monocytes % 06/25/2022 5.3     Eosinophils % 06/25/2022 0.8 (A)    Basophils % 06/25/2022 0.5     Immature Granulocytes % 06/25/2022 0.5 (A)    nRBC, Auto 06/25/2022 0.2 (A)    Neutrophils, Abs 06/25/2022 3.79     Lymphocytes, Absolute 06/25/2022 8.05 (A)    Monocytes, Absolute  06/25/2022 0.67     Eosinophils, Absolute 06/25/2022 0.10     Basophils, Absolute 06/25/2022 0.07     Immature Granulocytes, A* 06/25/2022 0.07 (A)    nRBC, Absolute 06/25/2022 0.02 (A)    Diff Type 06/25/2022 Manual     POC Glucose 06/25/2022 275 (A)    Segmented Neutrophils, M* 06/25/2022 30 (A)    Lymphocytes, Man % 06/25/2022 66 (A)    Monocytes, Man % 06/25/2022 3     Eosinophils, Man % 06/25/2022 1     Platelet Morphology 06/25/2022 Few Large Platelets (A)    RBC Morphology 06/25/2022 Normal     Smudge Cells 06/25/2022 Few     Atypical Lymphocytes 06/25/2022 Few     Group & Rh 06/25/2022 B POS     Indirect Cris 06/25/2022 NEG     UNIT NUMBER 06/25/2022 [id number]     UNIT ABO/RH 06/25/2022 B POS     DISPENSE STATUS 06/25/2022 Transfused     Unit Expiration 06/25/2022 [id number]     Product Code 06/25/2022 [id number]     Unit Blood Type Code 06/25/2022 7300     CROSSMATCH INTERPRETATION 06/25/2022 Compatible     Hemoglobin 06/25/2022 7.7 (A)    Hematocrit 06/25/2022 23.8 (A)    Case Report 06/25/2022                      Value:Surgical Pathology                                Case: [id number]                                   Authorizing Provider:  Maurice Gomez MD      Collected:           06/25/2022 10:08 AM          Ordering Location:     [address]  Received:            06/27/2022 09:09 AM                                 Medical Telemetry                                                            Pathologist:           Satish Domingo MD                                                            Specimens:   A) - Intestine Large, Transverse Colon, polyp                                                       B) - Intestine Large, Cecum, polyp                                                         Final Diagnosis 06/25/2022                      Value:This result contains rich text formatting which cannot be displayed here.    Comments 06/25/2022                       Value:This result contains rich text formatting which cannot be displayed here.    Gross Description 06/25/2022                      Value:This result contains rich text formatting which cannot be displayed here.    Microscopic Description 06/25/2022                      Value:This result contains rich text formatting which cannot be displayed here.    Laboratory Notes 06/25/2022                      Value:This result contains rich text formatting which cannot be displayed here.    POC Glucose 06/25/2022 197 (A)    Hemoglobin 06/25/2022 8.6 (A)    Hematocrit 06/25/2022 25.3 (A)    POC Glucose 06/25/2022 207 (A)    Hemoglobin 06/25/2022 8.8 (A)    Hematocrit 06/25/2022 25.6 (A)    Sodium 06/26/2022 145     Potassium 06/26/2022 3.8     Chloride 06/26/2022 108 (A)    CO2 06/26/2022 25     Anion Gap 06/26/2022 16     Glucose 06/26/2022 144 (A)    BUN 06/26/2022 7     Creatinine 06/26/2022 0.74     BUN/Creatinine Ratio 06/26/2022 9     Calcium 06/26/2022 8.4 (A)    eGFR 06/26/2022 83     WBC 06/26/2022 12.33 (A)    RBC 06/26/2022 2.92 (A)    Hemoglobin 06/26/2022 8.7 (A)    Hematocrit 06/26/2022 27.0 (A)    MCV 06/26/2022 92.5     MCH 06/26/2022 29.8     MCHC 06/26/2022 32.2     RDW 06/26/2022 14.7 (A)    Platelet Count 06/26/2022 198     MPV 06/26/2022 12.2     Neutrophils % 06/26/2022 37.2 (A)    Lymphocytes % 06/26/2022 53.7 (A)    Monocytes % 06/26/2022 6.2 (A)    Eosinophils % 06/26/2022 1.0     Basophils % 06/26/2022 0.8     Immature Granulocytes % 06/26/2022 1.1 (A)    nRBC, Auto 06/26/2022 0.5 (A)    Neutrophils, Abs 06/26/2022 4.60     Lymphocytes, Absolute 06/26/2022 6.62 (A)    Monocytes, Absolute 06/26/2022 0.76     Eosinophils, Absolute 06/26/2022 0.12     Basophils, Absolute 06/26/2022 0.10     Immature Granulocytes, A* 06/26/2022 0.13 (A)    nRBC, Absolute 06/26/2022 0.06 (A)    Diff Type 06/26/2022 Manual     Segmented Neutrophils, M* 06/26/2022 44 (A)     Bands, Man % 06/26/2022 2     Lymphocytes, Man % 06/26/2022 50 (A)    Monocytes, Man % 06/26/2022 4     Platelet Morphology 06/26/2022 Few Large Platelets (A)    Polychromasia 06/26/2022 Few     Smudge Cells 06/26/2022 Few     POC Glucose 06/26/2022 156 (A)    POC Glucose 06/25/2022 149 (A)    POC Glucose 06/24/2022 178 (A)   Admission on 06/24/2022, Discharged on 06/24/2022   Component Date Value    Sodium 06/24/2022 142     Potassium 06/24/2022 4.7     Chloride 06/24/2022 107     CO2 06/24/2022 25     Anion Gap 06/24/2022 15     Glucose 06/24/2022 227 (A)    BUN 06/24/2022 18     Creatinine 06/24/2022 1.05 (A)    BUN/Creatinine Ratio 06/24/2022 17     Calcium 06/24/2022 8.7     Total Protein 06/24/2022 6.8     Albumin 06/24/2022 3.4 (A)    Globulin 06/24/2022 3.4     A/G Ratio 06/24/2022 1.0     Bilirubin, Total 06/24/2022 0.1     Alk Phos 06/24/2022 61     ALT 06/24/2022 44     AST 06/24/2022 30     eGFR 06/24/2022 56 (A)    Troponin I High Sensitiv* 06/24/2022 4.2     Occult Blood 06/24/2022 Positive (A)    WBC 06/24/2022 16.79 (A)    RBC 06/24/2022 3.88 (A)    Hemoglobin 06/24/2022 11.6 (A)    Hematocrit 06/24/2022 35.9 (A)    MCV 06/24/2022 92.5     MCH 06/24/2022 29.9     MCHC 06/24/2022 32.3     RDW 06/24/2022 12.9     Platelet Count 06/24/2022 263     MPV 06/24/2022 12.6 (A)    Neutrophils % 06/24/2022 60.5     Lymphocytes % 06/24/2022 32.4     Neutrophils, Abs 06/24/2022 10.15 (A)    Lymphocytes, Absolute 06/24/2022 5.44 (A)    Diff Type 06/24/2022 Manual     Monocytes % 06/24/2022 6.2 (A)    Eosinophils % 06/24/2022 0.5 (A)    Basophils % 06/24/2022 0.4     Monocytes, Absolute 06/24/2022 1.04 (A)    Eosinophils, Absolute 06/24/2022 0.09     Basophils, Absolute 06/24/2022 0.07     Segmented Neutrophils, M* 06/24/2022 53     Bands, Man % 06/24/2022 1     Lymphocytes, Man % 06/24/2022 36     Monocytes, Man % 06/24/2022 6     Eosinophils, Man %  06/24/2022 3     Metamyelocytes, Man % 06/24/2022 1     Platelet Morphology 06/24/2022 Normal     Anisocytosis 06/24/2022 1+     Poikilocytosis 06/24/2022 1+     Reactive Lymphocytes 06/24/2022 Few     WBC 06/24/2022 14.12 (A)    RBC 06/24/2022 3.34 (A)    Hemoglobin 06/24/2022 10.0 (A)    Hematocrit 06/24/2022 31.8 (A)    MCV 06/24/2022 95.2     MCH 06/24/2022 29.9     MCHC 06/24/2022 31.4 (A)    RDW 06/24/2022 13.4     Platelet Count 06/24/2022 246     MPV 06/24/2022 12.4     Neutrophils % 06/24/2022 64.0     Lymphocytes % 06/24/2022 29.8     Neutrophils, Abs 06/24/2022 9.03 (A)    Lymphocytes, Absolute 06/24/2022 4.21     Diff Type 06/24/2022 Manual     Monocytes % 06/24/2022 5.6     Eosinophils % 06/24/2022 0.2 (A)    Basophils % 06/24/2022 0.4     Monocytes, Absolute 06/24/2022 0.79     Eosinophils, Absolute 06/24/2022 0.03     Basophils, Absolute 06/24/2022 0.06     Segmented Neutrophils, M* 06/24/2022 65 (A)    Lymphocytes, Man % 06/24/2022 30     Monocytes, Man % 06/24/2022 5     Platelet Morphology 06/24/2022 Normal     Hypochromic 06/24/2022 1+    Hospital Outpatient Visit on 06/15/2022   Component Date Value    POC Glucose 06/15/2022 160 (A)    POC Glucose 06/15/2022 160 (A)    Case Report 06/15/2022                      Value:Surgical Pathology                                Case: [id number]                                   Authorizing Provider:  Maurice Gomez MD      Collected:           06/15/2022 09:37 AM          Ordering Location:     [facility] - Endoscopy       Received:            06/15/2022 11:39 AM          Pathologist:           Ruiz Michaels MD                                                       Specimens:   A) - Intestine Large, Transverse Colon, polyp X3/ polyp biopsyX1                                    B) - Intestine Large, Cecum, polyp                                                                  C) - Colon, hepatic flexure polyp bipopsy                                                   Final Diagnosis 06/15/2022                      Value:This result contains rich text formatting which cannot be displayed here.    Gross Description 06/15/2022                      Value:This result contains rich text formatting which cannot be displayed here.    Microscopic Description 06/15/2022                      Value:This result contains rich text formatting which cannot be displayed here.    Laboratory Notes 06/15/2022                      Value:This result contains rich text formatting which cannot be displayed here.   Hospital Outpatient Visit on 06/08/2022   Component Date Value    Case Report 06/08/2022                      Value:Surgical Pathology                                Case: [id number]                                   Authorizing Provider:  Maurice Gomez MD      Collected:           06/08/2022 11:54 AM          Ordering Location:     [facility] - Endoscopy       Received:            06/08/2022 01:25 PM          Pathologist:           Satish Domingo MD                                                            Specimens:   A) - Stomach, a. stomach bx                                                                         B) - Esophagus, b. esophagus bx                                                            Final Diagnosis 06/08/2022                      Value:This result contains rich text formatting which cannot be displayed here.    Gross Description 06/08/2022                      Value:This result contains rich text formatting which cannot be displayed here.    Microscopic Description 06/08/2022                      Value:This result contains rich text formatting which cannot be displayed here.    Laboratory Notes 06/08/2022                      Value:This result contains rich text formatting which cannot be displayed here.       Last Imaging:  Colonoscopy  Narrative: Procedure Date  6/25/22    Impression  Overall   Impression:   67 year old woman on Plavix, with colonoscopy and   polypectomy ~ 10 days ago with subsequent hematochezia suspicious for post   polypectomy bleeding. EGD unrevealing since bleeding began. Exam today   with sigmoid diverticulosis, and two small polyps removed with cold snare   (transverse, cecum). There were two areas of ulceration noted, one in   transverse and one in the cecum. Both had flat pigmented spots but were   not actively bleeding. Due to the severity of her bleed, and actually   ongoing bleeding with prep overnight, a hemoclip was deployed to each   ulcer with success.    Recommendation  Await pathology results  Advance diet today  I would recommend to hold plavix for an additional 3 days, then restart  Followup with Dr. Gomez     Indication  None    Providers  Attending: Maurice Gomez MD, Ashley Melendrez MD  Fellow:    Other Staff: Vik Zambrano RN, Juan Abreu CRNA, Lv Javier, ELLE, Calin Houser MD    Medications  Moderate sedation administered by anesthesia staff - See anesthesia   record.    Preprocedure  A history and physical has been performed, and patient medication   allergies have been reviewed. The patient's tolerance of previous   anesthesia has been reviewed. The risks and benefits of the procedure and   the sedation options and risks were discussed with the patient. All   questions were answered and informed consent obtained.    ASA Score: ASA 2 - Patient with mild systemic disease with no functional   limitations  Mallampati Airway Score: II (hard and soft palate, upper portion of   tonsils anduvula visible)    Details of the Procedure  The patient underwent moderate sedation, which was administered by an   anesthesia professional. The patient's heart rate, blood pressure, level   of consciousness, respirations, oxygen, ECG and ETCO2 were monitored   throughout the procedure. A digital rectal exam was performed. A perianal   exam was performed. The scope was  introduced through the anus and advanced   to the cecum. Bowel prep was adequate. The patient's estimated blood loss   was minimal (<5 mL). The procedure was not difficult. The patient   tolerated the procedure well. There were no apparent complications.     Scope: Gastroscope, Colonoscope  Scope Serial: [id number]    Events  Procedure Events   Event Event Time     Procedure Events   Event Event Time     CECAL WITHDRAWAL TIME:     Findings  Few small mild localized diverticula containing no content in the sigmoid   colon; no bleeding was identified  Mild, localized ulcerated mucosa in the mid transverse colon; no bleeding   was identified; placed clips  One sessile polyp measuring 5-9 mm in the transverse colon; no bleeding   was identified; completely removed en bloc by cold snare but did not   retrieve specimen  Moderate, localized ulcerated mucosa in the cecum; no bleeding was   identified; placed 1 clip successfully; hemostasis achieved  One sessile polyp measuring 5-9 mm in the cecum; no bleeding was   identified; completely removed en bloc by cold snare and retrieved   specimen         Review of Systems   Gastrointestinal: Positive for abdominal pain.   All other systems reviewed and are negative.        Objective:      Physical Exam  Vitals reviewed.   Constitutional:       Appearance: Normal appearance.   Cardiovascular:      Rate and Rhythm: Normal rate and regular rhythm.      Pulses: Normal pulses.      Heart sounds: Normal heart sounds.   Pulmonary:      Effort: Pulmonary effort is normal.      Breath sounds: Normal breath sounds.   Abdominal:      General: Abdomen is flat. Bowel sounds are normal.      Palpations: Abdomen is soft.   Musculoskeletal:         General: Normal range of motion.      Cervical back: Normal range of motion and neck supple.   Skin:     General: Skin is warm and dry.   Neurological:      General: No focal deficit present.      Mental Status: She is alert and oriented to  person, place, and time. Mental status is at baseline.         Assessment:       1. Anemia in other chronic diseases classified elsewhere  CBC Auto Differential    CBC Auto Differential   2. Coronary artery disease of native artery of native heart with stable angina pectoris  Ambulatory referral/consult to Cardiology   3. Generalized abdominal pain  CT Abdomen Pelvis  Without Contrast   4. Diverticulosis     5. Leukocytosis, unspecified type          Plan:         Olga was seen today for follow-up, anemia and transitional care.    Diagnoses and all orders for this visit:    Anemia in other chronic diseases classified elsewhere  -     CBC Auto Differential; Future  -     CBC Auto Differential    Coronary artery disease of native artery of native heart with stable angina pectoris  -     Ambulatory referral/consult to Cardiology; Future    Generalized abdominal pain  -     CT Abdomen Pelvis  Without Contrast; Future    Diverticulosis    Leukocytosis, unspecified type    Other orders  The following orders have not been finalized:  -     clopidogreL (PLAVIX) 75 mg tablet

## 2022-07-20 ENCOUNTER — HOSPITAL ENCOUNTER (OUTPATIENT)
Dept: RADIOLOGY | Facility: HOSPITAL | Age: 67
Discharge: HOME | End: 2022-07-20
Attending: INTERNAL MEDICINE
Payer: MEDICARE

## 2022-07-20 DIAGNOSIS — R10.84 GENERALIZED ABDOMINAL PAIN: ICD-10-CM

## 2022-07-20 PROCEDURE — 74176 CT ABD & PELVIS W/O CONTRAST: CPT | Mod: 26,,, | Performed by: STUDENT IN AN ORGANIZED HEALTH CARE EDUCATION/TRAINING PROGRAM

## 2022-07-20 PROCEDURE — 74176 CT ABDOMEN PELVIS WITHOUT CONTRAST: ICD-10-PCS | Mod: 26,,, | Performed by: STUDENT IN AN ORGANIZED HEALTH CARE EDUCATION/TRAINING PROGRAM

## 2022-07-20 PROCEDURE — 74176 CT ABD & PELVIS W/O CONTRAST: CPT | Mod: TC

## 2022-07-22 ENCOUNTER — TELEPHONE (OUTPATIENT)
Dept: FAMILY MEDICINE | Facility: CLINIC | Age: 67
End: 2022-07-22
Payer: MEDICARE

## 2022-07-22 NOTE — TELEPHONE ENCOUNTER
----- Message from Lv Cartagena MD sent at 7/22/2022 12:44 PM CDT -----  Need to see in  1 week please  abnl results         Patient already has appointment for next week

## 2022-07-25 ENCOUNTER — OFFICE VISIT (OUTPATIENT)
Dept: DIABETES SERVICES | Facility: CLINIC | Age: 67
End: 2022-07-25
Payer: MEDICARE

## 2022-07-25 VITALS
DIASTOLIC BLOOD PRESSURE: 90 MMHG | SYSTOLIC BLOOD PRESSURE: 140 MMHG | OXYGEN SATURATION: 95 % | HEIGHT: 69 IN | WEIGHT: 205.38 LBS | BODY MASS INDEX: 30.42 KG/M2 | RESPIRATION RATE: 16 BRPM | HEART RATE: 88 BPM

## 2022-07-25 DIAGNOSIS — K76.0 FATTY LIVER: ICD-10-CM

## 2022-07-25 DIAGNOSIS — I25.10 CORONARY ARTERY DISEASE INVOLVING NATIVE CORONARY ARTERY OF NATIVE HEART, UNSPECIFIED WHETHER ANGINA PRESENT: ICD-10-CM

## 2022-07-25 DIAGNOSIS — E11.9 TYPE 2 DIABETES MELLITUS WITHOUT COMPLICATION, WITHOUT LONG-TERM CURRENT USE OF INSULIN: Primary | ICD-10-CM

## 2022-07-25 DIAGNOSIS — K31.84 GASTROPARESIS: ICD-10-CM

## 2022-07-25 DIAGNOSIS — K21.9 GASTROESOPHAGEAL REFLUX DISEASE, UNSPECIFIED WHETHER ESOPHAGITIS PRESENT: ICD-10-CM

## 2022-07-25 DIAGNOSIS — I10 PRIMARY HYPERTENSION: ICD-10-CM

## 2022-07-25 DIAGNOSIS — E78.5 HYPERLIPIDEMIA, UNSPECIFIED HYPERLIPIDEMIA TYPE: ICD-10-CM

## 2022-07-25 LAB — GLUCOSE SERPL-MCNC: 154 MG/DL (ref 70–110)

## 2022-07-25 PROCEDURE — 99213 OFFICE O/P EST LOW 20 MIN: CPT | Mod: S$PBB,,, | Performed by: NURSE PRACTITIONER

## 2022-07-25 PROCEDURE — 99213 PR OFFICE/OUTPT VISIT, EST, LEVL III, 20-29 MIN: ICD-10-PCS | Mod: S$PBB,,, | Performed by: NURSE PRACTITIONER

## 2022-07-25 PROCEDURE — 99215 OFFICE O/P EST HI 40 MIN: CPT | Mod: PBBFAC | Performed by: NURSE PRACTITIONER

## 2022-07-25 PROCEDURE — 82962 GLUCOSE BLOOD TEST: CPT | Mod: PBBFAC | Performed by: NURSE PRACTITIONER

## 2022-07-25 RX ORDER — LOSARTAN POTASSIUM 50 MG/1
50 TABLET ORAL DAILY
Qty: 90 TABLET | Refills: 3 | Status: SHIPPED | OUTPATIENT
Start: 2022-07-25 | End: 2023-08-09 | Stop reason: SDUPTHER

## 2022-07-25 RX ORDER — GLIPIZIDE 5 MG/1
5 TABLET, FILM COATED, EXTENDED RELEASE ORAL
Qty: 90 TABLET | Refills: 3 | Status: SHIPPED | OUTPATIENT
Start: 2022-07-25 | End: 2022-09-29

## 2022-07-25 NOTE — PATIENT INSTRUCTIONS
Pt is advised to monitor and document glucose fasting when you wake up before you eat and 2 hours after meal and bring in meter to next visit.      Ensure to take medications as directed.      Follow diabetic diet as directed.      Work to achieve normal body weight.     Ensure to exercise 4-5 times per week for 20 minutes.  Snacks with 0-5 grams carbs   Hard Boiled Egg   Crystal Light, Vitamin Water, Powerade Zero   Herbal tea, unsweetened   8 oz unsweetened almond milk   2 tsp peanut butter on celery   ½ cup sugar-free Jell-O   1 sugar-free popsicle   Non starchy vegetables such as carrots or celery sticks with lowfat dressing   ½ oz lowfat cheese or string cheese   1 closed handful of nuts or tbsp of seeds, unsalted    Snacks with 15 gram carbs  . 1 small piece of fruit or . banana or . cup light canned fruit  . 3 li cracker squares  . 3 cups popcorn  . 5 Vanilla Wafers  . 1/2 cup low fat, no added sugar ice cream or frozen yogurt  . 1/2 turkey, ham, or chicken sandwich  . 1.2 cup fruit with 1/2 cup of cottage cheese  . 4-6 unsalted wheat crackers with 1 oz low fat cheese or 1 tbsp peanut butter  . 30 goldfish crackers  . 7-8 mini rice cakes  . 1/3 cup hummus dip with raw vegetables  . 1/2 whole wheat ravi, 1 tbsp hummus  . Mini pizza (. whole wheat English muffin, low-fat cheese, tomato sauce)  . 100 calorie snack pack  . 4-6 oz light yogurt  . 1/2 cup sugar-free pudding

## 2022-07-27 ENCOUNTER — OFFICE VISIT (OUTPATIENT)
Dept: FAMILY MEDICINE | Facility: CLINIC | Age: 67
End: 2022-07-27
Payer: MEDICARE

## 2022-07-27 VITALS
BODY MASS INDEX: 30.27 KG/M2 | WEIGHT: 204.38 LBS | HEART RATE: 72 BPM | TEMPERATURE: 97 F | DIASTOLIC BLOOD PRESSURE: 80 MMHG | SYSTOLIC BLOOD PRESSURE: 118 MMHG | RESPIRATION RATE: 18 BRPM | OXYGEN SATURATION: 99 % | HEIGHT: 69 IN

## 2022-07-27 DIAGNOSIS — K21.9 GASTROESOPHAGEAL REFLUX DISEASE, UNSPECIFIED WHETHER ESOPHAGITIS PRESENT: Primary | ICD-10-CM

## 2022-07-27 DIAGNOSIS — K57.90 DIVERTICULOSIS: ICD-10-CM

## 2022-07-27 DIAGNOSIS — E11.43 DIABETIC GASTROPARESIS: ICD-10-CM

## 2022-07-27 DIAGNOSIS — K31.84 DIABETIC GASTROPARESIS: ICD-10-CM

## 2022-07-27 DIAGNOSIS — R91.1 PULMONARY NODULE: ICD-10-CM

## 2022-07-27 DIAGNOSIS — Z87.891 PERSONAL HISTORY OF NICOTINE DEPENDENCE: ICD-10-CM

## 2022-07-27 PROCEDURE — 99214 PR OFFICE/OUTPT VISIT, EST, LEVL IV, 30-39 MIN: ICD-10-PCS | Mod: ,,, | Performed by: INTERNAL MEDICINE

## 2022-07-27 PROCEDURE — 99214 OFFICE O/P EST MOD 30 MIN: CPT | Mod: ,,, | Performed by: INTERNAL MEDICINE

## 2022-07-27 RX ORDER — FAMOTIDINE 20 MG/1
TABLET, FILM COATED ORAL
Qty: 90 TABLET | Refills: 1 | Status: ON HOLD | OUTPATIENT
Start: 2022-07-27 | End: 2023-02-24 | Stop reason: HOSPADM

## 2022-07-27 NOTE — PATIENT INSTRUCTIONS
Olga was seen today for follow-up and results.    Diagnoses and all orders for this visit:    Gastroesophageal reflux disease, unspecified whether esophagitis present  -     Ambulatory referral/consult to Gastroenterology; Future    Diverticulosis  -     Ambulatory referral/consult to Gastroenterology; Future    Pulmonary nodule  -     Ambulatory referral/consult to Pulmonology; Future  -     CT Chest Lung Screening Low Dose; Future    Personal history of nicotine dependence   -     CT Chest Lung Screening Low Dose; Future    Diabetic gastroparesis    Other orders  The following orders have not been finalized:  -     famotidine (PEPCID) 20 MG tablet

## 2022-07-27 NOTE — PROGRESS NOTES
Subjective:       Patient ID: Olga Stephenson is a 67 y.o. female.    Chief Complaint: Diabetes Mellitus (Pt here for follow up visit, states her sugars have been good, checks occasionally.)    Here today for routine evaluation and med refill.  Denies complaints  Blood pressure elevated here today. She has not taken blood pressure meds today and has been under some recent stress.  Advised to monitor for 2-3 weeks and call if continues to remain elevated.   Lab Results       Component                Value               Date                       HGBA1C                   7.0 (H)             06/24/2022            No results found for: MICROALBUR, [id number]  Lab Results       Component                Value               Date                       CHOL                     153                 01/05/2022                 CHOL                     137                 01/30/2020            Lab Results       Component                Value               Date                       HDL                      35 (L)              01/05/2022                 HDL                      37                  01/30/2020            Lab Results       Component                Value               Date                       LDLCALC                  92                  01/05/2022                 LDLCALC                  67                  01/30/2020            Lab Results       Component                Value               Date                       TRIG                     132                 01/05/2022                 TRIG                     165                 01/30/2020            Lab Results       Component                Value               Date                       CHOLHDL                  4.4                 01/05/2022            CMP  Sodium       Date                     Value               Ref Range           Status                06/26/2022               145                 136 - 145 mmol*     Final            ----------  Potassium        Date                     Value               Ref Range           Status                06/26/2022               3.8                 3.5 - 5.1 mmol*     Final            ----------  Chloride       Date                     Value               Ref Range           Status                06/26/2022               108 (H)             98 - 107 mmol/L     Final            ----------  CO2       Date                     Value               Ref Range           Status                06/26/2022               25                  21 - 32 mmol/L      Final            ----------  Glucose       Date                     Value               Ref Range           Status                06/26/2022               144 (H)             74 - 106 mg/dL      Final            ----------  BUN       Date                     Value               Ref Range           Status                06/26/2022               7                   7 - 18 mg/dL        Final            ----------  Creatinine       Date                     Value               Ref Range           Status                06/26/2022               0.74                0.55 - 1.02 mg*     Final            ----------  Calcium       Date                     Value               Ref Range           Status                06/26/2022               8.4 (L)             8.5 - 10.1 mg/*     Final            ----------  Total Protein       Date                     Value               Ref Range           Status                06/24/2022               6.8                 6.4 - 8.2 g/dL      Final            ----------  Albumin       Date                     Value               Ref Range           Status                06/24/2022               3.4 (L)             3.5 - 5.0 g/dL      Final            ----------  Bilirubin, Total       Date                     Value               Ref Range           Status                06/24/2022               0.1                 0.0 - 1.2 mg/dL     Final             ----------  Alk Phos       Date                     Value               Ref Range           Status                06/24/2022               61                  55 - 142 U/L        Final            ----------  AST       Date                     Value               Ref Range           Status                06/24/2022               30                  15 - 37 U/L         Final            ----------  ALT       Date                     Value               Ref Range           Status                06/24/2022               44                  13 - 56 U/L         Final            ----------  Anion Gap       Date                     Value               Ref Range           Status                06/26/2022               16                  7 - 16 mmol/L       Final            ----------  eGFR        Date                     Value               Ref Range           Status                06/08/2021               68                  >=60 mL/min/1.*     Final            ----------  eGFR       Date                     Value               Ref Range           Status                06/26/2022               83                  >=60 mL/min/1.*     Final            ----------      Review of Systems   Constitutional: Negative for activity change, appetite change, diaphoresis and fatigue.   HENT: Negative for nasal congestion, facial swelling and sinus pressure/congestion.    Eyes: Negative for visual disturbance.   Respiratory: Negative for shortness of breath and wheezing.    Cardiovascular: Negative for chest pain and leg swelling.   Gastrointestinal: Negative for constipation, diarrhea, nausea and vomiting.   Endocrine: Negative for polydipsia, polyphagia and polyuria.   Genitourinary: Negative for dysuria, frequency and urgency.   Musculoskeletal: Negative for gait problem and myalgias.   Integumentary:  Negative for color change, rash and wound.   Neurological: Negative for dizziness, syncope, weakness, headaches,  disturbances in coordination and coordination difficulties.   Hematological: Does not bruise/bleed easily.   Psychiatric/Behavioral: Negative for self-injury, sleep disturbance and suicidal ideas. The patient is not nervous/anxious.          Objective:      Physical Exam  Vitals and nursing note reviewed.   Constitutional:       Appearance: Normal appearance.   HENT:      Head: Normocephalic.   Cardiovascular:      Rate and Rhythm: Normal rate.   Pulmonary:      Effort: Pulmonary effort is normal.   Musculoskeletal:         General: Normal range of motion.   Skin:     General: Skin is warm and dry.   Neurological:      General: No focal deficit present.      Mental Status: She is alert and oriented to person, place, and time.   Psychiatric:         Mood and Affect: Mood normal.         Behavior: Behavior normal.         Thought Content: Thought content normal.         Judgment: Judgment normal.         Assessment:       Problem List Items Addressed This Visit        Cardiac/Vascular    Coronary artery disease    Hyperlipidemia    Hypertension       Endocrine    Type 2 diabetes mellitus - Primary    Relevant Medications    glipiZIDE (GLUCOTROL) 5 MG TR24    Other Relevant Orders    POCT Glucose, Hand-Held Device (Completed)       GI    Fatty liver    Gastroesophageal reflux disease    Gastroparesis          Plan:       Problem List Items Addressed This Visit        Cardiac/Vascular    Coronary artery disease    Hyperlipidemia    Hypertension       Endocrine    Type 2 diabetes mellitus - Primary    Relevant Medications    glipiZIDE (GLUCOTROL) 5 MG TR24    Other Relevant Orders    POCT Glucose, Hand-Held Device (Completed)       GI    Fatty liver    Gastroesophageal reflux disease    Gastroparesis           Monitor blood pressure 2-3 weeks and call for persistent elevation  DASH and lifestyle modifications encouraged.  Ensure to take all meds as directed to better control blood pressure and glucose.

## 2022-07-27 NOTE — PROGRESS NOTES
Subjective:       Patient ID: Olga Stephenson is a 67 y.o. female.    Chief Complaint: Follow-up and Results    Patient is here today for follow up evaluation. Patient recent abdominal CT reviewed and shows diverticulosis and a nodule of her lung. Patient states this is known and has been there for a long time. Patient is still having epigastric pain. Rates the pain a 7/10. Discussed high fiber diet and foods to avoid to prevent from furthering diverticulosis into infection. Patient states she still has problem with digesting her food and constipation. States seems food will 'get stuck' for 3-4 days and then will empty. Will refer to Pulmonology for lung nodule. Will also refer to GI for evaluation for gastric PM. Also recommended using Pepcid OTC QD.  Patient is an active smoker and smoker for more than 35+ years. Will order CT low dose chest. Will follow in 3 months.     Follow-up  Associated symptoms include abdominal pain and a change in bowel habit.       Current Medications:    Current Outpatient Medications:     albuterol (PROVENTIL/VENTOLIN HFA) 90 mcg/actuation inhaler, Inhale 2 puffs into the lungs 2 (two) times a day., Disp: 18 g, Rfl: 0    blood sugar diagnostic (FREESTYLE LITE STRIPS) Strp, Use one strip daily to monitor glucose, Disp: 100 each, Rfl: 3    blood sugar diagnostic Strp, , Disp: , Rfl:     clopidogreL (PLAVIX) 75 mg tablet, Take 1 tablet (75 mg total) by mouth once daily., Disp: 30 tablet, Rfl: 1    estradioL (VIVELLE-DOT) 0.025 mg/24 hr, Place 1 patch onto the skin twice a week., Disp: , Rfl:     ezetimibe (ZETIA) 10 mg tablet, Take 1 tablet by mouth once daily., Disp: , Rfl:     FARXIGA 10 mg tablet, TAKE 1 TABLET DAILY, Disp: 90 tablet, Rfl: 3    glipiZIDE (GLUCOTROL) 5 MG TR24, Take 1 tablet (5 mg total) by mouth daily with breakfast., Disp: 90 tablet, Rfl: 3    loratadine (CLARITIN) 10 mg tablet, , Disp: , Rfl:     losartan (COZAAR) 50 MG tablet, Take 1 tablet (50 mg total)  by mouth once daily., Disp: 90 tablet, Rfl: 3    metFORMIN (GLUCOPHAGE) 1000 MG tablet, , Disp: , Rfl:     metoprolol succinate (TOPROL-XL) 25 MG 24 hr tablet, Take 1 tablet by mouth once daily., Disp: , Rfl:     pantoprazole (PROTONIX) 40 MG tablet, Take 1 tablet (40 mg total) by mouth once daily., Disp: 90 tablet, Rfl: 3    pregabalin (LYRICA) 75 MG capsule, Take 75 mg by mouth. 1 in the morning and 2 at night, Disp: , Rfl:     traMADoL (ULTRAM) 50 mg tablet, Take 1 tablet (50 mg total) by mouth every 6 (six) hours as needed for Pain., Disp: 30 tablet, Rfl: 0    Last Labs:     Office Visit on 07/25/2022   Component Date Value    POC Glucose 07/25/2022 154 (A)   Office Visit on 07/06/2022   Component Date Value    WBC 07/06/2022 9.53     RBC 07/06/2022 3.69 (A)    Hemoglobin 07/06/2022 11.1 (A)    Hematocrit 07/06/2022 34.8 (A)    MCV 07/06/2022 94.3     MCH 07/06/2022 30.1     MCHC 07/06/2022 31.9 (A)    RDW 07/06/2022 15.5 (A)    Platelet Count 07/06/2022 357     MPV 07/06/2022 12.2     Neutrophils % 07/06/2022 50.3 (A)    Lymphocytes % 07/06/2022 39.0     Monocytes % 07/06/2022 8.1 (A)    Eosinophils % 07/06/2022 1.3     Basophils % 07/06/2022 0.9     Immature Granulocytes % 07/06/2022 0.4     nRBC, Auto 07/06/2022 0.0     Neutrophils, Abs 07/06/2022 4.79     Lymphocytes, Absolute 07/06/2022 3.72     Monocytes, Absolute 07/06/2022 0.77     Eosinophils, Absolute 07/06/2022 0.12     Basophils, Absolute 07/06/2022 0.09     Immature Granulocytes, A* 07/06/2022 0.04     nRBC, Absolute 07/06/2022 0.00     Diff Type 07/06/2022 Auto        Last Imaging:  CT Abdomen Pelvis  Without Contrast  Narrative: EXAMINATION:  CT ABDOMEN PELVIS WITHOUT CONTRAST    CLINICAL HISTORY:  diverticulosis;Generalized abdominal pain    COMPARISON:  11/19/21 and [id number]    TECHNIQUE:  CT ABDOMEN PELVIS WITHOUT CONTRAST    FINDINGS:  Lower lobes: Pulmonary nodular density located within the medial left lower lobe  measures 0.9 x 1.5 cm, previously 0.8 x 1.6 cm along the comparison performed 2018    Cardiac: No effusion.    Abdomen:    Hepatobiliary/gallbladder: Normal for noncontrast technique.  Gallbladder is absent.  No ductal obstruction.    Spleen: Normal for noncontrast technique.    Pancreas: Normal for noncontrast technique.    Adrenal/Genitourinary system: Normal for noncontrast technique.    Bowel and Mesentery: There is no evidence for bowel obstruction.  Diverticulosis.    Peritoneum: Normal.    Retroperitoneum: No enlarged lymph nodes.    Vasculature: Degenerative change    Reproductive: Normal.    Lymph nodes: No enlarged lymph nodes.    Abdominal wall: Normal.    Osseous structures: Degenerative change  Impression: Pulmonary nodular density located within the medial left lower lobe measures 0.9 x 1.5 cm, previously 0.8 x 1.6 cm along the comparison performed 2018    No acute finding    Electronically signed by: Angel Miguel  Date:    07/20/2022  Time:    08:17         Review of Systems   Gastrointestinal: Positive for abdominal pain, change in bowel habit, constipation and change in bowel habit.   All other systems reviewed and are negative.        Objective:      Physical Exam  Vitals reviewed.   Constitutional:       Appearance: Normal appearance.   Cardiovascular:      Rate and Rhythm: Normal rate and regular rhythm.      Pulses: Normal pulses.      Heart sounds: Normal heart sounds.   Pulmonary:      Effort: Pulmonary effort is normal.      Breath sounds: Normal breath sounds.   Abdominal:      General: Abdomen is flat. Bowel sounds are normal.      Palpations: Abdomen is soft.   Musculoskeletal:         General: Normal range of motion.      Cervical back: Normal range of motion and neck supple.   Skin:     General: Skin is warm and dry.   Neurological:      General: No focal deficit present.      Mental Status: She is alert and oriented to person, place, and time. Mental status is at baseline.          Assessment:       1. Gastroesophageal reflux disease, unspecified whether esophagitis present  Ambulatory referral/consult to Gastroenterology   2. Diverticulosis  Ambulatory referral/consult to Gastroenterology   3. Pulmonary nodule  Ambulatory referral/consult to Pulmonology    CT Chest Lung Screening Low Dose   4. Personal history of nicotine dependence   CT Chest Lung Screening Low Dose   5. Diabetic gastroparesis          Plan:         Olga was seen today for follow-up and results.    Diagnoses and all orders for this visit:    Gastroesophageal reflux disease, unspecified whether esophagitis present  -     Ambulatory referral/consult to Gastroenterology; Future    Diverticulosis  -     Ambulatory referral/consult to Gastroenterology; Future    Pulmonary nodule  -     Ambulatory referral/consult to Pulmonology; Future  -     CT Chest Lung Screening Low Dose; Future    Personal history of nicotine dependence   -     CT Chest Lung Screening Low Dose; Future    Diabetic gastroparesis    Other orders  The following orders have not been finalized:  -     famotidine (PEPCID) 20 MG tablet

## 2022-08-30 ENCOUNTER — TELEPHONE (OUTPATIENT)
Dept: FAMILY MEDICINE | Facility: CLINIC | Age: 67
End: 2022-08-30
Payer: MEDICARE

## 2022-08-30 NOTE — TELEPHONE ENCOUNTER
----- Message from Aaliyah Reaves sent at 8/29/2022  4:40 PM CDT -----  Pt would like sinus medicine called in for her.      [phone]

## 2022-08-30 NOTE — TELEPHONE ENCOUNTER
Call returned to pt to inform her that she will need to come in to see Dr. Cartagena.    No answer. Left message.

## 2022-09-02 NOTE — PROGRESS NOTES
"        PATIENT NAME: Olga Stephenson   : 1955    AGE: 67 y.o. DATE: 2022   MRN: [id number]        Reason for Visit / Chief Complaint: Medicare AWV (Subseqeunt Medicare AWV visit )        Olga Stephenson presents for a Subsequent  Medicare Annual Wellness Visit today. Ms. Stephenson get her primary care needs met by Dr. Lv Cartagena. She has history of colonic polyps, osteoarthritis, CAD, Hyperlipidemia, GERD, DM Type 2, Allergic Rhinitis, Lumbosacral Spondylosis, and Gastroparesis. She is due for Tetanus and Shingles vaccines orders were sent to pharmacy for these. She is due for GYN exam and consult was placed today. She is followed by Mae Gautam for her diabetes. She will be due foot exam at her next visit. She is also followed by GI. Microalbumin was collected today.     Review of Systems   Constitutional:  Negative for chills and fever.   HENT:  Positive for sinus pain ("around my eyes and forehead" x 3 weeks). Negative for hearing loss ("I have to put my cell phone on speaker and my kids say I can't hear").    Eyes:  Positive for blurred vision ("from my sinuses"). Negative for double vision.   Respiratory:  Negative for cough and shortness of breath.    Cardiovascular:  Negative for chest pain and leg swelling.   Gastrointestinal:  Positive for diarrhea ("whenever I eat"). Negative for abdominal pain and constipation.   Genitourinary:  Negative for dysuria, frequency and urgency.   Neurological:  Negative for dizziness and headaches.   Psychiatric/Behavioral:  Negative for depression. The patient is not nervous/anxious.       The following components were reviewed and updated:      Medical/Social/Family History:  Past Medical History:   Diagnosis Date    Acute superficial gastritis without hemorrhage 2022    Adenomatous polyp of cecum 6/15/2022    Adenomatous polyp of transverse colon 6/15/2022    Asthma     Coronary artery disease     " Diabetes mellitus     Diabetes mellitus, type 2     Diverticula, colon 6/15/2022    Epigastric pain 2022    Heart attack     History of colon polyps 6/15/2022    Hyperlipidemia     Hypertension     Polyp of hepatic flexure of colon 6/15/2022    Screening for colon cancer 6/15/2022    Thyroid disease         Family History   Problem Relation Age of Onset    Diabetes Mother     Heart disease Mother     Dementia Mother     No Known Problems Father     Diabetes Brother         Past Surgical History:   Procedure Laterality Date    CARDIAC SURGERY       SECTION      COLONOSCOPY  2017    repeat in 3 years    ESOPHAGOGASTRODUODENOSCOPY  03/10/2021    HYSTERECTOMY         Social History     Tobacco Use   Smoking Status Former   Smokeless Tobacco Never       Social History     Substance and Sexual Activity   Alcohol Use Not Currently         Allergies and Current Medications     Review of patient's allergies indicates:   Allergen Reactions    Jardiance [empagliflozin] Anaphylaxis     Headaches     Trulicity [dulaglutide]      Abdominal pain       Current Outpatient Medications:     albuterol (PROVENTIL/VENTOLIN HFA) 90 mcg/actuation inhaler, Inhale 2 puffs into the lungs 2 (two) times a day., Disp: 18 g, Rfl: 0    blood sugar diagnostic (FREESTYLE LITE STRIPS) Strp, Use one strip daily to monitor glucose, Disp: 100 each, Rfl: 3    blood sugar diagnostic Strp, , Disp: , Rfl:     clopidogreL (PLAVIX) 75 mg tablet, Take 1 tablet (75 mg total) by mouth once daily., Disp: 30 tablet, Rfl: 1    estradioL (VIVELLE-DOT) 0.025 mg/24 hr, Place 1 patch onto the skin twice a week., Disp: , Rfl:     ezetimibe (ZETIA) 10 mg tablet, Take 1 tablet by mouth once daily., Disp: , Rfl:     famotidine (PEPCID) 20 MG tablet, Take OTC-one tablet daily, Disp: 90 tablet, Rfl: 1    FARXIGA 10 mg tablet, TAKE 1 TABLET DAILY, Disp: 90 tablet, Rfl: 3    glipiZIDE (GLUCOTROL) 5 MG TR24, Take 1 tablet (5  mg total) by mouth daily with breakfast., Disp: 90 tablet, Rfl: 3    loratadine (CLARITIN) 10 mg tablet, , Disp: , Rfl:     losartan (COZAAR) 50 MG tablet, Take 1 tablet (50 mg total) by mouth once daily., Disp: 90 tablet, Rfl: 3    metFORMIN (GLUCOPHAGE) 1000 MG tablet, , Disp: , Rfl:     metoprolol succinate (TOPROL-XL) 25 MG 24 hr tablet, Take 1 tablet by mouth once daily., Disp: , Rfl:     pantoprazole (PROTONIX) 40 MG tablet, Take 1 tablet (40 mg total) by mouth once daily., Disp: 90 tablet, Rfl: 3    pregabalin (LYRICA) 75 MG capsule, Take 75 mg by mouth. 1 in the morning and 2 at night, Disp: , Rfl:     aspirin 81 MG Chew, , Disp: , Rfl:     cyclobenzaprine (FLEXERIL) 10 MG tablet, , Disp: , Rfl:     diclofenac (FLECTOR) 1.3 % PT12, , Disp: , Rfl:     diphth,pertus,acell,,tetanus (BOOSTRIX) 2.5-8-5 Lf-mcg-Lf/0.5mL Susp, Inject 0.5 mLs into the muscle once. for 1 dose, Disp: 0.5 mL, Rfl: 0    varicella-zoster gE-AS01B, PF, (SHINGRIX, PF,) 50 mcg/0.5 mL injection, Inject 0.5 mLs into the muscle once. for 1 dose, Disp: 1 each, Rfl: 0      Health Risk Assessment   Fall Risk:  not at risk   Obesity: BMI Body mass index is 30.27 kg/m².   Advance Directive: no but information given   Depression: PHQ9- 3   HTN: DASH diet, exercise, weight management, med compliance, home BP monitoring, and follow-up discussed.   T2DM:  diabetic diet, glucose monitoring, activity level, weight management, med compliance, and follow-up discussed.  STI: not at risk   Statin Use: no      Health Maintenance   Last eye exam: states saw Dr. Navarro Tolliver in [place]. Ms 2/22 - ROP faxed   Last CV screen with lipids: 1/5/22   Diabetes screening with fasting glucose or A1c: 6/24/22   Colonoscopy: 6/25/22 Dr. Gomez - repeat in 3 years   Flu Vaccine: 11/8/21   Pneumonia vaccines: declined   COVID vaccine: (moderna) 3/29/21, 4/27/21, 12/17/21   Hep B vaccine: na   DEXA: declined   Last pap/pelvic: referral sent (history of  hysterectomy)  Last Mammogram: 5/23/22   AAA screening: na   HIV Screening: not at risk  Hepatitis C Screen: 4/16/21 non-reactive  Low Dose CT Scan: na    Health Maintenance Topics with due status: Not Due       Topic Last Completion Date    Lipid Panel 01/05/2022    Eye Exam 02/01/2022    Mammogram 05/23/2022    Hemoglobin A1c 06/24/2022    Colorectal Cancer Screening 06/25/2022    Foot Exam 07/25/2022    Aspirin/Antiplatelet Therapy 09/08/2022     Health Maintenance Due   Topic Date Due    TETANUS VACCINE  Never done    Shingles Vaccine (1 of 2) Never done    Diabetes Urine Screening  12/03/2021         Lab results available in Epic or see dates from [facility] above:   Lab Results   Component Value Date    CHOL 153 01/05/2022    CHOL 137 01/30/2020     Lab Results   Component Value Date    HDL 35 (L) 01/05/2022    HDL 37 01/30/2020     Lab Results   Component Value Date    LDLCALC 92 01/05/2022    LDLCALC 67 01/30/2020     Lab Results   Component Value Date    TRIG 132 01/05/2022    TRIG 165 01/30/2020     Lab Results   Component Value Date    CHOLHDL 4.4 01/05/2022       Lab Results   Component Value Date    HGBA1C 7.0 (H) 06/24/2022       Sodium   Date Value Ref Range Status   06/26/2022 145 136 - 145 mmol/L Final     Potassium   Date Value Ref Range Status   06/26/2022 3.8 3.5 - 5.1 mmol/L Final     Chloride   Date Value Ref Range Status   06/26/2022 108 (H) 98 - 107 mmol/L Final     CO2   Date Value Ref Range Status   06/26/2022 25 21 - 32 mmol/L Final     Glucose   Date Value Ref Range Status   06/26/2022 144 (H) 74 - 106 mg/dL Final     BUN   Date Value Ref Range Status   06/26/2022 7 7 - 18 mg/dL Final     Creatinine   Date Value Ref Range Status   06/26/2022 0.74 0.55 - 1.02 mg/dL Final     Calcium   Date Value Ref Range Status   06/26/2022 8.4 (L) 8.5 - 10.1 mg/dL Final     Total Protein   Date Value Ref Range Status   06/24/2022 6.8 6.4 - 8.2 g/dL Final     Albumin   Date Value Ref Range Status   06/24/2022  "3.4 (L) 3.5 - 5.0 g/dL Final     Bilirubin, Total   Date Value Ref Range Status   06/24/2022 0.1 0.0 - 1.2 mg/dL Final     Alk Phos   Date Value Ref Range Status   06/24/2022 61 55 - 142 U/L Final     AST   Date Value Ref Range Status   06/24/2022 30 15 - 37 U/L Final     ALT   Date Value Ref Range Status   06/24/2022 44 13 - 56 U/L Final     Anion Gap   Date Value Ref Range Status   06/26/2022 16 7 - 16 mmol/L Final     eGFR    Date Value Ref Range Status   06/08/2021 68 >=60 mL/min/1.73m² Final     eGFR   Date Value Ref Range Status   06/26/2022 83 >=60 mL/min/1.73m² Final         Incontinence  Bowel: no  Bladder: no      Care Team  Dr. Cartagena -PCP                 Dr. Navarro Tolliver-optometry       **See Completed Assessments for Annual Wellness visit within the encounter summary    The following assessments were completed & reviewed:  Depression Screening  Cognitive function Screening  Timed Get Up Test  Whisper Test  Vision Screen  Health Risk Assessment  Checklist of ADLs and IADLs        Objective  Vitals:    09/08/22 1139   BP: 124/84   Pulse: 77   Resp: 16   Temp: 98.4 °F (36.9 °C)   TempSrc: Oral   SpO2: 97%   Weight: 93 kg (205 lb)   Height: 5' 9" (1.753 m)   PainSc:   1      Body mass index is 30.27 kg/m².  Ideal body weight: 66.2 kg (145 lb 15.1 oz)       Physical Exam  HENT:      Head: Normocephalic.      Right Ear: Tympanic membrane normal.      Left Ear: Tympanic membrane normal.   Cardiovascular:      Rate and Rhythm: Normal rate and regular rhythm.      Pulses: Normal pulses.      Heart sounds: Normal heart sounds.   Pulmonary:      Effort: Pulmonary effort is normal.      Breath sounds: Normal breath sounds.   Abdominal:      General: Bowel sounds are normal.      Palpations: Abdomen is soft.   Skin:     General: Skin is warm and dry.   Neurological:      Mental Status: She is alert and oriented to person, place, and time.   Psychiatric:         Mood and Affect: Mood normal.         " Behavior: Behavior normal.       Assessment:     1. Encounter for subsequent annual wellness visit (AWV) in Medicare patient    2. Primary hypertension    3. Hyperlipidemia, unspecified hyperlipidemia type    4. Type 2 diabetes mellitus without complication, without long-term current use of insulin  - Microalbumin/Creatinine Ratio, Urine; Future  - Microalbumin/Creatinine Ratio, Urine    5. Coronary artery disease involving native coronary artery of native heart, unspecified whether angina present    6. Screening for cervical cancer  - Ambulatory referral/consult to Gynecology; Future    7. Need for vaccination  - diphth,pertus,acell,,tetanus (BOOSTRIX) 2.5-8-5 Lf-mcg-Lf/0.5mL Susp; Inject 0.5 mLs into the muscle once. for 1 dose  Dispense: 0.5 mL; Refill: 0  - varicella-zoster gE-AS01B, PF, (SHINGRIX, PF,) 50 mcg/0.5 mL injection; Inject 0.5 mLs into the muscle once. for 1 dose  Dispense: 1 each; Refill: 0         Plan:    Referrals:   GYN     Advised to call office if does not hear from anyone with referral appt within 2-3 weeks to check on status of referral. Voiced understanding.      Discussed and provided with a screening schedule and personal prevention plan in accordance with USPSTF age appropriate recommendations and Medicare screening guidelines.   Education, counseling, and referrals were provided as needed.  After Visit Summary printed and given to patient which includes written education and a list of any referrals if indicated.     Education including diet, exercise, falls, preventive health care for older adults and advanced directives discussed with patient and patient verbalized understanding.      F/U plan for yearly Annual Wellness Visit.    Signature: ERIN Dickson-BC

## 2022-09-08 ENCOUNTER — OFFICE VISIT (OUTPATIENT)
Dept: FAMILY MEDICINE | Facility: CLINIC | Age: 67
End: 2022-09-08
Payer: MEDICARE

## 2022-09-08 VITALS
BODY MASS INDEX: 30.36 KG/M2 | TEMPERATURE: 98 F | HEIGHT: 69 IN | SYSTOLIC BLOOD PRESSURE: 124 MMHG | HEART RATE: 77 BPM | OXYGEN SATURATION: 97 % | WEIGHT: 205 LBS | DIASTOLIC BLOOD PRESSURE: 84 MMHG | RESPIRATION RATE: 16 BRPM

## 2022-09-08 DIAGNOSIS — Z00.00 ENCOUNTER FOR SUBSEQUENT ANNUAL WELLNESS VISIT (AWV) IN MEDICARE PATIENT: Primary | ICD-10-CM

## 2022-09-08 DIAGNOSIS — I10 PRIMARY HYPERTENSION: ICD-10-CM

## 2022-09-08 DIAGNOSIS — E11.9 TYPE 2 DIABETES MELLITUS WITHOUT COMPLICATION, WITHOUT LONG-TERM CURRENT USE OF INSULIN: ICD-10-CM

## 2022-09-08 DIAGNOSIS — Z12.4 SCREENING FOR CERVICAL CANCER: ICD-10-CM

## 2022-09-08 DIAGNOSIS — E78.5 HYPERLIPIDEMIA, UNSPECIFIED HYPERLIPIDEMIA TYPE: ICD-10-CM

## 2022-09-08 DIAGNOSIS — I25.10 CORONARY ARTERY DISEASE INVOLVING NATIVE CORONARY ARTERY OF NATIVE HEART, UNSPECIFIED WHETHER ANGINA PRESENT: ICD-10-CM

## 2022-09-08 DIAGNOSIS — Z23 NEED FOR VACCINATION: ICD-10-CM

## 2022-09-08 PROBLEM — M47.817 LUMBOSACRAL SPONDYLOSIS WITHOUT MYELOPATHY: Status: ACTIVE | Noted: 2022-09-08

## 2022-09-08 PROBLEM — M19.90 OSTEOARTHRITIS: Status: ACTIVE | Noted: 2022-09-08

## 2022-09-08 LAB
CREAT UR-MCNC: 92 MG/DL (ref 28–219)
MICROALBUMIN UR-MCNC: 4.4 MG/DL (ref 0–2.8)
MICROALBUMIN/CREAT RATIO PNL UR: 47.8 MG/G (ref 0–30)

## 2022-09-08 PROCEDURE — G0439 PPPS, SUBSEQ VISIT: HCPCS | Mod: ,,, | Performed by: NURSE PRACTITIONER

## 2022-09-08 PROCEDURE — 82570 MICROALBUMIN / CREATININE RATIO URINE: ICD-10-PCS | Mod: ,,, | Performed by: CLINICAL MEDICAL LABORATORY

## 2022-09-08 PROCEDURE — 82570 ASSAY OF URINE CREATININE: CPT | Mod: ,,, | Performed by: CLINICAL MEDICAL LABORATORY

## 2022-09-08 PROCEDURE — 82043 UR ALBUMIN QUANTITATIVE: CPT | Mod: ,,, | Performed by: CLINICAL MEDICAL LABORATORY

## 2022-09-08 PROCEDURE — 82043 MICROALBUMIN / CREATININE RATIO URINE: ICD-10-PCS | Mod: ,,, | Performed by: CLINICAL MEDICAL LABORATORY

## 2022-09-08 PROCEDURE — G0439 PR MEDICARE ANNUAL WELLNESS SUBSEQUENT VISIT: ICD-10-PCS | Mod: ,,, | Performed by: NURSE PRACTITIONER

## 2022-09-08 RX ORDER — DICLOFENAC EPOLAMINE 0.01 G/1
SYSTEM TOPICAL
COMMUNITY
End: 2022-10-26

## 2022-09-08 RX ORDER — NAPROXEN SODIUM 220 MG/1
TABLET, FILM COATED ORAL
COMMUNITY
End: 2022-10-26

## 2022-09-08 RX ORDER — CYCLOBENZAPRINE HCL 10 MG
TABLET ORAL
Status: ON HOLD | COMMUNITY
End: 2023-02-24 | Stop reason: HOSPADM

## 2022-09-08 RX ORDER — ZOSTER VACCINE RECOMBINANT, ADJUVANTED 50 MCG/0.5
0.5 KIT INTRAMUSCULAR ONCE
Qty: 1 EACH | Refills: 0 | Status: SHIPPED | OUTPATIENT
Start: 2022-09-08 | End: 2022-09-08

## 2022-09-08 NOTE — PATIENT INSTRUCTIONS
Counseling and Referral of Other Preventative  (Italic type indicates deductible and co-insurance are waived)    Patient Name: Olga Stephenson  Today's Date: 9/8/2022    Health Maintenance         Date Due Completion Date    Pneumococcal Vaccines (Age 65+) (1 - PCV) Never done ---    TETANUS VACCINE Never done ---    DEXA Scan Never done ---    Shingles Vaccine (1 of 2) Never done ---    Diabetes Urine Screening 12/03/2021 12/3/2020    COVID-19 Vaccine (4 - Booster for Moderna series) 04/17/2022 12/17/2021    Influenza Vaccine (1) 09/01/2022 11/8/2021    Hemoglobin A1c 12/24/2022 6/24/2022    Lipid Panel 01/05/2023 1/5/2022    Eye Exam 02/01/2023 2/1/2022 (Done)    Override on 2/1/2022: Done (dr marks)    Mammogram 05/23/2023 5/23/2022    Foot Exam 07/25/2023 7/25/2022 (Done)    Override on 7/25/2022: Done    Aspirin/Antiplatelet Therapy 07/27/2023 7/27/2022    Colorectal Cancer Screening 06/25/2025 6/25/2022          No orders of the defined types were placed in this encounter.

## 2022-10-09 DIAGNOSIS — Z71.89 COMPLEX CARE COORDINATION: ICD-10-CM

## 2022-10-10 RX ORDER — ALBUTEROL SULFATE 90 UG/1
2 AEROSOL, METERED RESPIRATORY (INHALATION) 2 TIMES DAILY
Qty: 18 G | Refills: 1 | Status: SHIPPED | OUTPATIENT
Start: 2022-10-10 | End: 2023-03-22

## 2022-10-10 NOTE — TELEPHONE ENCOUNTER
/  [facility] called requesting a 90 day refill for this pt's Ventolin inhaler. I verified with the pharmacy technician that they have a 8.5g inhaler and that Dr. Cartagena can enter in the comment to pharmacy that this rx is for a qty for 90 days. Rx entered and routed to Dr. Cartagena for approval.

## 2022-10-24 ENCOUNTER — OFFICE VISIT (OUTPATIENT)
Dept: OBSTETRICS AND GYNECOLOGY | Facility: CLINIC | Age: 67
End: 2022-10-24
Payer: MEDICARE

## 2022-10-24 VITALS
SYSTOLIC BLOOD PRESSURE: 118 MMHG | DIASTOLIC BLOOD PRESSURE: 78 MMHG | HEIGHT: 69 IN | BODY MASS INDEX: 31.1 KG/M2 | WEIGHT: 210 LBS

## 2022-10-24 DIAGNOSIS — Z01.419 NORMAL GYNECOLOGIC EXAMINATION: Primary | ICD-10-CM

## 2022-10-24 PROCEDURE — G0101 PR CA SCREEN;PELVIC/BREAST EXAM: ICD-10-PCS | Mod: S$PBB,,, | Performed by: ADVANCED PRACTICE MIDWIFE

## 2022-10-24 PROCEDURE — 99214 OFFICE O/P EST MOD 30 MIN: CPT | Mod: PBBFAC | Performed by: ADVANCED PRACTICE MIDWIFE

## 2022-10-24 PROCEDURE — 87624 HPV HI-RISK TYP POOLED RSLT: CPT | Mod: ,,, | Performed by: CLINICAL MEDICAL LABORATORY

## 2022-10-24 PROCEDURE — G0123 SCREEN CERV/VAG THIN LAYER: HCPCS | Mod: GCY | Performed by: ADVANCED PRACTICE MIDWIFE

## 2022-10-24 PROCEDURE — 87624 HUMAN PAPILLOMAVIRUS (HPV): ICD-10-PCS | Mod: ,,, | Performed by: CLINICAL MEDICAL LABORATORY

## 2022-10-24 PROCEDURE — G0101 CA SCREEN;PELVIC/BREAST EXAM: HCPCS | Mod: S$PBB,,, | Performed by: ADVANCED PRACTICE MIDWIFE

## 2022-10-24 NOTE — PROGRESS NOTES
"Subjective:       Patient ID: Olga Stephenson is a 67 y.o. female.    Chief Complaint: Well Woman (Stated that its been awhile since her last pap. Denies any problems at this time.)    Has been "years" since last pap.  Denies hx abn pap  MMG in April, Dr Schneider, WNL  Colonoscopy 2022, polyps, repeat 5 years    Review of Systems   Constitutional: Negative.    HENT: Negative.     Eyes: Negative.    Respiratory: Negative.     Cardiovascular: Negative.    Gastrointestinal: Negative.    Endocrine: Negative.    Genitourinary: Negative.    Musculoskeletal: Negative.    Integumentary:  Negative.   Allergic/Immunologic: Negative.    Neurological: Negative.    Psychiatric/Behavioral: Negative.         Objective:      Physical Exam  Vitals reviewed.   Constitutional:       Appearance: Normal appearance.   HENT:      Head: Normocephalic.   Cardiovascular:      Rate and Rhythm: Normal rate and regular rhythm.   Pulmonary:      Effort: Pulmonary effort is normal.      Breath sounds: Normal breath sounds.   Chest:   Breasts:     Right: Normal. No mass.      Left: Normal. No mass.   Abdominal:      General: Abdomen is flat.      Palpations: Abdomen is soft.   Genitourinary:     General: Normal vulva.      Exam position: Lithotomy position.      Vagina: Normal.      Uterus: Absent. Not tender.       Adnexa: Right adnexa normal and left adnexa normal.        Right: No mass.          Left: No mass.        Rectum: Normal. No mass.   Musculoskeletal:         General: Normal range of motion.      Cervical back: Normal range of motion.   Skin:     General: Skin is warm and dry.   Neurological:      Mental Status: She is alert and oriented to person, place, and time.   Psychiatric:         Mood and Affect: Mood normal.         Behavior: Behavior normal.       Assessment:       Problem List Items Addressed This Visit    None  Visit Diagnoses       Normal gynecologic examination    -  Primary    Relevant Orders    ThinPrep Pap Test          "   Plan:       F/U in 2 years or as needed.

## 2022-10-26 ENCOUNTER — OFFICE VISIT (OUTPATIENT)
Dept: DIABETES SERVICES | Facility: CLINIC | Age: 67
End: 2022-10-26
Payer: MEDICARE

## 2022-10-26 VITALS
RESPIRATION RATE: 14 BRPM | DIASTOLIC BLOOD PRESSURE: 78 MMHG | WEIGHT: 206 LBS | HEIGHT: 69 IN | BODY MASS INDEX: 30.51 KG/M2 | HEART RATE: 89 BPM | SYSTOLIC BLOOD PRESSURE: 120 MMHG | OXYGEN SATURATION: 97 %

## 2022-10-26 DIAGNOSIS — E11.9 TYPE 2 DIABETES MELLITUS WITHOUT COMPLICATION, WITHOUT LONG-TERM CURRENT USE OF INSULIN: Primary | ICD-10-CM

## 2022-10-26 LAB — GLUCOSE SERPL-MCNC: 126 MG/DL (ref 70–110)

## 2022-10-26 PROCEDURE — 99214 OFFICE O/P EST MOD 30 MIN: CPT | Mod: S$PBB,,, | Performed by: NURSE PRACTITIONER

## 2022-10-26 PROCEDURE — 82962 GLUCOSE BLOOD TEST: CPT | Mod: PBBFAC | Performed by: NURSE PRACTITIONER

## 2022-10-26 PROCEDURE — 99215 OFFICE O/P EST HI 40 MIN: CPT | Mod: PBBFAC | Performed by: NURSE PRACTITIONER

## 2022-10-26 PROCEDURE — 99214 PR OFFICE/OUTPT VISIT, EST, LEVL IV, 30-39 MIN: ICD-10-PCS | Mod: S$PBB,,, | Performed by: NURSE PRACTITIONER

## 2022-10-26 RX ORDER — DAPAGLIFLOZIN 10 MG/1
10 TABLET, FILM COATED ORAL DAILY
Qty: 90 TABLET | Refills: 3 | Status: SHIPPED | OUTPATIENT
Start: 2022-10-26 | End: 2023-12-18

## 2022-10-26 RX ORDER — VALACYCLOVIR HYDROCHLORIDE 500 MG/1
TABLET, FILM COATED ORAL
COMMUNITY
Start: 2022-10-19 | End: 2023-01-10 | Stop reason: SDUPTHER

## 2022-10-26 NOTE — PROGRESS NOTES
Subjective:       Patient ID: Olga Stephenson is a 67 y.o. female.    Chief Complaint: Diabetes Mellitus (Pt here for follow up visit and A1c, states her sugars have been running high, checks 3 x week.)    Here today for routine evaluation and med refill.  She reports that she had colonic polyps removed and had severe bleeding afterwards and area had to be clamped, she was transfused 1 unit as well.   A1c poc unable to do lab due to low hemoglobin.  Has not had repeat blood work since having blood.      Hemoglobin A1C       Date                     Value               Ref Range           Status                06/24/2022               7.0 (H)             4.5 - 6.6 %         Final                 12/07/2021               7.8 (A)             4.5 - 6.6 %         Final                 06/08/2021               6.8 (H)             4.5 - 6.6 %         Final                05/27/2021               7.0 (A)             4.5 - 6.6 %         Final            ----------  Lab Results       Component                Value               Date                       MICROALBUR               4.4 (H)             09/08/2022            Lab Results       Component                Value               Date                       CHOL                     153                 01/05/2022                 CHOL                     137                 01/30/2020            Lab Results       Component                Value               Date                       HDL                      35 (L)              01/05/2022                 HDL                      37                  01/30/2020            Lab Results       Component                Value               Date                       LDLCALC                  92                  01/05/2022                 LDLCALC                  67                  01/30/2020            Lab Results       Component                Value               Date                       TRIG                     132                  01/05/2022                 TRIG                     165                 01/30/2020            Lab Results       Component                Value               Date                       CHOLHDL                  4.4                 01/05/2022            CMP  Sodium       Date                     Value               Ref Range           Status                06/26/2022               145                 136 - 145 mmol*     Final            ----------  Potassium       Date                     Value               Ref Range           Status                06/26/2022               3.8                 3.5 - 5.1 mmol*     Final            ----------  Chloride       Date                     Value               Ref Range           Status                06/26/2022               108 (H)             98 - 107 mmol/L     Final            ----------  CO2       Date                     Value               Ref Range           Status                06/26/2022               25                  21 - 32 mmol/L      Final            ----------  Glucose       Date                     Value               Ref Range           Status                06/26/2022               144 (H)             74 - 106 mg/dL      Final            ----------  BUN       Date                     Value               Ref Range           Status                06/26/2022               7                   7 - 18 mg/dL        Final            ----------  Creatinine       Date                     Value               Ref Range           Status                06/26/2022               0.74                0.55 - 1.02 mg*     Final            ----------  Calcium       Date                     Value               Ref Range           Status                06/26/2022               8.4 (L)             8.5 - 10.1 mg/*     Final            ----------  Total Protein       Date                     Value               Ref Range           Status                06/24/2022                6.8                 6.4 - 8.2 g/dL      Final            ----------  Albumin       Date                     Value               Ref Range           Status                06/24/2022               3.4 (L)             3.5 - 5.0 g/dL      Final            ----------  Bilirubin, Total       Date                     Value               Ref Range           Status                06/24/2022               0.1                 0.0 - 1.2 mg/dL     Final            ----------  Alk Phos       Date                     Value               Ref Range           Status                06/24/2022               61                  55 - 142 U/L        Final            ----------  AST       Date                     Value               Ref Range           Status                06/24/2022               30                  15 - 37 U/L         Final            ----------  ALT       Date                     Value               Ref Range           Status                06/24/2022               44                  13 - 56 U/L         Final            ----------  Anion Gap       Date                     Value               Ref Range           Status                06/26/2022               16                  7 - 16 mmol/L       Final            ----------  eGFR        Date                     Value               Ref Range           Status                06/08/2021               68                  >=60 mL/min/1.*     Final            ----------  eGFR       Date                     Value               Ref Range           Status                06/26/2022               83                  >=60 mL/min/1.*     Final            ----------        Review of Systems   Constitutional:  Negative for activity change, appetite change, diaphoresis and fatigue.   HENT:  Negative for nasal congestion, facial swelling and sinus pressure/congestion.    Eyes:  Negative for visual disturbance.   Respiratory:  Negative for shortness of breath and  wheezing.    Cardiovascular:  Negative for chest pain and leg swelling.   Gastrointestinal:  Negative for constipation, diarrhea, nausea and vomiting.   Endocrine: Negative for polydipsia, polyphagia and polyuria.   Genitourinary:  Negative for dysuria, frequency and urgency.   Musculoskeletal:  Negative for gait problem and myalgias.   Integumentary:  Negative for color change, rash and wound.   Neurological:  Negative for dizziness, syncope, weakness, headaches, coordination difficulties and coordination difficulties.   Hematological:  Does not bruise/bleed easily.   Psychiatric/Behavioral:  Negative for self-injury, sleep disturbance and suicidal ideas. The patient is not nervous/anxious.        Objective:      Physical Exam  Vitals and nursing note reviewed.   Constitutional:       Appearance: Normal appearance.   HENT:      Head: Normocephalic.   Cardiovascular:      Rate and Rhythm: Normal rate and regular rhythm.      Heart sounds: Normal heart sounds.   Pulmonary:      Effort: Pulmonary effort is normal.      Breath sounds: Normal breath sounds.   Musculoskeletal:         General: Normal range of motion.   Skin:     General: Skin is warm and dry.   Neurological:      General: No focal deficit present.      Mental Status: She is alert and oriented to person, place, and time.   Psychiatric:         Mood and Affect: Mood normal.         Behavior: Behavior normal.         Thought Content: Thought content normal.         Judgment: Judgment normal.       Assessment:       Problem List Items Addressed This Visit          Endocrine    Type 2 diabetes mellitus - Primary    Relevant Orders    Hemoglobin A1C, POCT    POCT Glucose, Hand-Held Device (Completed)         Plan:       Problem List Items Addressed This Visit          Endocrine    Type 2 diabetes mellitus - Primary    Relevant Medications    dapagliflozin (FARXIGA) 10 mg tablet    Other Relevant Orders    Hemoglobin A1C, POCT    POCT Glucose, Hand-Held Device  (Completed)    Lipid Panel    TSH    CBC Auto Differential           Will do cbc today due to POC a1c unable to process due to low hemoglobin and hx of recent transfusion due to acute anemia 2 to blood loss.  Other labs as well, complaining of hair loss, will do TSH.   Lifestyel modifications encouraged.   Reviewed meter at the bedside. Daily readings 160-180s  Will call with any changes once a1c from lab is resulted.

## 2022-10-27 ENCOUNTER — PATIENT MESSAGE (OUTPATIENT)
Dept: DIABETES SERVICES | Facility: CLINIC | Age: 67
End: 2022-10-27
Payer: MEDICARE

## 2022-10-27 ENCOUNTER — OFFICE VISIT (OUTPATIENT)
Dept: FAMILY MEDICINE | Facility: CLINIC | Age: 67
End: 2022-10-27
Payer: MEDICARE

## 2022-10-27 ENCOUNTER — APPOINTMENT (OUTPATIENT)
Dept: RADIOLOGY | Facility: CLINIC | Age: 67
End: 2022-10-27
Attending: INTERNAL MEDICINE
Payer: MEDICARE

## 2022-10-27 VITALS
WEIGHT: 207.63 LBS | DIASTOLIC BLOOD PRESSURE: 76 MMHG | RESPIRATION RATE: 18 BRPM | HEART RATE: 86 BPM | BODY MASS INDEX: 30.75 KG/M2 | SYSTOLIC BLOOD PRESSURE: 124 MMHG | TEMPERATURE: 98 F | HEIGHT: 69 IN | OXYGEN SATURATION: 97 %

## 2022-10-27 DIAGNOSIS — M17.11 PRIMARY OSTEOARTHRITIS OF RIGHT KNEE: Primary | ICD-10-CM

## 2022-10-27 DIAGNOSIS — J30.89 SEASONAL ALLERGIC RHINITIS DUE TO OTHER ALLERGIC TRIGGER: ICD-10-CM

## 2022-10-27 DIAGNOSIS — M17.11 PRIMARY OSTEOARTHRITIS OF RIGHT KNEE: ICD-10-CM

## 2022-10-27 DIAGNOSIS — I10 PRIMARY HYPERTENSION: ICD-10-CM

## 2022-10-27 LAB
GH SERPL-MCNC: NORMAL NG/ML
INSULIN SERPL-ACNC: NORMAL U[IU]/ML
LAB AP CLINICAL INFORMATION: NORMAL
LAB AP GYN INTERPRETATION: NEGATIVE
LAB AP PAP DISCLAIMER COMMENTS: NORMAL
RENIN PLAS-CCNC: NORMAL NG/ML/H

## 2022-10-27 PROCEDURE — 99214 OFFICE O/P EST MOD 30 MIN: CPT | Mod: ,,, | Performed by: INTERNAL MEDICINE

## 2022-10-27 PROCEDURE — 73560 X-RAY EXAM OF KNEE 1 OR 2: CPT | Mod: TC,RHCUB,RT | Performed by: INTERNAL MEDICINE

## 2022-10-27 PROCEDURE — 99214 PR OFFICE/OUTPT VISIT, EST, LEVL IV, 30-39 MIN: ICD-10-PCS | Mod: ,,, | Performed by: INTERNAL MEDICINE

## 2022-10-27 RX ORDER — CLOPIDOGREL BISULFATE 75 MG/1
1 TABLET ORAL
Status: ON HOLD | COMMUNITY
End: 2023-02-10 | Stop reason: CLARIF

## 2022-10-27 RX ORDER — FLUTICASONE PROPIONATE 50 MCG
1 SPRAY, SUSPENSION (ML) NASAL DAILY
Qty: 16 G | Refills: 3 | Status: SHIPPED | OUTPATIENT
Start: 2022-10-27 | End: 2023-01-10 | Stop reason: SDUPTHER

## 2022-10-27 RX ORDER — ESTRADIOL 0.1 MG/D
1 FILM, EXTENDED RELEASE TRANSDERMAL
Qty: 8 PATCH | Refills: 11 | Status: SHIPPED | OUTPATIENT
Start: 2022-10-27 | End: 2022-12-05

## 2022-10-27 RX ORDER — EZETIMIBE 10 MG/1
1 TABLET ORAL
Status: ON HOLD | COMMUNITY
End: 2023-02-24 | Stop reason: HOSPADM

## 2022-10-27 RX ORDER — LOSARTAN POTASSIUM 25 MG/1
TABLET ORAL
Status: ON HOLD | COMMUNITY
Start: 2022-10-20 | End: 2023-02-24 | Stop reason: HOSPADM

## 2022-10-27 RX ORDER — METOPROLOL SUCCINATE 25 MG/1
1 TABLET, EXTENDED RELEASE ORAL
Status: ON HOLD | COMMUNITY
End: 2023-02-24 | Stop reason: HOSPADM

## 2022-10-27 RX ORDER — CYCLOBENZAPRINE HCL 10 MG
1 TABLET ORAL NIGHTLY PRN
COMMUNITY

## 2022-10-27 RX ORDER — LORATADINE 10 MG/1
TABLET ORAL
Status: CANCELLED | OUTPATIENT
Start: 2022-10-27

## 2022-10-27 RX ORDER — MELOXICAM 15 MG/1
15 TABLET ORAL DAILY
Qty: 30 TABLET | Refills: 0 | Status: ON HOLD | OUTPATIENT
Start: 2022-10-27 | End: 2023-02-24 | Stop reason: HOSPADM

## 2022-10-27 RX ORDER — GLIPIZIDE 5 MG/1
5 TABLET ORAL DAILY
Status: ON HOLD | COMMUNITY
Start: 2022-09-30 | End: 2023-02-24 | Stop reason: HOSPADM

## 2022-10-27 RX ORDER — PREGABALIN 75 MG/1
150 CAPSULE ORAL NIGHTLY
COMMUNITY
End: 2023-09-05 | Stop reason: SDUPTHER

## 2022-10-27 RX ORDER — DAPAGLIFLOZIN 10 MG/1
1 TABLET, FILM COATED ORAL
Status: ON HOLD | COMMUNITY
End: 2023-02-24 | Stop reason: HOSPADM

## 2022-10-27 NOTE — PROGRESS NOTES
Subjective:       Patient ID: Olga Stephenson is a 67 y.o. female.    Chief Complaint: Knee Pain, Sinusitis, and Hand Pain    Patient is here today for check up evaluation. Patient is complaining of right knee pain. Rates pain 8/10. Pain on movement and crepitus felt. Patient follows with Dr Tolliver. States he wants surgery but patient states she has not made up her mind yet. States will follow with him next month. Patient states she take OTC tylenol prn for the pain. Will prescribe Mobic 15 mg PO QD #30. Will also order xray of right knee and order brace and cane. Patient requesting sinus medications. States has flonase at home. Will prescribe Ed A-hist. Will follow in 1 month.     Current Medications:    Current Outpatient Medications:     albuterol (PROVENTIL/VENTOLIN HFA) 90 mcg/actuation inhaler, Inhale 2 puffs into the lungs 2 (two) times a day., Disp: 18 g, Rfl: 1    blood sugar diagnostic (FREESTYLE LITE STRIPS) Strp, Use one strip daily to monitor glucose, Disp: 100 each, Rfl: 3    clopidogreL (PLAVIX) 75 mg tablet, Take 1 tablet (75 mg total) by mouth once daily., Disp: 30 tablet, Rfl: 1    cyclobenzaprine (FLEXERIL) 10 MG tablet, 1 tablet nightly as needed., Disp: , Rfl:     dapagliflozin (FARXIGA) 10 mg tablet, Take 1 tablet (10 mg total) by mouth once daily., Disp: 90 tablet, Rfl: 3    estradioL (VIVELLE-DOT) 0.1 mg/24 hr PTSW, Place 1 patch onto the skin twice a week., Disp: 10 patch, Rfl: 1    ezetimibe (ZETIA) 10 mg tablet, Take 1 tablet by mouth once daily., Disp: , Rfl:     famotidine (PEPCID) 20 MG tablet, Take OTC-one tablet daily, Disp: 90 tablet, Rfl: 1    glipiZIDE 5 MG TR24, Take 1 tablet (5 mg total) by mouth daily with breakfast., Disp: 90 tablet, Rfl: 3    loratadine (CLARITIN) 10 mg tablet, , Disp: , Rfl:     losartan (COZAAR) 25 MG tablet, , Disp: , Rfl:     losartan (COZAAR) 50 MG tablet, Take 1 tablet (50 mg total) by mouth once daily., Disp: 90 tablet, Rfl: 3    metFORMIN  (GLUCOPHAGE) 1000 MG tablet, , Disp: , Rfl:     metoprolol succinate (TOPROL-XL) 25 MG 24 hr tablet, Take 1 tablet by mouth once daily., Disp: , Rfl:     metoprolol succinate (TOPROL-XL) 25 MG 24 hr tablet, 1 tablet., Disp: , Rfl:     pantoprazole (PROTONIX) 40 MG tablet, Take 1 tablet (40 mg total) by mouth once daily., Disp: 90 tablet, Rfl: 3    pregabalin (LYRICA) 75 MG capsule, Take 75 mg by mouth. 1 in the morning and 2 at night, Disp: , Rfl:     pregabalin (LYRICA) 75 MG capsule, 1 capsule., Disp: , Rfl:     valACYclovir (VALTREX) 500 MG tablet, Take one tablet by mouth daily, Disp: , Rfl:     clopidogreL (PLAVIX) 75 mg tablet, 1 tablet., Disp: , Rfl:     cyclobenzaprine (FLEXERIL) 10 MG tablet, , Disp: , Rfl:     dapagliflozin (FARXIGA) 10 mg tablet, 1 tablet., Disp: , Rfl:     ezetimibe (ZETIA) 10 mg tablet, 1 tablet., Disp: , Rfl:     glipiZIDE (GLUCOTROL) 5 MG tablet, Take 5 mg by mouth once daily., Disp: , Rfl:     Last Labs:     Abstract on 10/26/2022   Component Date Value    Hemoglobin A1C 10/26/2022 8.1 (H)     Estimated Average Glucose 10/26/2022 184     TSH 10/26/2022 0.812     Triglycerides 10/26/2022 245 (H)     Cholesterol 10/26/2022 178     HDL Cholesterol 10/26/2022 35 (L)     Cholesterol/HDL Ratio (R* 10/26/2022 5.1     Non-HDL 10/26/2022 143     VLDL 10/26/2022 49    Office Visit on 10/26/2022   Component Date Value    POC Glucose 10/26/2022 126 (A)        Last Imaging:  CT Abdomen Pelvis  Without Contrast  Narrative: EXAMINATION:  CT ABDOMEN PELVIS WITHOUT CONTRAST    CLINICAL HISTORY:  diverticulosis;Generalized abdominal pain    COMPARISON:  11/19/21 and 20181    TECHNIQUE:  CT ABDOMEN PELVIS WITHOUT CONTRAST    FINDINGS:  Lower lobes: Pulmonary nodular density located within the medial left lower lobe measures 0.9 x 1.5 cm, previously 0.8 x 1.6 cm along the comparison performed 2018    Cardiac: No effusion.    Abdomen:    Hepatobiliary/gallbladder: Normal for noncontrast technique.   Gallbladder is absent.  No ductal obstruction.    Spleen: Normal for noncontrast technique.    Pancreas: Normal for noncontrast technique.    Adrenal/Genitourinary system: Normal for noncontrast technique.    Bowel and Mesentery: There is no evidence for bowel obstruction.  Diverticulosis.    Peritoneum: Normal.    Retroperitoneum: No enlarged lymph nodes.    Vasculature: Degenerative change    Reproductive: Normal.    Lymph nodes: No enlarged lymph nodes.    Abdominal wall: Normal.    Osseous structures: Degenerative change  Impression: Pulmonary nodular density located within the medial left lower lobe measures 0.9 x 1.5 cm, previously 0.8 x 1.6 cm along the comparison performed 2018    No acute finding    Electronically signed by: Angel Miguel  Date:    07/20/2022  Time:    08:17         Review of Systems   HENT:  Positive for nasal congestion and sinus pressure/congestion.    Musculoskeletal:  Positive for arthralgias.   All other systems reviewed and are negative.      Objective:      Physical Exam  Vitals reviewed.   Constitutional:       Appearance: Normal appearance.   Cardiovascular:      Rate and Rhythm: Normal rate and regular rhythm.      Pulses: Normal pulses.      Heart sounds: Normal heart sounds.   Pulmonary:      Effort: Pulmonary effort is normal.      Breath sounds: Normal breath sounds.   Abdominal:      General: Abdomen is flat. Bowel sounds are normal.      Palpations: Abdomen is soft.   Musculoskeletal:         General: Normal range of motion.      Cervical back: Normal range of motion and neck supple.   Skin:     General: Skin is warm and dry.   Neurological:      General: No focal deficit present.      Mental Status: She is alert and oriented to person, place, and time. Mental status is at baseline.       Assessment:       1. Primary osteoarthritis of right knee  X-Ray Knee 1 or 2 View Right    Ambulatory referral/consult to Orthopedics      2. Seasonal allergic rhinitis due to other  allergic trigger        3. Primary hypertension             Plan:         Olga was seen today for knee pain, sinusitis and hand pain.    Diagnoses and all orders for this visit:    Primary osteoarthritis of right knee  -     X-Ray Knee 1 or 2 View Right; Future  -     Ambulatory referral/consult to Orthopedics; Future    Seasonal allergic rhinitis due to other allergic trigger    Primary hypertension    Other orders  The following orders have not been finalized:  -     Cancel: loratadine (CLARITIN) 10 mg tablet  -     meloxicam (MOBIC) 15 MG tablet  -     fluticasone propionate (FLONASE) 50 mcg/actuation nasal spray  -     chlorpheniramine-phenylephrine 4-10 mg per tablet

## 2022-10-29 LAB
HPV 16: NEGATIVE
HPV 18: NEGATIVE
HPV OTHER: NEGATIVE

## 2022-11-01 ENCOUNTER — PATIENT MESSAGE (OUTPATIENT)
Dept: DIABETES SERVICES | Facility: CLINIC | Age: 67
End: 2022-11-01
Payer: MEDICARE

## 2022-11-03 ENCOUNTER — CLINICAL SUPPORT (OUTPATIENT)
Dept: FAMILY MEDICINE | Facility: CLINIC | Age: 67
End: 2022-11-03
Payer: MEDICARE

## 2022-11-03 DIAGNOSIS — Z23 FLU VACCINE NEED: Primary | ICD-10-CM

## 2022-11-03 PROCEDURE — 90694 FLU VACCINE - QUADRIVALENT - ADJUVANTED: ICD-10-PCS | Mod: ,,, | Performed by: INTERNAL MEDICINE

## 2022-11-03 PROCEDURE — G0008 ADMIN INFLUENZA VIRUS VAC: HCPCS | Mod: ,,, | Performed by: INTERNAL MEDICINE

## 2022-11-03 PROCEDURE — 90694 VACC AIIV4 NO PRSRV 0.5ML IM: CPT | Mod: ,,, | Performed by: INTERNAL MEDICINE

## 2022-11-03 PROCEDURE — G0008 FLU VACCINE - QUADRIVALENT - ADJUVANTED: ICD-10-PCS | Mod: ,,, | Performed by: INTERNAL MEDICINE

## 2022-11-04 ENCOUNTER — OFFICE VISIT (OUTPATIENT)
Dept: GASTROENTEROLOGY | Facility: CLINIC | Age: 67
End: 2022-11-04
Payer: MEDICARE

## 2022-11-04 VITALS
DIASTOLIC BLOOD PRESSURE: 89 MMHG | HEIGHT: 69 IN | OXYGEN SATURATION: 99 % | SYSTOLIC BLOOD PRESSURE: 141 MMHG | WEIGHT: 209 LBS | BODY MASS INDEX: 30.96 KG/M2 | HEART RATE: 90 BPM

## 2022-11-04 DIAGNOSIS — K76.0 FATTY LIVER: Primary | ICD-10-CM

## 2022-11-04 PROCEDURE — 99213 OFFICE O/P EST LOW 20 MIN: CPT | Mod: ,,, | Performed by: NURSE PRACTITIONER

## 2022-11-04 PROCEDURE — 99213 PR OFFICE/OUTPT VISIT, EST, LEVL III, 20-29 MIN: ICD-10-PCS | Mod: ,,, | Performed by: NURSE PRACTITIONER

## 2022-11-04 NOTE — PROGRESS NOTES
Juan Luis Pabon is a 34 y.o.  male and presents with Chief Complaint Patient presents with  Cholesterol Problem  Complete Physical  
 
 
Pt is taking lopid and fish oil for hyperlipemia. Denies chest pain, SOB, orthopnoea, PND , leg swelling. Pt works for [employer] and wants form filled for sea duty. History reviewed. No pertinent past medical history. History reviewed. No pertinent surgical history. Current Outpatient Medications Medication Sig  
 gemfibrozil (LOPID) 600 mg tablet Take 1 Tab by mouth two (2) times a day.  Omega-3-DHA-EPA-Fish Oil (FISH OIL) 1,000 mg (120 mg-180 mg) cap bid No current facility-administered medications for this visit. Health Maintenance Topic Date Due  
 DTaP/Tdap/Td series (1 - Tdap) 07/19/2010  Influenza Age 5 to Adult  08/01/2018 There is no immunization history on file for this patient. No LMP for male patient. Allergies and Intolerances:  
No Known Allergies Family History:  
History reviewed. No pertinent family history. Social History: He  reports that  has never smoked. he has never used smokeless tobacco.  He  reports that he drinks alcohol. Review of Systems:  
General: negative for - chills, fatigue, fever, weight change Psych: negative for - anxiety, depression, irritability or mood swings ENT: negative for - headaches, hearing change, nasal congestion, oral lesions, sneezing or sore throat Heme/ Lymph: negative for - bleeding problems, bruising, pallor or swollen lymph nodes Endo: negative for - hot flashes, polydipsia/polyuria or temperature intolerance Resp: negative for - cough, shortness of breath or wheezing CV: negative for - chest pain, edema or palpitations GI: negative for - abdominal pain, change in bowel habits, constipation, diarrhea or nausea/vomiting : negative for - dysuria, hematuria, incontinence, pelvic pain or vulvar/vaginal symptoms Pt is a 68 y/o BF here for 6 month follow up of fatty liver. No problems, denies any symptoms. Last LFTs WNL June 2022. Last liver u/s showed fatty liver as expected May 2022.     Past Medical History:   Diagnosis Date    Acute superficial gastritis without hemorrhage 6/8/2022    Adenomatous polyp of cecum 6/15/2022    Adenomatous polyp of transverse colon 6/15/2022    Asthma     Coronary artery disease     Diabetes mellitus     Diabetes mellitus, type 2     Diverticula, colon 6/15/2022    Epigastric pain 6/8/2022    Heart attack 2011    History of colon polyps 6/15/2022    Hyperlipidemia     Hypertension     Polyp of hepatic flexure of colon 6/15/2022    Screening for colon cancer 6/15/2022    Thyroid disease      Review of Systems   Constitutional:  Negative for chills and fever.   Respiratory:  Negative for shortness of breath.    Cardiovascular:  Negative for chest pain.   Gastrointestinal:  Negative for abdominal pain, blood in stool, constipation, diarrhea, melena, nausea and vomiting.   Skin:  Negative for rash.   Neurological:  Negative for weakness.     Physical Exam  Vitals reviewed. Exam conducted with a chaperone present.   Constitutional:       General: She is not in acute distress.     Appearance: Normal appearance.   HENT:      Head: Normocephalic.   Eyes:      General: No scleral icterus.     Pupils: Pupils are equal, round, and reactive to light.   Cardiovascular:      Rate and Rhythm: Normal rate.   Pulmonary:      Effort: Pulmonary effort is normal.   Abdominal:      General: There is no distension.      Palpations: Abdomen is soft.      Tenderness: There is no abdominal tenderness. There is no guarding or rebound.   Skin:     General: Skin is warm and dry.   Neurological:      General: No focal deficit present.      Mental Status: She is alert and oriented to person, place, and time. Mental status is at baseline.   Psychiatric:         Mood and Affect: Mood normal.         Behavior: Behavior  MSK: negative for - joint pain, joint swelling or muscle pain Neuro: negative for - confusion, headaches, seizures or weakness Derm: negative for - dry skin, hair changes, rash or skin lesion changes Physical:  
Vitals:  
Vitals:  
 10/18/18 0745 BP: 129/78 Pulse: 80 Resp: 15 Temp: 97.7 °F (36.5 °C) TempSrc: Oral  
SpO2: 99% Weight: 216 lb (98 kg) Height: 5' 8\" (1.727 m) Exam:  
HEENT- atraumatic,normocephalic, awake, oriented, well nourished Neck - supple,no enlarged lymph nodes, no JVD, no thyromegaly Chest- CTA, no rhonchi, no crackles Heart- rrr, no murmurs / gallop/rub Abdomen- soft,BS+,NT, no hepatosplenomegaly Ext - no c/c/edema Neuro- no focal deficits. Power 5/5 all extremities Skin - warm,dry, no obvious rashes. Review of Data:  
LABS:  
Lab Results Component Value Date/Time WBC 5.0 08/20/2018 09:17 AM  
 HGB 13.6 08/20/2018 09:17 AM  
 HCT 44.4 08/20/2018 09:17 AM  
 PLATELET 939 49/31/1571 09:17 AM  
 
Lab Results Component Value Date/Time Sodium 142 08/20/2018 09:17 AM  
 Potassium 4.4 08/20/2018 09:17 AM  
 Chloride 106 08/20/2018 09:17 AM  
 CO2 28 08/20/2018 09:17 AM  
 Glucose 102 (H) 08/20/2018 09:17 AM  
 BUN 12 08/20/2018 09:17 AM  
 Creatinine 0.85 08/20/2018 09:17 AM  
 
Lab Results Component Value Date/Time Cholesterol, total 153 08/20/2018 09:17 AM  
 HDL Cholesterol 32 (L) 08/20/2018 09:17 AM  
 LDL, calculated 50.4 08/20/2018 09:17 AM  
 Triglyceride 353 (H) 08/20/2018 09:17 AM  
 
No results found for: GPT Impression / Plan: ICD-10-CM ICD-9-CM 1. Other hyperlipidemia E78.49 272.4 2. Mixed hyperlipidemia E78.2 272.2 gemfibrozil (LOPID) 600 mg tablet 3. Annual physical exam Z00.00 V70.0 Form fo sea duty filled. Lipids greatly improved. Asked pt to increase fish oil to 4 daily , currently taking one daily. Explained to patient risk benefits of the medications. Advised patient to normal.         Thought Content: Thought content normal.         Judgment: Judgment normal.     Plan  -CMP  -liver u/s  -lose 10% of body weight, low fat diet, control diabetes and cholesterol, avoid alcohol  -RTC 6 months, sooner PRN   stop meds if having any side effects. Pt verbalized understanding of the instructions. I have discussed the diagnosis with the patient and the intended plan as seen in the above orders. The patient has received an after-visit summary and questions were answered concerning future plans. I have discussed medication side effects and warnings with the patient as well. I have reviewed the plan of care with the patient, accepted their input and they are in agreement with the treatment goals. Reviewed plan of care. Patient has provided input and agrees with goals. Follow-up Disposition: 
Return in about 1 year (around 10/18/2019).  
 
Jorge Bunch MD

## 2022-11-07 NOTE — PROGRESS NOTES
Late note- Pt came in on 11/3/2022 to receive a flu vaccine. Pneumonia vaccine was also offered. Pt stated that she will get pneumonia vaccine in a few weeks when she returns to the clinic.     VIS given to pt. Pt remained in clinic for 15 minutes after vaccination was completed. No adverse reactions reported upon leaving the clinic.

## 2022-11-09 ENCOUNTER — HOSPITAL ENCOUNTER (OUTPATIENT)
Dept: RADIOLOGY | Facility: HOSPITAL | Age: 67
Discharge: HOME | End: 2022-11-09
Attending: ORTHOPAEDIC SURGERY
Payer: MEDICARE

## 2022-11-09 DIAGNOSIS — M17.0 PRIMARY OSTEOARTHRITIS OF BOTH KNEES: ICD-10-CM

## 2022-11-09 PROCEDURE — 73564 X-RAY EXAM KNEE 4 OR MORE: CPT | Mod: TC,50

## 2022-11-14 ENCOUNTER — HOSPITAL ENCOUNTER (OUTPATIENT)
Dept: RADIOLOGY | Facility: HOSPITAL | Age: 67
Discharge: HOME | End: 2022-11-14
Attending: NURSE PRACTITIONER
Payer: MEDICARE

## 2022-11-14 DIAGNOSIS — K76.0 FATTY LIVER: ICD-10-CM

## 2022-11-14 PROCEDURE — 76705 US ABDOMEN LIMITED_LIVER: ICD-10-PCS | Mod: 26,,, | Performed by: STUDENT IN AN ORGANIZED HEALTH CARE EDUCATION/TRAINING PROGRAM

## 2022-11-14 PROCEDURE — 76705 ECHO EXAM OF ABDOMEN: CPT | Mod: 26,,, | Performed by: STUDENT IN AN ORGANIZED HEALTH CARE EDUCATION/TRAINING PROGRAM

## 2022-11-14 PROCEDURE — 76705 ECHO EXAM OF ABDOMEN: CPT | Mod: TC

## 2023-01-10 ENCOUNTER — APPOINTMENT (OUTPATIENT)
Dept: RADIOLOGY | Facility: CLINIC | Age: 68
End: 2023-01-10
Attending: INTERNAL MEDICINE
Payer: MEDICARE

## 2023-01-10 ENCOUNTER — OFFICE VISIT (OUTPATIENT)
Dept: FAMILY MEDICINE | Facility: CLINIC | Age: 68
End: 2023-01-10
Payer: MEDICARE

## 2023-01-10 VITALS
WEIGHT: 202.63 LBS | OXYGEN SATURATION: 95 % | DIASTOLIC BLOOD PRESSURE: 80 MMHG | SYSTOLIC BLOOD PRESSURE: 128 MMHG | HEART RATE: 93 BPM | RESPIRATION RATE: 18 BRPM | BODY MASS INDEX: 30.01 KG/M2 | HEIGHT: 69 IN | TEMPERATURE: 97 F

## 2023-01-10 DIAGNOSIS — J30.89 SEASONAL ALLERGIC RHINITIS DUE TO OTHER ALLERGIC TRIGGER: Primary | ICD-10-CM

## 2023-01-10 DIAGNOSIS — J32.9 SINUSITIS, UNSPECIFIED CHRONICITY, UNSPECIFIED LOCATION: ICD-10-CM

## 2023-01-10 DIAGNOSIS — M17.11 PRIMARY OSTEOARTHRITIS OF RIGHT KNEE: ICD-10-CM

## 2023-01-10 DIAGNOSIS — I10 PRIMARY HYPERTENSION: ICD-10-CM

## 2023-01-10 DIAGNOSIS — J06.9 UPPER RESPIRATORY TRACT INFECTION, UNSPECIFIED TYPE: ICD-10-CM

## 2023-01-10 PROCEDURE — 99214 PR OFFICE/OUTPT VISIT, EST, LEVL IV, 30-39 MIN: ICD-10-PCS | Mod: ,,, | Performed by: INTERNAL MEDICINE

## 2023-01-10 PROCEDURE — 99214 OFFICE O/P EST MOD 30 MIN: CPT | Mod: ,,, | Performed by: INTERNAL MEDICINE

## 2023-01-10 PROCEDURE — 70220 X-RAY EXAM OF SINUSES: CPT | Mod: TC,RHCUB | Performed by: INTERNAL MEDICINE

## 2023-01-10 RX ORDER — FLUTICASONE PROPIONATE 50 MCG
1 SPRAY, SUSPENSION (ML) NASAL DAILY
Qty: 16 G | Refills: 3 | Status: SHIPPED | OUTPATIENT
Start: 2023-01-10

## 2023-01-10 RX ORDER — TRAMADOL HYDROCHLORIDE 50 MG/1
50 TABLET ORAL DAILY PRN
Status: ON HOLD | COMMUNITY
Start: 2023-01-04 | End: 2023-02-24 | Stop reason: HOSPADM

## 2023-01-10 RX ORDER — FLUCONAZOLE 150 MG/1
150 TABLET ORAL DAILY
Qty: 3 TABLET | Refills: 0 | Status: SHIPPED | OUTPATIENT
Start: 2023-01-10 | End: 2023-01-13

## 2023-01-10 RX ORDER — VALACYCLOVIR HYDROCHLORIDE 500 MG/1
500 TABLET, FILM COATED ORAL DAILY
Qty: 90 TABLET | Refills: 1 | Status: SHIPPED | OUTPATIENT
Start: 2023-01-10 | End: 2023-08-09 | Stop reason: SDUPTHER

## 2023-01-10 RX ORDER — ESTRADIOL 0.1 MG/D
1 FILM, EXTENDED RELEASE TRANSDERMAL
Qty: 10 PATCH | Refills: 5 | Status: ON HOLD | OUTPATIENT
Start: 2023-01-12 | End: 2023-02-24 | Stop reason: HOSPADM

## 2023-01-10 RX ORDER — AMOXICILLIN AND CLAVULANATE POTASSIUM 500; 125 MG/1; MG/1
1 TABLET, FILM COATED ORAL 2 TIMES DAILY
Qty: 20 TABLET | Refills: 0 | Status: SHIPPED | OUTPATIENT
Start: 2023-01-10 | End: 2023-01-20

## 2023-01-10 NOTE — PROGRESS NOTES
"Subjective:       Patient ID: Olga Stephenson is a 67 y.o. female.    Chief Complaint: Hypertension    Patient is here today for check up evaluation. Patient is complaining of right knee pain.  does follow with Dr Tolliver in Orthopedics.  is scheduled for surgery next month. Patient currently takes Tramadol for the pain. Patient is also complaining of sinus congestion and states feels she "cannot breathe and nose is clogged up". States when blowing nose she has green mucus for the past month. Patient does have noticeable hoarseness to her voice. Has maxillary sinus tenderness on exam. Will order sinus xray. Will prescribe Augmentin 50 mg PO BID #20 and Flonase 1 spray each nostril daily. Patient also requesting Diflucan for yeast infection she feels she will have when taking antibiotics. Will follow in 1 month.     Current Medications:    Current Outpatient Medications:     albuterol (PROVENTIL/VENTOLIN HFA) 90 mcg/actuation inhaler, Inhale 2 puffs into the lungs 2 (two) times a day., Disp: 18 g, Rfl: 1    amoxicillin-clavulanate 500-125mg (AUGMENTIN) 500-125 mg Tab, Take 1 tablet (500 mg total) by mouth 2 (two) times daily. for 10 days, Disp: 20 tablet, Rfl: 0    blood sugar diagnostic (FREESTYLE LITE STRIPS) Strp, Use one strip daily to monitor glucose, Disp: 100 each, Rfl: 3    chlorpheniramine-phenylephrine 4-10 mg per tablet, Take 1 tablet by mouth every 4 (four) hours as needed for Congestion., Disp: 30 tablet, Rfl: 2    clopidogreL (PLAVIX) 75 mg tablet, Take 1 tablet (75 mg total) by mouth once daily., Disp: 30 tablet, Rfl: 1    clopidogreL (PLAVIX) 75 mg tablet, 1 tablet., Disp: , Rfl:     cyclobenzaprine (FLEXERIL) 10 MG tablet, , Disp: , Rfl:     cyclobenzaprine (FLEXERIL) 10 MG tablet, 1 tablet nightly as needed., Disp: , Rfl:     dapagliflozin (FARXIGA) 10 mg tablet, Take 1 tablet (10 mg total) by mouth once daily., Disp: 90 tablet, Rfl: 3    dapagliflozin (FARXIGA) 10 mg tablet, " 1 tablet., Disp: , Rfl:     [START ON 1/12/2023] estradioL (VIVELLE-DOT) 0.1 mg/24 hr PTSW, Place 1 patch onto the skin twice a week., Disp: 10 patch, Rfl: 5    ezetimibe (ZETIA) 10 mg tablet, Take 1 tablet by mouth once daily., Disp: , Rfl:     ezetimibe (ZETIA) 10 mg tablet, 1 tablet., Disp: , Rfl:     famotidine (PEPCID) 20 MG tablet, Take OTC-one tablet daily, Disp: 90 tablet, Rfl: 1    fluconazole (DIFLUCAN) 150 MG Tab, Take 1 tablet (150 mg total) by mouth once daily. for 3 days, Disp: 3 tablet, Rfl: 0    fluticasone propionate (FLONASE) 50 mcg/actuation nasal spray, 1 spray (50 mcg total) by Each Nostril route once daily., Disp: 16 g, Rfl: 3    glipiZIDE (GLUCOTROL) 5 MG tablet, Take 5 mg by mouth once daily., Disp: , Rfl:     glipiZIDE 5 MG TR24, Take 1 tablet (5 mg total) by mouth daily with breakfast., Disp: 90 tablet, Rfl: 3    loratadine (CLARITIN) 10 mg tablet, , Disp: , Rfl:     losartan (COZAAR) 25 MG tablet, , Disp: , Rfl:     losartan (COZAAR) 50 MG tablet, Take 1 tablet (50 mg total) by mouth once daily., Disp: 90 tablet, Rfl: 3    meloxicam (MOBIC) 15 MG tablet, Take 1 tablet (15 mg total) by mouth once daily., Disp: 30 tablet, Rfl: 0    metFORMIN (GLUCOPHAGE) 1000 MG tablet, , Disp: , Rfl:     metoprolol succinate (TOPROL-XL) 25 MG 24 hr tablet, Take 1 tablet by mouth once daily., Disp: , Rfl:     metoprolol succinate (TOPROL-XL) 25 MG 24 hr tablet, 1 tablet., Disp: , Rfl:     pantoprazole (PROTONIX) 40 MG tablet, Take 1 tablet (40 mg total) by mouth once daily., Disp: 90 tablet, Rfl: 3    pregabalin (LYRICA) 75 MG capsule, Take 75 mg by mouth. 1 in the morning and 2 at night, Disp: , Rfl:     pregabalin (LYRICA) 75 MG capsule, 1 capsule., Disp: , Rfl:     traMADoL (ULTRAM) 50 mg tablet, Take by mouth., Disp: , Rfl:     valACYclovir (VALTREX) 500 MG tablet, Take 1 tablet (500 mg total) by mouth once daily., Disp: 90 tablet, Rfl: 1    Last Labs:     No visits with results  within 1 Month(s) from this visit.   Latest known visit with results is:   Lab Visit on 11/04/2022   Component Date Value    Sodium 11/04/2022 140     Potassium 11/04/2022 4.0     Chloride 11/04/2022 107     CO2 11/04/2022 29     Anion Gap 11/04/2022 8     Glucose 11/04/2022 113 (H)     BUN 11/04/2022 9     Creatinine 11/04/2022 0.91     BUN/Creatinine Ratio 11/04/2022 10     Calcium 11/04/2022 9.4     Total Protein 11/04/2022 8.3 (H)     Albumin 11/04/2022 3.7     Globulin 11/04/2022 4.6 (H)     A/G Ratio 11/04/2022 0.8     Bilirubin, Total 11/04/2022 0.5     Alk Phos 11/04/2022 86     ALT 11/04/2022 39     AST 11/04/2022 18     eGFR 11/04/2022 69        Last Imaging:  X-Ray Sinuses Min 3 Views  Narrative: EXAMINATION:  XR SINUSES MIN 3 VIEWS    CLINICAL HISTORY:  Chronic sinusitis, unspecified    COMPARISON:  None.    FINDINGS:  No air-fluid levels or abnormal soft tissue density are present within the sinuses. No fracture is identified. Sinuses appear normally formed. No other abnormality seen.  Impression: No evidence of sinus abnormality demonstrated.    Electronically signed by: Ervin Fonseca  Date:    01/10/2023  Time:    11:25         Review of Systems   HENT:  Positive for nasal congestion, postnasal drip, sinus pressure/congestion and voice change.    All other systems reviewed and are negative.      Objective:      Physical Exam  Vitals reviewed.   Constitutional:       Appearance: Normal appearance.   Cardiovascular:      Rate and Rhythm: Normal rate and regular rhythm.      Pulses: Normal pulses.      Heart sounds: Normal heart sounds.   Pulmonary:      Effort: Pulmonary effort is normal.      Breath sounds: Normal breath sounds.   Abdominal:      General: Abdomen is flat. Bowel sounds are normal.      Palpations: Abdomen is soft.   Musculoskeletal:         General: Normal range of motion.      Cervical back: Normal range of motion and neck supple.   Skin:     General: Skin is warm  and dry.   Neurological:      General: No focal deficit present.      Mental Status: She is alert and oriented to person, place, and time. Mental status is at baseline.       Assessment:       1. Seasonal allergic rhinitis due to other allergic trigger        2. Primary hypertension        3. Primary osteoarthritis of right knee        4. Upper respiratory tract infection, unspecified type        5. Sinusitis, unspecified chronicity, unspecified location  X-Ray Sinuses Min 3 Views           Plan:         Olga was seen today for hypertension.    Diagnoses and all orders for this visit:    Seasonal allergic rhinitis due to other allergic trigger    Primary hypertension    Primary osteoarthritis of right knee    Upper respiratory tract infection, unspecified type    Sinusitis, unspecified chronicity, unspecified location  -     X-Ray Sinuses Min 3 Views; Future    Other orders  -     estradioL (VIVELLE-DOT) 0.1 mg/24 hr PTSW; Place 1 patch onto the skin twice a week.  -     valACYclovir (VALTREX) 500 MG tablet; Take 1 tablet (500 mg total) by mouth once daily.  -     amoxicillin-clavulanate 500-125mg (AUGMENTIN) 500-125 mg Tab; Take 1 tablet (500 mg total) by mouth 2 (two) times daily. for 10 days  -     fluconazole (DIFLUCAN) 150 MG Tab; Take 1 tablet (150 mg total) by mouth once daily. for 3 days  -     fluticasone propionate (FLONASE) 50 mcg/actuation nasal spray; 1 spray (50 mcg total) by Each Nostril route once daily.

## 2023-01-10 NOTE — PATIENT INSTRUCTIONS
Olga was seen today for hypertension.    Diagnoses and all orders for this visit:    Seasonal allergic rhinitis due to other allergic trigger    Primary hypertension    Primary osteoarthritis of right knee    Upper respiratory tract infection, unspecified type    Sinusitis, unspecified chronicity, unspecified location  -     X-Ray Sinuses Min 3 Views; Future    Other orders  -     estradioL (VIVELLE-DOT) 0.1 mg/24 hr PTSW; Place 1 patch onto the skin twice a week.  -     valACYclovir (VALTREX) 500 MG tablet; Take 1 tablet (500 mg total) by mouth once daily.  -     amoxicillin-clavulanate 500-125mg (AUGMENTIN) 500-125 mg Tab; Take 1 tablet (500 mg total) by mouth 2 (two) times daily. for 10 days  -     fluconazole (DIFLUCAN) 150 MG Tab; Take 1 tablet (150 mg total) by mouth once daily. for 3 days  -     fluticasone propionate (FLONASE) 50 mcg/actuation nasal spray; 1 spray (50 mcg total) by Each Nostril route once daily.

## 2023-01-30 ENCOUNTER — HOSPITAL ENCOUNTER (OUTPATIENT)
Dept: RADIOLOGY | Facility: HOSPITAL | Age: 68
Discharge: HOME | End: 2023-01-30
Attending: ORTHOPAEDIC SURGERY
Payer: MEDICARE

## 2023-01-30 DIAGNOSIS — Z01.811 PRE-OPERATIVE RESPIRATORY EXAMINATION: ICD-10-CM

## 2023-01-30 PROCEDURE — 71046 X-RAY EXAM CHEST 2 VIEWS: CPT | Mod: TC

## 2023-01-30 PROCEDURE — 71046 XR CHEST PA AND LATERAL: ICD-10-PCS | Mod: 26,,, | Performed by: RADIOLOGY

## 2023-01-30 PROCEDURE — 85730 THROMBOPLASTIN TIME PARTIAL: CPT | Performed by: ORTHOPAEDIC SURGERY

## 2023-01-30 PROCEDURE — 85610 PROTHROMBIN TIME: CPT | Performed by: ORTHOPAEDIC SURGERY

## 2023-01-30 PROCEDURE — 71046 X-RAY EXAM CHEST 2 VIEWS: CPT | Mod: 26,,, | Performed by: RADIOLOGY

## 2023-02-02 ENCOUNTER — HOSPITAL ENCOUNTER (EMERGENCY)
Facility: HOSPITAL | Age: 68
Discharge: HOME | End: 2023-02-02
Payer: MEDICARE

## 2023-02-02 VITALS
RESPIRATION RATE: 15 BRPM | HEART RATE: 65 BPM | HEIGHT: 69 IN | SYSTOLIC BLOOD PRESSURE: 130 MMHG | WEIGHT: 208 LBS | DIASTOLIC BLOOD PRESSURE: 60 MMHG | OXYGEN SATURATION: 98 % | TEMPERATURE: 99 F | BODY MASS INDEX: 30.81 KG/M2

## 2023-02-02 DIAGNOSIS — M94.0 COSTOCHONDRITIS, ACUTE: ICD-10-CM

## 2023-02-02 DIAGNOSIS — K21.9 GASTROESOPHAGEAL REFLUX DISEASE, UNSPECIFIED WHETHER ESOPHAGITIS PRESENT: Primary | ICD-10-CM

## 2023-02-02 DIAGNOSIS — R07.9 CHEST PAIN: ICD-10-CM

## 2023-02-02 LAB
ALBUMIN SERPL BCP-MCNC: 3.9 G/DL (ref 3.5–5)
ALBUMIN/GLOB SERPL: 0.9 {RATIO}
ALP SERPL-CCNC: 79 U/L (ref 55–142)
ALT SERPL W P-5'-P-CCNC: 44 U/L (ref 13–56)
ANION GAP SERPL CALCULATED.3IONS-SCNC: 13 MMOL/L (ref 7–16)
APTT PPP: 26.2 SECONDS (ref 25.2–37.3)
AST SERPL W P-5'-P-CCNC: 37 U/L (ref 15–37)
BASOPHILS # BLD AUTO: 0.09 K/UL (ref 0–0.2)
BASOPHILS NFR BLD AUTO: 1 % (ref 0–1)
BILIRUB SERPL-MCNC: 0.3 MG/DL (ref ?–1.2)
BILIRUB UR QL STRIP: NEGATIVE
BUN SERPL-MCNC: 8 MG/DL (ref 7–18)
BUN/CREAT SERPL: 8 (ref 6–20)
CALCIUM SERPL-MCNC: 9.4 MG/DL (ref 8.5–10.1)
CHLORIDE SERPL-SCNC: 103 MMOL/L (ref 98–107)
CLARITY UR: NORMAL
CO2 SERPL-SCNC: 27 MMOL/L (ref 21–32)
COLOR UR: NORMAL
CREAT SERPL-MCNC: 1.01 MG/DL (ref 0.55–1.02)
DIFFERENTIAL METHOD BLD: ABNORMAL
EGFR (NO RACE VARIABLE) (RUSH/TITUS): 61 ML/MIN/1.73M²
EOSINOPHIL # BLD AUTO: 0.1 K/UL (ref 0–0.5)
EOSINOPHIL NFR BLD AUTO: 1.1 % (ref 1–4)
ERYTHROCYTE [DISTWIDTH] IN BLOOD BY AUTOMATED COUNT: 13.2 % (ref 11.5–14.5)
GLOBULIN SER-MCNC: 4.2 G/DL (ref 2–4)
GLUCOSE SERPL-MCNC: 149 MG/DL (ref 74–106)
GLUCOSE UR STRIP-MCNC: >1000 MG/DL
HCT VFR BLD AUTO: 45.2 % (ref 38–47)
HGB BLD-MCNC: 14.1 G/DL (ref 12–16)
IMM GRANULOCYTES # BLD AUTO: 0.02 K/UL (ref 0–0.04)
IMM GRANULOCYTES NFR BLD: 0.2 % (ref 0–0.4)
INR BLD: 0.99
KETONES UR STRIP-SCNC: NEGATIVE MG/DL
LEUKOCYTE ESTERASE UR QL STRIP: NEGATIVE
LYMPHOCYTES # BLD AUTO: 4.59 K/UL (ref 1–4.8)
LYMPHOCYTES NFR BLD AUTO: 50.8 % (ref 27–41)
MAGNESIUM SERPL-MCNC: 2.1 MG/DL (ref 1.7–2.3)
MCH RBC QN AUTO: 28.7 PG (ref 27–31)
MCHC RBC AUTO-ENTMCNC: 31.2 G/DL (ref 32–36)
MCV RBC AUTO: 91.9 FL (ref 80–96)
MONOCYTES # BLD AUTO: 0.62 K/UL (ref 0–0.8)
MONOCYTES NFR BLD AUTO: 6.9 % (ref 2–6)
MPC BLD CALC-MCNC: 12.3 FL (ref 9.4–12.4)
NEUTROPHILS # BLD AUTO: 3.62 K/UL (ref 1.8–7.7)
NEUTROPHILS NFR BLD AUTO: 40 % (ref 53–65)
NITRITE UR QL STRIP: NEGATIVE
NRBC # BLD AUTO: 0 X10E3/UL
NRBC, AUTO (.00): 0 %
NT-PROBNP SERPL-MCNC: 35 PG/ML (ref 1–125)
PH UR STRIP: 6 PH UNITS
PLATELET # BLD AUTO: 264 K/UL (ref 150–400)
POTASSIUM SERPL-SCNC: 4.2 MMOL/L (ref 3.5–5.1)
PROT SERPL-MCNC: 8.1 G/DL (ref 6.4–8.2)
PROT UR QL STRIP: NEGATIVE
PROTHROMBIN TIME: 12.7 SECONDS (ref 11.7–14.7)
RBC # BLD AUTO: 4.92 M/UL (ref 4.2–5.4)
RBC # UR STRIP: NEGATIVE /UL
SODIUM SERPL-SCNC: 139 MMOL/L (ref 136–145)
SP GR UR STRIP: 1.01
TROPONIN I SERPL HS-MCNC: 4.1 PG/ML
UROBILINOGEN UR STRIP-ACNC: NORMAL MG/DL
WBC # BLD AUTO: 9.04 K/UL (ref 4.5–11)

## 2023-02-02 PROCEDURE — 93010 ELECTROCARDIOGRAM REPORT: CPT | Mod: ,,, | Performed by: HOSPITALIST

## 2023-02-02 PROCEDURE — 81003 URINALYSIS AUTO W/O SCOPE: CPT | Performed by: NURSE PRACTITIONER

## 2023-02-02 PROCEDURE — 85025 COMPLETE CBC W/AUTO DIFF WBC: CPT | Performed by: NURSE PRACTITIONER

## 2023-02-02 PROCEDURE — 80053 COMPREHEN METABOLIC PANEL: CPT | Performed by: NURSE PRACTITIONER

## 2023-02-02 PROCEDURE — 83880 ASSAY OF NATRIURETIC PEPTIDE: CPT | Performed by: NURSE PRACTITIONER

## 2023-02-02 PROCEDURE — 96374 THER/PROPH/DIAG INJ IV PUSH: CPT

## 2023-02-02 PROCEDURE — 99285 EMERGENCY DEPT VISIT HI MDM: CPT | Mod: 25

## 2023-02-02 PROCEDURE — 84484 ASSAY OF TROPONIN QUANT: CPT | Performed by: NURSE PRACTITIONER

## 2023-02-02 PROCEDURE — 99284 PR EMERGENCY DEPT VISIT,LEVEL IV: ICD-10-PCS | Mod: ,,, | Performed by: NURSE PRACTITIONER

## 2023-02-02 PROCEDURE — 63600175 PHARM REV CODE 636 W HCPCS: Performed by: NURSE PRACTITIONER

## 2023-02-02 PROCEDURE — 96375 TX/PRO/DX INJ NEW DRUG ADDON: CPT

## 2023-02-02 PROCEDURE — 93005 ELECTROCARDIOGRAM TRACING: CPT

## 2023-02-02 PROCEDURE — 25000003 PHARM REV CODE 250: Performed by: NURSE PRACTITIONER

## 2023-02-02 PROCEDURE — 85610 PROTHROMBIN TIME: CPT | Performed by: NURSE PRACTITIONER

## 2023-02-02 PROCEDURE — 93010 EKG 12-LEAD: ICD-10-PCS | Mod: ,,, | Performed by: HOSPITALIST

## 2023-02-02 PROCEDURE — 99284 EMERGENCY DEPT VISIT MOD MDM: CPT | Mod: ,,, | Performed by: NURSE PRACTITIONER

## 2023-02-02 PROCEDURE — 85730 THROMBOPLASTIN TIME PARTIAL: CPT | Performed by: NURSE PRACTITIONER

## 2023-02-02 PROCEDURE — 83735 ASSAY OF MAGNESIUM: CPT | Performed by: NURSE PRACTITIONER

## 2023-02-02 RX ORDER — KETOROLAC TROMETHAMINE 15 MG/ML
15 INJECTION, SOLUTION INTRAMUSCULAR; INTRAVENOUS
Status: COMPLETED | OUTPATIENT
Start: 2023-02-02 | End: 2023-02-02

## 2023-02-02 RX ORDER — LIDOCAINE HYDROCHLORIDE 20 MG/ML
15 SOLUTION OROPHARYNGEAL ONCE
Status: COMPLETED | OUTPATIENT
Start: 2023-02-02 | End: 2023-02-02

## 2023-02-02 RX ORDER — FAMOTIDINE 10 MG/ML
20 INJECTION INTRAVENOUS
Status: COMPLETED | OUTPATIENT
Start: 2023-02-02 | End: 2023-02-02

## 2023-02-02 RX ORDER — OMEPRAZOLE 20 MG/1
CAPSULE, DELAYED RELEASE ORAL
Qty: 44 CAPSULE | Refills: 0 | Status: ON HOLD | OUTPATIENT
Start: 2023-02-02 | End: 2023-02-24 | Stop reason: HOSPADM

## 2023-02-02 RX ORDER — TIZANIDINE 4 MG/1
4 TABLET ORAL EVERY 6 HOURS PRN
Qty: 15 TABLET | Refills: 0 | Status: ON HOLD | OUTPATIENT
Start: 2023-02-02 | End: 2023-02-24 | Stop reason: HOSPADM

## 2023-02-02 RX ORDER — KETOROLAC TROMETHAMINE 30 MG/ML
30 INJECTION, SOLUTION INTRAMUSCULAR; INTRAVENOUS
Status: DISCONTINUED | OUTPATIENT
Start: 2023-02-02 | End: 2023-02-02

## 2023-02-02 RX ORDER — MAG HYDROX/ALUMINUM HYD/SIMETH 200-200-20
30 SUSPENSION, ORAL (FINAL DOSE FORM) ORAL ONCE
Status: COMPLETED | OUTPATIENT
Start: 2023-02-02 | End: 2023-02-02

## 2023-02-02 RX ADMIN — ALUMINUM HYDROXIDE, MAGNESIUM HYDROXIDE, AND SIMETHICONE 30 ML: 200; 200; 20 SUSPENSION ORAL at 06:02

## 2023-02-02 RX ADMIN — KETOROLAC TROMETHAMINE 15 MG: 15 INJECTION, SOLUTION INTRAMUSCULAR; INTRAVENOUS at 06:02

## 2023-02-02 RX ADMIN — FAMOTIDINE 20 MG: 10 INJECTION, SOLUTION INTRAVENOUS at 06:02

## 2023-02-02 RX ADMIN — LIDOCAINE HYDROCHLORIDE 15 ML: 20 SOLUTION ORAL; TOPICAL at 06:02

## 2023-02-02 NOTE — ED PROVIDER NOTES
Encounter Date: 2/2/2023       History     Chief Complaint   Patient presents with    Chest Pain    Shortness of Breath     Patient presents to the ER with complaint of right-sided chest pain.  Patient states the symptoms have been ongoing x1 week.  She describes the pain as a catching type pain that is worse when she rolls over in the bed, or if she turns moves in certain positions.  She states the pain started while she was just sitting around her house.  She does not recall injury.  She has not had any exertional type activities in the last week to 2 weeks.  She has history of hypertension diabetes and heart attack.  She denies shortness of breath.  She denies nausea vomiting or diarrhea.  She reports sinus type congestion off and on times 2-3 weeks.  She denies fever or cough.      The history is provided by the patient.   Review of patient's allergies indicates:   Allergen Reactions    Jardiance [empagliflozin] Anaphylaxis     Headaches     Trulicity [dulaglutide]      Abdominal pain     Past Medical History:   Diagnosis Date    Acute superficial gastritis without hemorrhage 06/08/2022    Adenomatous polyp of cecum 06/15/2022    Adenomatous polyp of transverse colon 06/15/2022    Anticoagulant long-term use     Arthritis     Asthma     Coronary artery disease     Diabetes mellitus, type 2 2014    Diverticula, colon 06/15/2022    DVT (deep venous thrombosis)     Epigastric pain 06/08/2022    Heart attack 2011    History of colon polyps 06/15/2022    Hyperlipidemia     Hypertension 2014    Polyp of hepatic flexure of colon 06/15/2022    Screening for colon cancer 06/15/2022    Thyroid disease      Past Surgical History:   Procedure Laterality Date    ARTHROPLASTY, KNEE, TOTAL, USING COMPUTER-ASSISTED NAVIGATION Right 02/09/2023    Procedure: ARTHROPLASTY, KNEE, TOTAL, USING COMPUTER-ASSISTED NAVIGATION;  Surgeon: Erick Tolliver MD;  Location: [facility];  Service: Orthopedics;  Laterality: Right;     BILATERAL OOPHORECTOMY Bilateral     35 years ago,  1-2 years after hyst     SECTION      CHOLECYSTECTOMY      COLONOSCOPY  2017    repeat in 3 years    CORONARY ARTERY BYPASS GRAFT  2011    ESOPHAGOGASTRODUODENOSCOPY  03/10/2021    HYSTERECTOMY       Family History   Problem Relation Age of Onset    Diabetes Mother     Heart disease Mother     Dementia Mother     Thyroid disease Mother     No Known Problems Father     Diabetes Brother     Breast cancer Neg Hx     Colon cancer Neg Hx     Ovarian cancer Neg Hx      Social History     Tobacco Use    Smoking status: Former     Passive exposure: Past    Smokeless tobacco: Never   Substance Use Topics    Alcohol use: Not Currently    Drug use: Never     Review of Systems   Constitutional:  Positive for activity change and fatigue.   Respiratory:  Positive for shortness of breath.    Cardiovascular:  Positive for chest pain.   All other systems reviewed and are negative.    Physical Exam     Initial Vitals [23 1410]   BP Pulse Resp Temp SpO2   (!) 141/72 66 16 98.7 °F (37.1 °C) 99 %      MAP       --         Physical Exam    Nursing note and vitals reviewed.  Constitutional: She appears well-developed and well-nourished.   HENT:   Head: Normocephalic.   Right Ear: External ear normal.   Left Ear: External ear normal.   Nose: Nose normal.   Mouth/Throat: Oropharynx is clear and moist.   Eyes: Conjunctivae and EOM are normal. Pupils are equal, round, and reactive to light.   Neck: Neck supple.   Normal range of motion.  Cardiovascular:  Normal rate, regular rhythm, normal heart sounds and intact distal pulses.           Pulmonary/Chest: Breath sounds normal. She exhibits tenderness (tender to palpation).   Abdominal: Abdomen is soft. Bowel sounds are normal.   Musculoskeletal:         General: Normal range of motion.      Cervical back: Normal range of motion and neck supple.     Neurological: She is alert and oriented to person, place, and time. She has  normal strength. GCS score is 15. GCS eye subscore is 4. GCS verbal subscore is 5. GCS motor subscore is 6.   Skin: Skin is warm and dry. Capillary refill takes less than 2 seconds.   Psychiatric: She has a normal mood and affect. Her behavior is normal. Judgment and thought content normal.       Medical Screening Exam   See Full Note    ED Course   Procedures  Labs Reviewed   COMPREHENSIVE METABOLIC PANEL - Abnormal; Notable for the following components:       Result Value    Glucose 149 (*)     Globulin 4.2 (*)     All other components within normal limits   CBC WITH DIFFERENTIAL - Abnormal; Notable for the following components:    MCHC 31.2 (*)     Neutrophils % 40.0 (*)     Lymphocytes % 50.8 (*)     Monocytes % 6.9 (*)     All other components within normal limits   TROPONIN I - Normal   PROTIME-INR - Normal   MAGNESIUM - Normal   NT-PRO NATRIURETIC PEPTIDE - Normal   APTT - Normal   CBC W/ AUTO DIFFERENTIAL    Narrative:     The following orders were created for panel order CBC auto differential.  Procedure                               Abnormality         Status                     ---------                               -----------         ------                     CBC with Differential[[id number]]        Abnormal            Final result                 Please view results for these tests on the individual orders.   URINALYSIS, REFLEX TO URINE CULTURE        ECG Results              EKG 12-lead (Final result)  Result time 02/02/23 20:40:26      Final result by Interface, Lab In [facility] (02/02/23 20:40:26)                   Narrative:    Test Reason : R07.9,    Vent. Rate : 067 BPM     Atrial Rate : 000 BPM     P-R Int : 162 ms          QRS Dur : 088 ms      QT Int : 406 ms       P-R-T Axes : 051 031 047 degrees     QTc Int : 418 ms    Sinus rhythm  Anterior infarct - age undetermined  Low QRS voltages in precordial leads  Abnormal ECG    Confirmed by Himanshu GARVIN, Geneva MACEDO ([id number]) on 2/2/2023 8:40:18  PM    Referred By: SUMIT   SELF           Confirmed By:Geneva Downs MD                                  Imaging Results              X-Ray Chest AP Portable (Final result)  Result time 02/02/23 14:49:44      Final result by Lj Gonzalez DO (02/02/23 14:49:44)                   Impression:      Stable chest x-ray without acute cardiopulmonary process demonstrated.    Point of Service: [facility]      Electronically signed by: Lj Gonzalez  Date:    02/02/2023  Time:    14:49               Narrative:    EXAMINATION:  XR CHEST AP PORTABLE    CLINICAL HISTORY:  chest pain;    COMPARISON:  Chest x-ray January 30, 2023    TECHNIQUE:  Frontal view/views of the chest.    FINDINGS:  The cardiomediastinal silhouette is stable in configuration.  Chronic change of the lungs without focal consolidation, pleural effusion, or pneumothorax.  Visualized osseous and surrounding soft tissue structures appear grossly unchanged.  Chronic left basilar scarring.  Surgical clips again project over the left mid and lower chest.                                       Medications   famotidine (PF) injection 20 mg (20 mg Intravenous Given 2/2/23 1815)   aluminum-magnesium hydroxide-simethicone 200-200-20 mg/5 mL suspension 30 mL (30 mLs Oral Given 2/2/23 1816)     And   LIDOcaine HCl 2% oral solution 15 mL (15 mLs Oral Given 2/2/23 1815)   ketorolac injection 15 mg (15 mg Intravenous Given 2/2/23 1815)   ketorolac injection 15 mg (15 mg Intravenous Given 2/2/23 1815)     Medical Decision Making:   Initial Assessment:   Patient presents to the ER with complaint of right-sided chest pain.  Patient states the symptoms have been ongoing x1 week.  She describes the pain as a catching type pain that is worse when she rolls over in the bed, or if she turns moves in certain positions.  She states the pain started while she was just sitting around her house.  She does not recall injury.  She has not had any exertional type  activities in the last week to 2 weeks.  She has history of hypertension diabetes and heart attack.  She denies shortness of breath.  She denies nausea vomiting or diarrhea.  She reports sinus type congestion off and on times 2-3 weeks.  She denies fever or cough.  Differential Diagnosis:   Chest pain versus cardiac event  GERD  Pneumonia  Pleurisy  Costochondritis  Clinical Tests:   Lab Tests: Ordered  Radiological Study: Ordered  Medical Tests: Ordered and Reviewed  ED Management:  EKG EKG normal sinus rhythm no ischemia 67 beats per minute  Cardiac workup  Chest x-ray    Dr. Lama is aware of patient in his also contributing to this patient's medical management.  Toradol 30mg IV  GI cocktail  Pecid IV    Patient is d/c home with dx of GERD, CP and costochondritis. Patient is given rx for tizanidine and prilosec.  She is given referral to GI.  She is instructed to follow up with her PCP in 2 days for recheck. Patient verbalizes understanding and agrees with plan of care.                  Clinical Impression:   Final diagnoses:  [R07.9] Chest pain  [K21.9] Gastroesophageal reflux disease, unspecified whether esophagitis present (Primary)  [M94.0] Costochondritis, acute        ED Disposition Condition    Discharge Stable          ED Prescriptions       Medication Sig Dispense Start Date End Date Auth. Provider    omeprazole (PRILOSEC) 20 MG capsule () Take 1 tablet by mouth b.i.d. x7 days.  Then 1 tablet by mouth 30 minutes before the 1st meal of the day daily. 44 capsule 2023 PHILIP Arteaga    tiZANidine (ZANAFLEX) 4 MG tablet () Take 1 tablet (4 mg total) by mouth every 6 (six) hours as needed (Muscle pain/spasm). 15 tablet 2023 PHILIP Arteaga          Follow-up Information    None          PHILIP Arteaga  03/10/23 [id number]

## 2023-02-03 NOTE — DISCHARGE INSTRUCTIONS
Take medication as prescribed.  Schedule follow up appointment with GI, referral sent from ER to follow up with SAGAR Callejas.   Alternate moist heat and ice compresses to sore areas of the chest for comfort.  Return to the ER with new or worsening symptoms.

## 2023-02-07 DIAGNOSIS — K21.9 GASTROESOPHAGEAL REFLUX DISEASE: Primary | ICD-10-CM

## 2023-02-08 NOTE — HPI
67-year-old female with severe degenerative joint disease of the right knee who is failed non operative treatment including injections walking with a cane for greater than 3 months she is now risk for falls needing total knee arthroplasty on the right  Right lower extremity she moves her toes she has sensation to touch has palpable pulses she has range of motion 5-100 degrees of flexion there is crepitus on motion no instability on varus valgus stress has a 1+ effusion walks with antalgic gait  X-rays show tricompartmental degenerative changes with more involved medial compartment right knee  Impression severe degenerative joint disease right knee  Plan total knee arthroplasty on the right with or without patellar resurfacing

## 2023-02-08 NOTE — H&P
[facility] - Orthopedic Periop Services  Orthopedics  H&P    Patient Name: Olga Stephenson  MRN: [id number]  Admission Date: (Not on file)  Primary Care Provider: Lv Cartagena MD    Patient information was obtained from patient and past medical records.     Subjective:     Principal Problem:<principal problem not specified>    Chief Complaint: No chief complaint on file.       HPI: 67-year-old female with severe degenerative joint disease of the right knee who is failed non operative treatment including injections walking with a cane for greater than 3 months she is now risk for falls needing total knee arthroplasty on the right  Right lower extremity she moves her toes she has sensation to touch has palpable pulses she has range of motion 5-100 degrees of flexion there is crepitus on motion no instability on varus valgus stress has a 1+ effusion walks with antalgic gait  X-rays show tricompartmental degenerative changes with more involved medial compartment right knee  Impression severe degenerative joint disease right knee  Plan total knee arthroplasty on the right with or without patellar resurfacing      Past Medical History:   Diagnosis Date    Acute superficial gastritis without hemorrhage 2022    Adenomatous polyp of cecum 6/15/2022    Adenomatous polyp of transverse colon 6/15/2022    Asthma     Coronary artery disease     Diabetes mellitus     Diabetes mellitus, type 2     Diverticula, colon 6/15/2022    Epigastric pain 2022    Heart attack 2011    History of colon polyps 6/15/2022    Hyperlipidemia     Hypertension     Polyp of hepatic flexure of colon 6/15/2022    Screening for colon cancer 6/15/2022    Thyroid disease        Past Surgical History:   Procedure Laterality Date    BILATERAL OOPHORECTOMY Bilateral     35 years ago,  1-2 years after hyst    CARDIAC SURGERY       SECTION      COLONOSCOPY  2017    repeat in 3 years    ESOPHAGOGASTRODUODENOSCOPY   03/10/2021    HYSTERECTOMY         Review of patient's allergies indicates:   Allergen Reactions    Jardiance [empagliflozin] Anaphylaxis     Headaches     Trulicity [dulaglutide]      Abdominal pain       No current facility-administered medications for this encounter.     Current Outpatient Medications   Medication Sig    albuterol (PROVENTIL/VENTOLIN HFA) 90 mcg/actuation inhaler Inhale 2 puffs into the lungs 2 (two) times a day.    blood sugar diagnostic (FREESTYLE LITE STRIPS) Strp Use one strip daily to monitor glucose    chlorpheniramine-phenylephrine 4-10 mg per tablet Take 1 tablet by mouth every 4 (four) hours as needed for Congestion.    clopidogreL (PLAVIX) 75 mg tablet Take 1 tablet (75 mg total) by mouth once daily.    clopidogreL (PLAVIX) 75 mg tablet 1 tablet.    cyclobenzaprine (FLEXERIL) 10 MG tablet     cyclobenzaprine (FLEXERIL) 10 MG tablet 1 tablet nightly as needed.    dapagliflozin (FARXIGA) 10 mg tablet Take 1 tablet (10 mg total) by mouth once daily.    dapagliflozin (FARXIGA) 10 mg tablet 1 tablet.    estradioL (VIVELLE-DOT) 0.1 mg/24 hr PTSW Place 1 patch onto the skin twice a week.    ezetimibe (ZETIA) 10 mg tablet Take 1 tablet by mouth once daily.    ezetimibe (ZETIA) 10 mg tablet 1 tablet.    famotidine (PEPCID) 20 MG tablet Take OTC-one tablet daily    fluticasone propionate (FLONASE) 50 mcg/actuation nasal spray 1 spray (50 mcg total) by Each Nostril route once daily.    glipiZIDE (GLUCOTROL) 5 MG tablet Take 5 mg by mouth once daily.    glipiZIDE 5 MG TR24 Take 1 tablet (5 mg total) by mouth daily with breakfast.    loratadine (CLARITIN) 10 mg tablet     losartan (COZAAR) 25 MG tablet     losartan (COZAAR) 50 MG tablet Take 1 tablet (50 mg total) by mouth once daily.    meloxicam (MOBIC) 15 MG tablet Take 1 tablet (15 mg total) by mouth once daily.    metFORMIN (GLUCOPHAGE) 1000 MG tablet     metoprolol succinate (TOPROL-XL) 25 MG 24 hr tablet Take 1 tablet  by mouth once daily.    metoprolol succinate (TOPROL-XL) 25 MG 24 hr tablet 1 tablet.    omeprazole (PRILOSEC) 20 MG capsule Take 1 tablet by mouth b.i.d. x7 days.  Then 1 tablet by mouth 30 minutes before the 1st meal of the day daily.    pantoprazole (PROTONIX) 40 MG tablet Take 1 tablet (40 mg total) by mouth once daily.    pregabalin (LYRICA) 75 MG capsule Take 75 mg by mouth. 1 in the morning and 2 at night    pregabalin (LYRICA) 75 MG capsule 1 capsule.    tiZANidine (ZANAFLEX) 4 MG tablet Take 1 tablet (4 mg total) by mouth every 6 (six) hours as needed (Muscle pain/spasm).    traMADoL (ULTRAM) 50 mg tablet Take by mouth.    valACYclovir (VALTREX) 500 MG tablet Take 1 tablet (500 mg total) by mouth once daily.     Family History       Problem Relation (Age of Onset)    Dementia Mother    Diabetes Mother, Brother    Heart disease Mother    No Known Problems Father          Tobacco Use    Smoking status: Former     Passive exposure: Past    Smokeless tobacco: Never   Substance and Sexual Activity    Alcohol use: Not Currently    Drug use: Never    Sexual activity: Not Currently     Partners: Male     Birth control/protection: None     Review of Systems   Musculoskeletal:  Positive for joint pain and joint swelling.   Objective:     Vital Signs (Most Recent):    Vital Signs (24h Range):  BP: ()/()   Arterial Line BP: ()/()            There is no height or weight on file to calculate BMI.    No intake or output data in the 24 hours ending 02/08/23 1655              Right Knee Exam     Inspection   Effusion: present    Tenderness   The patient is tender to palpation of the medial joint line and lateral joint line.    Crepitus   The patient has crepitus of the medial joint line.    Other   Sensation: normal    Vascular Exam     Right Pulses  Dorsalis Pedis:      2+          Significant Labs: All pertinent labs within the past 24 hours have been reviewed.    Significant Imaging: I have reviewed all  pertinent imaging results/findings.    Assessment/Plan:     No notes have been filed under this hospital service.  Service: Orthopedic Surgery      Erick Tolliver MD  Orthopedics  [facility] - Orthopedic Periop Services

## 2023-02-08 NOTE — SUBJECTIVE & OBJECTIVE
Past Medical History:   Diagnosis Date    Acute superficial gastritis without hemorrhage 2022    Adenomatous polyp of cecum 6/15/2022    Adenomatous polyp of transverse colon 6/15/2022    Asthma     Coronary artery disease     Diabetes mellitus     Diabetes mellitus, type 2     Diverticula, colon 6/15/2022    Epigastric pain 2022    Heart attack     History of colon polyps 6/15/2022    Hyperlipidemia     Hypertension     Polyp of hepatic flexure of colon 6/15/2022    Screening for colon cancer 6/15/2022    Thyroid disease        Past Surgical History:   Procedure Laterality Date    BILATERAL OOPHORECTOMY Bilateral     35 years ago,  1-2 years after hyst    CARDIAC SURGERY       SECTION      COLONOSCOPY  2017    repeat in 3 years    ESOPHAGOGASTRODUODENOSCOPY  03/10/2021    HYSTERECTOMY         Review of patient's allergies indicates:   Allergen Reactions    Jardiance [empagliflozin] Anaphylaxis     Headaches     Trulicity [dulaglutide]      Abdominal pain       No current facility-administered medications for this encounter.     Current Outpatient Medications   Medication Sig    albuterol (PROVENTIL/VENTOLIN HFA) 90 mcg/actuation inhaler Inhale 2 puffs into the lungs 2 (two) times a day.    blood sugar diagnostic (FREESTYLE LITE STRIPS) Strp Use one strip daily to monitor glucose    chlorpheniramine-phenylephrine 4-10 mg per tablet Take 1 tablet by mouth every 4 (four) hours as needed for Congestion.    clopidogreL (PLAVIX) 75 mg tablet Take 1 tablet (75 mg total) by mouth once daily.    clopidogreL (PLAVIX) 75 mg tablet 1 tablet.    cyclobenzaprine (FLEXERIL) 10 MG tablet     cyclobenzaprine (FLEXERIL) 10 MG tablet 1 tablet nightly as needed.    dapagliflozin (FARXIGA) 10 mg tablet Take 1 tablet (10 mg total) by mouth once daily.    dapagliflozin (FARXIGA) 10 mg tablet 1 tablet.    estradioL (VIVELLE-DOT) 0.1 mg/24 hr PTSW Place 1 patch onto the skin twice a week.    ezetimibe (ZETIA) 10  mg tablet Take 1 tablet by mouth once daily.    ezetimibe (ZETIA) 10 mg tablet 1 tablet.    famotidine (PEPCID) 20 MG tablet Take OTC-one tablet daily    fluticasone propionate (FLONASE) 50 mcg/actuation nasal spray 1 spray (50 mcg total) by Each Nostril route once daily.    glipiZIDE (GLUCOTROL) 5 MG tablet Take 5 mg by mouth once daily.    glipiZIDE 5 MG TR24 Take 1 tablet (5 mg total) by mouth daily with breakfast.    loratadine (CLARITIN) 10 mg tablet     losartan (COZAAR) 25 MG tablet     losartan (COZAAR) 50 MG tablet Take 1 tablet (50 mg total) by mouth once daily.    meloxicam (MOBIC) 15 MG tablet Take 1 tablet (15 mg total) by mouth once daily.    metFORMIN (GLUCOPHAGE) 1000 MG tablet     metoprolol succinate (TOPROL-XL) 25 MG 24 hr tablet Take 1 tablet by mouth once daily.    metoprolol succinate (TOPROL-XL) 25 MG 24 hr tablet 1 tablet.    omeprazole (PRILOSEC) 20 MG capsule Take 1 tablet by mouth b.i.d. x7 days.  Then 1 tablet by mouth 30 minutes before the 1st meal of the day daily.    pantoprazole (PROTONIX) 40 MG tablet Take 1 tablet (40 mg total) by mouth once daily.    pregabalin (LYRICA) 75 MG capsule Take 75 mg by mouth. 1 in the morning and 2 at night    pregabalin (LYRICA) 75 MG capsule 1 capsule.    tiZANidine (ZANAFLEX) 4 MG tablet Take 1 tablet (4 mg total) by mouth every 6 (six) hours as needed (Muscle pain/spasm).    traMADoL (ULTRAM) 50 mg tablet Take by mouth.    valACYclovir (VALTREX) 500 MG tablet Take 1 tablet (500 mg total) by mouth once daily.     Family History       Problem Relation (Age of Onset)    Dementia Mother    Diabetes Mother, Brother    Heart disease Mother    No Known Problems Father          Tobacco Use    Smoking status: Former     Passive exposure: Past    Smokeless tobacco: Never   Substance and Sexual Activity    Alcohol use: Not Currently    Drug use: Never    Sexual activity: Not Currently     Partners: Male     Birth control/protection: None     Review of Systems    Musculoskeletal:  Positive for joint pain and joint swelling.   Objective:     Vital Signs (Most Recent):    Vital Signs (24h Range):  BP: ()/()   Arterial Line BP: ()/()            There is no height or weight on file to calculate BMI.    No intake or output data in the 24 hours ending 02/08/23 1655              Right Knee Exam     Inspection   Effusion: present    Tenderness   The patient is tender to palpation of the medial joint line and lateral joint line.    Crepitus   The patient has crepitus of the medial joint line.    Other   Sensation: normal    Vascular Exam     Right Pulses  Dorsalis Pedis:      2+          Significant Labs: All pertinent labs within the past 24 hours have been reviewed.    Significant Imaging: I have reviewed all pertinent imaging results/findings.   stated

## 2023-02-09 ENCOUNTER — ANESTHESIA EVENT (OUTPATIENT)
Dept: SURGERY | Facility: HOSPITAL | Age: 68
End: 2023-02-09
Payer: MEDICARE

## 2023-02-09 ENCOUNTER — HOSPITAL ENCOUNTER (OUTPATIENT)
Facility: HOSPITAL | Age: 68
Discharge: NURSING, REHAB OR LONG-TERM CARE | End: 2023-02-10
Attending: ORTHOPAEDIC SURGERY | Admitting: ORTHOPAEDIC SURGERY
Payer: MEDICARE

## 2023-02-09 ENCOUNTER — ANESTHESIA (OUTPATIENT)
Dept: SURGERY | Facility: HOSPITAL | Age: 68
End: 2023-02-09
Payer: MEDICARE

## 2023-02-09 DIAGNOSIS — M17.11 ARTHRITIS OF RIGHT KNEE: ICD-10-CM

## 2023-02-09 LAB
ANION GAP SERPL CALCULATED.3IONS-SCNC: 15 MMOL/L (ref 7–16)
BASOPHILS # BLD AUTO: 0.07 K/UL (ref 0–0.2)
BASOPHILS NFR BLD AUTO: 0.9 % (ref 0–1)
BUN SERPL-MCNC: 9 MG/DL (ref 7–18)
BUN/CREAT SERPL: 9 (ref 6–20)
CALCIUM SERPL-MCNC: 8.8 MG/DL (ref 8.5–10.1)
CHLORIDE SERPL-SCNC: 106 MMOL/L (ref 98–107)
CO2 SERPL-SCNC: 25 MMOL/L (ref 21–32)
CREAT SERPL-MCNC: 0.99 MG/DL (ref 0.55–1.02)
DIFFERENTIAL METHOD BLD: ABNORMAL
EGFR (NO RACE VARIABLE) (RUSH/TITUS): 63 ML/MIN/1.73M²
EOSINOPHIL # BLD AUTO: 0.07 K/UL (ref 0–0.5)
EOSINOPHIL NFR BLD AUTO: 0.9 % (ref 1–4)
ERYTHROCYTE [DISTWIDTH] IN BLOOD BY AUTOMATED COUNT: 13.5 % (ref 11.5–14.5)
GLUCOSE SERPL-MCNC: 164 MG/DL (ref 70–105)
GLUCOSE SERPL-MCNC: 167 MG/DL (ref 70–105)
GLUCOSE SERPL-MCNC: 188 MG/DL (ref 70–105)
GLUCOSE SERPL-MCNC: 195 MG/DL (ref 74–106)
GLUCOSE SERPL-MCNC: 203 MG/DL (ref 70–105)
HCT VFR BLD AUTO: 42.3 % (ref 38–47)
HGB BLD-MCNC: 13.7 G/DL (ref 12–16)
IMM GRANULOCYTES # BLD AUTO: 0.03 K/UL (ref 0–0.04)
IMM GRANULOCYTES NFR BLD: 0.4 % (ref 0–0.4)
LYMPHOCYTES # BLD AUTO: 2.66 K/UL (ref 1–4.8)
LYMPHOCYTES NFR BLD AUTO: 35.8 % (ref 27–41)
MCH RBC QN AUTO: 29.6 PG (ref 27–31)
MCHC RBC AUTO-ENTMCNC: 32.4 G/DL (ref 32–36)
MCV RBC AUTO: 91.4 FL (ref 80–96)
MONOCYTES # BLD AUTO: 0.31 K/UL (ref 0–0.8)
MONOCYTES NFR BLD AUTO: 4.2 % (ref 2–6)
MPC BLD CALC-MCNC: 12.6 FL (ref 9.4–12.4)
NEUTROPHILS # BLD AUTO: 4.29 K/UL (ref 1.8–7.7)
NEUTROPHILS NFR BLD AUTO: 57.8 % (ref 53–65)
NRBC # BLD AUTO: 0 X10E3/UL
NRBC, AUTO (.00): 0 %
PLATELET # BLD AUTO: 199 K/UL (ref 150–400)
POTASSIUM SERPL-SCNC: 4.8 MMOL/L (ref 3.5–5.1)
RBC # BLD AUTO: 4.63 M/UL (ref 4.2–5.4)
SODIUM SERPL-SCNC: 141 MMOL/L (ref 136–145)
WBC # BLD AUTO: 7.43 K/UL (ref 4.5–11)

## 2023-02-09 PROCEDURE — 25000003 PHARM REV CODE 250: Performed by: ORTHOPAEDIC SURGERY

## 2023-02-09 PROCEDURE — D9220A PRA ANESTHESIA: ICD-10-PCS | Mod: CRNA,,, | Performed by: NURSE ANESTHETIST, CERTIFIED REGISTERED

## 2023-02-09 PROCEDURE — 63600175 PHARM REV CODE 636 W HCPCS: Mod: TB,JG | Performed by: ORTHOPAEDIC SURGERY

## 2023-02-09 PROCEDURE — D9220A PRA ANESTHESIA: ICD-10-PCS | Mod: ANES,,, | Performed by: ANESTHESIOLOGY

## 2023-02-09 PROCEDURE — 27200700 HC TUBE, ENDOTRACHEAL NASAL RAE: Performed by: NURSE ANESTHETIST, CERTIFIED REGISTERED

## 2023-02-09 PROCEDURE — 25000003 PHARM REV CODE 250: Performed by: ANESTHESIOLOGY

## 2023-02-09 PROCEDURE — C1713 ANCHOR/SCREW BN/BN,TIS/BN: HCPCS | Performed by: ORTHOPAEDIC SURGERY

## 2023-02-09 PROCEDURE — 85025 COMPLETE CBC W/AUTO DIFF WBC: CPT | Performed by: ORTHOPAEDIC SURGERY

## 2023-02-09 PROCEDURE — 25000242 PHARM REV CODE 250 ALT 637 W/ HCPCS: Performed by: ORTHOPAEDIC SURGERY

## 2023-02-09 PROCEDURE — 27000716 HC OXISENSOR PROBE, ANY SIZE: Performed by: NURSE ANESTHETIST, CERTIFIED REGISTERED

## 2023-02-09 PROCEDURE — 94640 AIRWAY INHALATION TREATMENT: CPT

## 2023-02-09 PROCEDURE — 97165 OT EVAL LOW COMPLEX 30 MIN: CPT

## 2023-02-09 PROCEDURE — 27201960 HC SPINAL TRAY: Performed by: NURSE ANESTHETIST, CERTIFIED REGISTERED

## 2023-02-09 PROCEDURE — C1776 JOINT DEVICE (IMPLANTABLE): HCPCS | Performed by: ORTHOPAEDIC SURGERY

## 2023-02-09 PROCEDURE — 27000655: Performed by: NURSE ANESTHETIST, CERTIFIED REGISTERED

## 2023-02-09 PROCEDURE — 80048 BASIC METABOLIC PNL TOTAL CA: CPT | Performed by: ORTHOPAEDIC SURGERY

## 2023-02-09 PROCEDURE — 99223 PR INITIAL HOSPITAL CARE,LEVL III: ICD-10-PCS | Mod: $0,,, | Performed by: HOSPITALIST

## 2023-02-09 PROCEDURE — 37000008 HC ANESTHESIA 1ST 15 MINUTES: Performed by: ORTHOPAEDIC SURGERY

## 2023-02-09 PROCEDURE — 63600175 PHARM REV CODE 636 W HCPCS: Performed by: NURSE ANESTHETIST, CERTIFIED REGISTERED

## 2023-02-09 PROCEDURE — 25000003 PHARM REV CODE 250: Performed by: NURSE ANESTHETIST, CERTIFIED REGISTERED

## 2023-02-09 PROCEDURE — 63600175 PHARM REV CODE 636 W HCPCS: Performed by: ORTHOPAEDIC SURGERY

## 2023-02-09 PROCEDURE — 36000713 HC OR TIME LEV V EA ADD 15 MIN: Performed by: ORTHOPAEDIC SURGERY

## 2023-02-09 PROCEDURE — 99900031 HC PATIENT EDUCATION (STAT)

## 2023-02-09 PROCEDURE — 27200750 HC INSULATED NEEDLE/ STIMUPLEX: Performed by: NURSE ANESTHETIST, CERTIFIED REGISTERED

## 2023-02-09 PROCEDURE — 94761 N-INVAS EAR/PLS OXIMETRY MLT: CPT

## 2023-02-09 PROCEDURE — 63600175 PHARM REV CODE 636 W HCPCS: Performed by: ANESTHESIOLOGY

## 2023-02-09 PROCEDURE — 27000689 HC BLADE LARYNGOSCOPE ANY SIZE: Performed by: NURSE ANESTHETIST, CERTIFIED REGISTERED

## 2023-02-09 PROCEDURE — C1769 GUIDE WIRE: HCPCS | Performed by: ORTHOPAEDIC SURGERY

## 2023-02-09 PROCEDURE — 27201423 OPTIME MED/SURG SUP & DEVICES STERILE SUPPLY: Performed by: ORTHOPAEDIC SURGERY

## 2023-02-09 PROCEDURE — 64447 PERIPHERAL BLOCK: ICD-10-PCS | Mod: 59,RT,, | Performed by: ANESTHESIOLOGY

## 2023-02-09 PROCEDURE — 71000039 HC RECOVERY, EACH ADD'L HOUR: Performed by: ORTHOPAEDIC SURGERY

## 2023-02-09 PROCEDURE — 27000510 HC BLANKET BAIR HUGGER ANY SIZE: Performed by: NURSE ANESTHETIST, CERTIFIED REGISTERED

## 2023-02-09 PROCEDURE — 82962 GLUCOSE BLOOD TEST: CPT

## 2023-02-09 PROCEDURE — 27000165 HC TUBE, ETT CUFFED: Performed by: NURSE ANESTHETIST, CERTIFIED REGISTERED

## 2023-02-09 PROCEDURE — D9220A PRA ANESTHESIA: Mod: CRNA,,, | Performed by: NURSE ANESTHETIST, CERTIFIED REGISTERED

## 2023-02-09 PROCEDURE — 27000284 HC CANNULA NASAL: Performed by: NURSE ANESTHETIST, CERTIFIED REGISTERED

## 2023-02-09 PROCEDURE — 27447 PR TOTAL KNEE ARTHROPLASTY: ICD-10-PCS | Mod: RT,,, | Performed by: ORTHOPAEDIC SURGERY

## 2023-02-09 PROCEDURE — 0055T BONE SRGRY CMPTR CT/MRI IMAG: CPT | Mod: RT,,, | Performed by: ORTHOPAEDIC SURGERY

## 2023-02-09 PROCEDURE — 27447 TOTAL KNEE ARTHROPLASTY: CPT | Mod: RT,,, | Performed by: ORTHOPAEDIC SURGERY

## 2023-02-09 PROCEDURE — 36000712 HC OR TIME LEV V 1ST 15 MIN: Performed by: ORTHOPAEDIC SURGERY

## 2023-02-09 PROCEDURE — 0055T PR COMPUTER-ASSIST MUSCSKEL NAVIG, ORTHO PROC, CT/MRI: ICD-10-PCS | Mod: RT,,, | Performed by: ORTHOPAEDIC SURGERY

## 2023-02-09 PROCEDURE — 37000009 HC ANESTHESIA EA ADD 15 MINS: Performed by: ORTHOPAEDIC SURGERY

## 2023-02-09 PROCEDURE — 71000033 HC RECOVERY, INTIAL HOUR: Performed by: ORTHOPAEDIC SURGERY

## 2023-02-09 PROCEDURE — 97161 PT EVAL LOW COMPLEX 20 MIN: CPT

## 2023-02-09 PROCEDURE — 64447 NJX AA&/STRD FEMORAL NRV IMG: CPT | Mod: 59,RT,, | Performed by: ANESTHESIOLOGY

## 2023-02-09 PROCEDURE — D9220A PRA ANESTHESIA: Mod: ANES,,, | Performed by: ANESTHESIOLOGY

## 2023-02-09 PROCEDURE — 99223 1ST HOSP IP/OBS HIGH 75: CPT | Mod: $0,,, | Performed by: HOSPITALIST

## 2023-02-09 DEVICE — CEMENT BONE SURG SMPLX P RADPQ: Type: IMPLANTABLE DEVICE | Site: KNEE | Status: FUNCTIONAL

## 2023-02-09 DEVICE — INSERT TIBIAL CS SIZE 4 9MM: Type: IMPLANTABLE DEVICE | Site: KNEE | Status: FUNCTIONAL

## 2023-02-09 DEVICE — COMP FEM CRUCIATE CEM DZ 5 RT: Type: IMPLANTABLE DEVICE | Site: KNEE | Status: FUNCTIONAL

## 2023-02-09 DEVICE — PATELLA TRI 32X10 X3 POLYETHYL: Type: IMPLANTABLE DEVICE | Site: KNEE | Status: FUNCTIONAL

## 2023-02-09 DEVICE — BASEPLATE TIB CEM PRIM SZ 4: Type: IMPLANTABLE DEVICE | Site: KNEE | Status: FUNCTIONAL

## 2023-02-09 DEVICE — GUIDEPIN 3.20MM 4 KANT SQUARE: Type: IMPLANTABLE DEVICE | Site: KNEE | Status: FUNCTIONAL

## 2023-02-09 RX ORDER — PROPOFOL 10 MG/ML
VIAL (ML) INTRAVENOUS
Status: DISCONTINUED | OUTPATIENT
Start: 2023-02-09 | End: 2023-02-09

## 2023-02-09 RX ORDER — ONDANSETRON 2 MG/ML
4 INJECTION INTRAMUSCULAR; INTRAVENOUS EVERY 8 HOURS PRN
Status: DISCONTINUED | OUTPATIENT
Start: 2023-02-09 | End: 2023-02-10 | Stop reason: HOSPADM

## 2023-02-09 RX ORDER — DOCUSATE SODIUM 100 MG/1
100 CAPSULE, LIQUID FILLED ORAL EVERY 12 HOURS
Status: DISCONTINUED | OUTPATIENT
Start: 2023-02-09 | End: 2023-02-10 | Stop reason: HOSPADM

## 2023-02-09 RX ORDER — EZETIMIBE 10 MG/1
10 TABLET ORAL DAILY
Status: DISCONTINUED | OUTPATIENT
Start: 2023-02-09 | End: 2023-02-10 | Stop reason: HOSPADM

## 2023-02-09 RX ORDER — LOSARTAN POTASSIUM 50 MG/1
50 TABLET ORAL DAILY
Status: DISCONTINUED | OUTPATIENT
Start: 2023-02-09 | End: 2023-02-10 | Stop reason: HOSPADM

## 2023-02-09 RX ORDER — DIPHENHYDRAMINE HYDROCHLORIDE 50 MG/ML
25 INJECTION INTRAMUSCULAR; INTRAVENOUS EVERY 6 HOURS PRN
Status: DISCONTINUED | OUTPATIENT
Start: 2023-02-09 | End: 2023-02-09 | Stop reason: HOSPADM

## 2023-02-09 RX ORDER — VALACYCLOVIR HYDROCHLORIDE 500 MG/1
500 TABLET, FILM COATED ORAL DAILY
Status: DISCONTINUED | OUTPATIENT
Start: 2023-02-09 | End: 2023-02-10 | Stop reason: HOSPADM

## 2023-02-09 RX ORDER — BUPIVACAINE HYDROCHLORIDE 7.5 MG/ML
INJECTION, SOLUTION EPIDURAL; RETROBULBAR
Status: COMPLETED | OUTPATIENT
Start: 2023-02-09 | End: 2023-02-09

## 2023-02-09 RX ORDER — PANTOPRAZOLE SODIUM 40 MG/1
40 TABLET, DELAYED RELEASE ORAL DAILY
Status: DISCONTINUED | OUTPATIENT
Start: 2023-02-09 | End: 2023-02-10 | Stop reason: HOSPADM

## 2023-02-09 RX ORDER — CYCLOBENZAPRINE HCL 10 MG
10 TABLET ORAL NIGHTLY
Status: DISCONTINUED | OUTPATIENT
Start: 2023-02-09 | End: 2023-02-10 | Stop reason: HOSPADM

## 2023-02-09 RX ORDER — HYDROCODONE BITARTRATE AND ACETAMINOPHEN 5; 325 MG/1; MG/1
1 TABLET ORAL EVERY 4 HOURS PRN
Status: DISCONTINUED | OUTPATIENT
Start: 2023-02-09 | End: 2023-02-10 | Stop reason: HOSPADM

## 2023-02-09 RX ORDER — METFORMIN HYDROCHLORIDE 500 MG/1
500 TABLET ORAL
Status: DISCONTINUED | OUTPATIENT
Start: 2023-02-10 | End: 2023-02-10 | Stop reason: HOSPADM

## 2023-02-09 RX ORDER — SODIUM CHLORIDE 9 MG/ML
75 INJECTION, SOLUTION INTRAVENOUS CONTINUOUS
Status: DISCONTINUED | OUTPATIENT
Start: 2023-02-09 | End: 2023-02-10 | Stop reason: HOSPADM

## 2023-02-09 RX ORDER — TIZANIDINE 4 MG/1
4 TABLET ORAL EVERY 6 HOURS PRN
Status: DISCONTINUED | OUTPATIENT
Start: 2023-02-09 | End: 2023-02-10 | Stop reason: HOSPADM

## 2023-02-09 RX ORDER — TRANEXAMIC ACID 100 MG/ML
INJECTION, SOLUTION INTRAVENOUS
Status: DISCONTINUED | OUTPATIENT
Start: 2023-02-09 | End: 2023-02-09

## 2023-02-09 RX ORDER — SODIUM CHLORIDE 9 MG/ML
INJECTION, SOLUTION INTRAVENOUS CONTINUOUS
Status: DISCONTINUED | OUTPATIENT
Start: 2023-02-09 | End: 2023-02-09

## 2023-02-09 RX ORDER — ONDANSETRON 2 MG/ML
4 INJECTION INTRAMUSCULAR; INTRAVENOUS DAILY PRN
Status: DISCONTINUED | OUTPATIENT
Start: 2023-02-09 | End: 2023-02-09 | Stop reason: HOSPADM

## 2023-02-09 RX ORDER — MIDAZOLAM HYDROCHLORIDE 5 MG/ML
INJECTION INTRAMUSCULAR; INTRAVENOUS
Status: DISCONTINUED | OUTPATIENT
Start: 2023-02-09 | End: 2023-02-09

## 2023-02-09 RX ORDER — ALBUTEROL SULFATE 0.83 MG/ML
2.5 SOLUTION RESPIRATORY (INHALATION) 2 TIMES DAILY
Status: DISCONTINUED | OUTPATIENT
Start: 2023-02-10 | End: 2023-02-10 | Stop reason: HOSPADM

## 2023-02-09 RX ORDER — FENTANYL CITRATE 50 UG/ML
INJECTION, SOLUTION INTRAMUSCULAR; INTRAVENOUS
Status: DISCONTINUED | OUTPATIENT
Start: 2023-02-09 | End: 2023-02-09

## 2023-02-09 RX ORDER — GLIPIZIDE 5 MG/1
5 TABLET ORAL DAILY
Status: DISCONTINUED | OUTPATIENT
Start: 2023-02-09 | End: 2023-02-10 | Stop reason: HOSPADM

## 2023-02-09 RX ORDER — MORPHINE SULFATE 10 MG/ML
4 INJECTION INTRAMUSCULAR; INTRAVENOUS; SUBCUTANEOUS EVERY 5 MIN PRN
Status: DISCONTINUED | OUTPATIENT
Start: 2023-02-09 | End: 2023-02-09 | Stop reason: HOSPADM

## 2023-02-09 RX ORDER — ROPIVACAINE HYDROCHLORIDE 7.5 MG/ML
INJECTION, SOLUTION EPIDURAL; PERINEURAL
Status: COMPLETED | OUTPATIENT
Start: 2023-02-09 | End: 2023-02-09

## 2023-02-09 RX ORDER — IPRATROPIUM BROMIDE AND ALBUTEROL SULFATE 2.5; .5 MG/3ML; MG/3ML
3 SOLUTION RESPIRATORY (INHALATION) ONCE AS NEEDED
Status: DISCONTINUED | OUTPATIENT
Start: 2023-02-09 | End: 2023-02-09 | Stop reason: HOSPADM

## 2023-02-09 RX ORDER — ALBUTEROL SULFATE 90 UG/1
2 AEROSOL, METERED RESPIRATORY (INHALATION) 2 TIMES DAILY
Status: DISCONTINUED | OUTPATIENT
Start: 2023-02-09 | End: 2023-02-09

## 2023-02-09 RX ORDER — HYDROMORPHONE HYDROCHLORIDE 2 MG/ML
0.5 INJECTION, SOLUTION INTRAMUSCULAR; INTRAVENOUS; SUBCUTANEOUS EVERY 5 MIN PRN
Status: DISCONTINUED | OUTPATIENT
Start: 2023-02-09 | End: 2023-02-09 | Stop reason: HOSPADM

## 2023-02-09 RX ORDER — DEXAMETHASONE SODIUM PHOSPHATE 10 MG/ML
INJECTION INTRAMUSCULAR; INTRAVENOUS
Status: COMPLETED | OUTPATIENT
Start: 2023-02-09 | End: 2023-02-09

## 2023-02-09 RX ORDER — METOPROLOL SUCCINATE 25 MG/1
25 TABLET, EXTENDED RELEASE ORAL DAILY
Status: DISCONTINUED | OUTPATIENT
Start: 2023-02-09 | End: 2023-02-10 | Stop reason: HOSPADM

## 2023-02-09 RX ORDER — MEPERIDINE HYDROCHLORIDE 25 MG/ML
25 INJECTION INTRAMUSCULAR; INTRAVENOUS; SUBCUTANEOUS ONCE AS NEEDED
Status: DISCONTINUED | OUTPATIENT
Start: 2023-02-09 | End: 2023-02-09 | Stop reason: HOSPADM

## 2023-02-09 RX ORDER — PREGABALIN 75 MG/1
75 CAPSULE ORAL 3 TIMES DAILY
Status: DISCONTINUED | OUTPATIENT
Start: 2023-02-09 | End: 2023-02-10 | Stop reason: HOSPADM

## 2023-02-09 RX ORDER — CLOPIDOGREL BISULFATE 75 MG/1
75 TABLET ORAL DAILY
Status: DISCONTINUED | OUTPATIENT
Start: 2023-02-09 | End: 2023-02-10 | Stop reason: HOSPADM

## 2023-02-09 RX ORDER — LIDOCAINE HYDROCHLORIDE 20 MG/ML
INJECTION, SOLUTION EPIDURAL; INFILTRATION; INTRACAUDAL; PERINEURAL
Status: DISCONTINUED | OUTPATIENT
Start: 2023-02-09 | End: 2023-02-09

## 2023-02-09 RX ORDER — ROCURONIUM BROMIDE 50 MG/5 ML
SYRINGE (ML) INTRAVENOUS
Status: DISCONTINUED | OUTPATIENT
Start: 2023-02-09 | End: 2023-02-09

## 2023-02-09 RX ORDER — ALBUTEROL SULFATE 0.83 MG/ML
2.5 SOLUTION RESPIRATORY (INHALATION) 2 TIMES DAILY
Status: DISCONTINUED | OUTPATIENT
Start: 2023-02-09 | End: 2023-02-09

## 2023-02-09 RX ORDER — ONDANSETRON 2 MG/ML
INJECTION INTRAMUSCULAR; INTRAVENOUS
Status: DISCONTINUED | OUTPATIENT
Start: 2023-02-09 | End: 2023-02-09

## 2023-02-09 RX ORDER — EPHEDRINE SULFATE 50 MG/ML
INJECTION, SOLUTION INTRAVENOUS
Status: DISCONTINUED | OUTPATIENT
Start: 2023-02-09 | End: 2023-02-09

## 2023-02-09 RX ORDER — MORPHINE SULFATE 4 MG/ML
4 INJECTION, SOLUTION INTRAMUSCULAR; INTRAVENOUS EVERY 4 HOURS PRN
Status: DISCONTINUED | OUTPATIENT
Start: 2023-02-09 | End: 2023-02-10 | Stop reason: HOSPADM

## 2023-02-09 RX ORDER — BISACODYL 10 MG
10 SUPPOSITORY, RECTAL RECTAL DAILY PRN
Status: DISCONTINUED | OUTPATIENT
Start: 2023-02-09 | End: 2023-02-10 | Stop reason: HOSPADM

## 2023-02-09 RX ORDER — FLUTICASONE PROPIONATE 50 MCG
1 SPRAY, SUSPENSION (ML) NASAL DAILY
Status: DISCONTINUED | OUTPATIENT
Start: 2023-02-09 | End: 2023-02-10 | Stop reason: HOSPADM

## 2023-02-09 RX ORDER — DAPAGLIFLOZIN 10 MG/1
10 TABLET, FILM COATED ORAL DAILY
Status: DISCONTINUED | OUTPATIENT
Start: 2023-02-09 | End: 2023-02-10 | Stop reason: HOSPADM

## 2023-02-09 RX ADMIN — MIDAZOLAM HYDROCHLORIDE 2 MG: 5 INJECTION, SOLUTION INTRAMUSCULAR; INTRAVENOUS at 08:02

## 2023-02-09 RX ADMIN — DAPAGLIFLOZIN 10 MG: 10 TABLET, FILM COATED ORAL at 03:02

## 2023-02-09 RX ADMIN — PREGABALIN 75 MG: 75 CAPSULE ORAL at 08:02

## 2023-02-09 RX ADMIN — EPHEDRINE SULFATE 10 MG: 50 INJECTION INTRAVENOUS at 08:02

## 2023-02-09 RX ADMIN — ROPIVACAINE HYDROCHLORIDE 15 ML: 7.5 INJECTION, SOLUTION EPIDURAL; PERINEURAL at 08:02

## 2023-02-09 RX ADMIN — FLUTICASONE PROPIONATE 50 MCG: 50 SPRAY, METERED NASAL at 03:02

## 2023-02-09 RX ADMIN — MORPHINE SULFATE 4 MG: 4 INJECTION, SOLUTION INTRAMUSCULAR; INTRAVENOUS at 08:02

## 2023-02-09 RX ADMIN — HYDROMORPHONE HYDROCHLORIDE 0.5 MG: 2 INJECTION, SOLUTION INTRAMUSCULAR; INTRAVENOUS; SUBCUTANEOUS at 12:02

## 2023-02-09 RX ADMIN — VANCOMYCIN HYDROCHLORIDE 1500 MG: 1 INJECTION, POWDER, LYOPHILIZED, FOR SOLUTION INTRAVENOUS at 08:02

## 2023-02-09 RX ADMIN — DOCUSATE SODIUM 100 MG: 100 CAPSULE, LIQUID FILLED ORAL at 08:02

## 2023-02-09 RX ADMIN — TRANEXAMIC ACID 1000 MG: 100 INJECTION, SOLUTION INTRAVENOUS at 09:02

## 2023-02-09 RX ADMIN — TRANEXAMIC ACID 1000 MG: 100 INJECTION, SOLUTION INTRAVENOUS at 08:02

## 2023-02-09 RX ADMIN — GLIPIZIDE 5 MG: 5 TABLET ORAL at 03:02

## 2023-02-09 RX ADMIN — LIDOCAINE HYDROCHLORIDE 80 MG: 20 INJECTION, SOLUTION EPIDURAL; INFILTRATION; INTRACAUDAL; PERINEURAL at 08:02

## 2023-02-09 RX ADMIN — VANCOMYCIN HYDROCHLORIDE 1000 MG: 1 INJECTION, POWDER, LYOPHILIZED, FOR SOLUTION INTRAVENOUS at 09:02

## 2023-02-09 RX ADMIN — PROPOFOL 100 MG: 10 INJECTION, EMULSION INTRAVENOUS at 08:02

## 2023-02-09 RX ADMIN — SODIUM CHLORIDE: 9 INJECTION, SOLUTION INTRAVENOUS at 07:02

## 2023-02-09 RX ADMIN — ALBUTEROL SULFATE 2.5 MG: 2.5 SOLUTION RESPIRATORY (INHALATION) at 03:02

## 2023-02-09 RX ADMIN — HYDROCODONE BITARTRATE AND ACETAMINOPHEN 1 TABLET: 5; 325 TABLET ORAL at 05:02

## 2023-02-09 RX ADMIN — CEFAZOLIN 2 G: 2 INJECTION, POWDER, FOR SOLUTION INTRAMUSCULAR; INTRAVENOUS at 08:02

## 2023-02-09 RX ADMIN — DEXAMETHASONE SODIUM PHOSPHATE 10 MG: 10 INJECTION, SOLUTION INTRAMUSCULAR; INTRAVENOUS at 08:02

## 2023-02-09 RX ADMIN — CYCLOBENZAPRINE 10 MG: 10 TABLET, FILM COATED ORAL at 08:02

## 2023-02-09 RX ADMIN — CEFAZOLIN 2 G: 2 INJECTION, POWDER, FOR SOLUTION INTRAMUSCULAR; INTRAVENOUS at 11:02

## 2023-02-09 RX ADMIN — EZETIMIBE 10 MG: 10 TABLET ORAL at 03:02

## 2023-02-09 RX ADMIN — BUPIVACAINE HYDROCHLORIDE 1.5 ML: 7.5 INJECTION, SOLUTION EPIDURAL; RETROBULBAR at 08:02

## 2023-02-09 RX ADMIN — PREGABALIN 75 MG: 75 CAPSULE ORAL at 03:02

## 2023-02-09 RX ADMIN — HYDROCODONE BITARTRATE AND ACETAMINOPHEN 1 TABLET: 5; 325 TABLET ORAL at 11:02

## 2023-02-09 RX ADMIN — CEFAZOLIN 2 G: 2 INJECTION, POWDER, FOR SOLUTION INTRAMUSCULAR; INTRAVENOUS at 05:02

## 2023-02-09 RX ADMIN — FENTANYL CITRATE 50 MCG: 50 INJECTION INTRAMUSCULAR; INTRAVENOUS at 08:02

## 2023-02-09 RX ADMIN — PANTOPRAZOLE SODIUM 40 MG: 40 TABLET, DELAYED RELEASE ORAL at 03:02

## 2023-02-09 RX ADMIN — Medication 50 MG: at 08:02

## 2023-02-09 RX ADMIN — SODIUM CHLORIDE 75 ML/HR: 9 INJECTION, SOLUTION INTRAVENOUS at 03:02

## 2023-02-09 RX ADMIN — ONDANSETRON 8 MG: 2 INJECTION INTRAMUSCULAR; INTRAVENOUS at 08:02

## 2023-02-09 RX ADMIN — MORPHINE SULFATE 4 MG: 4 INJECTION, SOLUTION INTRAMUSCULAR; INTRAVENOUS at 03:02

## 2023-02-09 RX ADMIN — FENTANYL CITRATE 50 MCG: 50 INJECTION INTRAMUSCULAR; INTRAVENOUS at 10:02

## 2023-02-09 RX ADMIN — SUGAMMADEX 200 MG: 100 INJECTION, SOLUTION INTRAVENOUS at 10:02

## 2023-02-09 NOTE — PROGRESS NOTES
1055 REC'ED TO RR ASLEEP, COLOR PINK. O2 VIA FM. NO RESP. DISTRESS NOTED. DRESSING TO RIGHT KNEE D/I. CRYOCUFF AND IMMOBILIZER RIGHT KNEE. HEMOVAC DRAIN RIGHT KNEE COMPRESSED P/D SCANT RED COLORED DRAINAGE. RIGHT PEDAL PULSE STRONG, FOOT WARM, TOES PINK. CEVALLOS CATH P/D YELLOW URINE. IV INFUSING WELL LEFT WRIST 20G. CATH. SLEEPY UNABLE TO ASSESS NEURO STATUS. OBSERVING CLOSELY. BLOOD SUGAR 167. SEE FLOW SHEET.    1110 X-RAYS AND LAB COMPLETED, ALYSHA. WELL.    1125 WAITING ON ROOM PLACEMENT FAMILY MADE AWARE. SPINAL LEVEL APPROX. L1.    1200 STILL WAITING ON ROOM PLACEMENT. MORE AWAKE, MINIMAL MOVEMENT LOWER EXTREMITIES SECONDARY TO SPINAL  ANESTHESIA. DAUGHTER KEPT UPDATED.    1235 STILL WAITING ON ROOM. SPOKE WITH DAUGHTER VIA PHONE. ROOM NOT BEING CLEANED. PATIENT AWARE. C/O PAIN RIGHT KNEE. SEE FLOW SHEET FOR MEDS GIVEN.    1300  PATIENT RESTING WITHOUT DISTRESS NOTED. V/S STABLE. DRESSING D/I. HEMOVAC DRAIN IN PLACE. CEVALLOS CATH P/D DRAINING. NO DISTRESS NOTED. WAITING ON ROOM PLACEMENT. RELEASED TO ALMITA AMBROCIO RN.

## 2023-02-09 NOTE — SUBJECTIVE & OBJECTIVE
Past Medical History:   Diagnosis Date    Acute superficial gastritis without hemorrhage 2022    Adenomatous polyp of cecum 6/15/2022    Adenomatous polyp of transverse colon 6/15/2022    Asthma     Coronary artery disease     Diabetes mellitus     Diabetes mellitus, type 2     Diverticula, colon 6/15/2022    Epigastric pain 2022    Heart attack     History of colon polyps 6/15/2022    Hyperlipidemia     Hypertension     Polyp of hepatic flexure of colon 6/15/2022    Screening for colon cancer 6/15/2022    Thyroid disease        Past Surgical History:   Procedure Laterality Date    BILATERAL OOPHORECTOMY Bilateral     35 years ago,  1-2 years after hyst    CARDIAC SURGERY       SECTION      CHOLECYSTECTOMY      COLONOSCOPY  2017    repeat in 3 years    ESOPHAGOGASTRODUODENOSCOPY  03/10/2021    HYSTERECTOMY         Review of patient's allergies indicates:   Allergen Reactions    Jardiance [empagliflozin] Anaphylaxis     Headaches     Trulicity [dulaglutide]      Abdominal pain       No current facility-administered medications on file prior to encounter.     Current Outpatient Medications on File Prior to Encounter   Medication Sig    albuterol (PROVENTIL/VENTOLIN HFA) 90 mcg/actuation inhaler Inhale 2 puffs into the lungs 2 (two) times a day.    cyclobenzaprine (FLEXERIL) 10 MG tablet 1 tablet nightly as needed.    dapagliflozin (FARXIGA) 10 mg tablet Take 1 tablet (10 mg total) by mouth once daily.    dapagliflozin (FARXIGA) 10 mg tablet 1 tablet.    ezetimibe (ZETIA) 10 mg tablet Take 1 tablet by mouth once daily.    ezetimibe (ZETIA) 10 mg tablet 1 tablet.    glipiZIDE (GLUCOTROL) 5 MG tablet Take 5 mg by mouth once daily.    glipiZIDE 5 MG TR24 Take 1 tablet (5 mg total) by mouth daily with breakfast.    losartan (COZAAR) 25 MG tablet     losartan (COZAAR) 50 MG tablet Take 1 tablet (50 mg total) by mouth once daily.    metFORMIN (GLUCOPHAGE) 1000 MG tablet     metoprolol succinate  (TOPROL-XL) 25 MG 24 hr tablet Take 1 tablet by mouth once daily.    metoprolol succinate (TOPROL-XL) 25 MG 24 hr tablet 1 tablet.    multivitamin capsule Take 1 capsule by mouth once daily.    OXYGEN-AIR DELIVERY SYSTEMS MISC 2 L by Misc.(Non-Drug; Combo Route) route nightly.    pantoprazole (PROTONIX) 40 MG tablet Take 1 tablet (40 mg total) by mouth once daily.    pregabalin (LYRICA) 75 MG capsule Take 75 mg by mouth. 1 in the morning and 2 at night    pregabalin (LYRICA) 75 MG capsule 1 capsule.    blood sugar diagnostic (FREESTYLE LITE STRIPS) Strp Use one strip daily to monitor glucose    chlorpheniramine-phenylephrine 4-10 mg per tablet Take 1 tablet by mouth every 4 (four) hours as needed for Congestion.    clopidogreL (PLAVIX) 75 mg tablet Take 1 tablet (75 mg total) by mouth once daily.    clopidogreL (PLAVIX) 75 mg tablet 1 tablet.    cyclobenzaprine (FLEXERIL) 10 MG tablet     famotidine (PEPCID) 20 MG tablet Take OTC-one tablet daily    loratadine (CLARITIN) 10 mg tablet     meloxicam (MOBIC) 15 MG tablet Take 1 tablet (15 mg total) by mouth once daily.    [DISCONTINUED] hydrocortisone 2.5 % cream      Family History       Problem Relation (Age of Onset)    Dementia Mother    Diabetes Mother, Brother    Heart disease Mother    No Known Problems Father          Tobacco Use    Smoking status: Former     Passive exposure: Past    Smokeless tobacco: Never   Substance and Sexual Activity    Alcohol use: Not Currently    Drug use: Never    Sexual activity: Not Currently     Partners: Male     Birth control/protection: None     Review of Systems   Constitutional:  Negative for activity change, chills, fatigue and fever.   HENT:  Negative for congestion and sore throat.    Respiratory:  Negative for chest tightness.    Gastrointestinal:  Negative for abdominal distention, abdominal pain and diarrhea.   Endocrine: Negative for cold intolerance and heat intolerance.   Genitourinary:  Negative for dysuria.    Musculoskeletal:  Negative for arthralgias and back pain.        Right knee pain     Skin:  Negative for color change and rash.   Neurological:  Negative for dizziness, tremors and headaches.   Psychiatric/Behavioral:  Negative for agitation. The patient is not nervous/anxious.    Objective:     Vital Signs (Most Recent):  Temp: 97.7 °F (36.5 °C) (02/09/23 1140)  Pulse: 70 (02/09/23 1539)  Resp: 20 (02/09/23 1539)  BP: 127/76 (02/09/23 1410)  SpO2: 96 % (02/09/23 1539) Vital Signs (24h Range):  Temp:  [97.6 °F (36.4 °C)-98.1 °F (36.7 °C)] 97.7 °F (36.5 °C)  Pulse:  [68-96] 70  Resp:  [10-22] 20  SpO2:  [94 %-100 %] 96 %  BP: (118-159)/(61-76) 127/76     Weight: 91.6 kg (202 lb)  Body mass index is 29.83 kg/m².    Physical Exam  Constitutional:       Appearance: Normal appearance.   HENT:      Head: Normocephalic and atraumatic.      Nose: Nose normal.      Mouth/Throat:      Mouth: Mucous membranes are moist.      Pharynx: Oropharynx is clear.   Eyes:      Extraocular Movements: Extraocular movements intact.      Conjunctiva/sclera: Conjunctivae normal.      Pupils: Pupils are equal, round, and reactive to light.   Cardiovascular:      Rate and Rhythm: Normal rate and regular rhythm.      Pulses: Normal pulses.      Heart sounds: Normal heart sounds.   Pulmonary:      Effort: Pulmonary effort is normal.      Breath sounds: Normal breath sounds.   Abdominal:      General: Abdomen is flat. Bowel sounds are normal.      Palpations: Abdomen is soft.   Musculoskeletal:         General: Normal range of motion.      Cervical back: Normal range of motion and neck supple.      Comments: R knee post-op changes.     Skin:     General: Skin is warm and dry.   Neurological:      General: No focal deficit present.      Mental Status: She is alert and oriented to person, place, and time.   Psychiatric:         Mood and Affect: Mood normal.         Behavior: Behavior normal.       Significant Labs: All pertinent labs within the  past 24 hours have been reviewed.    Significant Imaging: I have reviewed all pertinent imaging results/findings within the past 24 hours.

## 2023-02-09 NOTE — DISCHARGE INSTRUCTIONS
Physical Therapy Instructions:  ANKLE: Pumps        Point toes down, then up. Repeat 30 times, 2 sessions per day        Quad Set        With other leg bent, foot flat, slowly tighten muscles on right thigh of straight leg while counting out loud to 5.  Repeat 30 times, 2 sessions per day.          Hip Abduction / Adduction: with Extended Knee (Supine)        Bring right leg out to side and return. Keep knee straight.  Repeat 30 times, 2 sessions per day.         HIP / KNEE: Flexion, Heel Slides - Supine        Slide right heel up toward buttocks, keeping leg in straight line. Repeat 30 times, 2 sessions per day.  Use towel or pillowcase under heel as needed.       Straight Leg Raise        Bend left leg. Raise right leg 8-12 inches with knee locked. Exhale and tighten thigh muscles while raising leg.   Repeat 30 times, 2 sessions per day.           KNEE: Extension, Long Arc Quads - Sitting        Raise right leg until knee is straight.  Repeat 30 times, 2 sessions per day        KNEE: Flexion / Extension - Sitting        Sit at edge of surface, foot on towel or pillowcase. Bend and straighten right knee.  Repeat 30 times, 2 sessions per day.   Use opposite leg to increase knee flexion.    Copyright © [employer]. All rights reserved.

## 2023-02-09 NOTE — HPI
68yo man DM2, gastroparesis, HTN, HLD, GERD, GI Bleed with prior polyp who presents with R knee pain that has failed conservative management.  She is status post surgical intervention.  She has been ambulatory with physical therapy and has not complaints aside from R knee pain.    She states she has had difficulty taking aspirin in the past due to GI bleed.

## 2023-02-09 NOTE — PT/OT/SLP EVAL
Physical Therapy Evaluation    Patient Name:  Olga Stephenson   MRN:  [id number]    Recommendations:     Discharge Recommendations: nursing facility, skilled, rehabilitation facility   Discharge Equipment Recommendations: other (see comments) (to be determined)   Barriers to discharge: Decreased caregiver support and awaiting swing bed placement    Assessment:     Olga Stephenson is a 67 y.o. female admitted with a medical diagnosis of Arthritis of right knee.  She presents with the following impairments/functional limitations: weakness, impaired functional mobility, gait instability, decreased lower extremity function, pain, impaired skin, edema, orthopedic precautions.    Pt participated well with PT evaluation and is motivated to regain independent function. Pt lives alone and reports expected discharge to swing bed once medically stable.      Rehab Prognosis: Good; patient would benefit from acute skilled PT services to address these deficits and reach maximum level of function.    Recent Surgery: Procedure(s) (LRB):  ARTHROPLASTY, KNEE, TOTAL, USING COMPUTER-ASSISTED NAVIGATION (Right) Day of Surgery    Plan:     During this hospitalization, patient to be seen BID (5x/week daily 2x/week) to address the identified rehab impairments via gait training, therapeutic activities, therapeutic exercises and progress toward the following goals:    Plan of Care Expires:  03/09/23    Subjective     Chief Complaint: R TKR  Patient/Family Comments/goals: Pt agreeable to PT evaluation   Pain/Comfort:  Pain Rating 1: 9/10  Location - Side 1: Right  Location 1: knee  Pain Addressed 1: Distraction, Cessation of Activity, Reposition  Pain Rating Post-Intervention 1: 9/10    Patients cultural, spiritual, Yazidi conflicts given the current situation: no    Living Environment:  Pt lives alone in single story home with no stairs to enter.  Prior to admission, patients level of function was independent.  Equipment used at home:  cane, straight.  DME owned (not currently used): none.  Upon discharge, patient will have assistance from swing bed staff.    Objective:     Communicated with Darlyn Lee LPN prior to session.  Patient found supine with blood pressure cuff, cryotherapy, mcarthur catheter, hemovac, knee immobilizer, peripheral IV, pulse ox (continuous), SCD  upon PT entry to room.    General Precautions: Standard, fall  Orthopedic Precautions:RLE weight bearing as tolerated   Braces: Knee immobilizer  Respiratory Status: Room air    Exams:  Cognitive Exam:  Patient is oriented to Person, Place, Time, and Situation  Sensation:    -       Intact  RLE ROM: Hip 3/5, knee unable to test, ankle 4/5  RLE Strength: WFL except knee (unable to assess)  LLE ROM: WFL  LLE Strength: WFL    Functional Mobility:  Bed Mobility:     Supine to Sit: minimum assistance for R LE management  Transfers:     Sit to Stand:  contact guard assistance with rolling walker  Gait: Pt ambulated 20' using RW with CGA and verbal cues for sequencing. Pt demonstrated step to gait pattern, mild axial flexion, decreased zach, but no LOB or SOB.      AM-PAC 6 CLICK MOBILITY  Total Score:16       Treatment & Education:  Education:   Importance of keeping surgical knee straight when in chair or in bed. Pillow/towel roll should not be behind the knee but may be placed under calf or heel in order to further assist with passive knee extension.   Weight bearing as tolerated R lower extremity  Purpose and operation of cryocuff machine. Discussed necessity of pillowcase or thin towel being placed between skin and ice pack.   TKR protocol exercises to perform BID x 30 reps. Handout provided.   Rolling walker should be used for safe ambulation until progressed by HHPT or Outpatient PT.   Mobility during hospital stay with staff assistance for safety  Purpose and schedule for CPM beginning post-op day one.     Patient left up in chair with all lines intact, call button in  reach, and Darlyn Lee LPN notified.    GOALS:   Multidisciplinary Problems       Physical Therapy Goals          Problem: Physical Therapy    Goal Priority Disciplines Outcome Goal Variances Interventions   Physical Therapy Goal     PT, PT/OT Ongoing, Progressing     Description: Short Term Goals to be met by: 23    Patient will increase functional independence with mobility by performin. Supine to sit with Modified [place]  2. Sit to stand transfer with Modified [place]  3. Bed to chair transfer with Modified [place] using Rolling Walker, WBAT R LE  4. Gait  x 100 feet with Modified [place] using Rolling Walker, WBAT R LE.   5. Lower extremity exercise program x30 reps per handout, with assistance as needed  6. Knee ROM 0-90    Long Term Goals to be met by: 23    Pt will regain full independent functional mobility to return to prior activities of daily living.                        History:     Past Medical History:   Diagnosis Date    Acute superficial gastritis without hemorrhage 2022    Adenomatous polyp of cecum 6/15/2022    Adenomatous polyp of transverse colon 6/15/2022    Asthma     Coronary artery disease     Diabetes mellitus     Diabetes mellitus, type 2     Diverticula, colon 6/15/2022    Epigastric pain 2022    Heart attack 2011    History of colon polyps 6/15/2022    Hyperlipidemia     Hypertension     Polyp of hepatic flexure of colon 6/15/2022    Screening for colon cancer 6/15/2022    Thyroid disease        Past Surgical History:   Procedure Laterality Date    BILATERAL OOPHORECTOMY Bilateral     35 years ago,  1-2 years after hyst    CARDIAC SURGERY       SECTION      CHOLECYSTECTOMY      COLONOSCOPY  2017    repeat in 3 years    ESOPHAGOGASTRODUODENOSCOPY  03/10/2021    HYSTERECTOMY         Time Tracking:     PT Received On: 23  PT Start Time: 1457     PT Stop Time: 1517  PT Total Time (min): 20 min     Billable Minutes:  Evaluation low complexity       02/09/2023

## 2023-02-09 NOTE — ANESTHESIA POSTPROCEDURE EVALUATION
Anesthesia Post Evaluation    Patient: Olga Stephenson    Procedure(s) Performed: Procedure(s) (LRB):  ARTHROPLASTY, KNEE, TOTAL, USING COMPUTER-ASSISTED NAVIGATION (Right)    Final Anesthesia Type: spinal      Patient location during evaluation: PACU  Patient participation: Yes- Able to Participate  Level of consciousness: awake and alert and oriented  Pain management: adequate  Airway patency: patent  AKANKSHA mitigation strategies: Multimodal analgesia and Use of major conduction anesthesia (spinal/epidural) or peripheral nerve block  PONV status at discharge: No PONV  Anesthetic complications: no      Cardiovascular status: hemodynamically stable  Respiratory status: unassisted, spontaneous ventilation and nasal cannula  Hydration status: euvolemic  Follow-up not needed.          Vitals Value Taken Time   /72 02/09/23 1107   Temp 36.4 °C (97.6 °F) 02/09/23 1057   Pulse 93 02/09/23 1107   Resp 22 02/09/23 1107   SpO2 100 % 02/09/23 1107   Vitals shown include unvalidated device data.      No case tracking events are documented in the log.      Pain/Daniel Score: No data recorded

## 2023-02-09 NOTE — ANESTHESIA PROCEDURE NOTES
Intubation    Date/Time: 2/9/2023 8:32 AM  Performed by: Zhane Rico CRNA  Authorized by: Kevin Alegria DO     Intubation:     Induction:  Intravenous    Intubated:  Postinduction    Mask Ventilation:  Easy mask    Attempts:  1    Attempted By:  CRNA    Blade:  Stephon 4    Laryngeal View Grade: Grade I - full view of cords      Difficult Airway Encountered?: No      Complications:  None    Airway Device:  Oral endotracheal tube    Airway Device Size:  7.0    Style/Cuff Inflation:  Cuffed (inflated to minimal occlusive pressure)    Tube secured:  22    Secured at:  The lips    Placement Verified By:  Capnometry    Complicating Factors:  None    Findings Post-Intubation:  BS equal bilateral

## 2023-02-09 NOTE — ASSESSMENT & PLAN NOTE
Patient's FSGs are controlled on current medication regimen.  Last A1c reviewed-   Lab Results   Component Value Date    HGBA1C 8.1 (H) 10/26/2022     Most recent fingerstick glucose reviewed- No results for input(s): POCTGLUCOSE in the last 24 hours.  Current correctional scale  Low  Maintain anti-hyperglycemic dose as follows-   Antihyperglycemics (From admission, onward)    Start     Stop Route Frequency Ordered    02/10/23 0745  metFORMIN tablet 500 mg         -- Oral With breakfast 02/09/23 1415    02/09/23 1430  dapagliflozin tablet 10 mg         -- Oral Daily 02/09/23 1415    02/09/23 1430  glipiZIDE tablet 5 mg         -- Oral Daily 02/09/23 1415        Primary team continued home diabetic medications.

## 2023-02-09 NOTE — TRANSFER OF CARE
"Anesthesia Transfer of Care Note    Patient: Olga Stephenson    Procedure(s) Performed: Procedure(s) (LRB):  ARTHROPLASTY, KNEE, TOTAL, USING COMPUTER-ASSISTED NAVIGATION (Right)    Patient location: PACU    Anesthesia Type: general, regional and spinal    Transport from OR: Transported from OR on 100% O2 by closed face mask with adequate spontaneous ventilation    Post pain: adequate analgesia    Post assessment: no apparent anesthetic complications    Post vital signs: stable    Level of consciousness: responds to stimulation    Nausea/Vomiting: no nausea/vomiting    Complications: none    Transfer of care protocol was followedComments: Accu check-167      Last vitals:   Visit Vitals  /70 (BP Location: Right arm, Patient Position: Lying)   Pulse 96   Temp 36.4 °C (97.6 °F) (Oral)   Resp 16   Ht 5' 9" (1.753 m)   Wt 91.6 kg (202 lb)   SpO2 99%   Breastfeeding No   BMI 29.83 kg/m²     "

## 2023-02-09 NOTE — OP NOTE
[facility] Orthopedic Periop Services  General Surgery  Operative Note    SUMMARY     Date of Procedure: 2/9/2023     Procedure: Procedure(s) (LRB):  ARTHROPLASTY, KNEE, TOTAL, USING COMPUTER-ASSISTED NAVIGATION (Right)       Surgeon(s) and Role:     * Erick Tolliver MD - Primary    Assisting Surgeon: None    Pre-Operative Diagnosis: Primary osteoarthritis of right knee [M17.11]    Post-Operative Diagnosis: Post-Op Diagnosis Codes:     * Primary osteoarthritis of right knee [M17.11]    Anesthesia: General    Operative Findings (including complications, if any):         OPERATIVE REPORT    SURGERY DATE:  2/9/2023    PRE-OP DIAGNOSIS:  Primary osteoarthritis of right knee [M17.11]    POST-OP DIAGNOSIS:  Post-Op Diagnosis Codes:     * Primary osteoarthritis of right knee [M17.11]    PROCEDURE:  Procedure(s) (LRB):  ARTHROPLASTY, KNEE, TOTAL, USING COMPUTER-ASSISTED NAVIGATION (Right)    SURGEON:  Erick Tolliver M.D.    Assisting Surgeon:      ANESTHESIA:  General    BLOOD LOSS:  100cc    TOURNIQUET TIME:  77min    COMPLICATIONS:  None.    IMPLANTS PLACED:    Implant Name Type Inv. Item Serial No.  Lot No. LRB No. Used Action   CEMENT BONE SURG SMPLX P RADPQ - [id number]  CEMENT BONE SURG SMPLX P RADPQ  [employer] ADAN. [id number] Right 1 Implanted   CEMENT BONE SURG SMPLX P RADPQ - [id number]  CEMENT BONE SURG SMPLX P RADPQ  [employer] ADAN. [id number] Right 1 Implanted   GUIDEPIN 3.20MM 4 ARIEL SQUARE - [id number]  GUIDEPIN 3.20MM 4 [facility] ([facility])  Right 1 Implanted   GUIDEPIN 3.20MM 4 ARIEL SQUARE - [id number]  GUIDEPIN 3.20MM 4 [facility] ([facility])  Right 1 Implanted   COMP FEM CRUCIATE YANY DZ 5 RT - [id number]  COMP FEM CRUCIATE YANY DZ 5 RT  [employer] ADNA. [id number] Right 1 Implanted   BASEPLATE TIB YANY PRIM SZ 4 - [id number]  BASEPLATE TIB YANY PRIM SZ 4  [employer] ADAN. [id number] Right 1 Implanted   PATELLA TRI 32X10 X3 POLYETHYL - [id number]  PATELLA TRI  32X10 X3 POLYETHYL  [employer] ADAN. PR6T Right 1 Implanted   INSERT TIBIAL CS SIZE 4 9MM - [id number]  INSERT TIBIAL CS SIZE 4 9MM  TAMMY [employer] ADAN. [id number] Right 1 Implanted       INDICATIONS:  Patient is a 67 y.o. year old female with severe degenerative joint disease of the right knee needing total knee arthroplasty.    PROCEDURE IN DETAIL:  After having the risks and benefits of the procedure explained at length to the patient and the patient stating that she understands the risks and benefits of the procedure and wishes to proceed with the procedure, written informed consent was obtained.  The patient was taken to the Operating room and placed on the Operative table at which time General was induced per anesthesia.  The patient then had a sandbag taped on the bed so the knee could be held at 90 degrees of flexion.  A tourniquet was placed over cast padding on the proximal right thigh.  The right lower extremity was prepped and draped in sterile fashion.  It was elevated and exsanguinated with an Esmarch bandage.    At this time a 15 centimeter incision was made centered over the patella going from the tibial tubercle mid-line to approximately 4-5 centimeters superior to the superior pole of the patella.  Skin incision was made with a #10 blade and was taken down to the anterior retinaculum of the patella.  At this point a medial flap was elevated up using the #10 blade.  At this point the knee was flexed up and the quadriceps tendon was identified and a medial arthrotomy starting approximately a centimeter superior to the superior pole of the patella and going over the media border of the patella and the medial border of the patella going down to the tibial tubercle was made using a #10 blade. At this point the joint opened up and there was noted to be severe DJD of the knee.  The knee was then flexed up.  The remnants of the medial and lateral meniscus were removed, leaving minimal meniscus remaining.   The osteophytes off of the femur were removed using a rongeur.    The ASM navigation block was pinned on to the femur.  The cutting block was then positioned and aligned based on the computer alignment.  It was pinned in place and the distal femoral cut was made. Next the tibial guide was placed at the ASM block pinned to the tibia.  The cutting block was in position based on the computer navigation.  Cutting block pin place and the tibial cut was made.  At this point the knee was noted to be balanced with proper flexion and extension gaps.  The sizing block was then placed onto the distal femur.  It was pinned in place setting the 30 degrees of external rotation.  At this point the cutting block was noted to be a size 5 based off the sizer.  The sizer was removed.  The cutting block was placed.  The anterior cut followed by the anterior chamfer posterior cut followed by the posterior chamfer cut was made.  Trial femur was placed.  Trial tibial tray was placed.  It was a size 4 with a trial 9 millimeter poly.  The knee was noted to be balanced at this point.  The tibia was then prepped placing the tibial plate guide onto the proximal tibia.  It was then punched.  It was then punched comparing the size 4 tibial tray.  Once this was done the distal femur  holes were drilled based off of the trial femoral component.  At this point all trial components were removed.  The patella was resurfaced removing 9 millimeter of bone.  A 32 millimeter all polyethylene patella was inserted and peg holes drilled.  The knee was put through range of motion.  The patella was noted to track appropriately with good alignment and was felt to be stable.  The wounds were irrigated out with copious amounts of normal saline.  The tibia was cemented first removing excess cement followed by the femur.  The cement was allowed to polymerize and the tibial insert was placed.  The knee was irrigated out with copious amounts of normal saline.   The knee was noted to be tracking in the correct position and alignment with good motion.     At this point two medium Hemovac drains were placed in the joint coming out through the skin.  Figure of eight #1 Vicryl interrupted sutures were used to close the arthrotomy. Subcuticular stitches of 2-0 Vicryl followed by skin staples were used to closed the skin.  A sterile occlusive dressing was placed followed by a Cryo-Cuff.  The patient was then taken from the Operative table and taken to the Recovery Room in good condition.  All counts are correct. There were no complications.         Description of Technical Procedures:     Significant Surgical Tasks Conducted by the Assistant(s), if Applicable:     Estimated Blood Loss (EBL): * No values recorded between 2/9/2023  8:55 AM and 2/9/2023 10:41 AM *           Implants:   Implant Name Type Inv. Item Serial No.  Lot No. LRB No. Used Action   CEMENT BONE SURG SMPLX P RADPQ - [id number]  CEMENT BONE SURG SMPLX P RADPQ  [employer] ADAN. [id number] Right 1 Implanted   CEMENT BONE SURG SMPLX P RADPQ - [id number]  CEMENT BONE SURG SMPLX P RADPQ  [employer] AD[employer]. [id number] Right 1 Implanted   GUIDEPIN 3.20MM 4 ARIEL SQUARE - [id number]  GUIDEPIN 3.20MM 4 ARIEL SQUARE  [facility])  Right 1 Implanted   GUIDEPIN 3.20MM 4 ARIEL SQUARE - [id number]  GUIDEPIN 3.20MM 4 ARIEL [facility] ([facility])  Right 1 Implanted   COMP FEM CRUCIATE YANY DZ 5 RT - [id number]  COMP FEM CRUCIATE YANY DZ 5 RT  [employer] ADAN. [id number] Right 1 Implanted   BASEPLATE TIB YANY PRIM SZ 4 - [id number]  BASEPLATE TIB YANY PRIM SZ 4  [employer] ADAN. [id number] Right 1 Implanted   PATELLA TRI 32X10 X3 POLYETHYL - [id number]  PATELLA TRI 32X10 X3 POLYETHYL  [employer] ADAN. PR6T Right 1 Implanted   INSERT TIBIAL CS SIZE 4 9MM - [id number]  INSERT TIBIAL CS SIZE 4 9MM  [employer] ADAN. [id number] Right 1 Implanted       Specimens:   Specimen (24h ago, onward)      None                     Condition: Good    Disposition: PACU - hemodynamically stable.    Attestation: I was present and scrubbed for the entire procedure.

## 2023-02-09 NOTE — PLAN OF CARE
1305  pt vs stable pt voices no complaints pt sao2 95% on ra pt dsg d/I to r knee toes with good color cap refill less than 3 sec will monitor pt on Q15 min vs waiting for room to be cleaned      1355 pt vs stable pt resting well pt room ready family notified of room # orders to release to room per md    1410 pt released to diamond zamora rn b/p 127/76 pulse 95 resp 16 sao2 97% on ra temp 97

## 2023-02-09 NOTE — CONSULTS
[facility]  [facility] Medicine  Consult Note    Patient Name: Olga Stephenson  MRN: [id number]  Admission Date: 2023  Hospital Length of Stay: 0 days  Attending Physician: Erick Tolliver MD   Primary Care Provider: Lv Cartagena MD           Patient information was obtained from patient, past medical records and ER records.     Consults  Subjective:     Principal Problem: Arthritis of right knee    Chief Complaint:   Chief Complaint   Patient presents with    Knee Pain        HPI: 68yo man DM2, gastroparesis, HTN, HLD, GERD, GI Bleed with prior polyp who presents with R knee pain that has failed conservative management.  She is status post surgical intervention.  She has been ambulatory with physical therapy and has not complaints aside from R knee pain.    She states she has had difficulty taking aspirin in the past due to GI bleed.        Past Medical History:   Diagnosis Date    Acute superficial gastritis without hemorrhage 2022    Adenomatous polyp of cecum 6/15/2022    Adenomatous polyp of transverse colon 6/15/2022    Asthma     Coronary artery disease     Diabetes mellitus     Diabetes mellitus, type 2     Diverticula, colon 6/15/2022    Epigastric pain 2022    Heart attack 2011    History of colon polyps 6/15/2022    Hyperlipidemia     Hypertension     Polyp of hepatic flexure of colon 6/15/2022    Screening for colon cancer 6/15/2022    Thyroid disease        Past Surgical History:   Procedure Laterality Date    BILATERAL OOPHORECTOMY Bilateral     35 years ago,  1-2 years after hyst    CARDIAC SURGERY       SECTION      CHOLECYSTECTOMY      COLONOSCOPY  2017    repeat in 3 years    ESOPHAGOGASTRODUODENOSCOPY  03/10/2021    HYSTERECTOMY         Review of patient's allergies indicates:   Allergen Reactions    Jardiance [empagliflozin] Anaphylaxis     Headaches     Trulicity [dulaglutide]      Abdominal pain       No current  facility-administered medications on file prior to encounter.     Current Outpatient Medications on File Prior to Encounter   Medication Sig    albuterol (PROVENTIL/VENTOLIN HFA) 90 mcg/actuation inhaler Inhale 2 puffs into the lungs 2 (two) times a day.    cyclobenzaprine (FLEXERIL) 10 MG tablet 1 tablet nightly as needed.    dapagliflozin (FARXIGA) 10 mg tablet Take 1 tablet (10 mg total) by mouth once daily.    dapagliflozin (FARXIGA) 10 mg tablet 1 tablet.    ezetimibe (ZETIA) 10 mg tablet Take 1 tablet by mouth once daily.    ezetimibe (ZETIA) 10 mg tablet 1 tablet.    glipiZIDE (GLUCOTROL) 5 MG tablet Take 5 mg by mouth once daily.    glipiZIDE 5 MG TR24 Take 1 tablet (5 mg total) by mouth daily with breakfast.    losartan (COZAAR) 25 MG tablet     losartan (COZAAR) 50 MG tablet Take 1 tablet (50 mg total) by mouth once daily.    metFORMIN (GLUCOPHAGE) 1000 MG tablet     metoprolol succinate (TOPROL-XL) 25 MG 24 hr tablet Take 1 tablet by mouth once daily.    metoprolol succinate (TOPROL-XL) 25 MG 24 hr tablet 1 tablet.    multivitamin capsule Take 1 capsule by mouth once daily.    OXYGEN-AIR DELIVERY SYSTEMS MISC 2 L by Misc.(Non-Drug; Combo Route) route nightly.    pantoprazole (PROTONIX) 40 MG tablet Take 1 tablet (40 mg total) by mouth once daily.    pregabalin (LYRICA) 75 MG capsule Take 75 mg by mouth. 1 in the morning and 2 at night    pregabalin (LYRICA) 75 MG capsule 1 capsule.    blood sugar diagnostic (FREESTYLE LITE STRIPS) Strp Use one strip daily to monitor glucose    chlorpheniramine-phenylephrine 4-10 mg per tablet Take 1 tablet by mouth every 4 (four) hours as needed for Congestion.    clopidogreL (PLAVIX) 75 mg tablet Take 1 tablet (75 mg total) by mouth once daily.    clopidogreL (PLAVIX) 75 mg tablet 1 tablet.    cyclobenzaprine (FLEXERIL) 10 MG tablet     famotidine (PEPCID) 20 MG tablet Take OTC-one tablet daily    loratadine (CLARITIN) 10 mg tablet     meloxicam  (MOBIC) 15 MG tablet Take 1 tablet (15 mg total) by mouth once daily.    [DISCONTINUED] hydrocortisone 2.5 % cream      Family History       Problem Relation (Age of Onset)    Dementia Mother    Diabetes Mother, Brother    Heart disease Mother    No Known Problems Father          Tobacco Use    Smoking status: Former     Passive exposure: Past    Smokeless tobacco: Never   Substance and Sexual Activity    Alcohol use: Not Currently    Drug use: Never    Sexual activity: Not Currently     Partners: Male     Birth control/protection: None     Review of Systems   Constitutional:  Negative for activity change, chills, fatigue and fever.   HENT:  Negative for congestion and sore throat.    Respiratory:  Negative for chest tightness.    Gastrointestinal:  Negative for abdominal distention, abdominal pain and diarrhea.   Endocrine: Negative for cold intolerance and heat intolerance.   Genitourinary:  Negative for dysuria.   Musculoskeletal:  Negative for arthralgias and back pain.        Right knee pain     Skin:  Negative for color change and rash.   Neurological:  Negative for dizziness, tremors and headaches.   Psychiatric/Behavioral:  Negative for agitation. The patient is not nervous/anxious.    Objective:     Vital Signs (Most Recent):  Temp: 97.7 °F (36.5 °C) (02/09/23 1140)  Pulse: 70 (02/09/23 1539)  Resp: 20 (02/09/23 1539)  BP: 127/76 (02/09/23 1410)  SpO2: 96 % (02/09/23 1539) Vital Signs (24h Range):  Temp:  [97.6 °F (36.4 °C)-98.1 °F (36.7 °C)] 97.7 °F (36.5 °C)  Pulse:  [68-96] 70  Resp:  [10-22] 20  SpO2:  [94 %-100 %] 96 %  BP: (118-159)/(61-76) 127/76     Weight: 91.6 kg (202 lb)  Body mass index is 29.83 kg/m².    Physical Exam  Constitutional:       Appearance: Normal appearance.   HENT:      Head: Normocephalic and atraumatic.      Nose: Nose normal.      Mouth/Throat:      Mouth: Mucous membranes are moist.      Pharynx: Oropharynx is clear.   Eyes:      Extraocular Movements: Extraocular  movements intact.      Conjunctiva/sclera: Conjunctivae normal.      Pupils: Pupils are equal, round, and reactive to light.   Cardiovascular:      Rate and Rhythm: Normal rate and regular rhythm.      Pulses: Normal pulses.      Heart sounds: Normal heart sounds.   Pulmonary:      Effort: Pulmonary effort is normal.      Breath sounds: Normal breath sounds.   Abdominal:      General: Abdomen is flat. Bowel sounds are normal.      Palpations: Abdomen is soft.   Musculoskeletal:         General: Normal range of motion.      Cervical back: Normal range of motion and neck supple.      Comments: R knee post-op changes.     Skin:     General: Skin is warm and dry.   Neurological:      General: No focal deficit present.      Mental Status: She is alert and oriented to person, place, and time.   Psychiatric:         Mood and Affect: Mood normal.         Behavior: Behavior normal.       Significant Labs: All pertinent labs within the past 24 hours have been reviewed.    Significant Imaging: I have reviewed all pertinent imaging results/findings within the past 24 hours.    Assessment/Plan:     * Arthritis of right knee  S/p surgical intervention per ortho  Eliquis 2.5mg bid. Monitor given history of GI bleed.         Hyperlipidemia  Continue home statin.       GIB (gastrointestinal bleeding)  Prior GI bleed with polyp one year ago.  No recurrent bleeding.        Gastroesophageal reflux disease  Continue home PPI.       Hypertension  Continue home antihypertensives.       Coronary artery disease  S/p CABG.        Type 2 diabetes mellitus  Patient's FSGs are controlled on current medication regimen.  Last A1c reviewed-   Lab Results   Component Value Date    HGBA1C 8.1 (H) 10/26/2022     Most recent fingerstick glucose reviewed- No results for input(s): POCTGLUCOSE in the last 24 hours.  Current correctional scale  Low  Maintain anti-hyperglycemic dose as follows-   Antihyperglycemics (From admission, onward)    Start      Stop Route Frequency Ordered    02/10/23 0745  metFORMIN tablet 500 mg         -- Oral With breakfast 02/09/23 1415 02/09/23 1430  dapagliflozin tablet 10 mg         -- Oral Daily 02/09/23 1415 02/09/23 1430  glipiZIDE tablet 5 mg         -- Oral Daily 02/09/23 1415        Primary team continued home diabetic medications.     Gastroparesis  No n/v currently but severe indigestion after eating.    H/o  Esophageal stricture.   F/u with GI.         VTE Risk Mitigation (From admission, onward)         Ordered     apixaban tablet 2.5 mg  2 times daily        Question:  Is the patient competent?  Answer:  Yes    02/09/23 1415     IP VTE HIGH RISK PATIENT  Once         02/09/23 1415     Place LIZZETH hose  Until discontinued         02/09/23 1415                    Thank you for your consult. I will follow-up with patient. Please contact us if you have any additional questions.    Geneva Downs MD  Department of Hospital Medicine   [facility]

## 2023-02-09 NOTE — PLAN OF CARE
Problem: Physical Therapy  Goal: Physical Therapy Goal  Description: Short Term Goals to be met by: 23    Patient will increase functional independence with mobility by performin. Supine to sit with Modified [place]  2. Sit to stand transfer with Modified [place]  3. Bed to chair transfer with Modified [place] using Rolling Walker, WBAT R LE  4. Gait  x 100 feet with Modified [place] using Rolling Walker, WBAT R LE.   5. Lower extremity exercise program x30 reps per handout, with assistance as needed  6. Knee ROM 0-90    Long Term Goals to be met by: 23    Pt will regain full independent functional mobility to return to prior activities of daily living.   Outcome: Ongoing, Progressing     Pt participated well with PT evaluation and is motivated to regain independent function. Pt lives alone and reports expected discharge to swing bed once medically stable.

## 2023-02-09 NOTE — PLAN OF CARE
[facility]  Initial Discharge Assessment       Primary Care Provider: Lv Cartagena MD    Admission Diagnosis: Primary osteoarthritis of right knee [M17.11]  Arthritis of right knee [M17.11]    Admission Date: 2/9/2023  Expected Discharge Date:     Discharge Barriers Identified: None    Payor: MEDICARE / Plan: MEDICARE PART A & B / Product Type: Government /     Extended Emergency Contact Information  Primary Emergency Contact: Mary Yang  Mobile Phone: [phone]  Relation: Daughter  Preferred language: English   needed? No  Secondary Emergency Contact: RANJEET ADKINS  Mobile Phone: [phone]  Relation: Daughter  Preferred language: English   needed? No    Discharge Plan A: Rehab  Discharge Plan B: Home, Home Health      [facility] - [address] [address]  Phone: [phone] Fax: [phone]    [place], [place] - [address]  [address]  [facility] [address]  Phone: [phone] Fax: [phone]    [facility] [id number] [facility], [place] - [address]  [address]  [facility] [address]  Phone: [phone] Fax: [phone]    [facility], [place] - [address]  [address]  [facility] [address]  Phone: [phone] Fax: [phone]      Initial Assessment (most recent)       Adult Discharge Assessment - 02/09/23 1509          Discharge Assessment    Assessment Type Discharge Planning Assessment     Source of Information patient     Communicated ANNETTE with patient/caregiver Date not available/Unable to determine     People in Home alone     Do you expect to return to your current living situation? Yes     Do you have help at home or someone to help you manage your care at home? Yes     Who are your caregiver(s) and their phone number(s)? Mary Yang (dtr) [phone]     Prior to hospitilization cognitive status: Alert/Oriented      Current cognitive status: Alert/Oriented     Home Accessibility wheelchair accessible     Home Layout Able to live on 1st floor     Equipment Currently Used at Home cane, straight     Readmission within 30 days? No     Patient currently being followed by outpatient case management? No     Do you currently have service(s) that help you manage your care at home? No     Do you take prescription medications? Yes     Do you have prescription coverage? Yes     Do you have any problems affording any of your prescribed medications? No     Is the patient taking medications as prescribed? yes     How do you get to doctors appointments? car, drives self     Are you on dialysis? No     Do you take coumadin? No     Discharge Plan A Rehab     Discharge Plan B Home;Home Health     DME Needed Upon Discharge  none     Discharge Plan discussed with: Patient     Discharge Barriers Identified None        Physical Activity    On average, how many days per week do you engage in moderate to strenuous exercise (like a brisk walk)? 0 days     On average, how many minutes do you engage in exercise at this level? 0 min        Financial Resource Strain    How hard is it for you to pay for the very basics like food, housing, medical care, and heating? Not hard at all        Housing Stability    In the last 12 months, was there a time when you were not able to pay the mortgage or rent on time? No     In the last 12 months, how many places have you lived? 1     In the last 12 months, was there a time when you did not have a steady place to sleep or slept in a shelter (including now)? No        Transportation Needs    In the past 12 months, has lack of transportation kept you from medical appointments or from getting medications? No     In the past 12 months, has lack of transportation kept you from meetings, work, or from getting things needed for daily living? No        Food Insecurity    Within the past 12 months, you worried that your food  would run out before you got the money to buy more. Never true     Within the past 12 months, the food you bought just didn't last and you didn't have money to get more. Never true        Stress    Do you feel stress - tense, restless, nervous, or anxious, or unable to sleep at night because your mind is troubled all the time - these days? Not at all        Social Connections    In a typical week, how many times do you talk on the phone with family, friends, or neighbors? More than three times a week     How often do you get together with friends or relatives? More than three times a week     How often do you attend Muslim or Buddhist services? More than 4 times per year     Do you belong to any clubs or organizations such as Muslim groups, unions, fraternal or athletic groups, or school groups? Yes     How often do you attend meetings of the clubs or organizations you belong to? More than 4 times per year     Are you , , , , never , or living with a partner?         Alcohol Use    Q1: How often do you have a drink containing alcohol? Never     Q2: How many drinks containing alcohol do you have on a typical day when you are drinking? Patient does not drink     Q3: How often do you have six or more drinks on one occasion? Never                   Patient lives at home alone. She has family for support. She has a cane for home use. No HH pta. SDOH complete.     SS received consult for [facility] vs home health. Patient would like to go to [facility] at discharge. Choice obtained for [facility]. Referral sent to Malina at [facility] at this time. Packet started. SS following.      1617 SS spoke with Malina at [facility], patient has been approved and can be admitted tomorrow.

## 2023-02-09 NOTE — PT/OT/SLP EVAL
Occupational Therapy   Evaluation    Name: Olga Stephenson  MRN: [id number]  Admitting Diagnosis: Arthritis of right knee  Recent Surgery: Procedure(s) (LRB):  ARTHROPLASTY, KNEE, TOTAL, USING COMPUTER-ASSISTED NAVIGATION (Right) Day of Surgery    Recommendations:     Discharge Recommendations: nursing facility, skilled  Discharge Equipment Recommendations:  bedside commode, walker, rolling  Barriers to discharge:  Decreased caregiver support    Assessment:     Olga Stephenson is a 67 y.o. female with a medical diagnosis of Arthritis of right knee.  She presents with s/p right TKR on 2/9/223. Performance deficits affecting function: impaired self care skills, impaired functional mobility, gait instability, impaired balance, pain.  Pt lives home alone and anticipating discharge to swing bed in order to regain maximum independence with ADL tasks and functional mobility.     Rehab Prognosis: Good; patient would benefit from acute skilled OT services to address these deficits and reach maximum level of function.       Plan:     Patient to be seen 5 x/week to address the above listed problems via self-care/home management, therapeutic activities, therapeutic exercises  Plan of Care Expires: 02/16/23  Plan of Care Reviewed with: patient    Subjective     Chief Complaint: s/p TKR  Patient/Family Comments/goals: pt agreeable to participate in OT eval    Occupational Profile:  Living Environment: pt lives alone in one story home with no steps to enter  Previous level of function: independent with all ADL tasks, IADL tasks, and functional mobility   Roles and Routines: perform self care; homemaker  Equipment Used at Home: cane, straight  Assistance upon Discharge: swing bed    Pain/Comfort:  Pain Rating 1: 9/10  Location - Side 1: Right  Location 1: knee  Pain Addressed 1: Nurse notified    Patients cultural, spiritual, Holiness conflicts given the current situation: no    Objective:     Communicated with: ELLE Parmar  prior to session.  Patient found supine with blood pressure cuff, cryotherapy, mcarthur catheter, hemovac, knee immobilizer, peripheral IV, pulse ox (continuous), SCD upon OT entry to room.    General Precautions: Standard, fall  Orthopedic Precautions: RLE weight bearing as tolerated  Braces: Knee immobilizer  Respiratory Status: Room air    Occupational Performance:    Bed Mobility:    Patient completed Supine to Sit with minimum assistance    Functional Mobility/Transfers:  Patient completed Sit <> Stand Transfer with contact guard assistance  with  rolling walker   Patient completed Bed <> Chair Transfer using Step Transfer technique with contact guard assistance with rolling walker  Functional Mobility: pt performed functional mobility to door and back to chair with RW with CGA    Activities of Daily Living:  Upper Body Dressing: minimum assistance to taiwo gown as robe    Cognitive/Visual Perceptual:  Cognitive/Psychosocial Skills:     -       Oriented to: Person, Place, Time, and Situation   -       Follows Commands/attention:Follows multistep  commands  -       Mood/Affect/Coping skills/emotional control: Cooperative    Physical Exam:  Balance:    -       Fair with RW  Upper Extremity Range of Motion:     -       Right Upper Extremity: WFL  -       Left Upper Extremity: WFL  Upper Extremity Strength:    -       Right Upper Extremity: WFL  -       Left Upper Extremity: WFL  Gross motor coordination:   WFL    AMPAC 6 Click ADL:  AMPAC Total Score:      Treatment & Education:  Pt educated on OT role/POC.   Importance of OOB activity with staff assistance.  Importance of sitting up in the chair throughout the day as tolerated, especially for meals   Safety during functional t/f and mobility with use of RW  Importance of assisting with self-care activities   All questions/concerns answered within OT scope of practice      Patient left up in chair with all lines intact, call button in reach, and LELE Parmar  present    GOALS:   Multidisciplinary Problems       Occupational Therapy Goals          Problem: Occupational Therapy    Goal Priority Disciplines Outcome Interventions   Occupational Therapy Goal     OT, PT/OT Ongoing, Progressing    Description: ST.Pt will perform bathing with Dorinda with setup at EOB  2.Pt will perform UE dressing with Vonda  3.Pt will perform LE dressing with SBA with AD if needed  4.Pt will transfer bed/chair/bsc with SBA with RW  5.Pt will perform standing task x 2 min with SBA with RW  6.Tolerate 15 min of tx without fatigue.      LTG:   Restore to max I with selfcare and mobility.                           History:     Past Medical History:   Diagnosis Date    Acute superficial gastritis without hemorrhage 2022    Adenomatous polyp of cecum 6/15/2022    Adenomatous polyp of transverse colon 6/15/2022    Asthma     Coronary artery disease     Diabetes mellitus     Diabetes mellitus, type 2     Diverticula, colon 6/15/2022    Epigastric pain 2022    Heart attack     History of colon polyps 6/15/2022    Hyperlipidemia     Hypertension     Polyp of hepatic flexure of colon 6/15/2022    Screening for colon cancer 6/15/2022    Thyroid disease          Past Surgical History:   Procedure Laterality Date    BILATERAL OOPHORECTOMY Bilateral     35 years ago,  1-2 years after hyst    CARDIAC SURGERY       SECTION      CHOLECYSTECTOMY      COLONOSCOPY  2017    repeat in 3 years    ESOPHAGOGASTRODUODENOSCOPY  03/10/2021    HYSTERECTOMY         Time Tracking:     OT Date of Treatment: 23  OT Start Time: 1457  OT Stop Time: 1516  OT Total Time (min): 19 min    Billable Minutes:Evaluation OT min complexity eval    2023

## 2023-02-09 NOTE — ANESTHESIA PROCEDURE NOTES
Spinal    Diagnosis: right tka  Patient location during procedure: OR  Start time: 2/9/2023 8:21 AM  Timeout: 2/9/2023 8:20 AM  End time: 2/9/2023 8:25 AM    Staffing  Authorizing Provider: Kevin Alegria DO  Performing Provider: Kevin Alegria DO    Preanesthetic Checklist  Completed: patient identified, IV checked, site marked, risks and benefits discussed, surgical consent, monitors and equipment checked, pre-op evaluation and timeout performed  Spinal Block  Patient position: sitting  Prep: ChloraPrep  Patient monitoring: heart rate, continuous pulse ox and frequent blood pressure checks  Approach: midline  Location: L4-5  Injection technique: single shot  CSF Fluid: clear free-flowing CSF  Needle  Needle type: Quincke   Needle gauge: 22 G  Needle length: 3.5 in  Additional Documentation: incremental injection, negative aspiration for heme and no paresthesia on injection  Needle localization: anatomical landmarks  Assessment  Sensory level: T6  Ease of block: easy  Patient's tolerance of the procedure: comfortable throughout block and no complaints  Medications:    Medications: BUPivacaine (pf) (MARCAINE) injection 0.75% - Intraspinal   1.5 mL - 2/9/2023 8:25:00 AM

## 2023-02-09 NOTE — PLAN OF CARE
Problem: Occupational Therapy  Goal: Occupational Therapy Goal  Description: ST.Pt will perform bathing with Dorinda with setup at EOB  2.Pt will perform UE dressing with Vonda  3.Pt will perform LE dressing with SBA with AD if needed  4.Pt will transfer bed/chair/bsc with SBA with RW  5.Pt will perform standing task x 2 min with SBA with RW  6.Tolerate 15 min of tx without fatigue.      LTG:   Restore to max I with selfcare and mobility.      Outcome: Ongoing, Progressing

## 2023-02-09 NOTE — ANESTHESIA PROCEDURE NOTES
Peripheral Block    Patient location during procedure: OR   Block not for primary anesthetic.  Reason for block: at surgeon's request and post-op pain management   Post-op Pain Location: right TKA   Start time: 2/9/2023 8:27 AM  Timeout: 2/9/2023 8:26 AM   End time: 2/9/2023 8:30 AM    Staffing  Authorizing Provider: Kevin Alegria DO  Performing Provider: Kevin Alegria DO    Preanesthetic Checklist  Completed: patient identified, IV checked, site marked, risks and benefits discussed, surgical consent, monitors and equipment checked, pre-op evaluation and timeout performed  Peripheral Block  Patient position: supine  Prep: ChloraPrep  Patient monitoring: heart rate, cardiac monitor, continuous pulse ox, continuous capnometry and frequent blood pressure checks  Block type: adductor canal  Laterality: right  Injection technique: single shot  Needle  Needle type: Stimuplex   Needle gauge: 20 G  Needle length: 4 in  Needle localization: anatomical landmarks and ultrasound guidance   -ultrasound image captured on disc.  Assessment  Injection assessment: negative aspiration, negative parasthesia and local visualized surrounding nerve  Paresthesia pain: none  Heart rate change: no  Slow fractionated injection: yes  Pain Tolerance: comfortable throughout block  Medications:    Medications: ROPIvacaine (NAROPIN) injection 0.75% - Perineural   15 mL - 2/9/2023 8:30:00 AM  dexAMETHasone sodium phos (PF) injection 10 mg/mL - Other   10 mg - 2/9/2023 8:30:00 AM    Additional Notes  VSS.  [facility] RN monitoring vitals throughout procedure.  Patient tolerated procedure well.

## 2023-02-09 NOTE — ASSESSMENT & PLAN NOTE
No n/v currently but severe indigestion after eating.    H/o  Esophageal stricture.   F/u with GI.

## 2023-02-10 ENCOUNTER — HOSPITAL ENCOUNTER (INPATIENT)
Facility: HOSPITAL | Age: 68
LOS: 14 days | Discharge: HOME | DRG: 561 | End: 2023-02-24
Attending: FAMILY MEDICINE | Admitting: FAMILY MEDICINE
Payer: MEDICARE

## 2023-02-10 VITALS
BODY MASS INDEX: 29.92 KG/M2 | RESPIRATION RATE: 18 BRPM | TEMPERATURE: 100 F | DIASTOLIC BLOOD PRESSURE: 91 MMHG | HEART RATE: 86 BPM | WEIGHT: 202 LBS | SYSTOLIC BLOOD PRESSURE: 159 MMHG | HEIGHT: 69 IN | OXYGEN SATURATION: 95 %

## 2023-02-10 DIAGNOSIS — Z96.651 STATUS POST TOTAL RIGHT KNEE REPLACEMENT: Primary | ICD-10-CM

## 2023-02-10 DIAGNOSIS — E11.9 TYPE 2 DIABETES MELLITUS WITHOUT COMPLICATION, WITHOUT LONG-TERM CURRENT USE OF INSULIN: ICD-10-CM

## 2023-02-10 DIAGNOSIS — I10 PRIMARY HYPERTENSION: ICD-10-CM

## 2023-02-10 DIAGNOSIS — I25.10 CORONARY ARTERY DISEASE INVOLVING NATIVE CORONARY ARTERY OF NATIVE HEART, UNSPECIFIED WHETHER ANGINA PRESENT: ICD-10-CM

## 2023-02-10 DIAGNOSIS — Z96.651 S/P TOTAL KNEE REPLACEMENT, RIGHT: ICD-10-CM

## 2023-02-10 LAB
ALBUMIN SERPL BCP-MCNC: 3.4 G/DL (ref 3.5–5)
ALBUMIN/GLOB SERPL: 0.9 {RATIO}
ALP SERPL-CCNC: 63 U/L (ref 55–142)
ALT SERPL W P-5'-P-CCNC: 42 U/L (ref 13–56)
ANION GAP SERPL CALCULATED.3IONS-SCNC: 12 MMOL/L (ref 7–16)
ANION GAP SERPL CALCULATED.3IONS-SCNC: 14 MMOL/L (ref 7–16)
AST SERPL W P-5'-P-CCNC: 25 U/L (ref 15–37)
BASOPHILS # BLD AUTO: 0.03 K/UL (ref 0–0.2)
BASOPHILS # BLD AUTO: 0.03 K/UL (ref 0–0.2)
BASOPHILS NFR BLD AUTO: 0.2 % (ref 0–1)
BASOPHILS NFR BLD AUTO: 0.2 % (ref 0–1)
BILIRUB SERPL-MCNC: 0.6 MG/DL (ref ?–1.2)
BUN SERPL-MCNC: 10 MG/DL (ref 7–18)
BUN SERPL-MCNC: 10 MG/DL (ref 7–18)
BUN/CREAT SERPL: 11 (ref 6–20)
BUN/CREAT SERPL: 11 (ref 6–20)
CALCIUM SERPL-MCNC: 9.1 MG/DL (ref 8.5–10.1)
CALCIUM SERPL-MCNC: 9.2 MG/DL (ref 8.5–10.1)
CHLORIDE SERPL-SCNC: 102 MMOL/L (ref 98–107)
CHLORIDE SERPL-SCNC: 103 MMOL/L (ref 98–107)
CO2 SERPL-SCNC: 25 MMOL/L (ref 21–32)
CO2 SERPL-SCNC: 28 MMOL/L (ref 21–32)
CREAT SERPL-MCNC: 0.94 MG/DL (ref 0.55–1.02)
CREAT SERPL-MCNC: 0.95 MG/DL (ref 0.55–1.02)
DIFFERENTIAL METHOD BLD: ABNORMAL
DIFFERENTIAL METHOD BLD: ABNORMAL
EGFR (NO RACE VARIABLE) (RUSH/TITUS): 66 ML/MIN/1.73M²
EGFR (NO RACE VARIABLE) (RUSH/TITUS): 67 ML/MIN/1.73M²
EOSINOPHIL # BLD AUTO: 0.02 K/UL (ref 0–0.5)
EOSINOPHIL # BLD AUTO: 0.03 K/UL (ref 0–0.5)
EOSINOPHIL NFR BLD AUTO: 0.2 % (ref 1–4)
EOSINOPHIL NFR BLD AUTO: 0.2 % (ref 1–4)
ERYTHROCYTE [DISTWIDTH] IN BLOOD BY AUTOMATED COUNT: 13.4 % (ref 11.5–14.5)
ERYTHROCYTE [DISTWIDTH] IN BLOOD BY AUTOMATED COUNT: 13.9 % (ref 11.5–14.5)
GLOBULIN SER-MCNC: 3.9 G/DL (ref 2–4)
GLUCOSE SERPL-MCNC: 149 MG/DL (ref 74–106)
GLUCOSE SERPL-MCNC: 152 MG/DL (ref 74–106)
GLUCOSE SERPL-MCNC: 159 MG/DL (ref 70–105)
HCT VFR BLD AUTO: 38.7 % (ref 38–47)
HCT VFR BLD AUTO: 42.2 % (ref 38–47)
HGB BLD-MCNC: 12.7 G/DL (ref 12–16)
HGB BLD-MCNC: 13.6 G/DL (ref 12–16)
IMM GRANULOCYTES # BLD AUTO: 0.06 K/UL (ref 0–0.04)
IMM GRANULOCYTES NFR BLD: 0.5 % (ref 0–0.4)
LYMPHOCYTES # BLD AUTO: 2.69 K/UL (ref 1–4.8)
LYMPHOCYTES # BLD AUTO: 3.18 K/UL (ref 1–4.8)
LYMPHOCYTES NFR BLD AUTO: 17.5 % (ref 27–41)
LYMPHOCYTES NFR BLD AUTO: 24 % (ref 27–41)
LYMPHOCYTES NFR BLD MANUAL: 20 % (ref 27–41)
MAGNESIUM SERPL-MCNC: 1.7 MG/DL (ref 1.7–2.3)
MCH RBC QN AUTO: 29.3 PG (ref 27–31)
MCH RBC QN AUTO: 29.8 PG (ref 27–31)
MCHC RBC AUTO-ENTMCNC: 32.2 G/DL (ref 32–36)
MCHC RBC AUTO-ENTMCNC: 32.8 G/DL (ref 32–36)
MCV RBC AUTO: 89.4 FL (ref 80–96)
MCV RBC AUTO: 92.3 FL (ref 80–96)
MONOCYTES # BLD AUTO: 1.16 K/UL (ref 0–0.8)
MONOCYTES # BLD AUTO: 1.94 K/UL (ref 0–0.8)
MONOCYTES NFR BLD AUTO: 12.6 % (ref 2–6)
MONOCYTES NFR BLD AUTO: 8.7 % (ref 2–6)
MONOCYTES NFR BLD MANUAL: 13 % (ref 2–6)
MPC BLD CALC-MCNC: 12.6 FL (ref 9.4–12.4)
MPC BLD CALC-MCNC: 12.7 FL (ref 9.4–12.4)
NEUTROPHILS # BLD AUTO: 10.67 K/UL (ref 1.8–7.7)
NEUTROPHILS # BLD AUTO: 8.82 K/UL (ref 1.8–7.7)
NEUTROPHILS NFR BLD AUTO: 66.4 % (ref 53–65)
NEUTROPHILS NFR BLD AUTO: 69.5 % (ref 53–65)
NEUTS BAND NFR BLD MANUAL: 3 % (ref 1–5)
NEUTS SEG NFR BLD MANUAL: 64 % (ref 50–62)
NRBC # BLD AUTO: 0 X10E3/UL
NRBC BLD MANUAL-RTO: ABNORMAL %
NRBC, AUTO (.00): 0 %
PLATELET # BLD AUTO: 193 K/UL (ref 150–400)
PLATELET # BLD AUTO: 202 K/UL (ref 150–400)
PLATELET MORPHOLOGY: NORMAL
POTASSIUM SERPL-SCNC: 3.8 MMOL/L (ref 3.5–5.1)
POTASSIUM SERPL-SCNC: 4.4 MMOL/L (ref 3.5–5.1)
PROT SERPL-MCNC: 7.3 G/DL (ref 6.4–8.2)
RBC # BLD AUTO: 4.33 M/UL (ref 4.2–5.4)
RBC # BLD AUTO: 4.57 M/UL (ref 4.2–5.4)
RBC MORPH BLD: NORMAL
SODIUM SERPL-SCNC: 138 MMOL/L (ref 136–145)
SODIUM SERPL-SCNC: 138 MMOL/L (ref 136–145)
WBC # BLD AUTO: 13.27 K/UL (ref 4.5–11)
WBC # BLD AUTO: 15.36 K/UL (ref 4.5–11)

## 2023-02-10 PROCEDURE — 94761 N-INVAS EAR/PLS OXIMETRY MLT: CPT

## 2023-02-10 PROCEDURE — 97116 GAIT TRAINING THERAPY: CPT | Mod: CQ

## 2023-02-10 PROCEDURE — 82962 GLUCOSE BLOOD TEST: CPT

## 2023-02-10 PROCEDURE — 94640 AIRWAY INHALATION TREATMENT: CPT

## 2023-02-10 PROCEDURE — 27100098 HC SPACER

## 2023-02-10 PROCEDURE — 99900035 HC TECH TIME PER 15 MIN (STAT)

## 2023-02-10 PROCEDURE — 25000242 PHARM REV CODE 250 ALT 637 W/ HCPCS: Performed by: FAMILY MEDICINE

## 2023-02-10 PROCEDURE — 63600175 PHARM REV CODE 636 W HCPCS: Performed by: NURSE PRACTITIONER

## 2023-02-10 PROCEDURE — 80048 BASIC METABOLIC PNL TOTAL CA: CPT | Mod: XB | Performed by: NURSE PRACTITIONER

## 2023-02-10 PROCEDURE — 84443 ASSAY THYROID STIM HORMONE: CPT | Performed by: NURSE PRACTITIONER

## 2023-02-10 PROCEDURE — 80053 COMPREHEN METABOLIC PANEL: CPT | Performed by: ORTHOPAEDIC SURGERY

## 2023-02-10 PROCEDURE — 83735 ASSAY OF MAGNESIUM: CPT | Performed by: NURSE PRACTITIONER

## 2023-02-10 PROCEDURE — 25000242 PHARM REV CODE 250 ALT 637 W/ HCPCS: Performed by: ORTHOPAEDIC SURGERY

## 2023-02-10 PROCEDURE — 25000003 PHARM REV CODE 250: Performed by: NURSE PRACTITIONER

## 2023-02-10 PROCEDURE — 25000003 PHARM REV CODE 250: Performed by: ORTHOPAEDIC SURGERY

## 2023-02-10 PROCEDURE — 97535 SELF CARE MNGMENT TRAINING: CPT

## 2023-02-10 PROCEDURE — 99232 SBSQ HOSP IP/OBS MODERATE 35: CPT | Mod: ,,, | Performed by: HOSPITALIST

## 2023-02-10 PROCEDURE — 85025 COMPLETE CBC W/AUTO DIFF WBC: CPT | Performed by: ORTHOPAEDIC SURGERY

## 2023-02-10 PROCEDURE — 84134 ASSAY OF PREALBUMIN: CPT | Performed by: NURSE PRACTITIONER

## 2023-02-10 PROCEDURE — 11000004 HC SNF PRIVATE

## 2023-02-10 PROCEDURE — 83036 HEMOGLOBIN GLYCOSYLATED A1C: CPT | Performed by: NURSE PRACTITIONER

## 2023-02-10 PROCEDURE — 99232 PR SUBSEQUENT HOSPITAL CARE,LEVL II: ICD-10-PCS | Mod: ,,, | Performed by: HOSPITALIST

## 2023-02-10 PROCEDURE — 82306 VITAMIN D 25 HYDROXY: CPT | Performed by: NURSE PRACTITIONER

## 2023-02-10 PROCEDURE — 36416 COLLJ CAPILLARY BLOOD SPEC: CPT

## 2023-02-10 PROCEDURE — 85025 COMPLETE CBC W/AUTO DIFF WBC: CPT | Performed by: NURSE PRACTITIONER

## 2023-02-10 PROCEDURE — 97110 THERAPEUTIC EXERCISES: CPT | Mod: CQ

## 2023-02-10 RX ORDER — ACETAMINOPHEN 325 MG/1
650 TABLET ORAL EVERY 6 HOURS PRN
Status: DISCONTINUED | OUTPATIENT
Start: 2023-02-10 | End: 2023-02-24 | Stop reason: HOSPADM

## 2023-02-10 RX ORDER — CYCLOBENZAPRINE HCL 10 MG
10 TABLET ORAL 3 TIMES DAILY PRN
Status: DISCONTINUED | OUTPATIENT
Start: 2023-02-10 | End: 2023-02-24 | Stop reason: HOSPADM

## 2023-02-10 RX ORDER — POLYETHYLENE GLYCOL 3350 17 G/17G
17 POWDER, FOR SOLUTION ORAL 2 TIMES DAILY PRN
Status: DISCONTINUED | OUTPATIENT
Start: 2023-02-10 | End: 2023-02-24 | Stop reason: HOSPADM

## 2023-02-10 RX ORDER — LOSARTAN POTASSIUM 50 MG/1
50 TABLET ORAL DAILY
Status: DISCONTINUED | OUTPATIENT
Start: 2023-02-11 | End: 2023-02-24 | Stop reason: HOSPADM

## 2023-02-10 RX ORDER — CALCIUM CARBONATE 200(500)MG
500 TABLET,CHEWABLE ORAL 3 TIMES DAILY PRN
Status: DISCONTINUED | OUTPATIENT
Start: 2023-02-10 | End: 2023-02-24 | Stop reason: HOSPADM

## 2023-02-10 RX ORDER — TALC
6 POWDER (GRAM) TOPICAL NIGHTLY PRN
Status: DISCONTINUED | OUTPATIENT
Start: 2023-02-10 | End: 2023-02-24 | Stop reason: HOSPADM

## 2023-02-10 RX ORDER — DAPAGLIFLOZIN 10 MG/1
10 TABLET, FILM COATED ORAL DAILY
Status: DISCONTINUED | OUTPATIENT
Start: 2023-02-11 | End: 2023-02-24 | Stop reason: HOSPADM

## 2023-02-10 RX ORDER — HYDROCODONE BITARTRATE AND ACETAMINOPHEN 10; 325 MG/1; MG/1
1 TABLET ORAL EVERY 4 HOURS PRN
Qty: 30 TABLET | Refills: 0 | Status: SHIPPED | OUTPATIENT
Start: 2023-02-10 | End: 2023-02-24

## 2023-02-10 RX ORDER — GLUCAGON 1 MG
1 KIT INJECTION
Status: DISCONTINUED | OUTPATIENT
Start: 2023-02-10 | End: 2023-02-24 | Stop reason: HOSPADM

## 2023-02-10 RX ORDER — ACETAMINOPHEN 325 MG/1
650 TABLET ORAL EVERY 6 HOURS PRN
Status: DISCONTINUED | OUTPATIENT
Start: 2023-02-10 | End: 2023-02-10

## 2023-02-10 RX ORDER — METFORMIN HYDROCHLORIDE 500 MG/1
1000 TABLET ORAL NIGHTLY
Status: DISCONTINUED | OUTPATIENT
Start: 2023-02-10 | End: 2023-02-24 | Stop reason: HOSPADM

## 2023-02-10 RX ORDER — METOPROLOL SUCCINATE 25 MG/1
25 TABLET, EXTENDED RELEASE ORAL DAILY
Status: DISCONTINUED | OUTPATIENT
Start: 2023-02-11 | End: 2023-02-24 | Stop reason: HOSPADM

## 2023-02-10 RX ORDER — GLIPIZIDE 2.5 MG/1
5 TABLET, EXTENDED RELEASE ORAL
Status: DISCONTINUED | OUTPATIENT
Start: 2023-02-11 | End: 2023-02-24 | Stop reason: HOSPADM

## 2023-02-10 RX ORDER — ALBUTEROL SULFATE 90 UG/1
2 AEROSOL, METERED RESPIRATORY (INHALATION) 2 TIMES DAILY
Status: DISCONTINUED | OUTPATIENT
Start: 2023-02-10 | End: 2023-02-16

## 2023-02-10 RX ORDER — FLUTICASONE PROPIONATE 50 MCG
1 SPRAY, SUSPENSION (ML) NASAL DAILY
Status: DISCONTINUED | OUTPATIENT
Start: 2023-02-11 | End: 2023-02-24 | Stop reason: HOSPADM

## 2023-02-10 RX ORDER — IBUPROFEN 200 MG
24 TABLET ORAL
Status: DISCONTINUED | OUTPATIENT
Start: 2023-02-10 | End: 2023-02-24 | Stop reason: HOSPADM

## 2023-02-10 RX ORDER — DOCUSATE SODIUM 283 MG/5ML
1 LIQUID RECTAL DAILY PRN
Status: DISCONTINUED | OUTPATIENT
Start: 2023-02-10 | End: 2023-02-24 | Stop reason: HOSPADM

## 2023-02-10 RX ORDER — CLOPIDOGREL BISULFATE 75 MG/1
75 TABLET ORAL DAILY
Status: DISCONTINUED | OUTPATIENT
Start: 2023-02-11 | End: 2023-02-24 | Stop reason: HOSPADM

## 2023-02-10 RX ORDER — PREGABALIN 75 MG/1
75 CAPSULE ORAL DAILY
Status: DISCONTINUED | OUTPATIENT
Start: 2023-02-11 | End: 2023-02-24 | Stop reason: HOSPADM

## 2023-02-10 RX ORDER — INSULIN ASPART 100 [IU]/ML
1-10 INJECTION, SOLUTION INTRAVENOUS; SUBCUTANEOUS
Status: DISCONTINUED | OUTPATIENT
Start: 2023-02-10 | End: 2023-02-13

## 2023-02-10 RX ORDER — VALACYCLOVIR HYDROCHLORIDE 500 MG/1
500 TABLET, FILM COATED ORAL DAILY
Status: DISCONTINUED | OUTPATIENT
Start: 2023-02-11 | End: 2023-02-24 | Stop reason: HOSPADM

## 2023-02-10 RX ORDER — AMOXICILLIN 250 MG
1 CAPSULE ORAL 2 TIMES DAILY
Status: DISCONTINUED | OUTPATIENT
Start: 2023-02-10 | End: 2023-02-24 | Stop reason: HOSPADM

## 2023-02-10 RX ORDER — PANTOPRAZOLE SODIUM 40 MG/1
40 TABLET, DELAYED RELEASE ORAL DAILY
Status: DISCONTINUED | OUTPATIENT
Start: 2023-02-11 | End: 2023-02-24 | Stop reason: HOSPADM

## 2023-02-10 RX ORDER — IBUPROFEN 200 MG
16 TABLET ORAL
Status: DISCONTINUED | OUTPATIENT
Start: 2023-02-10 | End: 2023-02-24 | Stop reason: HOSPADM

## 2023-02-10 RX ORDER — ESTRADIOL 0.1 MG/D
1 FILM, EXTENDED RELEASE TRANSDERMAL
Status: DISCONTINUED | OUTPATIENT
Start: 2023-02-10 | End: 2023-02-12

## 2023-02-10 RX ORDER — PREGABALIN 75 MG/1
150 CAPSULE ORAL NIGHTLY
Status: DISCONTINUED | OUTPATIENT
Start: 2023-02-10 | End: 2023-02-24 | Stop reason: HOSPADM

## 2023-02-10 RX ORDER — ACETAMINOPHEN 325 MG/1
325 TABLET ORAL EVERY 6 HOURS PRN
Status: DISCONTINUED | OUTPATIENT
Start: 2023-02-10 | End: 2023-02-24 | Stop reason: HOSPADM

## 2023-02-10 RX ORDER — MAG HYDROX/ALUMINUM HYD/SIMETH 200-200-20
15 SUSPENSION, ORAL (FINAL DOSE FORM) ORAL EVERY 6 HOURS PRN
Status: DISCONTINUED | OUTPATIENT
Start: 2023-02-10 | End: 2023-02-24 | Stop reason: HOSPADM

## 2023-02-10 RX ORDER — EZETIMIBE 10 MG/1
10 TABLET ORAL NIGHTLY
Status: DISCONTINUED | OUTPATIENT
Start: 2023-02-10 | End: 2023-02-24 | Stop reason: HOSPADM

## 2023-02-10 RX ORDER — HYDROCODONE BITARTRATE AND ACETAMINOPHEN 10; 325 MG/1; MG/1
1 TABLET ORAL EVERY 4 HOURS PRN
Status: DISCONTINUED | OUTPATIENT
Start: 2023-02-10 | End: 2023-02-16

## 2023-02-10 RX ADMIN — INSULIN ASPART 1 UNITS: 100 INJECTION, SOLUTION INTRAVENOUS; SUBCUTANEOUS at 09:02

## 2023-02-10 RX ADMIN — EZETIMIBE 10 MG: 10 TABLET ORAL at 09:02

## 2023-02-10 RX ADMIN — ALUMINUM HYDROXIDE, MAGNESIUM HYDROXIDE, AND SIMETHICONE 15 ML: 200; 200; 20 SUSPENSION ORAL at 06:02

## 2023-02-10 RX ADMIN — LOSARTAN POTASSIUM 50 MG: 50 TABLET, FILM COATED ORAL at 08:02

## 2023-02-10 RX ADMIN — DOCUSATE SODIUM 100 MG: 100 CAPSULE, LIQUID FILLED ORAL at 08:02

## 2023-02-10 RX ADMIN — METOPROLOL SUCCINATE 25 MG: 25 TABLET, EXTENDED RELEASE ORAL at 08:02

## 2023-02-10 RX ADMIN — CLOPIDOGREL BISULFATE 75 MG: 75 TABLET ORAL at 08:02

## 2023-02-10 RX ADMIN — HYDROCODONE BITARTRATE AND ACETAMINOPHEN 1 TABLET: 5; 325 TABLET ORAL at 09:02

## 2023-02-10 RX ADMIN — DAPAGLIFLOZIN 10 MG: 10 TABLET, FILM COATED ORAL at 08:02

## 2023-02-10 RX ADMIN — PANTOPRAZOLE SODIUM 40 MG: 40 TABLET, DELAYED RELEASE ORAL at 08:02

## 2023-02-10 RX ADMIN — SENNOSIDES AND DOCUSATE SODIUM 1 TABLET: 8.6; 5 TABLET ORAL at 09:02

## 2023-02-10 RX ADMIN — FLUTICASONE PROPIONATE 50 MCG: 50 SPRAY, METERED NASAL at 08:02

## 2023-02-10 RX ADMIN — APIXABAN 2.5 MG: 2.5 TABLET, FILM COATED ORAL at 08:02

## 2023-02-10 RX ADMIN — METFORMIN HYDROCHLORIDE 500 MG: 500 TABLET ORAL at 08:02

## 2023-02-10 RX ADMIN — HYDROCODONE BITARTRATE AND ACETAMINOPHEN 1 TABLET: 5; 325 TABLET ORAL at 05:02

## 2023-02-10 RX ADMIN — INSULIN ASPART 2 UNITS: 100 INJECTION, SOLUTION INTRAVENOUS; SUBCUTANEOUS at 05:02

## 2023-02-10 RX ADMIN — PREGABALIN 75 MG: 75 CAPSULE ORAL at 08:02

## 2023-02-10 RX ADMIN — PREGABALIN 150 MG: 75 CAPSULE ORAL at 09:02

## 2023-02-10 RX ADMIN — APIXABAN 2.5 MG: 2.5 TABLET, FILM COATED ORAL at 09:02

## 2023-02-10 RX ADMIN — HYDROCODONE BITARTRATE AND ACETAMINOPHEN 1 TABLET: 10; 325 TABLET ORAL at 08:02

## 2023-02-10 RX ADMIN — HYDROCODONE BITARTRATE AND ACETAMINOPHEN 1 TABLET: 10; 325 TABLET ORAL at 03:02

## 2023-02-10 RX ADMIN — ALBUTEROL SULFATE 2.5 MG: 2.5 SOLUTION RESPIRATORY (INHALATION) at 08:02

## 2023-02-10 RX ADMIN — EZETIMIBE 10 MG: 10 TABLET ORAL at 08:02

## 2023-02-10 RX ADMIN — GLIPIZIDE 5 MG: 5 TABLET ORAL at 08:02

## 2023-02-10 RX ADMIN — METFORMIN HYDROCHLORIDE 1000 MG: 500 TABLET ORAL at 09:02

## 2023-02-10 RX ADMIN — ALBUTEROL SULFATE 2 PUFF: 90 AEROSOL, METERED RESPIRATORY (INHALATION) at 07:02

## 2023-02-10 NOTE — PT/OT/SLP PROGRESS
Occupational Therapy   Treatment    Name: Olga Stephenson  MRN: [id number]  Admitting Diagnosis:  Arthritis of right knee  1 Day Post-Op    Recommendations:     Discharge Recommendations: nursing facility, skilled  Discharge Equipment Recommendations:  bedside commode, walker, rolling  Barriers to discharge:  Decreased caregiver support    Assessment:     Olga Stephenson is a 67 y.o. female with a medical diagnosis of Arthritis of right knee.  She presents with s/p right TKR on 2/9/23. Performance deficits affecting function are impaired self care skills, impaired functional mobility, gait instability, impaired balance, pain.     Rehab Prognosis:  Good; patient would benefit from acute skilled OT services to address these deficits and reach maximum level of function.       Plan:     Patient to be seen 5 x/week to address the above listed problems via self-care/home management, therapeutic activities, therapeutic exercises  Plan of Care Expires: 02/16/23  Plan of Care Reviewed with: patient    Subjective     Pain/Comfort:  Pain Rating 1: 9/10  Location - Side 1: Right  Location 1: knee  Pain Addressed 1: Pre-medicate for activity    Objective:     Communicated with: ELLE Powell prior to session.  Patient found ambulatory in room/ruiz with peripheral IV upon OT entry to room.    General Precautions: Standard, fall    Orthopedic Precautions:RLE weight bearing as tolerated  Braces: Knee immobilizer  Respiratory Status: Room air     Occupational Performance:     Bed Mobility:    Not performed     Functional Mobility/Transfers:  Patient completed Sit <> Stand Transfer with contact guard assistance  with  rolling walker   Patient completed Toilet Transfer Step Transfer technique with contact guard assistance with  rolling walker  Functional Mobility: pt performed functional mobility from bathroom and back to chair with RW with CGA    Activities of Daily Living:  Upper Body Dressing: modified independence to taiwo  shirt  Lower Body Dressing: minimum assistance to taiwo pants  Toileting: stand by assistance to perform toilet hygiene      [facility] 6 Click ADL:      Treatment & Education:  Pt performed ADL training as listed above.     Patient left up in chair with all lines intact, call button in reach, and family present    GOALS:   Multidisciplinary Problems       Occupational Therapy Goals          Problem: Occupational Therapy    Goal Priority Disciplines Outcome Interventions   Occupational Therapy Goal     OT, PT/OT Ongoing, Progressing    Description: ST.Pt will perform bathing with Dorinda with setup at EOB  2.Pt will perform UE dressing with Vonda  3.Pt will perform LE dressing with SBA with AD if needed  4.Pt will transfer bed/chair/bsc with SBA with RW  5.Pt will perform standing task x 2 min with SBA with RW  6.Tolerate 15 min of tx without fatigue.      LTG:   Restore to max I with selfcare and mobility.                           Time Tracking:     OT Date of Treatment: 02/10/23  OT Start Time: 1014  OT Stop Time: 1029  OT Total Time (min): 15 min    Billable Minutes:Self Care/Home Management 15 minutes    OT/ROBERT: OT          2/10/2023

## 2023-02-10 NOTE — NURSING
BPA popup for pt at high risk for c. Diff & pt has laxative ordered. Pt last BM upon arrival to [facility] 2/10. Pt states BM was not runny or watery; was a normal BM. Pt has recently been on amoxicillin for sinus infection. Pt denies history of c. Diff. REMINGTON Flores NP notified.

## 2023-02-10 NOTE — PLAN OF CARE
Problem: Occupational Therapy  Goal: Occupational Therapy Goal  Description: Goals to be met by: 3/10/23     Patient will increase functional independence with ADLs by performing:    UE Dressing with Modified [place].  LE Dressing with Modified [place].  Grooming while standing with Modified [place].  Toileting from toilet with Modified [place] for hygiene and clothing management.   Sitanding x5-10 minutes with Modified [place].  Squat pivot transfers with Modified [place].  Step transfer with Modified [place]  Toilet transfer to toilet with Modified [place].    Outcome: Ongoing, Progressing

## 2023-02-10 NOTE — PLAN OF CARE
[facility]  Discharge Final Note    Primary Care Provider: Lv Cartagena MD    Expected Discharge Date: 2/10/2023    Final Discharge Note (most recent)       Final Note - 02/10/23 0841          Final Note    Assessment Type Final Discharge Note     Anticipated Discharge Disposition Swing Bed        Post-Acute Status    Post-Acute Authorization Placement     Post-Acute Placement Status Set-up Complete/Auth obtained     Patient choice form signed by patient/caregiver List with quality metrics by geographic area provided;List from CMS Compare     Discharge Delays None known at this time                     Important Message from Medicare             Contact Info       Erick Arizmendi MD   Specialty: Orthopedic Surgery    [address]   [address]  [place], [facility], [facility] [address]   Phone: [phone]       Next Steps: Follow up in 3 week(s)        Pt accepted to [facility] for dc today, family and pt aware, dr arizmendi aware, packet to jonathan

## 2023-02-10 NOTE — PT/OT/SLP EVAL
Occupational Therapy   Evaluation    Name: Olga Stephenson  MRN: [id number]  Admitting Diagnosis: R TKR secondary to OA  Recent Surgery: * No surgery found *      Recommendations:     Discharge Recommendations: home with home health  Discharge Equipment Recommendations:  walker, rolling  Barriers to discharge:       Assessment:     Olga Stephenson is a 67 y.o. female with a medical diagnosis of R TKR secondary to OA.  She presents with decreased balance, safety, strength and ROM of R knee. Performance deficits affecting function: weakness, impaired endurance, decreased safety awareness, decreased ROM, impaired balance, impaired self care skills. Pt reports increase pain as she is day 2 post OP. Pt lives at home alone and was I-Mod I at baseline    Rehab Prognosis: Good and Fair; patient would benefit from acute skilled OT services to address these deficits and reach maximum level of function.       Plan:     Patient to be seen 5 x/week to address the above listed problems via self-care/home management, therapeutic activities, therapeutic exercises, neuromuscular re-education  Plan of Care Expires:    Plan of Care Reviewed with: patient    Subjective     Chief Complaint: pain, decreased ROM and strength  Patient/Family Comments/goals: return to PLOF    Occupational Profile:  Living Environment: Pt lives at home alone with no step.  Previous level of function: I-Mod I  Equipment Used at Home: cane, straight  Assistance upon Discharge: TBD    Pain/Comfort:  Pain Rating 1: 9/10  Location - Side 1: Right  Location - Orientation 1: lower  Location 1: knee    Patients cultural, spiritual, Roman Catholic conflicts given the current situation: no    Objective:     Communicated with: Pt prior to session.  Patient found supine with   upon OT entry to room.    General Precautions: Standard, fall  Orthopedic Precautions:    Braces:    Respiratory Status: Room air    Occupational Performance:    Bed Mobility:    Patient completed  Rolling/Turning to Left with  modified independence and supervision  Patient completed Rolling/Turning to Right with modified independence and supervision  Patient completed Supine to Sit with supervision    Functional Mobility/Transfers:  Patient completed Sit <> Stand Transfer with supervision  with  rolling walker   Patient completed Toilet Transfer Step Transfer technique with supervision with  rolling walker  Functional Mobility: Pt completed functional ambulation to the restroom using the RW    Activities of Daily Living:  Upper Body Dressing: supervision sitting EOB  Lower Body Dressing: maximal assistance using due to decreased strength and mobility  Toileting: contact guard assistance and minimum assistance decreased strength and mobility    Cognitive/Visual Perceptual:  Cognitive/Psychosocial Skills:     -       Oriented to: Person, Place, Time, and Situation   -       Safety awareness/insight to disability: intact     Physical Exam:  Balance:    -       Fair  Upper Extremity Range of Motion:     -       Right Upper Extremity: WFL  -       Left Upper Extremity: WFL  Upper Extremity Strength:    -       Right Upper Extremity: 4/5  -       Left Upper Extremity: 4/5    AMPAC 6 Click ADL:  AMPAC Total Score: 20    Treatment & Education:  OT eval    Patient left supine with all lines intact and call button in reach    GOALS:   Multidisciplinary Problems       Occupational Therapy Goals          Problem: Occupational Therapy    Goal Priority Disciplines Outcome Interventions   Occupational Therapy Goal     OT, PT/OT Ongoing, Progressing    Description: Goals to be met by: 3/10/23     Patient will increase functional independence with ADLs by performing:    UE Dressing with Modified [place].  LE Dressing with Modified [place].  Grooming while standing with Modified [place].  Toileting from toilet with Modified [place] for hygiene and clothing management.   Sitanding x5-10 minutes with Modified  [place].  Squat pivot transfers with Modified [place].  Step transfer with Modified [place]  Toilet transfer to toilet with Modified [place].                         History:     Past Medical History:   Diagnosis Date    Acute superficial gastritis without hemorrhage 2022    Adenomatous polyp of cecum 6/15/2022    Adenomatous polyp of transverse colon 6/15/2022    Asthma     Coronary artery disease     Diabetes mellitus     Diabetes mellitus, type 2     Diverticula, colon 6/15/2022    Epigastric pain 2022    Heart attack     History of colon polyps 6/15/2022    Hyperlipidemia     Hypertension     Polyp of hepatic flexure of colon 6/15/2022    Screening for colon cancer 6/15/2022    Thyroid disease          Past Surgical History:   Procedure Laterality Date    ARTHROPLASTY, KNEE, TOTAL, USING COMPUTER-ASSISTED NAVIGATION Right 2023    Procedure: ARTHROPLASTY, KNEE, TOTAL, USING COMPUTER-ASSISTED NAVIGATION;  Surgeon: Erick Tolliver MD;  [facility];  Service: Orthopedics;  Laterality: Right;    BILATERAL OOPHORECTOMY Bilateral     35 years ago,  1-2 years after hyst    CARDIAC SURGERY       SECTION      CHOLECYSTECTOMY      COLONOSCOPY  2017    repeat in 3 years    ESOPHAGOGASTRODUODENOSCOPY  03/10/2021    HYSTERECTOMY         Time Tracking:     OT Date of Treatment:    OT Start Time:  3:15  OT Stop Time:  3:35  OT Total Time (min):      Billable Minutes:Evaluation 20    2/10/2023

## 2023-02-10 NOTE — DISCHARGE SUMMARY
[facility]  Orthopedics  Discharge Summary      Patient Name: Olga Stephenson  MRN: [id number]  Admission Date: 2/9/2023  Hospital Length of Stay: 0 days  Discharge Date and Time:  02/10/2023 7:36 AM  Attending Physician: Keri Mclaughlin MD   Discharging Provider: Keri Mclaughlin MD  Primary Care Provider: Lv Cartagena MD    HPI:   67-year-old female with severe degenerative joint disease of the right knee who is failed non operative treatment including injections walking with a cane for greater than 3 months she is now risk for falls needing total knee arthroplasty on the right  Right lower extremity she moves her toes she has sensation to touch has palpable pulses she has range of motion 5-100 degrees of flexion there is crepitus on motion no instability on varus valgus stress has a 1+ effusion walks with antalgic gait  X-rays show tricompartmental degenerative changes with more involved medial compartment right knee  Impression severe degenerative joint disease right knee  Plan total knee arthroplasty on the right with or without patellar resurfacing      Procedure(s) (LRB):  ARTHROPLASTY, KNEE, TOTAL, USING COMPUTER-ASSISTED NAVIGATION (Right)      Hospital Course:  No notes on file patient to the hospital on 02/09/2023 underwent a right total knee arthroplasty without complications.  Postop day 1 drains removed.  She moves her toes dorsiflexion plantar flexion.  Has sensation to touch in right lower extremity.  Palpable pulses dorsalis pedis artery.  She is on Eliquis for DVT prophylaxis.  Norco for pain.  Transfer to swing bed.  DC staples 2 weeks.  Follow-up Dr. Mclaughlin 3 weeks.  No complications.    Goals of Care Treatment Preferences:  Code Status: Full Code      Consults (From admission, onward)        Status Ordering Provider     Inpatient consult to Hospitalist  Once        Provider:  (Not yet assigned)    Acknowledged KERI MCLAUGHLIN consult case management/social  work  Once        Provider:  (Not yet assigned)    Completed KERI MCLAUGHLIN          Significant Diagnostic Studies: Labs: All labs within the past 24 hours have been reviewed    Pending Diagnostic Studies:     None        Final Active Diagnoses:    Diagnosis Date Noted POA    PRINCIPAL PROBLEM:  Arthritis of right knee [M17.11] 07/21/2021 Yes    Hyperlipidemia [E78.5]  Yes    GIB (gastrointestinal bleeding) [K92.2] 06/24/2022 Yes    Gastroesophageal reflux disease [K21.9] 05/02/2022 Yes    Coronary artery disease [I25.10]  Yes    Hypertension [I10]  Yes    Gastroparesis [K31.84] 04/16/2021 Yes    Type 2 diabetes mellitus [E11.9] 04/16/2021 Yes      Problems Resolved During this Admission:      Discharged Condition: good    Disposition: Swing Bed    Follow Up:    Patient Instructions:   No discharge procedures on file.  Medications:  Reconciled Home Medications:      Medication List      ASK your doctor about these medications    albuterol 90 mcg/actuation inhaler  Commonly known as: PROVENTIL/VENTOLIN HFA  Inhale 2 puffs into the lungs 2 (two) times a day.     blood sugar diagnostic Strp  Commonly known as: FREESTYLE LITE STRIPS  Use one strip daily to monitor glucose     chlorpheniramine-phenylephrine 4-10 mg per tablet  Take 1 tablet by mouth every 4 (four) hours as needed for Congestion.     * clopidogreL 75 mg tablet  Commonly known as: PLAVIX  Take 1 tablet (75 mg total) by mouth once daily.     * clopidogreL 75 mg tablet  Commonly known as: PLAVIX  1 tablet.     * cyclobenzaprine 10 MG tablet  Commonly known as: FLEXERIL     * cyclobenzaprine 10 MG tablet  Commonly known as: FLEXERIL  1 tablet nightly as needed.     estradioL 0.1 mg/24 hr Ptsw  Commonly known as: VIVELLE-DOT  Place 1 patch onto the skin twice a week.     * ezetimibe 10 mg tablet  Commonly known as: ZETIA  Take 1 tablet by mouth once daily.     * ezetimibe 10 mg tablet  Commonly known as: ZETIA  1 tablet.     famotidine 20 MG  tablet  Commonly known as: PEPCID  Take OTC-one tablet daily     * FARXIGA 10 mg tablet  Generic drug: dapagliflozin  Take 1 tablet (10 mg total) by mouth once daily.     * FARXIGA 10 mg tablet  Generic drug: dapagliflozin  1 tablet.     fluticasone propionate 50 mcg/actuation nasal spray  Commonly known as: FLONASE  1 spray (50 mcg total) by Each Nostril route once daily.     * glipiZIDE 5 MG Tr24  Take 1 tablet (5 mg total) by mouth daily with breakfast.     * glipiZIDE 5 MG tablet  Commonly known as: GLUCOTROL  Take 5 mg by mouth once daily.     loratadine 10 mg tablet  Commonly known as: CLARITIN     * losartan 50 MG tablet  Commonly known as: COZAAR  Take 1 tablet (50 mg total) by mouth once daily.     * losartan 25 MG tablet  Commonly known as: COZAAR     meloxicam 15 MG tablet  Commonly known as: MOBIC  Take 1 tablet (15 mg total) by mouth once daily.     metFORMIN 1000 MG tablet  Commonly known as: GLUCOPHAGE     * metoprolol succinate 25 MG 24 hr tablet  Commonly known as: TOPROL-XL  Take 1 tablet by mouth once daily.     * metoprolol succinate 25 MG 24 hr tablet  Commonly known as: TOPROL-XL  1 tablet.     multivitamin capsule  Take 1 capsule by mouth once daily.     omeprazole 20 MG capsule  Commonly known as: PRILOSEC  Take 1 tablet by mouth b.i.d. x7 days.  Then 1 tablet by mouth 30 minutes before the 1st meal of the day daily.     OXYGEN-AIR DELIVERY SYSTEMS MISC  2 L by Misc.(Non-Drug; Combo Route) route nightly.     pantoprazole 40 MG tablet  Commonly known as: PROTONIX  Take 1 tablet (40 mg total) by mouth once daily.     * pregabalin 75 MG capsule  Commonly known as: LYRICA  Take 75 mg by mouth. 1 in the morning and 2 at night     * pregabalin 75 MG capsule  Commonly known as: LYRICA  1 capsule.     tiZANidine 4 MG tablet  Commonly known as: ZANAFLEX  Take 1 tablet (4 mg total) by mouth every 6 (six) hours as needed (Muscle pain/spasm).     traMADoL 50 mg tablet  Commonly known as: ULTRAM  Take by  mouth.     valACYclovir 500 MG tablet  Commonly known as: VALTREX  Take 1 tablet (500 mg total) by mouth once daily.         * This list has 16 medication(s) that are the same as other medications prescribed for you. Read the directions carefully, and ask your doctor or other care provider to review them with you.                Erick Tolliver MD  Orthopedics  [facility]

## 2023-02-10 NOTE — PROGRESS NOTES
Mrs. Stephenson is a pleasant 68 y/o BF with a PMH of DM, Gastroparesis, HTN, GI bleed with prior polyp, OA right knee, Asthma, CAD (MI in 2011, and thyroid disease. She had a total right knee replacement by Dr. Tolliver on 2/9/2023 after failing conservative treatment including multiple ineffective steroid injections to her knee. She was transferred to [facility] from [facility] to be admitted to the [facility] program for PT/OT and pain control. Her staples are to be removed in 2 weeks (2/23/23). Upon arrival, she was assessed and reported to be in a good bit of pain to her right knee. She takes Norco for pain and one was given to her. She had cryotherapy in place. RRR, lung souns clear bilaterally, bowel sounds present to all 4 quadrants with no abdominal tenderness. No pedal edema. Pt will be seen by Dr. Bucio tomorrow for full History and Physical.

## 2023-02-10 NOTE — NURSING
Nurses Note -- 4 Eyes      2/10/2023   4:30 PM      Skin assessed during: Admit      [x] No Pressure Injuries Present    []Prevention Measures Documented      [] Yes- Altered Skin Integrity Present or Discovered   [] LDA Added if Not in Epic (Describe Wound)   [] New Altered Skin Integrity was Present on Admit and Documented in LDA   [] Wound Image Taken    Wound Care Consulted? No    Attending Nurse:  Debora Constantino RN     Second RN/Staff Member:  Naila Aranda RN    ACE wrap noted to R leg; clean, dry, intact

## 2023-02-10 NOTE — NURSING
Pt discharged to [facility] . Report called to Donny. Discharge packet reviewed to patient. Daughter at bedside.

## 2023-02-10 NOTE — NURSING
Pt arrived via personal vehicle with daughters. Pt has ACE wrap noted to R knee; clean, dry, intact. Pt has glasses, cryocuff, upper & lower dentures, phone, clothes, home meds. Pt has LIZZETH in place to L leg. Skin assessment completed with Naila Aranda RN. No breakdown noted. Estrace patch noted to L hip; patch removed per pt stating it's due to be changed. No acute distress noted.

## 2023-02-10 NOTE — PLAN OF CARE
Problem: Adult Inpatient Plan of Care  Goal: Plan of Care Review  Outcome: Ongoing, Progressing  Goal: Patient-Specific Goal (Individualized)  Outcome: Ongoing, Progressing  Goal: Absence of Hospital-Acquired Illness or Injury  Outcome: Ongoing, Progressing  Goal: Optimal Comfort and Wellbeing  Outcome: Ongoing, Progressing  Goal: Readiness for Transition of Care  Outcome: Ongoing, Progressing     Problem: Diabetes Comorbidity  Goal: Blood Glucose Level Within Targeted Range  Outcome: Ongoing, Progressing     Problem: Fall Injury Risk  Goal: Absence of Fall and Fall-Related Injury  Outcome: Ongoing, Progressing     Problem: Skin Injury Risk Increased  Goal: Skin Health and Integrity  Outcome: Ongoing, Progressing     Problem: Adjustment to Surgery (Knee Arthroplasty)  Goal: Optimal Coping  Outcome: Ongoing, Progressing     Problem: Bleeding (Knee Arthroplasty)  Goal: Absence of Bleeding  Outcome: Ongoing, Progressing     Problem: Bowel Motility Impaired (Knee Arthroplasty)  Goal: Effective Bowel Elimination  Outcome: Ongoing, Progressing     Problem: Fluid and Electrolyte Imbalance (Knee Arthroplasty)  Goal: Fluid and Electrolyte Balance  Outcome: Ongoing, Progressing     Problem: Functional Ability Impaired (Knee Arthroplasty)  Goal: Optimal Functional Ability  Outcome: Ongoing, Progressing     Problem: Infection (Knee Arthroplasty)  Goal: Absence of Infection Signs and Symptoms  Outcome: Ongoing, Progressing     Problem: Neurovascular Compromise (Knee Arthroplasty)  Goal: Intact Neurovascular Status  Outcome: Ongoing, Progressing     Problem: Ongoing Anesthesia Effects (Knee Arthroplasty)  Goal: Anesthesia/Sedation Recovery  Outcome: Ongoing, Progressing     Problem: Pain (Knee Arthroplasty)  Goal: Acceptable Pain Control  Outcome: Ongoing, Progressing     Problem: Postoperative Nausea and Vomiting (Knee Arthroplasty)  Goal: Nausea and Vomiting Relief  Outcome: Ongoing, Progressing     Problem: Postoperative  Urinary Retention (Knee Arthroplasty)  Goal: Effective Urinary Elimination  Outcome: Ongoing, Progressing     Problem: Respiratory Compromise (Knee Arthroplasty)  Goal: Effective Oxygenation and Ventilation  Outcome: Ongoing, Progressing

## 2023-02-10 NOTE — PT/OT/SLP PROGRESS
Physical Therapy Treatment    Patient Name:  Olga Stephenson   MRN:  [id number]    Recommendations:     Discharge Recommendations: nursing facility, skilled, rehabilitation facility  Discharge Equipment Recommendations: other (see comments) (to be determined)  Barriers to discharge: None    Assessment:     Olga Stephenson is a 67 y.o. female admitted with a medical diagnosis of Arthritis of right knee.  She presents with the following impairments/functional limitations: weakness, pain, decreased ROM .    Rehab Prognosis: Good; patient would benefit from acute skilled PT services to address these deficits and reach maximum level of function.    Recent Surgery: Procedure(s) (LRB):  ARTHROPLASTY, KNEE, TOTAL, USING COMPUTER-ASSISTED NAVIGATION (Right) 1 Day Post-Op    Received Plan of Care per Alondra Jones PT     Plan:     During this hospitalization, patient to be seen BID (5x/week daily 2x/week) to address the identified rehab impairments via gait training, therapeutic activities, therapeutic exercises and progress toward the following goals:    Plan of Care Expires:  03/09/23    Subjective     Chief Complaint: pain  Patient/Family Comments/goals: agrees to PT Tx   Pain/Comfort:  Pain Rating 1: 9/10  Location - Side 1: Right  Location 1: knee  Pain Addressed 1: Reposition, Cessation of Activity  Pain Rating Post-Intervention 1: 8/10      Objective:     Communicated with patient prior to session.  Patient found HOB elevated with   upon PT entry to room.     General Precautions: Standard, fall  Orthopedic Precautions: RLE weight bearing as tolerated  Braces: Knee immobilizer  Respiratory Status: Room air     Functional Mobility:  Bed Mobility:     Scooting: minimum assistance  Supine to Sit: minimum assistance and moderate assistance  Transfers:     Sit to Stand:  minimum assistance with rolling walker  Bed to Chair: minimum assistance with  rolling walker  using  Step Transfer  Gait: 40ft with RW , CGA; right  step-to, guarded      AM-PAC 6 CLICK MOBILITY  Turning over in bed (including adjusting bedclothes, sheets and blankets)?: 3  Sitting down on and standing up from a chair with arms (e.g., wheelchair, bedside commode, etc.): 3  Moving from lying on back to sitting on the side of the bed?: 3  Moving to and from a bed to a chair (including a wheelchair)?: 3  Need to walk in hospital room?: 3  Climbing 3-5 steps with a railing?: 2  Basic Mobility Total Score: 17       Treatment & Education:  TKA exercises protocol x 20 repetitions each with assist prn; pain with end range flexion   Pt up to 45* on CPM  Sat edge of bed for heel slides  Gait as noted above     Patient left up in chair with call button in reach, daughter present, and cryocuff on ..    GOALS:   Multidisciplinary Problems       Physical Therapy Goals          Problem: Physical Therapy    Goal Priority Disciplines Outcome Goal Variances Interventions   Physical Therapy Goal     PT, PT/OT Ongoing, Progressing     Description: Short Term Goals to be met by: 23    Patient will increase functional independence with mobility by performin. Supine to sit with Modified [place]  2. Sit to stand transfer with Modified [place]  3. Bed to chair transfer with Modified [place] using Rolling Walker, WBAT R LE  4. Gait  x 100 feet with Modified [place] using Rolling Walker, WBAT R LE.   5. Lower extremity exercise program x30 reps per handout, with assistance as needed  6. Knee ROM 0-90    Long Term Goals to be met by: 23    Pt will regain full independent functional mobility to return to prior activities of daily living.                        Time Tracking:     PT Received On: 02/10/23  PT Start Time: 09     PT Stop Time: 930  PT Total Time (min): 30 min     Billable Minutes: Gait Training 15 and Therapeutic Exercise 15    Treatment Type: Treatment  PT/PTA: PTA     PTA Visit Number: 1     02/10/2023

## 2023-02-11 LAB
25(OH)D3 SERPL-MCNC: 16.2 NG/ML
BILIRUB UR QL STRIP: NEGATIVE
CLARITY UR: CLEAR
COLOR UR: YELLOW
EST. AVERAGE GLUCOSE BLD GHB EST-MCNC: 144 MG/DL
GLUCOSE SERPL-MCNC: 140 MG/DL (ref 70–105)
GLUCOSE SERPL-MCNC: 149 MG/DL (ref 70–105)
GLUCOSE SERPL-MCNC: 171 MG/DL (ref 70–105)
GLUCOSE SERPL-MCNC: 207 MG/DL (ref 70–105)
GLUCOSE UR STRIP-MCNC: 500 MG/DL
HBA1C MFR BLD HPLC: 6.9 % (ref 4.5–6.6)
KETONES UR STRIP-SCNC: 40 MG/DL
LEUKOCYTE ESTERASE UR QL STRIP: NEGATIVE
NITRITE UR QL STRIP: NEGATIVE
PH UR STRIP: 5.5 PH UNITS
PREALB SERPL NEPH-MCNC: 19 MG/DL (ref 20–40)
PROT UR QL STRIP: NEGATIVE
RBC # UR STRIP: NEGATIVE /UL
SP GR UR STRIP: 1.01
TSH SERPL DL<=0.005 MIU/L-ACNC: 0.2 UIU/ML (ref 0.36–3.74)
UROBILINOGEN UR STRIP-ACNC: 0.2 MG/DL

## 2023-02-11 PROCEDURE — 99307 SBSQ NF CARE SF MDM 10: CPT | Mod: ,,, | Performed by: EMERGENCY MEDICINE

## 2023-02-11 PROCEDURE — 25000242 PHARM REV CODE 250 ALT 637 W/ HCPCS: Performed by: NURSE PRACTITIONER

## 2023-02-11 PROCEDURE — 81003 URINALYSIS AUTO W/O SCOPE: CPT | Performed by: EMERGENCY MEDICINE

## 2023-02-11 PROCEDURE — 27000981 HC MATTRESS, ACCUCAIR DAILY RENTAL

## 2023-02-11 PROCEDURE — 94640 AIRWAY INHALATION TREATMENT: CPT

## 2023-02-11 PROCEDURE — 97161 PT EVAL LOW COMPLEX 20 MIN: CPT

## 2023-02-11 PROCEDURE — 27000944

## 2023-02-11 PROCEDURE — 36416 COLLJ CAPILLARY BLOOD SPEC: CPT

## 2023-02-11 PROCEDURE — 94761 N-INVAS EAR/PLS OXIMETRY MLT: CPT

## 2023-02-11 PROCEDURE — 11000004 HC SNF PRIVATE

## 2023-02-11 PROCEDURE — 82962 GLUCOSE BLOOD TEST: CPT

## 2023-02-11 PROCEDURE — 63600175 PHARM REV CODE 636 W HCPCS: Performed by: NURSE PRACTITIONER

## 2023-02-11 PROCEDURE — 99900035 HC TECH TIME PER 15 MIN (STAT)

## 2023-02-11 PROCEDURE — 25000003 PHARM REV CODE 250: Performed by: FAMILY MEDICINE

## 2023-02-11 PROCEDURE — 25000003 PHARM REV CODE 250: Performed by: NURSE PRACTITIONER

## 2023-02-11 PROCEDURE — 97110 THERAPEUTIC EXERCISES: CPT

## 2023-02-11 PROCEDURE — 99307 PR NURSING FAC CARE, SUBSEQ, IMPROVING: ICD-10-PCS | Mod: ,,, | Performed by: EMERGENCY MEDICINE

## 2023-02-11 RX ADMIN — ALBUTEROL SULFATE 2 PUFF: 90 AEROSOL, METERED RESPIRATORY (INHALATION) at 09:02

## 2023-02-11 RX ADMIN — METFORMIN HYDROCHLORIDE 1000 MG: 500 TABLET ORAL at 09:02

## 2023-02-11 RX ADMIN — HYDROCODONE BITARTRATE AND ACETAMINOPHEN 1 TABLET: 10; 325 TABLET ORAL at 10:02

## 2023-02-11 RX ADMIN — HYDROCODONE BITARTRATE AND ACETAMINOPHEN 1 TABLET: 10; 325 TABLET ORAL at 09:02

## 2023-02-11 RX ADMIN — ALBUTEROL SULFATE 2 PUFF: 90 AEROSOL, METERED RESPIRATORY (INHALATION) at 08:02

## 2023-02-11 RX ADMIN — SENNOSIDES AND DOCUSATE SODIUM 1 TABLET: 8.6; 5 TABLET ORAL at 09:02

## 2023-02-11 RX ADMIN — VALACYCLOVIR HYDROCHLORIDE 500 MG: 500 TABLET, FILM COATED ORAL at 09:02

## 2023-02-11 RX ADMIN — GLIPIZIDE 5 MG: 2.5 TABLET, EXTENDED RELEASE ORAL at 09:02

## 2023-02-11 RX ADMIN — LOSARTAN POTASSIUM 50 MG: 50 TABLET, FILM COATED ORAL at 09:02

## 2023-02-11 RX ADMIN — INSULIN ASPART 2 UNITS: 100 INJECTION, SOLUTION INTRAVENOUS; SUBCUTANEOUS at 06:02

## 2023-02-11 RX ADMIN — INSULIN ASPART 4 UNITS: 100 INJECTION, SOLUTION INTRAVENOUS; SUBCUTANEOUS at 05:02

## 2023-02-11 RX ADMIN — HYDROCODONE BITARTRATE AND ACETAMINOPHEN 1 TABLET: 10; 325 TABLET ORAL at 03:02

## 2023-02-11 RX ADMIN — ACETAMINOPHEN 325MG 650 MG: 325 TABLET ORAL at 05:02

## 2023-02-11 RX ADMIN — METOPROLOL SUCCINATE 25 MG: 25 TABLET, EXTENDED RELEASE ORAL at 09:02

## 2023-02-11 RX ADMIN — DAPAGLIFLOZIN 10 MG: 10 TABLET, FILM COATED ORAL at 09:02

## 2023-02-11 RX ADMIN — PANTOPRAZOLE SODIUM 40 MG: 40 TABLET, DELAYED RELEASE ORAL at 09:02

## 2023-02-11 RX ADMIN — PREGABALIN 75 MG: 75 CAPSULE ORAL at 09:02

## 2023-02-11 RX ADMIN — EZETIMIBE 10 MG: 10 TABLET ORAL at 09:02

## 2023-02-11 RX ADMIN — PREGABALIN 150 MG: 75 CAPSULE ORAL at 09:02

## 2023-02-11 RX ADMIN — FLUTICASONE PROPIONATE 50 MCG: 50 SPRAY, METERED NASAL at 09:02

## 2023-02-11 RX ADMIN — CLOPIDOGREL BISULFATE 75 MG: 75 TABLET ORAL at 09:02

## 2023-02-11 RX ADMIN — APIXABAN 2.5 MG: 2.5 TABLET, FILM COATED ORAL at 09:02

## 2023-02-11 RX ADMIN — HYDROCODONE BITARTRATE AND ACETAMINOPHEN 1 TABLET: 10; 325 TABLET ORAL at 06:02

## 2023-02-11 NOTE — NURSING
Nurses Note -- 4 Eyes      2/11/2023   4:12 PM      Skin assessed during: Q Shift Change      [x] No Pressure Injuries Present    []Prevention Measures Documented      [] Yes- Altered Skin Integrity Present or Discovered   [] LDA Added if Not in Epic (Describe Wound)   [] New Altered Skin Integrity was Present on Admit and Documented in LDA   [] Wound Image Taken    Wound Care Consulted? No    Attending Nurse:  Debora Constantino RN     Second RN/Staff Member:  Mariza Godoy LPN    Ace wrap dressing noted to R knee with moderate amount of dry blood noted. Provider already aware.

## 2023-02-11 NOTE — PLAN OF CARE
[facility]  Initial Discharge Assessment       Primary Care Provider: Lv Cartagena MD    Admission Diagnosis: S/P total knee replacement, right [Z96.651]    Admission Date: 2/10/2023  Expected Discharge Date:     Discharge Barriers Identified: None    Payor: MEDICARE / Plan: MEDICARE PART A & B / Product Type: Government /     Extended Emergency Contact Information  Primary Emergency Contact: Mary Yang  Mobile Phone: [phone]  Relation: Daughter  Preferred language: English   needed? No  Secondary Emergency Contact: RANJEET ADKINS  Mobile Phone: [phone]  Relation: Daughter  Preferred language: English   needed? No    Discharge Plan A: Home, Home Health  Discharge Plan B: Home with family, Home Health      [facility] - [address] [address]  Phone: [phone] Fax: [phone]    [facility] - [place] - [address]  [address]  [facility] [address]  Phone: [phone] Fax: [phone]    [facility] [id number] [facility], [place] - [address]  [address]  [facility] [address]  Phone: [phone] Fax: [phone]    [facility], [place] - [address]  [address]  [facility] [address]  Phone: [phone] Fax: [phone]      Initial Assessment (most recent)       Adult Discharge Assessment - 02/10/23 1841          Discharge Assessment    Assessment Type Discharge Planning Assessment     Confirmed/corrected address, phone number and insurance Yes     Source of Information patient;family;health record     If unable to respond/provide information was family/caregiver contacted? Yes     Contact Name/Number Kelsi Yang, daughter [phone]     Does patient/caregiver understand observation status Yes     Communicated ANNETTE with patient/caregiver Date not available/Unable to determine     Reason For Admission Rt.  total knee replacement     People in Home alone     Facility Arrived From: [facility]     Do you expect to return to your current living situation? Yes     Do you have help at home or someone to help you manage your care at home? Yes     Who are your caregiver(s) and their phone number(s)? Mary daughter     Prior to hospitilization cognitive status: Alert/Oriented     Current cognitive status: Alert/Oriented     Walking or Climbing Stairs ambulation difficulty, requires equipment;stair climbing difficulty, requires equipment;transferring difficulty, requires equipment     Mobility Management cane or RW     Dressing/Bathing bathing difficulty, assistance 1 person;dressing difficulty, assistance 1 person     Home Layout Able to live on 1st floor     Equipment Currently Used at Home cane, straight     Readmission within 30 days? No     Patient currently being followed by outpatient case management? No     Do you currently have service(s) that help you manage your care at home? No     Do you take prescription medications? Yes     Do you have prescription coverage? Yes     Coverage Medicare     Do you have any problems affording any of your prescribed medications? No     Is the patient taking medications as prescribed? yes     Who is going to help you get home at discharge? Mary, daughter     How do you get to doctors appointments? family or friend will provide     Are you on dialysis? No     Do you take coumadin? No     Discharge Plan A Home;Home Health     Discharge Plan B Home with family;Home Health     DME Needed Upon Discharge  walker, rolling;bedside commode     Discharge Plan discussed with: Patient     Discharge Barriers Identified None        Physical Activity    On average, how many days per week do you engage in moderate to strenuous exercise (like a brisk walk)? 2 days     On average, how many minutes do you engage in exercise at this level? 10 min        Financial Resource Strain    How  hard is it for you to pay for the very basics like food, housing, medical care, and heating? Not very hard        Housing Stability    In the last 12 months, was there a time when you were not able to pay the mortgage or rent on time? No     In the last 12 months, how many places have you lived? 1     In the last 12 months, was there a time when you did not have a steady place to sleep or slept in a shelter (including now)? No        Transportation Needs    In the past 12 months, has lack of transportation kept you from medical appointments or from getting medications? No     In the past 12 months, has lack of transportation kept you from meetings, work, or from getting things needed for daily living? No        Food Insecurity    Within the past 12 months, you worried that your food would run out before you got the money to buy more. Never true     Within the past 12 months, the food you bought just didn't last and you didn't have money to get more. Never true        Stress    Do you feel stress - tense, restless, nervous, or anxious, or unable to sleep at night because your mind is troubled all the time - these days? Only a little        Social Connections    In a typical week, how many times do you talk on the phone with family, friends, or neighbors? More than three times a week     How often do you get together with friends or relatives? More than three times a week     How often do you attend Taoism or Caodaism services? More than 4 times per year     Do you belong to any clubs or organizations such as Taoism groups, unions, fraternal or athletic groups, or school groups? No     How often do you attend meetings of the clubs or organizations you belong to? Never     Are you , , , , never , or living with a partner?         Alcohol Use    Q1: How often do you have a drink containing alcohol? Never     Q2: How many drinks containing alcohol do you have on a typical day  when you are drinking? Patient does not drink     Q3: How often do you have six or more drinks on one occasion? Never                 Pt. Admitted for continued rehabilitation, pain management, and supportive care following Rt. Total knee replacement surgery. Pt. Lives alone. Has good family support. Pt. Is not current with HH services. Has a cane for mobility but will need a RW and BSC when d/c from swing bed. Plans to return home  with HH services at d/c. Will cont. To follow pt. And assist as needed throughout stay.

## 2023-02-11 NOTE — PT/OT/SLP EVAL
Physical Therapy Evaluation    Patient Name:  Olga Stephenson   MRN:  [id number]    Recommendations:     Discharge Recommendations: home with home health   Discharge Equipment Recommendations: walker, rolling   Barriers to discharge: None    Assessment:     Olga Stephenson is a 67 y.o. female admitted with a medical diagnosis of ARTHROPLASTY, RIGHT KNEE, TOTA.  She presents with the following impairments/functional limitations: weakness, impaired endurance, impaired self care skills, impaired functional mobility, gait instability, decreased lower extremity function, decreased ROM, pain .    Rehab Prognosis: Good; patient would benefit from acute skilled PT services to address these deficits and reach maximum level of function.    Recent Surgery: * R TKR  2/9/23     Plan:     During this hospitalization, patient to be seen BID (5 days per week) to address the identified rehab impairments via   and progress toward the following goals:    Plan of Care Expires:  03/10/23    Subjective     Chief Complaint: right knee pain  Patient/Family Comments/goals: to improve knee function and return home.  Pain/Comfort:  Pain Rating 1: 7/10  Location - Side 1: Right  Location - Orientation 1: lower  Location 1: knee  Pain Addressed 1: Pre-medicate for activity  Pain Rating Post-Intervention 1: 7/10    Patients cultural, spiritual, Scientology conflicts given the current situation: no    Living Environment:  Home alone.  Prior to admission, patients level of function was independent.  Equipment used at home: cane, straight.  DME owned (not currently used): none.  Upon discharge, patient will have assistance from home health.    Objective:     Communicated with patient  prior to session.  Patient found supine     upon PT entry to room.    General Precautions: Standard, fall  Orthopedic Precautions:RLE weight bearing as tolerated   Braces:    Respiratory Status: Room air    Exams:  Cognitive Exam:  Patient is oriented to Person,  Place, Time, and Situation  Gross Motor Coordination:  WFL  RLE ROM: WFL except knee extension -50 degrees and flexion 70 degrees  RLE Strength: 2+  LLE ROM: WFL  LLE Strength: WFL    Functional Mobility:  Bed Mobility:     Rolling Left:  minimum assistance  Rolling Right: minimum assistance  Supine to Sit: minimum assistance  Sit to Supine: minimum assistance  Transfers:     Sit to Stand:  minimum assistance with rolling walker  Bed to Chair: contact guard assistance with  rolling walker  using  Step Transfer  Toilet Transfer: modified independence with  rolling walker and grab bars  using  Step Transfer  Gait: 75 feet with RW; limited stance and swing phase of right LE  Balance: Good      AM-PAC 6 CLICK MOBILITY  Total Score:16       Treatment & Education:  Patient performed right LE exercises to facilitate range of motion and strength including:  Heel slides with towel 3 x 1 minute  Quad sets 3 x 10  Ankle pumps 3 x 20  Standing straight leg raise 3 x 10      Patient left supine with call button in reach and nurse present.    Case conference with Rebecca Garcia, PTA and POC reviewed.     GOALS:   Multidisciplinary Problems       Physical Therapy Goals          Problem: Physical Therapy    Goal Priority Disciplines Outcome Goal Variances Interventions   Physical Therapy Goal     PT, PT/OT Ongoing, Progressing     Description: Goals to be met by: 2 weeks     Patient will increase functional independence with mobility by performin. Supine to sit with Stand-by Assistance  2. Sit to supine with Stand-by Assistance  3. Rolling to Left and Right with Modified [place].  4. Sit to stand transfer with Stand-by Assistance  5. Bed to chair transfer with Stand-by Assistance using Rolling Walker  6. Gait  x 150 feet with Stand-by Assistance using Rolling Walker.     Goals to be met by: 4 weeks     Patient will increase functional independence with mobility by performin. Supine to sit with Modified  [place]  2. Sit to supine with Modified [place]  3. Sit to stand transfer with Modified [place]  4. Bed to chair transfer with Modified [place] using Rolling Walker  5. Gait  x 300 feet with Modified [place] using Rolling Walker.                          History:     Past Medical History:   Diagnosis Date    Acute superficial gastritis without hemorrhage 2022    Adenomatous polyp of cecum 6/15/2022    Adenomatous polyp of transverse colon 6/15/2022    Asthma     Coronary artery disease     Diabetes mellitus     Diabetes mellitus, type 2     Diverticula, colon 6/15/2022    Epigastric pain 2022    Heart attack 2011    History of colon polyps 6/15/2022    Hyperlipidemia     Hypertension     Polyp of hepatic flexure of colon 6/15/2022    Screening for colon cancer 6/15/2022    Thyroid disease        Past Surgical History:   Procedure Laterality Date    ARTHROPLASTY, KNEE, TOTAL, USING COMPUTER-ASSISTED NAVIGATION Right 2023    Procedure: ARTHROPLASTY, KNEE, TOTAL, USING COMPUTER-ASSISTED NAVIGATION;  Surgeon: Erick Tolliver MD;  Location: [facility];  Service: Orthopedics;  Laterality: Right;    BILATERAL OOPHORECTOMY Bilateral     35 years ago,  1-2 years after hyst    CARDIAC SURGERY       SECTION      CHOLECYSTECTOMY      COLONOSCOPY  2017    repeat in 3 years    ESOPHAGOGASTRODUODENOSCOPY  03/10/2021    HYSTERECTOMY         Time Tracking:     PT Received On: 23  PT Start Time: 0900     PT Stop Time: 0930  PT Total Time (min): 30 min     Billable Minutes: Evaluation 15 and Therapeutic Exercise 15      2023

## 2023-02-11 NOTE — HPI
PT IS A 67 YR OLD BF ADMITTED TO [facility] SWING BED FOR PT/OT/REHABILITATION FOR MUSCLE WEAKNESS AND MEDICAL MANAGEMENT. PT WAS TRANSFERRED FROM [facility] WHERE SHE WAS ADMITTED FOR R TKR PER DR MCLAUGHLIN ON 02/09/2023. PT HAD AN UNCOMPLICATED POSTOPERATIVE COURSE AND  PT IMPROVED; BUT HAS PERSISTENT MUSCLE WEAKNESS AND DEBILITY, AND WILL REQUIRE SWING BED FOR REHABILITATION. PT HAS HX CAD (MI, CABG 2011), DVT, DM2, HLD AND HTN.   PT WILL BE EVALUATED PER PT/OT AND A TREATMENT PLAN WILL BE INITIATED. THE PHARMACIST WILL REVIEW MEDICATIONS AND MAKE RECOMMENDATIONS. PT WILL SEE THE DIETICIAN  FOR NUTRITIONAL SUPPORT WITH WEIGHT  95.7 KG AND ALBUMIN OF 3.4. PT'S ABNORMAL ADMITTING LABS ARE: CMP:152, ALBUMIN 3.4, CBC:WBC 15.36 K, UA:KETONES 40. MAG IS NORMAL 1.7, TSH 0.196 AND VITAMIN D IS 16.2. PT WILL HAVE WEEKLY LABS.  PT IS RETIRED AND LIVES IN [place], [place].     CXR: NO ACUTE CHANGE    PT CODE STATUS IS FULL CODE  PT COVID STATUS IS NEG  PT HAS HAD COVID VACCINE  PT VTE PPX IS ELIQUIS/PLAVIX/TEDS/EARLY AMBULATION

## 2023-02-11 NOTE — PROGRESS NOTES
[facility]  [facility] Hospital Medicine  Progress Note    Patient Name: Olga Stephenson  MRN: [id number]  Patient Class: OP- Outpatient Recovery   Admission Date: 2/9/2023  Length of Stay: 0 days  Attending Physician: No att. providers found  Primary Care Provider: Lv Cartagena MD        Subjective:     Principal Problem:Arthritis of right knee    HPI:  68yo man DM2, gastroparesis, HTN, HLD, GERD, GI Bleed with prior polyp who presents with R knee pain that has failed conservative management.  She is status post surgical intervention.  She has been ambulatory with physical therapy and has not complaints aside from R knee pain.    She states she has had difficulty taking aspirin in the past due to GI bleed.        Overview/Hospital Course:  2/10/23: vitals and labs stable today.   Patient eager for discharged.        Interval History:     Review of Systems   Constitutional:  Negative for activity change, chills, fatigue and fever.   HENT:  Negative for congestion and sore throat.    Respiratory:  Negative for chest tightness.    Gastrointestinal:  Negative for abdominal distention, abdominal pain and diarrhea.   Endocrine: Negative for cold intolerance and heat intolerance.   Genitourinary:  Negative for dysuria.   Musculoskeletal:  Negative for arthralgias and back pain.        Right knee pain     Skin:  Negative for color change and rash.   Neurological:  Negative for dizziness, tremors and headaches.   Psychiatric/Behavioral:  Negative for agitation. The patient is not nervous/anxious.    Objective:     Vital Signs (Most Recent):  Temp: 99.6 °F (37.6 °C) (02/10/23 1100)  Pulse: 86 (02/10/23 1100)  Resp: 18 (02/10/23 1100)  BP: (!) 159/91 (02/10/23 1100)  SpO2: 95 % (02/10/23 1100)   Vital Signs (24h Range):  Temp:  [96.6 °F (35.9 °C)-99.6 °F (37.6 °C)] 99.3 °F (37.4 °C)  Pulse:  [] 100  Resp:  [14-20] 18  SpO2:  [95 %-99 %] 99 %  BP: (130-159)/(71-91) 148/86     Weight: 91.6 kg (202  lb)  Body mass index is 29.83 kg/m².    Intake/Output Summary (Last 24 hours) at 2/10/2023 2202  Last data filed at 2/10/2023 0654  Gross per 24 hour   Intake 350.05 ml   Output 2170 ml   Net -1819.95 ml      Physical Exam  Constitutional:       Appearance: Normal appearance.   HENT:      Head: Normocephalic and atraumatic.      Nose: Nose normal.      Mouth/Throat:      Mouth: Mucous membranes are moist.      Pharynx: Oropharynx is clear.   Eyes:      Extraocular Movements: Extraocular movements intact.      Conjunctiva/sclera: Conjunctivae normal.      Pupils: Pupils are equal, round, and reactive to light.   Cardiovascular:      Rate and Rhythm: Normal rate and regular rhythm.      Pulses: Normal pulses.      Heart sounds: Normal heart sounds.   Pulmonary:      Effort: Pulmonary effort is normal.      Breath sounds: Normal breath sounds.   Abdominal:      General: Abdomen is flat. Bowel sounds are normal.      Palpations: Abdomen is soft.   Musculoskeletal:         General: Normal range of motion.      Cervical back: Normal range of motion and neck supple.      Comments: R knee post-op changes.     Skin:     General: Skin is warm and dry.   Neurological:      General: No focal deficit present.      Mental Status: She is alert and oriented to person, place, and time.   Psychiatric:         Mood and Affect: Mood normal.         Behavior: Behavior normal.       Significant Labs: All pertinent labs within the past 24 hours have been reviewed.    Significant Imaging: I have reviewed all pertinent imaging results/findings within the past 24 hours.      Assessment/Plan:      * Arthritis of right knee  S/p surgical intervention per ortho  Eliquis 2.5mg bid. Monitor given history of GI bleed.         Hyperlipidemia  Continue home statin.       GIB (gastrointestinal bleeding)  Prior GI bleed with polyp one year ago.  No recurrent bleeding.        Gastroesophageal reflux disease  Continue home PPI.        Hypertension  Continue home antihypertensives.       Coronary artery disease  S/p CABG.        Type 2 diabetes mellitus  Patient's FSGs are controlled on current medication regimen.  Last A1c reviewed-   Lab Results   Component Value Date    HGBA1C 8.1 (H) 10/26/2022     Most recent fingerstick glucose reviewed- No results for input(s): POCTGLUCOSE in the last 24 hours.  Current correctional scale  Low  Maintain anti-hyperglycemic dose as follows-   Antihyperglycemics (From admission, onward)    Start     Stop Route Frequency Ordered    02/10/23 0745  metFORMIN tablet 500 mg         -- Oral With breakfast 02/09/23 1415    02/09/23 1430  dapagliflozin tablet 10 mg         -- Oral Daily 02/09/23 1415    02/09/23 1430  glipiZIDE tablet 5 mg         -- Oral Daily 02/09/23 1415        Primary team continued home diabetic medications.     Gastroparesis  No n/v currently but severe indigestion after eating.    H/o  Esophageal stricture.   F/u with GI.         VTE Risk Mitigation (From admission, onward)         Ordered     IP VTE HIGH RISK PATIENT  Once         02/10/23 0739                Discharge Planning   ANNETTE: 2/10/2023     Code Status: Full Code   Is the patient medically ready for discharge?:     Reason for patient still in hospital (select all that apply): Treatment  Discharge Plan A: Rehab   Discharge Delays: None known at this time              Geneva Downs MD  Department of Hospital Medicine   [facility]

## 2023-02-11 NOTE — PLAN OF CARE
Problem: Physical Therapy  Goal: Physical Therapy Goal  Description: Goals to be met by: 2 weeks     Patient will increase functional independence with mobility by performin. Supine to sit with Stand-by Assistance  2. Sit to supine with Stand-by Assistance  3. Rolling to Left and Right with Modified [place].  4. Sit to stand transfer with Stand-by Assistance  5. Bed to chair transfer with Stand-by Assistance using Rolling Walker  6. Gait  x 150 feet with Stand-by Assistance using Rolling Walker.     Goals to be met by: 4 weeks     Patient will increase functional independence with mobility by performin. Supine to sit with Modified [place]  2. Sit to supine with Modified [place]  3. Sit to stand transfer with Modified [place]  4. Bed to chair transfer with Modified [place] using Rolling Walker  5. Gait  x 300 feet with Modified [place] using Rolling Walker.     Outcome: Ongoing, Progressing

## 2023-02-12 PROBLEM — Z79.899 DVT PROPHYLAXIS: Status: ACTIVE | Noted: 2022-06-15

## 2023-02-12 PROBLEM — Z96.651 STATUS POST TOTAL RIGHT KNEE REPLACEMENT: Status: ACTIVE | Noted: 2023-02-12

## 2023-02-12 PROBLEM — E55.9 VITAMIN D DEFICIENCY: Status: ACTIVE | Noted: 2023-02-12

## 2023-02-12 PROBLEM — M62.81 MUSCLE WEAKNESS: Status: ACTIVE | Noted: 2023-02-12

## 2023-02-12 LAB
GLUCOSE SERPL-MCNC: 126 MG/DL (ref 70–105)
GLUCOSE SERPL-MCNC: 141 MG/DL (ref 70–105)
GLUCOSE SERPL-MCNC: 153 MG/DL (ref 70–105)
GLUCOSE SERPL-MCNC: 168 MG/DL (ref 70–105)

## 2023-02-12 PROCEDURE — 94761 N-INVAS EAR/PLS OXIMETRY MLT: CPT

## 2023-02-12 PROCEDURE — 82962 GLUCOSE BLOOD TEST: CPT

## 2023-02-12 PROCEDURE — 27000944

## 2023-02-12 PROCEDURE — 63600175 PHARM REV CODE 636 W HCPCS: Performed by: NURSE PRACTITIONER

## 2023-02-12 PROCEDURE — 27202174 HC DRESSING, SILVERLON ISLAND

## 2023-02-12 PROCEDURE — 25000003 PHARM REV CODE 250: Performed by: FAMILY MEDICINE

## 2023-02-12 PROCEDURE — 25000003 PHARM REV CODE 250: Performed by: NURSE PRACTITIONER

## 2023-02-12 PROCEDURE — 94640 AIRWAY INHALATION TREATMENT: CPT

## 2023-02-12 PROCEDURE — 36416 COLLJ CAPILLARY BLOOD SPEC: CPT

## 2023-02-12 PROCEDURE — 99900035 HC TECH TIME PER 15 MIN (STAT)

## 2023-02-12 PROCEDURE — 27000981 HC MATTRESS, ACCUCAIR DAILY RENTAL

## 2023-02-12 PROCEDURE — 11000004 HC SNF PRIVATE

## 2023-02-12 RX ORDER — ERGOCALCIFEROL 1.25 MG/1
50000 CAPSULE ORAL
Status: DISCONTINUED | OUTPATIENT
Start: 2023-02-13 | End: 2023-02-24 | Stop reason: HOSPADM

## 2023-02-12 RX ADMIN — VALACYCLOVIR HYDROCHLORIDE 500 MG: 500 TABLET, FILM COATED ORAL at 08:02

## 2023-02-12 RX ADMIN — ACETAMINOPHEN 325MG 650 MG: 325 TABLET ORAL at 08:02

## 2023-02-12 RX ADMIN — SENNOSIDES AND DOCUSATE SODIUM 1 TABLET: 8.6; 5 TABLET ORAL at 08:02

## 2023-02-12 RX ADMIN — INSULIN ASPART 1 UNITS: 100 INJECTION, SOLUTION INTRAVENOUS; SUBCUTANEOUS at 08:02

## 2023-02-12 RX ADMIN — APIXABAN 2.5 MG: 2.5 TABLET, FILM COATED ORAL at 08:02

## 2023-02-12 RX ADMIN — HYDROCODONE BITARTRATE AND ACETAMINOPHEN 1 TABLET: 10; 325 TABLET ORAL at 08:02

## 2023-02-12 RX ADMIN — HYDROCODONE BITARTRATE AND ACETAMINOPHEN 1 TABLET: 10; 325 TABLET ORAL at 06:02

## 2023-02-12 RX ADMIN — PREGABALIN 75 MG: 75 CAPSULE ORAL at 08:02

## 2023-02-12 RX ADMIN — CLOPIDOGREL BISULFATE 75 MG: 75 TABLET ORAL at 08:02

## 2023-02-12 RX ADMIN — FLUTICASONE PROPIONATE 50 MCG: 50 SPRAY, METERED NASAL at 08:02

## 2023-02-12 RX ADMIN — PREGABALIN 150 MG: 75 CAPSULE ORAL at 08:02

## 2023-02-12 RX ADMIN — EZETIMIBE 10 MG: 10 TABLET ORAL at 08:02

## 2023-02-12 RX ADMIN — DAPAGLIFLOZIN 10 MG: 10 TABLET, FILM COATED ORAL at 08:02

## 2023-02-12 RX ADMIN — HYDROCODONE BITARTRATE AND ACETAMINOPHEN 1 TABLET: 10; 325 TABLET ORAL at 02:02

## 2023-02-12 RX ADMIN — GLIPIZIDE 5 MG: 2.5 TABLET, EXTENDED RELEASE ORAL at 08:02

## 2023-02-12 RX ADMIN — METFORMIN HYDROCHLORIDE 1000 MG: 500 TABLET ORAL at 08:02

## 2023-02-12 RX ADMIN — HYDROCODONE BITARTRATE AND ACETAMINOPHEN 1 TABLET: 10; 325 TABLET ORAL at 10:02

## 2023-02-12 RX ADMIN — ALBUTEROL SULFATE 2 PUFF: 90 AEROSOL, METERED RESPIRATORY (INHALATION) at 08:02

## 2023-02-12 RX ADMIN — PANTOPRAZOLE SODIUM 40 MG: 40 TABLET, DELAYED RELEASE ORAL at 08:02

## 2023-02-12 RX ADMIN — LOSARTAN POTASSIUM 50 MG: 50 TABLET, FILM COATED ORAL at 08:02

## 2023-02-12 RX ADMIN — INSULIN ASPART 2 UNITS: 100 INJECTION, SOLUTION INTRAVENOUS; SUBCUTANEOUS at 11:02

## 2023-02-12 RX ADMIN — METOPROLOL SUCCINATE 25 MG: 25 TABLET, EXTENDED RELEASE ORAL at 08:02

## 2023-02-12 NOTE — NURSING
Nurses Note -- 4 Eyes      2/12/2023   9:53 AM      Skin assessed during: Q Shift Change      [x] No Pressure Injuries Present    []Prevention Measures Documented      [] Yes- Altered Skin Integrity Present or Discovered   [] LDA Added if Not in Epic (Describe Wound)   [] New Altered Skin Integrity was Present on Admit and Documented in LDA   [] Wound Image Taken    Wound Care Consulted? No    Attending Nurse:  Debora Constantino RN     Second RN/Staff Member:  REMINGTON Godoy LPN    Ace wrap dressing noted to R knee; moderate amount of dry bloody drainage.

## 2023-02-13 LAB
GLUCOSE SERPL-MCNC: 127 MG/DL (ref 70–105)
GLUCOSE SERPL-MCNC: 160 MG/DL (ref 70–105)
GLUCOSE SERPL-MCNC: 162 MG/DL (ref 70–105)

## 2023-02-13 PROCEDURE — 94761 N-INVAS EAR/PLS OXIMETRY MLT: CPT

## 2023-02-13 PROCEDURE — 97110 THERAPEUTIC EXERCISES: CPT | Mod: CQ

## 2023-02-13 PROCEDURE — 11000004 HC SNF PRIVATE

## 2023-02-13 PROCEDURE — 97110 THERAPEUTIC EXERCISES: CPT

## 2023-02-13 PROCEDURE — 25000003 PHARM REV CODE 250: Performed by: EMERGENCY MEDICINE

## 2023-02-13 PROCEDURE — 97530 THERAPEUTIC ACTIVITIES: CPT | Mod: CQ

## 2023-02-13 PROCEDURE — 63600175 PHARM REV CODE 636 W HCPCS: Performed by: NURSE PRACTITIONER

## 2023-02-13 PROCEDURE — 99900035 HC TECH TIME PER 15 MIN (STAT)

## 2023-02-13 PROCEDURE — 82962 GLUCOSE BLOOD TEST: CPT

## 2023-02-13 PROCEDURE — 25000003 PHARM REV CODE 250: Performed by: NURSE PRACTITIONER

## 2023-02-13 PROCEDURE — 97535 SELF CARE MNGMENT TRAINING: CPT

## 2023-02-13 PROCEDURE — 97116 GAIT TRAINING THERAPY: CPT | Mod: CQ

## 2023-02-13 PROCEDURE — 94640 AIRWAY INHALATION TREATMENT: CPT

## 2023-02-13 RX ADMIN — INSULIN ASPART 2 UNITS: 100 INJECTION, SOLUTION INTRAVENOUS; SUBCUTANEOUS at 05:02

## 2023-02-13 RX ADMIN — APIXABAN 2.5 MG: 2.5 TABLET, FILM COATED ORAL at 08:02

## 2023-02-13 RX ADMIN — ALBUTEROL SULFATE 2 PUFF: 90 AEROSOL, METERED RESPIRATORY (INHALATION) at 08:02

## 2023-02-13 RX ADMIN — METOPROLOL SUCCINATE 25 MG: 25 TABLET, EXTENDED RELEASE ORAL at 08:02

## 2023-02-13 RX ADMIN — ALBUTEROL SULFATE 2 PUFF: 90 AEROSOL, METERED RESPIRATORY (INHALATION) at 07:02

## 2023-02-13 RX ADMIN — HYDROCODONE BITARTRATE AND ACETAMINOPHEN 1 TABLET: 10; 325 TABLET ORAL at 08:02

## 2023-02-13 RX ADMIN — SENNOSIDES AND DOCUSATE SODIUM 1 TABLET: 8.6; 5 TABLET ORAL at 08:02

## 2023-02-13 RX ADMIN — VALACYCLOVIR HYDROCHLORIDE 500 MG: 500 TABLET, FILM COATED ORAL at 08:02

## 2023-02-13 RX ADMIN — HYDROCODONE BITARTRATE AND ACETAMINOPHEN 1 TABLET: 10; 325 TABLET ORAL at 01:02

## 2023-02-13 RX ADMIN — FLUTICASONE PROPIONATE 50 MCG: 50 SPRAY, METERED NASAL at 08:02

## 2023-02-13 RX ADMIN — DAPAGLIFLOZIN 10 MG: 10 TABLET, FILM COATED ORAL at 08:02

## 2023-02-13 RX ADMIN — ERGOCALCIFEROL 50000 UNITS: 1.25 CAPSULE ORAL at 08:02

## 2023-02-13 RX ADMIN — EZETIMIBE 10 MG: 10 TABLET ORAL at 08:02

## 2023-02-13 RX ADMIN — HYDROCODONE BITARTRATE AND ACETAMINOPHEN 1 TABLET: 10; 325 TABLET ORAL at 09:02

## 2023-02-13 RX ADMIN — PREGABALIN 75 MG: 75 CAPSULE ORAL at 08:02

## 2023-02-13 RX ADMIN — HYDROCODONE BITARTRATE AND ACETAMINOPHEN 1 TABLET: 10; 325 TABLET ORAL at 05:02

## 2023-02-13 RX ADMIN — INSULIN ASPART 2 UNITS: 100 INJECTION, SOLUTION INTRAVENOUS; SUBCUTANEOUS at 06:02

## 2023-02-13 RX ADMIN — GLIPIZIDE 5 MG: 2.5 TABLET, EXTENDED RELEASE ORAL at 08:02

## 2023-02-13 RX ADMIN — PREGABALIN 150 MG: 75 CAPSULE ORAL at 08:02

## 2023-02-13 RX ADMIN — CLOPIDOGREL BISULFATE 75 MG: 75 TABLET ORAL at 08:02

## 2023-02-13 RX ADMIN — METFORMIN HYDROCHLORIDE 1000 MG: 500 TABLET ORAL at 08:02

## 2023-02-13 RX ADMIN — PANTOPRAZOLE SODIUM 40 MG: 40 TABLET, DELAYED RELEASE ORAL at 08:02

## 2023-02-13 NOTE — PT/OT/SLP PROGRESS
Physical Therapy Treatment    Patient Name:  Olga Stephenson   MRN:  [id number]    Recommendations:     Discharge Recommendations: home with home health  Discharge Equipment Recommendations: cane, straight  Barriers to discharge: Inaccessible home and Decreased caregiver support    Assessment:     Olga Stephenson is a 67 y.o. female admitted with a medical diagnosis of <principal problem not specified>.  She presents with the following impairments/functional limitations: weakness, impaired endurance, impaired self care skills, impaired functional mobility, gait instability, decreased lower extremity function, decreased ROM. Pt. Displayed good effort and participation with PT tx.     Rehab Prognosis: Good; patient would benefit from acute skilled PT services to address these deficits and reach maximum level of function.    Recent Surgery: * No surgery found *      Plan:     During this hospitalization, patient to be seen BID to address the identified rehab impairments via gait training, therapeutic activities, therapeutic exercises and progress toward the following goals:    Plan of Care Expires:  03/10/23    Subjective     Chief Complaint: knee pain  Patient/Family Comments/goals: Pt. Agrees to PT, states she has been doing some exercises over the weekend.   Pain/Comfort:  Pain Rating 1:  (Pt. states her pain is improving with meds taken recently.)      Objective:     Communicated with    Kathrin Jewell LPN and Mert Archer, PT for POC     prior to session.  Patient found up in chair with   upon PT entry to room.     General Precautions: Standard, fall  Orthopedic Precautions: RLE weight bearing as tolerated  Braces: Knee immobilizer  Respiratory Status: Room air     Functional Mobility:  Transfers:     Sit to Stand:  contact guard assistance with rolling walker  Toilet Transfer: contact guard assistance with  rolling walker  using  Step Transfer  Gait: Pt. Participated in gt. Training with RW and VC for  appropriate step length/ safe distance to AD. X 95 feet to therapy room and x 140 feet after exercises.   Balance: good with bilat HHA on RW      AM-PAC 6 CLICK MOBILITY  Turning over in bed (including adjusting bedclothes, sheets and blankets)?: 3  Sitting down on and standing up from a chair with arms (e.g., wheelchair, bedside commode, etc.): 3  Moving to and from a bed to a chair (including a wheelchair)?: 3  Need to walk in hospital room?: 3  Climbing 3-5 steps with a railing?: 1       Treatment & Education:  Pt. Participated in mobility per above and QS, AP, Heel slides, SLR and Hip ABD/ ADD with maunal assist 3 x 10 reps each and standing TKE with no resist x 10 reps.     Patient left up in chair with call button in reach and nursing notified..    GOALS:   Multidisciplinary Problems       Physical Therapy Goals          Problem: Physical Therapy    Goal Priority Disciplines Outcome Goal Variances Interventions   Physical Therapy Goal     PT, PT/OT Ongoing, Progressing     Description: Goals to be met by: 2 weeks     Patient will increase functional independence with mobility by performin. Supine to sit with Stand-by Assistance  2. Sit to supine with Stand-by Assistance  3. Rolling to Left and Right with Modified [place].  4. Sit to stand transfer with Stand-by Assistance  5. Bed to chair transfer with Stand-by Assistance using Rolling Walker  6. Gait  x 150 feet with Stand-by Assistance using Rolling Walker.     Goals to be met by: 4 weeks     Patient will increase functional independence with mobility by performin. Supine to sit with Modified [place]  2. Sit to supine with Modified [place]  3. Sit to stand transfer with Modified [place]  4. Bed to chair transfer with Modified [place] using Rolling Walker  5. Gait  x 300 feet with Modified [place] using Rolling Walker.                          Time Tracking:     PT Received On: 23  PT Start Time: 0848     PT  Stop Time: 0941  PT Total Time (min): 53 min     Billable Minutes: Gait Training 18, Therapeutic Activity 10, and Therapeutic Exercise 30    Treatment Type: Treatment  PT/PTA: PTA     PTA Visit Number: 2     02/13/2023

## 2023-02-13 NOTE — ASSESSMENT & PLAN NOTE
Patient's FSGs are controlled on current medication regimen.  Last A1c reviewed-   Lab Results   Component Value Date    HGBA1C 6.9 (H) 02/10/2023     Most recent fingerstick glucose reviewed- No results for input(s): POCTGLUCOSE in the last 24 hours.  Current correctional scale  High  Maintain anti-hyperglycemic dose as follows-   Antihyperglycemics (From admission, onward)    Start     Stop Route Frequency Ordered    02/11/23 0900  dapagliflozin tablet 10 mg         -- Oral Daily 02/10/23 1521    02/11/23 0745  glipiZIDE 24 hr tablet 5 mg         -- Oral With breakfast 02/10/23 1521    02/10/23 2100  metFORMIN tablet 1,000 mg         -- Oral Nightly 02/10/23 1521    02/10/23 1731  insulin aspart U-100 injection 1-10 Units         -- SubQ Before meals & nightly PRN 02/10/23 1631        Hold Oral hypoglycemics while patient is in the hospital if pt has hypoblycemia

## 2023-02-13 NOTE — CONSULTS
"  [facility]  Adult Nutrition  Consult Note    SUMMARY     Recommendations    Recommendation/Intervention: 1. Consistent carbohyrate diet  Goals: Meet EEN >75%, BG<200mg/dL  Nutrition Goal Status: new    Assessment and Plan    No new Assessment & Plan notes have been filed under this hospital service since the last note was generated.  Service: Nutrition       Malnutrition Assessment  Malnutrition Type:  (Based on assessment this pt is not malnourished.)                                    Reason for Assessment    Reason For Assessment: consult (swing bed pt)  Diagnosis:  (s/p R TKR)  Relevant Medical History: GI bleed, DM ,HTN, GERD, CABG, DM  Interdisciplinary Rounds: attended  General Information Comments: RDN visited pt this a.m. and took food prefs.  Meal intake ~50%.  Her last BM was .  RDN reviewed basic DM diet principles with pt. and role of good BG control and WND healing.    Nutrition Risk Screen    Nutrition Risk Screen: no indicators present    Nutrition/Diet History    Patient Reported Diet/Restrictions/Preferences: general  Spiritual, Cultural Beliefs, Mormonism Practices, Values that Affect Care: no  Food Allergies: NKFA  Factors Affecting Nutritional Intake: decreased appetite    Anthropometrics    Temp: 98.3 °F (36.8 °C)  Height Method: Stated  Height: 5' 9" (175.3 cm)  Height (inches): 69 in  Weight Method: Bed Scale  Weight: 96 kg (211 lb 10.3 oz)  Weight (lb): 211.64 lb  Ideal Body Weight (IBW), Female: 145 lb  % Ideal Body Weight, Female (lb): 145.96 %  BMI (Calculated): 31.2       Lab/Procedures/Meds    Pertinent Labs Reviewed: reviewed  Pertinent Labs Comments: Hgb A1C 6.9 2/10, B-188  Pertinent Medications Reviewed: reviewed  Pertinent Medications Comments: Vit D, Zetia, Anaryl, Glucophage, Protonix    Physical Findings/Assessment         Estimated/Assessed Needs    Weight Used For Calorie Calculations: 73.5 kg (162 lb)  Energy Calorie " Requirements (kcal): 3234-9644  Energy Need Method: Kcal/kg  Protein Requirements: 73-80  Weight Used For Protein Calculations: 73 kg (160 lb 15 oz)     Estimated Fluid Requirement Method: RDA Method  RDA Method (mL): 1837         Nutrition Prescription Ordered    Current Diet Order: Regular  Nutrition Order Comments: Needs DM diet    Evaluation of Received Nutrient/Fluid Intake    Energy Calories Required: not meeting needs  Protein Required: not meeting needs  Fluid Required: meeting needs  Comments: Intake should improve with recovery.  % Intake of Estimated Energy Needs: 50 - 75 %  % Meal Intake: 50 - 75 %    Nutrition Risk    Level of Risk/Frequency of Follow-up: moderate       Monitor and Evaluation    Food and Nutrient Intake: food and beverage intake  Anthropometric Measurements: weight  Biochemical Data, Medical Tests and Procedures: electrolyte and renal panel, glucose/endocrine profile  Nutrition-Focused Physical Findings: overall appearance, skin       Nutrition Follow-Up    RD Follow-up?: Yes

## 2023-02-13 NOTE — PT/OT/SLP PROGRESS
Physical Therapy Treatment    Patient Name:  Olga Stephenson   MRN:  [id number]    Recommendations:     Discharge Recommendations: home with home health  Discharge Equipment Recommendations: cane, straight  Barriers to discharge: None    Assessment:     Olga Stephenson is a 67 y.o. female admitted with a medical diagnosis of R LE TKA.  She presents with the following impairments/functional limitations: weakness, impaired endurance, impaired functional mobility, gait instability, impaired balance, decreased lower extremity function, decreased safety awareness, pain, decreased ROM, edema.  Pt tolerated treatment well.  Pt progressing well toward functional goals.    Rehab Prognosis: Good; patient would benefit from acute skilled PT services to address these deficits and reach maximum level of function.    Recent Surgery: * No surgery found *      Plan:     During this hospitalization, patient to be seen BID to address the identified rehab impairments via gait training and progress toward the following goals:    Plan of Care Expires:  03/10/23    Subjective     Chief Complaint: Pt reports no new complaints.  Pt agrees to limited treatment for gait training.  Patient/Family Comments/goals: To get stronger and improve mobility to return home to [facility].  Pain/Comfort:  Pain Rating 1: 10/10  Location - Side 1: Right  Location 1: knee  Pain Rating Post-Intervention 1: 10/10      Objective:     Communicated with pt prior to session.  Patient found up in chair with   upon PT entry to room.     General Precautions: Standard, fall  Orthopedic Precautions: RLE weight bearing as tolerated  Braces: N/A  Respiratory Status: Room air     Functional Mobility:  Bed Mobility:     Scooting: stand by assistance  Sit to Supine: minimum assistance  Transfers:     Sit to Stand:  contact guard assistance with rolling walker  Gait: Pt received gait training for 120 ft with FWW WBAT R LE with cueing to improve weightshift to R during stance  phase, heel strike and toe-off during R LE advancement.  Balance: Static standing balance is good-      AM-PAC 6 CLICK MOBILITY  Turning over in bed (including adjusting bedclothes, sheets and blankets)?: 4  Sitting down on and standing up from a chair with arms (e.g., wheelchair, bedside commode, etc.): 3  Moving from lying on back to sitting on the side of the bed?: 3  Moving to and from a bed to a chair (including a wheelchair)?: 3  Need to walk in hospital room?: 3  Climbing 3-5 steps with a railing?: 1  Basic Mobility Total Score: 17       Treatment & Education:  Gait: Pt received gait training for 120 ft with FWW WBAT R LE with cueing to improve weightshift to R during stance phase, heel strike and toe-off during R LE advancement.  Pt required Min A for sit to sup to lift R LE onto bed.  Pt positioned in supine with pillow under heel for static knee ext stretch and cryocuff applied to R knee.      Patient left supine with all lines intact and call button in reach..    GOALS:   Multidisciplinary Problems       Physical Therapy Goals          Problem: Physical Therapy    Goal Priority Disciplines Outcome Goal Variances Interventions   Physical Therapy Goal     PT, PT/OT Ongoing, Progressing     Description: Goals to be met by: 2 weeks     Patient will increase functional independence with mobility by performin. Supine to sit with Stand-by Assistance  2. Sit to supine with Stand-by Assistance  3. Rolling to Left and Right with Modified [place].  4. Sit to stand transfer with Stand-by Assistance  5. Bed to chair transfer with Stand-by Assistance using Rolling Walker  6. Gait  x 150 feet with Stand-by Assistance using Rolling Walker.     Goals to be met by: 4 weeks     Patient will increase functional independence with mobility by performin. Supine to sit with Modified [place]  2. Sit to supine with Modified [place]  3. Sit to stand transfer with Modified [place]  4. Bed to chair  transfer with Modified [place] using Rolling Walker  5. Gait  x 300 feet with Modified [place] using Rolling Walker.                          Time Tracking:     PT Received On: 02/13/23  PT Start Time: 1400     PT Stop Time: 1415  PT Total Time (min): 15 min     Billable Minutes: Gait Training 15    Treatment Type: Treatment  PT/PTA: PT     PTA Visit Number: 0     02/13/2023

## 2023-02-13 NOTE — H&P
[facility] - Medical Surgical Unit  Hospital Medicine  History & Physical    Patient Name: Olga Stephenson  MRN: [id number]  Patient Class: IP- Swing  Admission Date: 2/10/2023  Attending Physician: Sandy Bucio MD  Primary Care Provider: Lv Cartagena MD         Patient information was obtained from patient and past medical records.     Subjective:     Principal Problem:<principal problem not specified>    Chief Complaint:   Chief Complaint   Patient presents with    Muscle Weakness        HPI: PT IS A 67 YR OLD BF ADMITTED TO [facility] SWING BED FOR PT/OT/REHABILITATION FOR MUSCLE WEAKNESS AND MEDICAL MANAGEMENT. PT WAS TRANSFERRED FROM [facility] WHERE SHE WAS ADMITTED FOR R TKR PER DR MCLAUGHLIN ON 02/09/2023. PT HAD AN UNCOMPLICATED POSTOPERATIVE COURSE AND  PT IMPROVED; BUT HAS PERSISTENT MUSCLE WEAKNESS AND DEBILITY, AND WILL REQUIRE SWING BED FOR REHABILITATION. PT HAS HX CAD (MI, CABG 2011), DVT, DM2, HLD AND HTN.   PT WILL BE EVALUATED PER PT/OT AND A TREATMENT PLAN WILL BE INITIATED. THE PHARMACIST WILL REVIEW MEDICATIONS AND MAKE RECOMMENDATIONS. PT WILL SEE THE DIETICIAN  FOR NUTRITIONAL SUPPORT WITH WEIGHT  95.7 KG AND ALBUMIN OF 3.4. PT'S ABNORMAL ADMITTING LABS ARE: CMP:152, ALBUMIN 3.4, CBC:WBC 15.36 K, UA:KETONES 40. MAG IS NORMAL 1.7, TSH 0.196 AND VITAMIN D IS 16.2. PT WILL HAVE WEEKLY LABS.  PT IS RETIRED AND LIVES IN [place], [place].     CXR: NO ACUTE CHANGE    PT CODE STATUS IS FULL CODE  PT COVID STATUS IS NEG  PT HAS HAD COVID VACCINE  PT VTE PPX IS ELIQUIS/PLAVIX/TEDS/EARLY AMBULATION        Past Medical History:   Diagnosis Date    Acute superficial gastritis without hemorrhage 06/08/2022    Adenomatous polyp of cecum 06/15/2022    Adenomatous polyp of transverse colon 06/15/2022    Anticoagulant long-term use     Arthritis     Asthma     Coronary artery disease     Diabetes mellitus     Diabetes mellitus, type 2     Diverticula, colon 06/15/2022     Epigastric pain 2022    Heart attack     History of colon polyps 06/15/2022    Hyperlipidemia     Hypertension     Polyp of hepatic flexure of colon 06/15/2022    Screening for colon cancer 06/15/2022    Thyroid disease        Past Surgical History:   Procedure Laterality Date    ARTHROPLASTY, KNEE, TOTAL, USING COMPUTER-ASSISTED NAVIGATION Right 2023    Procedure: ARTHROPLASTY, KNEE, TOTAL, USING COMPUTER-ASSISTED NAVIGATION;  Surgeon: Erick Tolliver MD;  Location: [facility];  Service: Orthopedics;  Laterality: Right;    BILATERAL OOPHORECTOMY Bilateral     35 years ago,  1-2 years after hyst    CARDIAC SURGERY       SECTION      CHOLECYSTECTOMY      COLONOSCOPY  2017    repeat in 3 years    ESOPHAGOGASTRODUODENOSCOPY  03/10/2021    HYSTERECTOMY         Review of patient's allergies indicates:   Allergen Reactions    Jardiance [empagliflozin] Anaphylaxis     Headaches     Trulicity [dulaglutide]      Abdominal pain       No current facility-administered medications on file prior to encounter.     Current Outpatient Medications on File Prior to Encounter   Medication Sig    albuterol (PROVENTIL/VENTOLIN HFA) 90 mcg/actuation inhaler Inhale 2 puffs into the lungs 2 (two) times a day.    apixaban (ELIQUIS) 2.5 mg Tab Take 1 tablet (2.5 mg total) by mouth 2 (two) times daily. for 12 days    clopidogreL (PLAVIX) 75 mg tablet Take 1 tablet (75 mg total) by mouth once daily.    cyclobenzaprine (FLEXERIL) 10 MG tablet 1 tablet nightly as needed.    dapagliflozin (FARXIGA) 10 mg tablet Take 1 tablet (10 mg total) by mouth once daily.    estradioL (VIVELLE-DOT) 0.1 mg/24 hr PTSW Place 1 patch onto the skin twice a week.    ezetimibe (ZETIA) 10 mg tablet Take 1 tablet by mouth every evening.    fluticasone propionate (FLONASE) 50 mcg/actuation nasal spray 1 spray (50 mcg total) by Each Nostril route once daily.    glipiZIDE 5 MG TR24 Take 1 tablet (5 mg total) by  mouth daily with breakfast.    HYDROcodone-acetaminophen (NORCO)  mg per tablet Take 1 tablet by mouth every 4 (four) hours as needed for Pain.    losartan (COZAAR) 50 MG tablet Take 1 tablet (50 mg total) by mouth once daily.    metFORMIN (GLUCOPHAGE) 1000 MG tablet Take 1,000 mg by mouth nightly.    metoprolol succinate (TOPROL-XL) 25 MG 24 hr tablet Take 1 tablet by mouth once daily.    multivitamin capsule Take 1 capsule by mouth once daily.    pantoprazole (PROTONIX) 40 MG tablet Take 1 tablet (40 mg total) by mouth once daily.    pregabalin (LYRICA) 75 MG capsule Take 75 mg by mouth once daily.    pregabalin (LYRICA) 75 MG capsule Take 150 mg by mouth nightly.    tiZANidine (ZANAFLEX) 4 MG tablet Take 1 tablet (4 mg total) by mouth every 6 (six) hours as needed (Muscle pain/spasm).    traMADoL (ULTRAM) 50 mg tablet Take 50 mg by mouth daily as needed.    valACYclovir (VALTREX) 500 MG tablet Take 1 tablet (500 mg total) by mouth once daily.    chlorpheniramine-phenylephrine 4-10 mg per tablet Take 1 tablet by mouth every 4 (four) hours as needed for Congestion.    cyclobenzaprine (FLEXERIL) 10 MG tablet     dapagliflozin (FARXIGA) 10 mg tablet 1 tablet.    ezetimibe (ZETIA) 10 mg tablet 1 tablet.    famotidine (PEPCID) 20 MG tablet Take OTC-one tablet daily    glipiZIDE (GLUCOTROL) 5 MG tablet Take 5 mg by mouth once daily.    loratadine (CLARITIN) 10 mg tablet     losartan (COZAAR) 25 MG tablet     meloxicam (MOBIC) 15 MG tablet Take 1 tablet (15 mg total) by mouth once daily.    metoprolol succinate (TOPROL-XL) 25 MG 24 hr tablet 1 tablet.    omeprazole (PRILOSEC) 20 MG capsule Take 1 tablet by mouth b.i.d. x7 days.  Then 1 tablet by mouth 30 minutes before the 1st meal of the day daily.    OXYGEN-AIR DELIVERY SYSTEMS MISC 2 L by Misc.(Non-Drug; Combo Route) route nightly.    [DISCONTINUED] hydrocortisone 2.5 % cream      Family History       Problem Relation (Age of Onset)     Dementia Mother    Diabetes Mother, Brother    Heart disease Mother    No Known Problems Father    Thyroid disease Mother          Tobacco Use    Smoking status: Former     Passive exposure: Past    Smokeless tobacco: Never   Substance and Sexual Activity    Alcohol use: Not Currently    Drug use: Never    Sexual activity: Not Currently     Partners: Male     Birth control/protection: None     Review of Systems   Constitutional:  Positive for activity change and fatigue. Negative for appetite change.   HENT: Negative.     Eyes: Negative.    Respiratory: Negative.     Cardiovascular:  Positive for leg swelling (R KNEE).   Gastrointestinal: Negative.    Endocrine: Negative.    Genitourinary: Negative.    Musculoskeletal:  Positive for arthralgias (R KNEE).   Skin:  Positive for wound (R KNEE POST OP).   Allergic/Immunologic: Positive for immunocompromised state (DM2).   Neurological:  Positive for weakness.   Hematological:  Bruises/bleeds easily.   Psychiatric/Behavioral: Negative.     Objective:     Vital Signs (Most Recent):  Temp: 98.3 °F (36.8 °C) (02/12/23 1951)  Pulse: 109 (02/12/23 2016)  Resp: 18 (02/12/23 2016)  BP: (!) 115/56 (02/12/23 1951)  SpO2: (!) 94 % (02/12/23 2016)   Vital Signs (24h Range):  Temp:  [98.3 °F (36.8 °C)] 98.3 °F (36.8 °C)  Pulse:  [104-109] 109  Resp:  [16-20] 18  SpO2:  [94 %-98 %] 94 %  BP: (115-137)/(56-73) 115/56     Weight: 95.7 kg (211 lb)  Body mass index is 31.16 kg/m².    Physical Exam  Constitutional:       Appearance: She is obese. She is ill-appearing.   HENT:      Head: Normocephalic and atraumatic.      Right Ear: Tympanic membrane normal.      Left Ear: Tympanic membrane normal.      Nose: Nose normal.      Mouth/Throat:      Mouth: Mucous membranes are moist.   Eyes:      Extraocular Movements: Extraocular movements intact.      Pupils: Pupils are equal, round, and reactive to light.   Cardiovascular:      Rate and Rhythm: Normal rate and regular rhythm.       Pulses: Normal pulses.      Heart sounds: Normal heart sounds. No murmur heard.    No friction rub. No gallop.   Pulmonary:      Effort: Pulmonary effort is normal. No respiratory distress.      Breath sounds: No wheezing, rhonchi or rales.   Abdominal:      General: Abdomen is flat. Bowel sounds are normal. There is no distension.      Palpations: Abdomen is soft.      Tenderness: There is no abdominal tenderness.   Musculoskeletal:         General: Swelling (R KNEE) and tenderness (R KNEE) present.      Cervical back: Normal range of motion and neck supple.      Right lower leg: Edema (R KNEE) present.   Skin:     General: Skin is warm and dry.      Capillary Refill: Capillary refill takes less than 2 seconds.      Findings: Lesion (POST OP WOUND R KNEE HEALING WELL) present.   Neurological:      Mental Status: She is alert and oriented to person, place, and time.      Sensory: No sensory deficit.      Motor: Weakness (GENERALIZED) present.      Coordination: Coordination abnormal.      Gait: Gait normal.   Psychiatric:         Mood and Affect: Mood normal.         Behavior: Behavior normal.         Thought Content: Thought content normal.         Judgment: Judgment normal.         CRANIAL NERVES     CN III, IV, VI   Pupils are equal, round, and reactive to light.     Significant Labs: All pertinent labs within the past 24 hours have been reviewed. SEE HPI  Recent Lab Results  (Last 5 results in the past 24 hours)        02/12/23  2043   02/12/23  1651   02/12/23  1117   02/12/23  0600   02/11/23  2247        POC Glucose 153   126   168   141   149                              Significant Imaging: I have reviewed all pertinent imaging results/findings within the past 24 hours.  CXR: I have reviewed all pertinent results/findings within the past 24 hours and my personal findings are:    NO ACUTE CHANGE    Assessment/Plan:     Vitamin D deficiency  PT HAS VITAMIN D DEFICIENCY  VIT D 16.2  SUPPLEMENTATION        Status post total right knee replacement  PT IS S/P R TKR ON 02/09/2023 PER DR ARNOLDO SEARS   PT/OT  VTE PPX  WOUND CARE      Muscle weakness  PT/OT WILL EVALUATE AND INITIATE A TREATMENT PLAN  WBAT      DVT prophylaxis  PT IS AT RISK FOR VTE  VTE PPX  ELIQUIS/PLAVIX TEDS/EARLY AMBULATION  WILL HOLD ESTRADIOL FOR NOW DUE TO INCREASED RISK OF VTE      Type 2 diabetes mellitus without complication, without long-term current use of insulin  Patient's FSGs are controlled on current medication regimen.  Last A1c reviewed-   Lab Results   Component Value Date    HGBA1C 6.9 (H) 02/10/2023     Most recent fingerstick glucose reviewed- No results for input(s): POCTGLUCOSE in the last 24 hours.  Current correctional scale  High  Maintain anti-hyperglycemic dose as follows-   Antihyperglycemics (From admission, onward)    Start     Stop Route Frequency Ordered    02/11/23 0900  dapagliflozin tablet 10 mg         -- Oral Daily 02/10/23 1521    02/11/23 0745  glipiZIDE 24 hr tablet 5 mg         -- Oral With breakfast 02/10/23 1521    02/10/23 2100  metFORMIN tablet 1,000 mg         -- Oral Nightly 02/10/23 1521    02/10/23 1731  insulin aspart U-100 injection 1-10 Units         -- SubQ Before meals & nightly PRN 02/10/23 1631        Hold Oral hypoglycemics while patient is in the hospital if pt has hypoblycemia      VTE Risk Mitigation (From admission, onward)         Ordered     apixaban tablet 2.5 mg  2 times daily         02/10/23 1521     IP VTE HIGH RISK PATIENT  Once         02/10/23 1506     Place LIZZETH hose  Until discontinued         02/10/23 1506     Place sequential compression device  Until discontinued         02/10/23 1506                   Sandy Bucio MD  Department of Hospital Medicine   [facility] - Medical Surgical Unit

## 2023-02-13 NOTE — PLAN OF CARE
Pt. Has started working with PT/OT services and is making good overall efforts toward goals. Voiced no complaints. Will cont. To follow pt.

## 2023-02-13 NOTE — NURSING
@ 07:04am ambulating from bathroom with assitance from grand daughter using standard walker. Alert and oriented. no distress noted. c/o pain to RLE informed her i will tx during 8:00am med pass. safety meaures in place. communicaiton board updated. Off going nurse PEDRITO Grier present performing bedside shift report. ANNETTE Dunn will resource me with this pt.

## 2023-02-13 NOTE — SUBJECTIVE & OBJECTIVE
Past Medical History:   Diagnosis Date    Acute superficial gastritis without hemorrhage 2022    Adenomatous polyp of cecum 06/15/2022    Adenomatous polyp of transverse colon 06/15/2022    Anticoagulant long-term use     Arthritis     Asthma     Coronary artery disease     Diabetes mellitus     Diabetes mellitus, type 2     Diverticula, colon 06/15/2022    Epigastric pain 2022    Heart attack     History of colon polyps 06/15/2022    Hyperlipidemia     Hypertension     Polyp of hepatic flexure of colon 06/15/2022    Screening for colon cancer 06/15/2022    Thyroid disease        Past Surgical History:   Procedure Laterality Date    ARTHROPLASTY, KNEE, TOTAL, USING COMPUTER-ASSISTED NAVIGATION Right 2023    Procedure: ARTHROPLASTY, KNEE, TOTAL, USING COMPUTER-ASSISTED NAVIGATION;  Surgeon: Erick Tolliver MD;  Location: [facility];  Service: Orthopedics;  Laterality: Right;    BILATERAL OOPHORECTOMY Bilateral     35 years ago,  1-2 years after hyst    CARDIAC SURGERY       SECTION      CHOLECYSTECTOMY      COLONOSCOPY  2017    repeat in 3 years    ESOPHAGOGASTRODUODENOSCOPY  03/10/2021    HYSTERECTOMY         Review of patient's allergies indicates:   Allergen Reactions    Jardiance [empagliflozin] Anaphylaxis     Headaches     Trulicity [dulaglutide]      Abdominal pain       No current facility-administered medications on file prior to encounter.     Current Outpatient Medications on File Prior to Encounter   Medication Sig    albuterol (PROVENTIL/VENTOLIN HFA) 90 mcg/actuation inhaler Inhale 2 puffs into the lungs 2 (two) times a day.    apixaban (ELIQUIS) 2.5 mg Tab Take 1 tablet (2.5 mg total) by mouth 2 (two) times daily. for 12 days    clopidogreL (PLAVIX) 75 mg tablet Take 1 tablet (75 mg total) by mouth once daily.    cyclobenzaprine (FLEXERIL) 10 MG tablet 1 tablet nightly as needed.    dapagliflozin (FARXIGA) 10 mg tablet Take 1 tablet (10 mg total) by mouth once  daily.    estradioL (VIVELLE-DOT) 0.1 mg/24 hr PTSW Place 1 patch onto the skin twice a week.    ezetimibe (ZETIA) 10 mg tablet Take 1 tablet by mouth every evening.    fluticasone propionate (FLONASE) 50 mcg/actuation nasal spray 1 spray (50 mcg total) by Each Nostril route once daily.    glipiZIDE 5 MG TR24 Take 1 tablet (5 mg total) by mouth daily with breakfast.    HYDROcodone-acetaminophen (NORCO)  mg per tablet Take 1 tablet by mouth every 4 (four) hours as needed for Pain.    losartan (COZAAR) 50 MG tablet Take 1 tablet (50 mg total) by mouth once daily.    metFORMIN (GLUCOPHAGE) 1000 MG tablet Take 1,000 mg by mouth nightly.    metoprolol succinate (TOPROL-XL) 25 MG 24 hr tablet Take 1 tablet by mouth once daily.    multivitamin capsule Take 1 capsule by mouth once daily.    pantoprazole (PROTONIX) 40 MG tablet Take 1 tablet (40 mg total) by mouth once daily.    pregabalin (LYRICA) 75 MG capsule Take 75 mg by mouth once daily.    pregabalin (LYRICA) 75 MG capsule Take 150 mg by mouth nightly.    tiZANidine (ZANAFLEX) 4 MG tablet Take 1 tablet (4 mg total) by mouth every 6 (six) hours as needed (Muscle pain/spasm).    traMADoL (ULTRAM) 50 mg tablet Take 50 mg by mouth daily as needed.    valACYclovir (VALTREX) 500 MG tablet Take 1 tablet (500 mg total) by mouth once daily.    chlorpheniramine-phenylephrine 4-10 mg per tablet Take 1 tablet by mouth every 4 (four) hours as needed for Congestion.    cyclobenzaprine (FLEXERIL) 10 MG tablet     dapagliflozin (FARXIGA) 10 mg tablet 1 tablet.    ezetimibe (ZETIA) 10 mg tablet 1 tablet.    famotidine (PEPCID) 20 MG tablet Take OTC-one tablet daily    glipiZIDE (GLUCOTROL) 5 MG tablet Take 5 mg by mouth once daily.    loratadine (CLARITIN) 10 mg tablet     losartan (COZAAR) 25 MG tablet     meloxicam (MOBIC) 15 MG tablet Take 1 tablet (15 mg total) by mouth once daily.    metoprolol succinate (TOPROL-XL) 25 MG 24 hr tablet 1 tablet.    omeprazole (PRILOSEC) 20  MG capsule Take 1 tablet by mouth b.i.d. x7 days.  Then 1 tablet by mouth 30 minutes before the 1st meal of the day daily.    OXYGEN-AIR DELIVERY SYSTEMS MISC 2 L by Misc.(Non-Drug; Combo Route) route nightly.    [DISCONTINUED] hydrocortisone 2.5 % cream      Family History       Problem Relation (Age of Onset)    Dementia Mother    Diabetes Mother, Brother    Heart disease Mother    No Known Problems Father    Thyroid disease Mother          Tobacco Use    Smoking status: Former     Passive exposure: Past    Smokeless tobacco: Never   Substance and Sexual Activity    Alcohol use: Not Currently    Drug use: Never    Sexual activity: Not Currently     Partners: Male     Birth control/protection: None     Review of Systems   Constitutional:  Positive for activity change and fatigue. Negative for appetite change.   HENT: Negative.     Eyes: Negative.    Respiratory: Negative.     Cardiovascular:  Positive for leg swelling (R KNEE).   Gastrointestinal: Negative.    Endocrine: Negative.    Genitourinary: Negative.    Musculoskeletal:  Positive for arthralgias (R KNEE).   Skin:  Positive for wound (R KNEE POST OP).   Allergic/Immunologic: Positive for immunocompromised state (DM2).   Neurological:  Positive for weakness.   Hematological:  Bruises/bleeds easily.   Psychiatric/Behavioral: Negative.     Objective:     Vital Signs (Most Recent):  Temp: 98.3 °F (36.8 °C) (02/12/23 1951)  Pulse: 109 (02/12/23 2016)  Resp: 18 (02/12/23 2016)  BP: (!) 115/56 (02/12/23 1951)  SpO2: (!) 94 % (02/12/23 2016)   Vital Signs (24h Range):  Temp:  [98.3 °F (36.8 °C)] 98.3 °F (36.8 °C)  Pulse:  [104-109] 109  Resp:  [16-20] 18  SpO2:  [94 %-98 %] 94 %  BP: (115-137)/(56-73) 115/56     Weight: 95.7 kg (211 lb)  Body mass index is 31.16 kg/m².    Physical Exam  Constitutional:       Appearance: She is obese. She is ill-appearing.   HENT:      Head: Normocephalic and atraumatic.      Right Ear: Tympanic membrane normal.      Left Ear:  Tympanic membrane normal.      Nose: Nose normal.      Mouth/Throat:      Mouth: Mucous membranes are moist.   Eyes:      Extraocular Movements: Extraocular movements intact.      Pupils: Pupils are equal, round, and reactive to light.   Cardiovascular:      Rate and Rhythm: Normal rate and regular rhythm.      Pulses: Normal pulses.      Heart sounds: Normal heart sounds. No murmur heard.    No friction rub. No gallop.   Pulmonary:      Effort: Pulmonary effort is normal. No respiratory distress.      Breath sounds: No wheezing, rhonchi or rales.   Abdominal:      General: Abdomen is flat. Bowel sounds are normal. There is no distension.      Palpations: Abdomen is soft.      Tenderness: There is no abdominal tenderness.   Musculoskeletal:         General: Swelling (R KNEE) and tenderness (R KNEE) present.      Cervical back: Normal range of motion and neck supple.      Right lower leg: Edema (R KNEE) present.   Skin:     General: Skin is warm and dry.      Capillary Refill: Capillary refill takes less than 2 seconds.      Findings: Lesion (POST OP WOUND R KNEE HEALING WELL) present.   Neurological:      Mental Status: She is alert and oriented to person, place, and time.      Sensory: No sensory deficit.      Motor: Weakness (GENERALIZED) present.      Coordination: Coordination abnormal.      Gait: Gait normal.   Psychiatric:         Mood and Affect: Mood normal.         Behavior: Behavior normal.         Thought Content: Thought content normal.         Judgment: Judgment normal.         CRANIAL NERVES     CN III, IV, VI   Pupils are equal, round, and reactive to light.     Significant Labs: All pertinent labs within the past 24 hours have been reviewed. SEE HPI  Recent Lab Results  (Last 5 results in the past 24 hours)        02/12/23  2043   02/12/23  1651   02/12/23  1117   02/12/23  0600   02/11/23  2247        POC Glucose 153   126   168   141   149                              Significant Imaging: I have  reviewed all pertinent imaging results/findings within the past 24 hours.  CXR: I have reviewed all pertinent results/findings within the past 24 hours and my personal findings are:    NO ACUTE CHANGE

## 2023-02-13 NOTE — NURSING
@ 17:16PM resting in bed talking with daughter. daughter present @ bedside. no distress noted. c/o pain; prn pain pill given. respiraitons even and non labored. safety measures in place. fresh ice and water given. no other needs voiced/visualized. will report off to oncoming shift.

## 2023-02-13 NOTE — ASSESSMENT & PLAN NOTE
PT IS AT RISK FOR VTE  VTE PPX  ELIQUIS/PLAVIX TEDS/EARLY AMBULATION  WILL HOLD ESTRADIOL FOR NOW DUE TO INCREASED RISK OF VTE

## 2023-02-13 NOTE — PT/OT/SLP PROGRESS
Occupational Therapy   Treatment    Name: Olga Stephenson  MRN: [id number]  Admitting Diagnosis:  R total knee replacement on 2/9/23       Recommendations:     Discharge Recommendations: outpatient PT  Discharge Equipment Recommendations:  walker, rolling  Barriers to discharge:       Assessment:     Olga Stephenson is a 67 y.o. female with a medical diagnosis of R total knee replacement on 2/9/23.  She presents with decreased balance, safety, strength. Performance deficits affecting function are weakness, impaired endurance, impaired self care skills, impaired functional mobility, impaired balance, decreased safety awareness, decreased ROM. Pt participated well with skilled OT but reported pain within her RLE. Pt required cues to complete functional ambulation with proper gait mechanics. Pt provided with education on the importance of ROM  and strength for functional performance.     Rehab Prognosis:  Good; patient would benefit from acute skilled OT services to address these deficits and reach maximum level of function.       Plan:     Patient to be seen 5 x/week to address the above listed problems via self-care/home management, therapeutic activities, therapeutic exercises  Plan of Care Expires:    Plan of Care Reviewed with: patient    Subjective     Pain/Comfort:  Pain Rating 1: 0/10  Location - Side 1: Right  Location - Orientation 1: lower  Location 1: knee    Objective:     Communicated with: Pt prior to session.  Patient found up in chair   upon OT entry to room.    General Precautions: Standard, fall    Orthopedic Precautions:   Braces:    Respiratory Status: Room air     Occupational Performance:     Bed Mobility:    Pt presented sitting upright in recliner upon Ot's arrival.      Functional Mobility/Transfers:  Patient completed Sit <> Stand Transfer with stand by assistance  with  rolling walker   Functional Mobility: Pt completed functional ambulation to the restroom using the RW for balance and  safety.     Activities of Daily Living:  Sit to stand t/f with Min A for balance and safety      [facility] 6 Click ADL: 21    Treatment & Education:  Therapeutic exercise:  While sitting with knee elevated on the mat the pt completed bicep curls 2 sets of 20 reps with 5# DB; chest press:2 sets of 20 reps with 5# DB. Pt educated on the importance of quad stretches and pt was provided with a few stretches by OT.     Therapeutic activity  While sitting in the w/c the pt competed 2 sets of 50 reps of unlateral R leg presses on the trampoline to increase knee flexion and extension for functional mobility     Patient left up in chair with all lines intact and call button in reach    GOALS:   Multidisciplinary Problems       Occupational Therapy Goals          Problem: Occupational Therapy    Goal Priority Disciplines Outcome Interventions   Occupational Therapy Goal     OT, PT/OT Ongoing, Progressing    Description: Goals to be met by: 3/10/23     Patient will increase functional independence with ADLs by performing:    UE Dressing with Modified [place].  LE Dressing with Modified [place].  Grooming while standing with Modified [place].  Toileting from toilet with Modified [place] for hygiene and clothing management.   Sitanding x5-10 minutes with Modified [place].  Squat pivot transfers with Modified [place].  Step transfer with Modified [place]  Toilet transfer to toilet with Modified [place].                         Time Tracking:     OT Date of Treatment:    OT Start Time:  1:30  OT Stop Time:  2:00  OT Total Time (min):      Billable Minutes:Self Care/Home Management 15  Therapeutic Exercise 15    OT/ROBERT: OT          2/13/2023

## 2023-02-14 LAB
GLUCOSE SERPL-MCNC: 139 MG/DL (ref 70–105)
GLUCOSE SERPL-MCNC: 152 MG/DL (ref 70–105)

## 2023-02-14 PROCEDURE — 97110 THERAPEUTIC EXERCISES: CPT

## 2023-02-14 PROCEDURE — 27000944

## 2023-02-14 PROCEDURE — 82962 GLUCOSE BLOOD TEST: CPT

## 2023-02-14 PROCEDURE — 97110 THERAPEUTIC EXERCISES: CPT | Mod: CQ

## 2023-02-14 PROCEDURE — 25000003 PHARM REV CODE 250: Performed by: FAMILY MEDICINE

## 2023-02-14 PROCEDURE — 99900035 HC TECH TIME PER 15 MIN (STAT)

## 2023-02-14 PROCEDURE — 97116 GAIT TRAINING THERAPY: CPT | Mod: CQ

## 2023-02-14 PROCEDURE — 27000981 HC MATTRESS, ACCUCAIR DAILY RENTAL

## 2023-02-14 PROCEDURE — 25000003 PHARM REV CODE 250: Performed by: NURSE PRACTITIONER

## 2023-02-14 PROCEDURE — 97530 THERAPEUTIC ACTIVITIES: CPT

## 2023-02-14 PROCEDURE — 94761 N-INVAS EAR/PLS OXIMETRY MLT: CPT

## 2023-02-14 PROCEDURE — 36416 COLLJ CAPILLARY BLOOD SPEC: CPT

## 2023-02-14 PROCEDURE — 11000004 HC SNF PRIVATE

## 2023-02-14 PROCEDURE — 94640 AIRWAY INHALATION TREATMENT: CPT

## 2023-02-14 RX ORDER — MELOXICAM 7.5 MG/1
7.5 TABLET ORAL DAILY
Status: DISCONTINUED | OUTPATIENT
Start: 2023-02-14 | End: 2023-02-24 | Stop reason: HOSPADM

## 2023-02-14 RX ADMIN — VALACYCLOVIR HYDROCHLORIDE 500 MG: 500 TABLET, FILM COATED ORAL at 08:02

## 2023-02-14 RX ADMIN — EZETIMIBE 10 MG: 10 TABLET ORAL at 08:02

## 2023-02-14 RX ADMIN — METFORMIN HYDROCHLORIDE 1000 MG: 500 TABLET ORAL at 08:02

## 2023-02-14 RX ADMIN — PREGABALIN 75 MG: 75 CAPSULE ORAL at 08:02

## 2023-02-14 RX ADMIN — HYDROCODONE BITARTRATE AND ACETAMINOPHEN 1 TABLET: 10; 325 TABLET ORAL at 02:02

## 2023-02-14 RX ADMIN — ALBUTEROL SULFATE 2 PUFF: 90 AEROSOL, METERED RESPIRATORY (INHALATION) at 07:02

## 2023-02-14 RX ADMIN — PANTOPRAZOLE SODIUM 40 MG: 40 TABLET, DELAYED RELEASE ORAL at 08:02

## 2023-02-14 RX ADMIN — FLUTICASONE PROPIONATE 50 MCG: 50 SPRAY, METERED NASAL at 08:02

## 2023-02-14 RX ADMIN — APIXABAN 2.5 MG: 2.5 TABLET, FILM COATED ORAL at 08:02

## 2023-02-14 RX ADMIN — DAPAGLIFLOZIN 10 MG: 10 TABLET, FILM COATED ORAL at 08:02

## 2023-02-14 RX ADMIN — HYDROCODONE BITARTRATE AND ACETAMINOPHEN 1 TABLET: 10; 325 TABLET ORAL at 08:02

## 2023-02-14 RX ADMIN — MELOXICAM 7.5 MG: 7.5 TABLET ORAL at 11:02

## 2023-02-14 RX ADMIN — SENNOSIDES AND DOCUSATE SODIUM 1 TABLET: 8.6; 5 TABLET ORAL at 08:02

## 2023-02-14 RX ADMIN — HYDROCODONE BITARTRATE AND ACETAMINOPHEN 1 TABLET: 10; 325 TABLET ORAL at 01:02

## 2023-02-14 RX ADMIN — GLIPIZIDE 5 MG: 2.5 TABLET, EXTENDED RELEASE ORAL at 08:02

## 2023-02-14 RX ADMIN — ACETAMINOPHEN 325MG 650 MG: 325 TABLET ORAL at 03:02

## 2023-02-14 RX ADMIN — CLOPIDOGREL BISULFATE 75 MG: 75 TABLET ORAL at 08:02

## 2023-02-14 RX ADMIN — CYCLOBENZAPRINE 10 MG: 10 TABLET, FILM COATED ORAL at 03:02

## 2023-02-14 RX ADMIN — METOPROLOL SUCCINATE 25 MG: 25 TABLET, EXTENDED RELEASE ORAL at 08:02

## 2023-02-14 RX ADMIN — PREGABALIN 150 MG: 75 CAPSULE ORAL at 08:02

## 2023-02-14 RX ADMIN — CYCLOBENZAPRINE 10 MG: 10 TABLET, FILM COATED ORAL at 10:02

## 2023-02-14 RX ADMIN — ALBUTEROL SULFATE 2 PUFF: 90 AEROSOL, METERED RESPIRATORY (INHALATION) at 08:02

## 2023-02-14 NOTE — PLAN OF CARE
[facility] - Medical Surgical Unit - Swing Bed   Interdisciplinary Team Meeting    Patient: Olga Stephenson   Today's Date: 2/14/2023   Estimated D/C Date: 3/3/23        Physician:Juan Lama D.O. Nurse Practitioner:    Pharmacy:[facility] Unit Director:  MIRYAM Reddy   : Malina Javier RN Physical/Occupational Therapy: Denzel Voss OT   Speech Therapy: [facility] [place]    Nursing: Gloria Payne RN  Respiratory: Marty Jewell, Resp. D Dietary:  Damitra Naseem, Dietary  Other:      Nurse  New Symptoms/Problems:   Last BM: 2/12/23 Urine: continent Diarrhea: No   Constipated: No Bowel: continent [place]: No   Isolation: No D/C Date:  Date:    O2 Device: Room Air O2 Flow:   SpO2: 95 %   Nutrition: Diabetic  Speech/Swallowing: No current speech or swallowing issues  Aspiration Precautions: No  Cognition: WNL    Physical Therapy  Physical Therapy/Gait: ambulated 120 ft with FWW with cueing ELOS: Plan to DC  3/3/23   Transfers: Minimal Assistance to CGA Range of Motion/Restrictions:      Occupational Therapy  Eating/Grooming: Modified Independent Toileting: Contact Guard Assistance to Min A   Bathing: Contact Guard Assistance Dressing (Upper Body): Supervision or Set-up Assistance   Dressing (Lower Body): Maximum Assistance        Tx Plan/Recommendations reviewed with family and/or patient on (date) 2/13/23.  Additional family Conference/Training:   D/C Plan/Recommendations: Home with HH  ANNETTE: 3/3/23    Pharmacy  Medication Changes (see MD orders in chart): Yes  MD:Juan Lama D.O. Labs Reviewed: Yes New Lab Orders: Yes   MD/NP Signature:

## 2023-02-14 NOTE — PT/OT/SLP PROGRESS
Occupational Therapy   Treatment    Name: Olga Stephenson  MRN: [id number]  Admitting Diagnosis:  R total knee replacement on 2/9/23       Recommendations:     Discharge Recommendations: outpatient PT  Discharge Equipment Recommendations:  walker, rolling  Barriers to discharge:       Assessment:     Olga Stephenson is a 67 y.o. female with a medical diagnosis of R total knee replacement on 2/9/23.  She presents with decreased balance, safety, strength. Performance deficits affecting function are weakness, impaired endurance, impaired self care skills, impaired functional mobility, impaired balance, decreased safety awareness, decreased ROM. Pt participated well with skilled OT but reported pain within her RLE. Pt required cues to complete functional ambulation with proper gait mechanics. Pt ROM with knee flexion was better on today    Rehab Prognosis:  Good; patient would benefit from acute skilled OT services to address these deficits and reach maximum level of function.       Plan:     Patient to be seen 5 x/week to address the above listed problems via self-care/home management, therapeutic activities, therapeutic exercises  Plan of Care Expires:    Plan of Care Reviewed with: patient    Subjective     Pain/Comfort:  Pain Rating 1: 0/10  Location - Side 1: Right  Location - Orientation 1: lower  Location 1: knee    Objective:     Communicated with: Pt prior to session.  Patient found up in chair   upon OT entry to room.    General Precautions: Standard, fall    Orthopedic Precautions:   Braces:    Respiratory Status: Room air     Occupational Performance:     Bed Mobility:    Pt presented sitting upright in recliner upon Ot's arrival.      Functional Mobility/Transfers:  Patient completed Sit <> Stand Transfer with stand by assistance  with  rolling walker   Functional Mobility: Pt completed functional ambulation to the restroom using the RW for balance and safety.     Activities of Daily Living:  Sit to stand  t/f with SVN-Mod I for balance and safety      [facility] 6 Click ADL: 21    Treatment & Education:  Therapeutic exercise:    While sitting in w/c the pt completed bicep curls 2 sets of 20 reps with 5# DB; chest press:2 sets of 20 reps with 5# DB. Pt educated on the importance of quad stretches and pt was provided with a few stretches by OT.     While standing using the parallel bars the pt completed 2 sets of 15 reps to increase core strength and LE ROM and strength to promote knee flexion and ROM    Therapeutic activity    While sitting in the w/c the pt competed 2 sets of 50 reps of unlateral R leg presses on the trampoline to increase knee flexion and extension for functional mobility     Pt was provided with knee massaged to reduce tenderness of RLE.    Patient left up in chair with all lines intact and call button in reach    GOALS:   Multidisciplinary Problems       Occupational Therapy Goals          Problem: Occupational Therapy    Goal Priority Disciplines Outcome Interventions   Occupational Therapy Goal     OT, PT/OT Ongoing, Progressing    Description: Goals to be met by: 3/10/23     Patient will increase functional independence with ADLs by performing:    UE Dressing with Modified [place].  LE Dressing with Modified [place].  Grooming while standing with Modified [place].  Toileting from toilet with Modified [place] for hygiene and clothing management.   Sitanding x5-10 minutes with Modified [place].  Squat pivot transfers with Modified [place].  Step transfer with Modified [place]  Toilet transfer to toilet with Modified [place].                         Time Tracking:     OT Date of Treatment:    OT Start Time:  1:45  OT Stop Time:  2:15  OT Total Time (min):      Billable Minutes:Self Care/Home Management 15  Therapeutic Exercise 15    OT/ROBERT: OT          2/14/2023

## 2023-02-14 NOTE — PLAN OF CARE
Problem: Adult Inpatient Plan of Care  Goal: Absence of Hospital-Acquired Illness or Injury  Intervention: Identify and Manage Fall Risk  Flowsheets (Taken 2/14/2023 0201)  Safety Promotion/Fall Prevention:   assistive device/personal item within reach   nonskid shoes/socks when out of bed   side rails raised x 3   instructed to call staff for mobility  Intervention: Prevent Skin Injury  Flowsheets (Taken 2/14/2023 0201)  Body Position: position changed independently  Skin Protection: incontinence pads utilized  Intervention: Prevent and Manage VTE (Venous Thromboembolism) Risk  Flowsheets (Taken 2/14/2023 0201)  Activity Management: Ambulated to bathroom - L4  VTE Prevention/Management: remove, assess skin, and reapply sequential compression device  Range of Motion: active ROM (range of motion) encouraged  Intervention: Prevent Infection  Flowsheets (Taken 2/14/2023 0201)  Infection Prevention: hand hygiene promoted     Problem: Fall Injury Risk  Goal: Absence of Fall and Fall-Related Injury  Intervention: Identify and Manage Contributors  Flowsheets (Taken 2/14/2023 0201)  Self-Care Promotion: independence encouraged  Medication Review/Management: medications reviewed  Intervention: Promote Injury-Free Environment  Flowsheets (Taken 2/14/2023 0201)  Safety Promotion/Fall Prevention:   assistive device/personal item within reach   nonskid shoes/socks when out of bed   side rails raised x 3   instructed to call staff for mobility     Problem: Adult Inpatient Plan of Care  Goal: Absence of Hospital-Acquired Illness or Injury  Intervention: Identify and Manage Fall Risk  Flowsheets (Taken 2/14/2023 0201)  Safety Promotion/Fall Prevention:   assistive device/personal item within reach   nonskid shoes/socks when out of bed   side rails raised x 3   instructed to call staff for mobility  Intervention: Prevent Skin Injury  Flowsheets (Taken 2/14/2023 0201)  Body Position: position changed independently  Skin Protection:  incontinence pads utilized  Intervention: Prevent and Manage VTE (Venous Thromboembolism) Risk  Flowsheets (Taken 2/14/2023 0201)  Activity Management: Ambulated to bathroom - L4  VTE Prevention/Management: remove, assess skin, and reapply sequential compression device  Range of Motion: active ROM (range of motion) encouraged  Intervention: Prevent Infection  Flowsheets (Taken 2/14/2023 0201)  Infection Prevention: hand hygiene promoted     Problem: Fall Injury Risk  Goal: Absence of Fall and Fall-Related Injury  Intervention: Identify and Manage Contributors  Flowsheets (Taken 2/14/2023 0201)  Self-Care Promotion: independence encouraged  Medication Review/Management: medications reviewed  Intervention: Promote Injury-Free Environment  Flowsheets (Taken 2/14/2023 0201)  Safety Promotion/Fall Prevention:   assistive device/personal item within reach   nonskid shoes/socks when out of bed   side rails raised x 3   instructed to call staff for mobility     Problem: Adult Inpatient Plan of Care  Goal: Absence of Hospital-Acquired Illness or Injury  Intervention: Identify and Manage Fall Risk  Flowsheets (Taken 2/14/2023 0201)  Safety Promotion/Fall Prevention:   assistive device/personal item within reach   nonskid shoes/socks when out of bed   side rails raised x 3   instructed to call staff for mobility  Intervention: Prevent Skin Injury  Flowsheets (Taken 2/14/2023 0201)  Body Position: position changed independently  Skin Protection: incontinence pads utilized  Intervention: Prevent and Manage VTE (Venous Thromboembolism) Risk  Flowsheets (Taken 2/14/2023 0201)  Activity Management: Ambulated to bathroom - L4  VTE Prevention/Management: remove, assess skin, and reapply sequential compression device  Range of Motion: active ROM (range of motion) encouraged  Intervention: Prevent Infection  Flowsheets (Taken 2/14/2023 0201)  Infection Prevention: hand hygiene promoted     Problem: Fall Injury Risk  Goal: Absence of  Fall and Fall-Related Injury  Intervention: Identify and Manage Contributors  Flowsheets (Taken 2/14/2023 0201)  Self-Care Promotion: independence encouraged  Medication Review/Management: medications reviewed  Intervention: Promote Injury-Free Environment  Flowsheets (Taken 2/14/2023 0201)  Safety Promotion/Fall Prevention:   assistive device/personal item within reach   nonskid shoes/socks when out of bed   side rails raised x 3   instructed to call staff for mobility

## 2023-02-14 NOTE — PT/OT/SLP PROGRESS
Physical Therapy Treatment    Patient Name:  Olga Stephenson   MRN:  [id number]    Recommendations:     Discharge Recommendations: home with home health  Discharge Equipment Recommendations: cane, straight  Barriers to discharge: None    Assessment:     Olga Stephenson is a 67 y.o. female admitted with a medical diagnosis of R LE TKA.  She presents with the following impairments/functional limitations: weakness, impaired endurance, gait instability, impaired balance, decreased lower extremity function, decreased safety awareness, pain, decreased ROM, edema, orthopedic precautions.    Pt tolerated Skilled PT fair with right knee pain throughout tx with flexion motion. Pt required Dorinda with Right lower extremity only during supine exercises due to right quad and hip muscles.     Rehab Prognosis: Good; patient would benefit from acute skilled PT services to address these deficits and reach maximum level of function.    Recent Surgery: * No surgery found *      Plan:     During this hospitalization, patient to be seen BID to address the identified rehab impairments via gait training, therapeutic exercises and progress toward the following goals:    Plan of Care Expires:  03/10/23    Subjective     Chief Complaint: Pt reports her knee is hurting a lot and just received pain pill with morning meds prior to PT AM session.   Patient/Family Comments/goals: To get stronger and improve mobility to return home to [facility].  Pain/Comfort:  Pain Rating 1: 9/10  Location - Side 1: Right  Location - Orientation 1: lower  Location 1: knee  Pain Addressed 1: Pre-medicate for activity, Distraction      Objective:     Communicated with pt prior to session.  Patient found standing in bathroom doorway with RW   upon PT entry to room.     General Precautions: Standard, fall  Orthopedic Precautions: RLE weight bearing as tolerated  Braces: N/A  Respiratory Status: Room air     Functional Mobility:  Bed Mobility:     Supine to Sit- Dorinda  Sit  to Supine: minimum assistance  Transfers:     Sit to Stand:  contact guard assistance with rolling walker  Gait: x 200 ft total using RW CGA   Balance: Static standing balance is good-      AM-PAC 6 CLICK MOBILITY  Turning over in bed (including adjusting bedclothes, sheets and blankets)?: 3  Sitting down on and standing up from a chair with arms (e.g., wheelchair, bedside commode, etc.): 4  Moving from lying on back to sitting on the side of the bed?: 3  Moving to and from a bed to a chair (including a wheelchair)?: 3  Need to walk in hospital room?: 3  Climbing 3-5 steps with a railing?: 1  Basic Mobility Total Score: 17       Treatment & Education: all to improve Right lower extremity ROM, strength, endurance,standing tolerance, and balance with all ADLs.   Ambulation from room to therapy gym x 100 ft using RW CGA antalgic gait pattern decrease in right knee flexion  Scitfit x 8 min  Supine Quad Sets 10 x 10 s/h  Supine Right lower extremity Straight leg raises 2 x 10 Dorinda to achieve full ROM  Supine to sitting EOM Dorinda with Right lower extremity  Ambulation from gym to room using RW x 100 ft   Sit to supine- Dorinda using RW    Patient left supine with all lines intact and call button in reach..    GOALS:   Multidisciplinary Problems       Physical Therapy Goals          Problem: Physical Therapy    Goal Priority Disciplines Outcome Goal Variances Interventions   Physical Therapy Goal     PT, PT/OT Ongoing, Progressing     Description: Goals to be met by: 2 weeks     Patient will increase functional independence with mobility by performin. Supine to sit with Stand-by Assistance  2. Sit to supine with Stand-by Assistance  3. Rolling to Left and Right with Modified [place].  4. Sit to stand transfer with Stand-by Assistance  5. Bed to chair transfer with Stand-by Assistance using Rolling Walker  6. Gait  x 150 feet with Stand-by Assistance using Rolling Walker.     Goals to be met by: 4 weeks     Patient  will increase functional independence with mobility by performin. Supine to sit with Modified [place]  2. Sit to supine with Modified [place]  3. Sit to stand transfer with Modified [place]  4. Bed to chair transfer with Modified [place] using Rolling Walker  5. Gait  x 300 feet with Modified [place] using Rolling Walker.                          Time Tracking:     PT Received On: 23  PT Start Time: 947     PT Stop Time: 1021  PT Total Time (min): 34 min     Billable Minutes: Gait Training 14 and Therapeutic Exercise 20    Treatment Type: Treatment  PT/PTA: PTA     PTA Visit Number: 2023

## 2023-02-14 NOTE — NURSING
Nurses Note -- 4 Eyes      2/13/2023   8:16 PM      Skin assessed during: Q Shift Change      [x] No Pressure Injuries Present    [x]Prevention Measures Documented      [x] Yes- Altered Skin Integrity Present or Discovered   [] LDA Added if Not in Epic (Describe Wound)   [x] New Altered Skin Integrity was Present on Admit and Documented in LDA   [] Wound Image Taken    Wound Care Consulted? No    Attending Nurse:  DARYA HERNANDEZ RN     Second RN/Staff Member:  Kathrin Trent LPN   Incision to right knee

## 2023-02-14 NOTE — NURSING
@ 07:04AM RESTING IN BED AWAKE. ALERT AND ORIENTED. RESPIRAIONS EVEN AND NON LABORED. OFF GOING NURSE PRESENT PERFORMING BEDSIDE SHIFT REPORT (ELLE LACKEY). C/O PAIN; WILL TX DURING AM MED PASS. VERBALIZED UNDERSTANDING. COMMUNICATION BOARD UPDATED. NO NEEDS VOICED/VISUALIZED. BLANCA,RN WILL RESOURCE ME WITH THIS PT.

## 2023-02-14 NOTE — NURSING
Nurses Note -- 4 Eyes      2/14/2023   07:04 AM      Skin assessed during: Q Shift Change      [x] No Pressure Injuries Present    []Prevention Measures Documented      [] Yes- Altered Skin Integrity Present or Discovered   [] LDA Added if Not in Epic (Describe Wound)   [] New Altered Skin Integrity was Present on Admit and Documented in LDA   [] Wound Image Taken    Wound Care Consulted? No    Attending Nurse:  Kathrin Jewell LPN     Second RN/Staff Member:  ELLE LACKEY    *S/P RTK REPLACEMENT*  NO OTHER SKIN ISSUES PRESENT

## 2023-02-14 NOTE — PT/OT/SLP PROGRESS
Physical Therapy Treatment    Patient Name:  Olga Stephenson   MRN:  [id number]    Recommendations:     Discharge Recommendations: outpatient PT  Discharge Equipment Recommendations: walker, rolling  Barriers to discharge: Inaccessible home and Decreased caregiver support    Assessment:     Olga Stephenson is a 67 y.o. female admitted with a medical diagnosis of <principal problem not specified>.  She presents with the following impairments/functional limitations: weakness, impaired endurance, gait instability, impaired balance, decreased lower extremity function, decreased safety awareness, pain, decreased ROM, edema, orthopedic precautions.    Rehab Prognosis: Good; patient would benefit from acute skilled PT services to address these deficits and reach maximum level of function.    Recent Surgery: * No surgery found *      Plan:     During this hospitalization, patient to be seen BID to address the identified rehab impairments via gait training, therapeutic activities, therapeutic exercises and progress toward the following goals:    Plan of Care Expires:  03/10/23    Subjective     Chief Complaint: knee pain  Patient/Family Comments/goals: Pt. Reports improved mobility this afternoon stating knee was very stiff this AM.   Pain/Comfort:  Pain Rating 1: 5/10  Location - Side 1: Right  Location 1: knee  Pain Addressed 1: Distraction      Objective:     Communicated with Kathrin Jewell LPN, OT and Supervising PT Mert Archer for POC prior to session.  Patient found up in chair with   upon PT entry to room.     General Precautions: Standard, fall  Orthopedic Precautions: RLE weight bearing as tolerated  Braces: N/A  Respiratory Status: Room air     Functional Mobility:  Bed Mobility:     Scooting: contact guard assistance and minimum assistance  Sit to Supine: minimum assistance  Transfers:     Sit to Stand:  stand by assistance and contact guard assistance with rolling walker  Gait: Pt. Participated in gait x  150 feet with good sequencing and upright posture noted this PM.       AM-PAC 6 CLICK MOBILITY  Turning over in bed (including adjusting bedclothes, sheets and blankets)?: 3  Sitting down on and standing up from a chair with arms (e.g., wheelchair, bedside commode, etc.): 4  Moving from lying on back to sitting on the side of the bed?: 3  Moving to and from a bed to a chair (including a wheelchair)?: 3  Need to walk in hospital room?: 3  Climbing 3-5 steps with a railing?: 1  Basic Mobility Total Score: 17       Treatment & Education:  Pt. Participated in mobility per above and LE PREs: Seated: Heel/ Toe Raises, LAQs with assist, Marching and Hip ABD/ ADD 2 x 15 reps each.     Patient left HOB elevated with call button in reach and family present..    GOALS:   Multidisciplinary Problems       Physical Therapy Goals          Problem: Physical Therapy    Goal Priority Disciplines Outcome Goal Variances Interventions   Physical Therapy Goal     PT, PT/OT Ongoing, Progressing     Description: Goals to be met by: 2 weeks     Patient will increase functional independence with mobility by performin. Supine to sit with Stand-by Assistance  2. Sit to supine with Stand-by Assistance  3. Rolling to Left and Right with Modified [place].  4. Sit to stand transfer with Stand-by Assistance  5. Bed to chair transfer with Stand-by Assistance using Rolling Walker  6. Gait  x 150 feet with Stand-by Assistance using Rolling Walker.     Goals to be met by: 4 weeks     Patient will increase functional independence with mobility by performin. Supine to sit with Modified [place]  2. Sit to supine with Modified [place]  3. Sit to stand transfer with Modified [place]  4. Bed to chair transfer with Modified [place] using Rolling Walker  5. Gait  x 300 feet with Modified [place] using Rolling Walker.                          Time Tracking:     PT Received On: 23  PT Start Time: 1430     PT Stop  Time: 1449  PT Total Time (min): 19 min     Billable Minutes: Gait Training 19    Treatment Type: Treatment  PT/PTA: PTA     PTA Visit Number: 2 02/14/2023

## 2023-02-14 NOTE — NURSING
@ 1742pm resting on side of bed eating dinner and eating her dinner. alert and oriented. respiraitons even and non labored. safety measures in place. no c/o pain. water pitcher filled with fresh ice and water. no concerns voiced. will report off to oncoming shift.

## 2023-02-15 LAB
GLUCOSE SERPL-MCNC: 147 MG/DL (ref 70–105)
GLUCOSE SERPL-MCNC: 164 MG/DL (ref 70–105)

## 2023-02-15 PROCEDURE — 27000981 HC MATTRESS, ACCUCAIR DAILY RENTAL

## 2023-02-15 PROCEDURE — 94761 N-INVAS EAR/PLS OXIMETRY MLT: CPT

## 2023-02-15 PROCEDURE — 97116 GAIT TRAINING THERAPY: CPT | Mod: CQ

## 2023-02-15 PROCEDURE — 99900035 HC TECH TIME PER 15 MIN (STAT)

## 2023-02-15 PROCEDURE — 25000003 PHARM REV CODE 250: Performed by: NURSE PRACTITIONER

## 2023-02-15 PROCEDURE — 82962 GLUCOSE BLOOD TEST: CPT

## 2023-02-15 PROCEDURE — 27000944

## 2023-02-15 PROCEDURE — 97110 THERAPEUTIC EXERCISES: CPT

## 2023-02-15 PROCEDURE — 25000003 PHARM REV CODE 250: Performed by: FAMILY MEDICINE

## 2023-02-15 PROCEDURE — 11000004 HC SNF PRIVATE

## 2023-02-15 PROCEDURE — 94640 AIRWAY INHALATION TREATMENT: CPT

## 2023-02-15 PROCEDURE — 97112 NEUROMUSCULAR REEDUCATION: CPT

## 2023-02-15 RX ADMIN — VALACYCLOVIR HYDROCHLORIDE 500 MG: 500 TABLET, FILM COATED ORAL at 08:02

## 2023-02-15 RX ADMIN — CLOPIDOGREL BISULFATE 75 MG: 75 TABLET ORAL at 08:02

## 2023-02-15 RX ADMIN — ALBUTEROL SULFATE 2 PUFF: 90 AEROSOL, METERED RESPIRATORY (INHALATION) at 07:02

## 2023-02-15 RX ADMIN — GLIPIZIDE 5 MG: 2.5 TABLET, EXTENDED RELEASE ORAL at 08:02

## 2023-02-15 RX ADMIN — PANTOPRAZOLE SODIUM 40 MG: 40 TABLET, DELAYED RELEASE ORAL at 08:02

## 2023-02-15 RX ADMIN — MELOXICAM 7.5 MG: 7.5 TABLET ORAL at 08:02

## 2023-02-15 RX ADMIN — APIXABAN 2.5 MG: 2.5 TABLET, FILM COATED ORAL at 08:02

## 2023-02-15 RX ADMIN — PREGABALIN 75 MG: 75 CAPSULE ORAL at 08:02

## 2023-02-15 RX ADMIN — FLUTICASONE PROPIONATE 50 MCG: 50 SPRAY, METERED NASAL at 08:02

## 2023-02-15 RX ADMIN — ALBUTEROL SULFATE 2 PUFF: 90 AEROSOL, METERED RESPIRATORY (INHALATION) at 08:02

## 2023-02-15 RX ADMIN — LOSARTAN POTASSIUM 50 MG: 50 TABLET, FILM COATED ORAL at 08:02

## 2023-02-15 RX ADMIN — HYDROCODONE BITARTRATE AND ACETAMINOPHEN 1 TABLET: 10; 325 TABLET ORAL at 09:02

## 2023-02-15 RX ADMIN — HYDROCODONE BITARTRATE AND ACETAMINOPHEN 1 TABLET: 10; 325 TABLET ORAL at 08:02

## 2023-02-15 RX ADMIN — SENNOSIDES AND DOCUSATE SODIUM 1 TABLET: 8.6; 5 TABLET ORAL at 08:02

## 2023-02-15 RX ADMIN — DAPAGLIFLOZIN 10 MG: 10 TABLET, FILM COATED ORAL at 08:02

## 2023-02-15 RX ADMIN — SENNOSIDES AND DOCUSATE SODIUM 1 TABLET: 8.6; 5 TABLET ORAL at 09:02

## 2023-02-15 RX ADMIN — HYDROCODONE BITARTRATE AND ACETAMINOPHEN 1 TABLET: 10; 325 TABLET ORAL at 02:02

## 2023-02-15 RX ADMIN — METOPROLOL SUCCINATE 25 MG: 25 TABLET, EXTENDED RELEASE ORAL at 08:02

## 2023-02-15 RX ADMIN — APIXABAN 2.5 MG: 2.5 TABLET, FILM COATED ORAL at 09:02

## 2023-02-15 RX ADMIN — PREGABALIN 150 MG: 75 CAPSULE ORAL at 09:02

## 2023-02-15 RX ADMIN — METFORMIN HYDROCHLORIDE 1000 MG: 500 TABLET ORAL at 09:02

## 2023-02-15 RX ADMIN — EZETIMIBE 10 MG: 10 TABLET ORAL at 09:02

## 2023-02-15 NOTE — PT/OT/SLP PROGRESS
Physical Therapy Treatment    Patient Name:  Olga Stephenson   MRN:  [id number]    Recommendations:     Discharge Recommendations: outpatient PT  Discharge Equipment Recommendations: walker, rolling  Barriers to discharge: None    Assessment:     Olga Stephenson is a 67 y.o. female admitted with a medical diagnosis of R LE TKA.  She presents with the following impairments/functional limitations: weakness, impaired endurance, gait instability, impaired balance, pain, decreased lower extremity function, decreased ROM, edema, orthopedic precautions.    Pt tolerated afternoon session fair to well with c/o right knee pain before, during, and after PT. Nursing notified and reports pt is due for pain pill. Pt ambulated and performed bed mobility and t/fs SBA-CGA using RW.     Rehab Prognosis: Good; patient would benefit from acute skilled PT services to address these deficits and reach maximum level of function.    Recent Surgery: * No surgery found *      Plan:     During this hospitalization, patient to be seen BID to address the identified rehab impairments via gait training and progress toward the following goals:    Plan of Care Expires:  03/10/23    Subjective     Chief Complaint: right knee pain. Pt has not had anything for pain since her 9 am meds per pt.    Patient/Family Comments/goals: To get stronger and improve mobility to return home to [facility].  Pain/Comfort:  Pain Rating 1: 8/10  Location - Side 1: Right  Location - Orientation 1: lower  Location 1: knee      Objective:     Communicated with pt prior to session.  Patient found asleep in bed   upon PT entry to room.     General Precautions: Standard, fall  Orthopedic Precautions: RLE weight bearing as tolerated  Braces: N/A  Respiratory Status: Room air     Functional Mobility:  Bed Mobility:     Supine to Sit- CGA  Sit to Supine: CGA  Transfers:     Sit to Stand:  contact guard assistance with rolling walker  Gait: x 100 ft total using RW CGA / SBA  Balance:  Static standing balance is good-      AM-PAC 6 CLICK MOBILITY  Turning over in bed (including adjusting bedclothes, sheets and blankets)?: 4  Sitting down on and standing up from a chair with arms (e.g., wheelchair, bedside commode, etc.): 4  Moving from lying on back to sitting on the side of the bed?: 3  Moving to and from a bed to a chair (including a wheelchair)?: 3  Need to walk in hospital room?: 3  Climbing 3-5 steps with a railing?: 1  Basic Mobility Total Score: 18       Treatment & Education: all to improve Right lower extremity ROM, strength, endurance,standing tolerance, and balance with all ADLs.   Supine to Sit- CGA with Right lower extremity  Sit to stand- SBA using RW  Ambulation from bed to bathroom using RW SBA  Toilet t/f using RW SBA  Ambulation from room <> nursing station using RW x 100 ft antalgic gait pattern vc on step through   Sit to supine- CGA Right lower extremity only     Patient left supine with all lines intact and call button in reach and nursing notified.     GOALS:   Multidisciplinary Problems       Physical Therapy Goals          Problem: Physical Therapy    Goal Priority Disciplines Outcome Goal Variances Interventions   Physical Therapy Goal     PT, PT/OT Ongoing, Progressing     Description: Goals to be met by: 2 weeks     Patient will increase functional independence with mobility by performin. Supine to sit with Stand-by Assistance  2. Sit to supine with Stand-by Assistance  3. Rolling to Left and Right with Modified [place].  4. Sit to stand transfer with Stand-by Assistance  5. Bed to chair transfer with Stand-by Assistance using Rolling Walker  6. Gait  x 150 feet with Stand-by Assistance using Rolling Walker.     Goals to be met by: 4 weeks     Patient will increase functional independence with mobility by performin. Supine to sit with Modified [place]  2. Sit to supine with Modified [place]  3. Sit to stand transfer with Modified  [place]  4. Bed to chair transfer with Modified [place] using Rolling Walker  5. Gait  x 300 feet with Modified [place] using Rolling Walker.                          Time Tracking:     PT Received On: 02/15/23  PT Start Time: 1415     PT Stop Time: 1430  PT Total Time (min): 15 min     Billable Minutes: Gait Training 15    Treatment Type: Treatment  PT/PTA: PTA     PTA Visit Number: 1     02/15/2023

## 2023-02-15 NOTE — NURSING
Nurses Note -- 4 Eyes      2/14/2023   7:53 PM      Skin assessed during: Q Shift Change      [x] No Pressure Injuries Present    [x]Prevention Measures Documented      [x] Yes- Altered Skin Integrity Present or Discovered   [] LDA Added if Not in Epic (Describe Wound)   [x] New Altered Skin Integrity was Present on Admit and Documented in LDA   [] Wound Image Taken    Wound Care Consulted? No    Attending Nurse:  DARYA HERNANDEZ RN     Second RN/Staff Member:  NAOMIE Trent LPN  Incision to right knee

## 2023-02-15 NOTE — PT/OT/SLP PROGRESS
Physical Therapy Treatment    Patient Name:  Olga Stephenson   MRN:  [id number]    Recommendations:     Discharge Recommendations: home with home health  Discharge Equipment Recommendations: walker, rolling  Barriers to discharge: None    Assessment:     Olga Stephenson is a 67 y.o. female admitted with a medical diagnosis of R LE TKA.  She presents with the following impairments/functional limitations: weakness, decreased lower extremity function, pain, decreased ROM.    Patient tolerated treatment without any complaints of pain. Patient demonstrated stable gait pattern during ambulation however she has decreased knee flexion during swing phase.      Rehab Prognosis: Good; patient would benefit from acute skilled PT services to address these deficits and reach maximum level of function.    Recent Surgery: * No surgery found *      Plan:     During this hospitalization, patient to be seen BID to address the identified rehab impairments via gait training, therapeutic exercises and progress toward the following goals:    Plan of Care Expires:  03/10/23    Subjective     Chief Complaint: right knee pain   Patient/Family Comments/goals: To get stronger and improve mobility to return home to [facility].  Pain/Comfort:  Pain Rating 1: 7/10  Location - Side 1: Right  Location - Orientation 1: lower  Location 1: knee  Pain Addressed 1: Cessation of Activity  Pain Rating Post-Intervention 1: 0/10      Objective:     Communicated with pt prior to session.  Patient found awake in bed  upon PT entry to room.     General Precautions: Standard, fall  Orthopedic Precautions: RLE weight bearing as tolerated  Braces: N/A  Respiratory Status: Room air     Functional Mobility:  Bed Mobility:     Supine to Sit- CGA  Sit to Supine: CGA  Transfers: Stand by assistance      Sit to Stand: Stand by assistance with rolling walker  Gait: 275 ft with stand by assistance with RW  Balance: good       AM-PAC 6 CLICK MOBILITY  Turning over in bed  (including adjusting bedclothes, sheets and blankets)?: 4  Sitting down on and standing up from a chair with arms (e.g., wheelchair, bedside commode, etc.): 4  Moving from lying on back to sitting on the side of the bed?: 4  Moving to and from a bed to a chair (including a wheelchair)?: 4  Need to walk in hospital room?: 4  Climbing 3-5 steps with a railing?: 1  Basic Mobility Total Score: 21       Treatment & Education: all to improve Right lower extremity ROM, strength, endurance,standing tolerance, and balance with all ADLs.   Scifit stepper x 10 minutes   Standing straight leg raise, hip flexion, hip extension, hamstring curls 2 x 10 2#   Sitting long arc quads x 10 2# assisted, hamstring curls with red theraband 2 x 10     Patient ambulated 275 ft and 150 ft with standby assistance with RW; patient demonstrated good gait stability, balance and step through gait however, has limited knee flexion through swing phase of gait.       Patient left supine with all lines intact and call button in reach.     GOALS:   Multidisciplinary Problems       Physical Therapy Goals          Problem: Physical Therapy    Goal Priority Disciplines Outcome Goal Variances Interventions   Physical Therapy Goal     PT, PT/OT Ongoing, Progressing     Description: Goals to be met by: 2 weeks     Patient will increase functional independence with mobility by performin. Supine to sit with Stand-by Assistance  2. Sit to supine with Stand-by Assistance  3. Rolling to Left and Right with Modified [place].  4. Sit to stand transfer with Stand-by Assistance  5. Bed to chair transfer with Stand-by Assistance using Rolling Walker  6. Gait  x 150 feet with Stand-by Assistance using Rolling Walker.     Goals to be met by: 4 weeks     Patient will increase functional independence with mobility by performin. Supine to sit with Modified [place]  2. Sit to supine with Modified [place]  3. Sit to stand transfer with Modified  [place]  4. Bed to chair transfer with Modified [place] using Rolling Walker  5. Gait  x 300 feet with Modified [place] using Rolling Walker.                          Time Tracking:     PT Received On: 02/15/23  PT Start Time: 0851     PT Stop Time: 0930  PT Total Time (min): 39 min     Billable Minutes: Gait Training 10 Therapeutic Exercise 29    Treatment Type: Treatment  PT/PTA: PT     PTA Visit Number: 1     02/15/2023

## 2023-02-15 NOTE — PLAN OF CARE
Problem: Adult Inpatient Plan of Care  Goal: Optimal Comfort and Wellbeing  Intervention: Monitor Pain and Promote Comfort  Flowsheets (Taken 2/15/2023 0141)  Pain Management Interventions: quiet environment facilitated  Intervention: Provide Person-Centered Care  Flowsheets (Taken 2/15/2023 0141)  Trust Relationship/Rapport:   emotional support provided   reassurance provided     Problem: Diabetes Comorbidity  Goal: Blood Glucose Level Within Targeted Range  Intervention: Monitor and Manage Glycemia  Flowsheets (Taken 2/15/2023 0141)  Glycemic Management: blood glucose monitored     Problem: Fall Injury Risk  Goal: Absence of Fall and Fall-Related Injury  Intervention: Identify and Manage Contributors  Flowsheets (Taken 2/15/2023 0141)  Self-Care Promotion: independence encouraged  Medication Review/Management: medications reviewed  Intervention: Promote Injury-Free Environment  Flowsheets (Taken 2/15/2023 0141)  Safety Promotion/Fall Prevention:   assistive device/personal item within reach   nonskid shoes/socks when out of bed   side rails raised x 3   instructed to call staff for mobility

## 2023-02-15 NOTE — PT/OT/SLP PROGRESS
Occupational Therapy   Treatment    Name: Olga Stephenson  MRN: [id number]  Admitting Diagnosis:  R total knee replacement on 2/9/23       Recommendations:     Discharge Recommendations: outpatient PT  Discharge Equipment Recommendations:  walker, rolling  Barriers to discharge:       Assessment:     Olga Stephenson is a 67 y.o. female with a medical diagnosis of R total knee replacement on 2/9/23.  She presents with decreased balance, safety, strength. Performance deficits affecting function are weakness, impaired endurance, impaired self care skills, impaired functional mobility, impaired balance, decreased safety awareness, decreased ROM. Pt reports increased pain and tenderness on lateral and posterior portion of knee. Pt reported 8/10 at the beginning of the session but 6/10 with decreased tenderness at the end.     Rehab Prognosis:  Good; patient would benefit from acute skilled OT services to address these deficits and reach maximum level of function.       Plan:     Patient to be seen 5 x/week to address the above listed problems via self-care/home management, therapeutic activities, therapeutic exercises  Plan of Care Expires:    Plan of Care Reviewed with: patient    Subjective     Pain/Comfort:  Pain Rating 1: 0/10  Location - Side 1: Right  Location - Orientation 1: lower  Location 1: knee    Objective:     Communicated with: Pt prior to session.  Patient found up in chair   upon OT entry to room.    General Precautions: Standard, fall    Orthopedic Precautions:   Braces:    Respiratory Status: Room air     Occupational Performance:     Bed Mobility:    Pt presented sitting upright in recliner upon Ot's arrival.      Functional Mobility/Transfers:  Patient completed Sit <> Stand Transfer with stand by assistance  with  rolling walker   Functional Mobility: Pt completed functional ambulation to the restroom using the RW for balance and safety.     Activities of Daily Living:  Sit to stand t/f with  SVN-Mod I for balance and safety      [facility] 6 Click ADL: 21    Treatment & Education:  Therapeutic exercise:    While sitting in w/c the pt completed bicep curls 2 sets of 20 reps with 5# DB; chest press:2 sets of 20 reps with 5# DB. Pt educated on the importance of quad stretches and pt was provided with a few stretches by OT.       Therapeutic activity    Manual  While sitting on the edge of the mat, knee mobilization and scar management/edema management techniques were applied to the pt's R knee. Anterior, Posterior, and lateral glide at a grade 1/2 were applied to retrain proprioceptors of R knees and to increased joint elasticity/mobility. Massage technique management techniques were applied to R  knee to increase joint ROM, flexibility and to decreased tightness and stiffness. x15 mins    Pt was provided with BioFreeze to reduce soreness   .    Patient left up in chair with all lines intact and call button in reach    GOALS:   Multidisciplinary Problems       Occupational Therapy Goals          Problem: Occupational Therapy    Goal Priority Disciplines Outcome Interventions   Occupational Therapy Goal     OT, PT/OT Ongoing, Progressing    Description: Goals to be met by: 3/10/23     Patient will increase functional independence with ADLs by performing:    UE Dressing with Modified [place].  LE Dressing with Modified [place].  Grooming while standing with Modified [place].  Toileting from toilet with Modified [place] for hygiene and clothing management.   Sitanding x5-10 minutes with Modified [place].  Squat pivot transfers with Modified [place].  Step transfer with Modified [place]  Toilet transfer to toilet with Modified [place].                         Time Tracking:     OT Date of Treatment:    OT Start Time:  1:45  OT Stop Time:  2:15  OT Total Time (min):      Billable Minutes:Self Care/Home Management 15  Therapeutic Exercise 15    OT/ROBERT: OT          2/15/2023

## 2023-02-16 LAB
GLUCOSE SERPL-MCNC: 138 MG/DL (ref 70–105)
GLUCOSE SERPL-MCNC: 184 MG/DL (ref 70–105)
GLUCOSE SERPL-MCNC: 205 MG/DL (ref 70–105)

## 2023-02-16 PROCEDURE — 97530 THERAPEUTIC ACTIVITIES: CPT | Mod: CQ

## 2023-02-16 PROCEDURE — 25000003 PHARM REV CODE 250: Performed by: NURSE PRACTITIONER

## 2023-02-16 PROCEDURE — 82962 GLUCOSE BLOOD TEST: CPT

## 2023-02-16 PROCEDURE — 25000003 PHARM REV CODE 250: Performed by: FAMILY MEDICINE

## 2023-02-16 PROCEDURE — 97140 MANUAL THERAPY 1/> REGIONS: CPT

## 2023-02-16 PROCEDURE — 27000981 HC MATTRESS, ACCUCAIR DAILY RENTAL

## 2023-02-16 PROCEDURE — 25000003 PHARM REV CODE 250: Performed by: EMERGENCY MEDICINE

## 2023-02-16 PROCEDURE — 94761 N-INVAS EAR/PLS OXIMETRY MLT: CPT

## 2023-02-16 PROCEDURE — 99900035 HC TECH TIME PER 15 MIN (STAT)

## 2023-02-16 PROCEDURE — 97110 THERAPEUTIC EXERCISES: CPT | Mod: CQ

## 2023-02-16 PROCEDURE — 27000944

## 2023-02-16 PROCEDURE — 27202174 HC DRESSING, SILVERLON ISLAND

## 2023-02-16 PROCEDURE — 94640 AIRWAY INHALATION TREATMENT: CPT

## 2023-02-16 PROCEDURE — 11000004 HC SNF PRIVATE

## 2023-02-16 PROCEDURE — 97116 GAIT TRAINING THERAPY: CPT | Mod: CQ

## 2023-02-16 RX ORDER — ALBUTEROL SULFATE 90 UG/1
2 AEROSOL, METERED RESPIRATORY (INHALATION) 2 TIMES DAILY
Status: DISCONTINUED | OUTPATIENT
Start: 2023-02-16 | End: 2023-02-24 | Stop reason: HOSPADM

## 2023-02-16 RX ORDER — OXYCODONE AND ACETAMINOPHEN 5; 325 MG/1; MG/1
1 TABLET ORAL EVERY 4 HOURS PRN
Status: DISCONTINUED | OUTPATIENT
Start: 2023-02-16 | End: 2023-02-24 | Stop reason: HOSPADM

## 2023-02-16 RX ADMIN — METFORMIN HYDROCHLORIDE 1000 MG: 500 TABLET ORAL at 08:02

## 2023-02-16 RX ADMIN — PREGABALIN 75 MG: 75 CAPSULE ORAL at 08:02

## 2023-02-16 RX ADMIN — PANTOPRAZOLE SODIUM 40 MG: 40 TABLET, DELAYED RELEASE ORAL at 08:02

## 2023-02-16 RX ADMIN — METOPROLOL SUCCINATE 25 MG: 25 TABLET, EXTENDED RELEASE ORAL at 08:02

## 2023-02-16 RX ADMIN — HYDROCODONE BITARTRATE AND ACETAMINOPHEN 1 TABLET: 10; 325 TABLET ORAL at 12:02

## 2023-02-16 RX ADMIN — APIXABAN 2.5 MG: 2.5 TABLET, FILM COATED ORAL at 08:02

## 2023-02-16 RX ADMIN — MELOXICAM 7.5 MG: 7.5 TABLET ORAL at 08:02

## 2023-02-16 RX ADMIN — OXYCODONE AND ACETAMINOPHEN 1 TABLET: 325; 5 TABLET ORAL at 08:02

## 2023-02-16 RX ADMIN — ALBUTEROL SULFATE 2 PUFF: 90 AEROSOL, METERED RESPIRATORY (INHALATION) at 07:02

## 2023-02-16 RX ADMIN — VALACYCLOVIR HYDROCHLORIDE 500 MG: 500 TABLET, FILM COATED ORAL at 08:02

## 2023-02-16 RX ADMIN — HYDROCODONE BITARTRATE AND ACETAMINOPHEN 1 TABLET: 10; 325 TABLET ORAL at 08:02

## 2023-02-16 RX ADMIN — EZETIMIBE 10 MG: 10 TABLET ORAL at 08:02

## 2023-02-16 RX ADMIN — OXYCODONE AND ACETAMINOPHEN 1 TABLET: 325; 5 TABLET ORAL at 04:02

## 2023-02-16 RX ADMIN — PREGABALIN 150 MG: 75 CAPSULE ORAL at 08:02

## 2023-02-16 RX ADMIN — FLUTICASONE PROPIONATE 50 MCG: 50 SPRAY, METERED NASAL at 08:02

## 2023-02-16 RX ADMIN — CLOPIDOGREL BISULFATE 75 MG: 75 TABLET ORAL at 08:02

## 2023-02-16 RX ADMIN — DAPAGLIFLOZIN 10 MG: 10 TABLET, FILM COATED ORAL at 08:02

## 2023-02-16 RX ADMIN — GLIPIZIDE 5 MG: 2.5 TABLET, EXTENDED RELEASE ORAL at 08:02

## 2023-02-16 RX ADMIN — Medication 6 MG: at 12:02

## 2023-02-16 RX ADMIN — ACETAMINOPHEN 325MG 650 MG: 325 TABLET ORAL at 10:02

## 2023-02-16 NOTE — PLAN OF CARE
Pt. Is still having a lot of pain and discomfort from knee replacement surgery and is being medicated for this. Pt. Will cont. Working with Rehab on safety and endurance. Will cont. To follow.

## 2023-02-16 NOTE — NURSING
Dr. Bucio notified of pt's blood sugar 206 with no sliding scale insulin ordered; pt receives 1000 mg metformin nightly. Dr. Bucio states she will order sliding scale insulin

## 2023-02-16 NOTE — NURSING
Nurses Note -- 4 Eyes      2/16/2023   10:59 AM      Skin assessed during: Q Shift Change      [x] No Pressure Injuries Present    []Prevention Measures Documented      [] Yes- Altered Skin Integrity Present or Discovered   [] LDA Added if Not in Epic (Describe Wound)   [] New Altered Skin Integrity was Present on Admit and Documented in LDA   [] Wound Image Taken    Wound Care Consulted? No    Attending Nurse:  Debora Constantino RN     Second RN/Staff Member:  Mariza Godoy LPN    Dressing to R knee clean, dry, intact

## 2023-02-16 NOTE — NURSING
Dr. Bucio notified pt c/o continued pain 10 out of 10 after Norco at 1213. Dr. Bucio states to look at incision. Incision site clean, dry, intact. No drainage noted, sutures intact. Site swollen & warm. No redness noted. New dressing applied. Pulse intact to R foot. Dr. Bucio notified. Dr. Bucio states she will order 1x pain shot.

## 2023-02-16 NOTE — PROGRESS NOTES
Attempted afternoon treatment but patient reported being in too much pain and did not want her second physical therapy session. Therapist applied cryocuff to right knee. Therapy will resume tomorrow.

## 2023-02-16 NOTE — PT/OT/SLP PROGRESS
Physical Therapy Treatment    Patient Name:  Olga Stephenson   MRN:  [id number]    Recommendations:     Discharge Recommendations: home with home health  Discharge Equipment Recommendations: walker, rolling  Barriers to discharge: Inaccessible home and Decreased caregiver support    Assessment:     Olga Stephenson is a 67 y.o. female admitted with a medical diagnosis of <principal problem not specified>.  She presents with the following impairments/functional limitations: weakness, impaired endurance, impaired self care skills, impaired functional mobility, impaired balance, decreased safety awareness, decreased ROM. Pt. Participates well, with good response to cues for form and posture.     Rehab Prognosis: Good; patient would benefit from acute skilled PT services to address these deficits and reach maximum level of function.    Recent Surgery: * No surgery found *      Plan:     During this hospitalization, patient to be seen BID to address the identified rehab impairments via gait training, therapeutic activities, therapeutic exercises and progress toward the following goals:    Plan of Care Expires:  03/10/23    Subjective     Chief Complaint: incision site pain  Patient/Family Comments/goals: Pt. States she had pain med about 20 mins before therapists arrival, agrees to PT tx. Nop new complaints voiced.   Pain/Comfort:  Pain Rating 1: 7/10  Location - Side 1: Right  Location 1: knee  Pain Addressed 1: Pre-medicate for activity  Pain Rating Post-Intervention 1: 8/10      Objective:     Communicated with Debora Constantino RN and  OT  prior to session.  Patient found HOB elevated with peripheral IV upon PT entry to room.     General Precautions: Standard, fall  Orthopedic Precautions: RLE weight bearing as tolerated  Braces: N/A  Respiratory Status: Room air     Functional Mobility:  Bed Mobility:     Rolling Left:  independence and modified independence  Scooting: contact guard assistance  Supine to Sit: contact  guard assistance and minimum assistance to negotiate R LE  Sit to Supine: minimum assistance  Transfers:     Sit to Stand:  stand by assistance with rolling walker  Gait: Pt. Amb. To and from PT gym for pt. Room with RW with SBA and Min VC for posture      AM-PAC 6 CLICK MOBILITY  Turning over in bed (including adjusting bedclothes, sheets and blankets)?: 4  Sitting down on and standing up from a chair with arms (e.g., wheelchair, bedside commode, etc.): 4  Moving from lying on back to sitting on the side of the bed?: 4  Moving to and from a bed to a chair (including a wheelchair)?: 4  Need to walk in hospital room?: 4  Climbing 3-5 steps with a railing?: 1  Basic Mobility Total Score: 21       Treatment & Education:  Pt. Participated in mobility per above and Nu-step x 10 min, Seated LAQs and Marching with Min A to VC/ TC for full ROM as tolerated 2 x10 on R, Followed with Standing: 2 x 10 reps each of Heel Raises, Marching, Hip Ext, Minisquats and Hip ABD on R side.     Patient left HOB elevated with call button in reach..    GOALS:   Multidisciplinary Problems       Physical Therapy Goals          Problem: Physical Therapy    Goal Priority Disciplines Outcome Goal Variances Interventions   Physical Therapy Goal     PT, PT/OT Ongoing, Progressing     Description: Goals to be met by: 2 weeks     Patient will increase functional independence with mobility by performin. Supine to sit with Stand-by Assistance  2. Sit to supine with Stand-by Assistance  3. Rolling to Left and Right with Modified [place].  4. Sit to stand transfer with Stand-by Assistance  5. Bed to chair transfer with Stand-by Assistance using Rolling Walker  6. Gait  x 150 feet with Stand-by Assistance using Rolling Walker.     Goals to be met by: 4 weeks     Patient will increase functional independence with mobility by performin. Supine to sit with Modified [place]  2. Sit to supine with Modified [place]  3. Sit to stand  transfer with Modified [place]  4. Bed to chair transfer with Modified [place] using Rolling Walker  5. Gait  x 300 feet with Modified [place] using Rolling Walker.                          Time Tracking:     PT Received On: 02/16/23  PT Start Time: 0843     PT Stop Time: 0936  PT Total Time (min): 53 min     Billable Minutes: Gait Training 16, Therapeutic Activity 8, and Therapeutic Exercise 29    Treatment Type: Treatment  PT/PTA: PTA     PTA Visit Number: 2     02/16/2023

## 2023-02-16 NOTE — PT/OT/SLP PROGRESS
Occupational Therapy   Treatment    Name: Olga Stephenson  MRN: [id number]  Admitting Diagnosis:  R total knee replacement on 2/9/23       Recommendations:     Discharge Recommendations: outpatient PT  Discharge Equipment Recommendations:  walker, rolling  Barriers to discharge:       Assessment:     Olga Stephenson is a 67 y.o. female with a medical diagnosis of R total knee replacement on 2/9/23.  She presents with decreased balance, safety, strength. Performance deficits affecting function are weakness, impaired endurance, impaired self care skills, impaired functional mobility, impaired balance, decreased safety awareness, decreased ROM. Pt reports increased pain and tenderness on lateral and posterior portion of knee. Pt reported 10/10 pain and reports that it was intolerable. Pt was provided with education on pre stretching prevent stiffness.      Rehab Prognosis:  Good; patient would benefit from acute skilled OT services to address these deficits and reach maximum level of function.       Plan:     Patient to be seen 5 x/week to address the above listed problems via self-care/home management, therapeutic activities, therapeutic exercises  Plan of Care Expires:    Plan of Care Reviewed with: patient    Subjective     Pain/Comfort:  Pain Rating 1: 0/10  Location - Side 1: Right  Location - Orientation 1: lower  Location 1: knee    Objective:     Communicated with: Pt prior to session.  Patient found up in chair   upon OT entry to room.    General Precautions: Standard, fall    Orthopedic Precautions:   Braces:    Respiratory Status: Room air     Occupational Performance:     Bed Mobility:    Pt presented sitting upright in recliner upon Ot's arrival.      Functional Mobility/Transfers:  Patient completed Sit <> Stand Transfer with stand by assistance  with  rolling walker   Functional Mobility: Pt completed functional ambulation to the restroom using the RW for balance and safety.     Activities of Daily  Living:  Sit to stand t/f with SVN-Mod I for balance and safety      [facility] 6 Click ADL: 21    Treatment & Education:  Therapeutic exercise:    While sitting in w/c the Pt educated on the importance of quad stretches and pt was provided with a few stretches by OT.     Therapeutic activity    Manual  While sitting on the edge of the mat,. Massage technique  were applied to R  knee to increase joint ROM, flexibility and to decreased tightness and stiffness. x15 mins    Pt was provided with BioFreeze to reduce soreness   .    Patient left up in chair with all lines intact and call button in reach    GOALS:   Multidisciplinary Problems       Occupational Therapy Goals          Problem: Occupational Therapy    Goal Priority Disciplines Outcome Interventions   Occupational Therapy Goal     OT, PT/OT Ongoing, Progressing    Description: Goals to be met by: 3/10/23     Patient will increase functional independence with ADLs by performing:    UE Dressing with Modified [place].  LE Dressing with Modified [place].  Grooming while standing with Modified [place].  Toileting from toilet with Modified [place] for hygiene and clothing management.   Sitanding x5-10 minutes with Modified [place].  Squat pivot transfers with Modified [place].  Step transfer with Modified [place]  Toilet transfer to toilet with Modified [place].                         Time Tracking:     OT Date of Treatment:    OT Start Time:  1:45  OT Stop Time:  2:15  OT Total Time (min):      Billable Minutes:Self Care/Home Management 15  Manual 15    OT/ROBERT: OT          2/16/2023

## 2023-02-16 NOTE — NURSING
Dr. Bucio notified pt called call bell crying & asking for something for pain. Pt received Norco  @ 8053 & Tylenol 650 mg @ 1052. Dr. Bucio states pt will have to wait until next [place] dose is due at 1213.

## 2023-02-17 LAB
ANION GAP SERPL CALCULATED.3IONS-SCNC: 13 MMOL/L (ref 7–16)
ANISOCYTOSIS BLD QL SMEAR: NORMAL
ATYPICAL LYMPHOCYTES: NORMAL
BASOPHILS # BLD AUTO: 0.06 K/UL (ref 0–0.2)
BASOPHILS NFR BLD AUTO: 0.5 % (ref 0–1)
BASOPHILS NFR BLD MANUAL: 1 % (ref 0–1)
BUN SERPL-MCNC: 9 MG/DL (ref 7–18)
BUN/CREAT SERPL: 12 (ref 6–20)
CALCIUM SERPL-MCNC: 9.2 MG/DL (ref 8.5–10.1)
CHLORIDE SERPL-SCNC: 101 MMOL/L (ref 98–107)
CO2 SERPL-SCNC: 28 MMOL/L (ref 21–32)
CREAT SERPL-MCNC: 0.75 MG/DL (ref 0.55–1.02)
DIFFERENTIAL METHOD BLD: ABNORMAL
EGFR (NO RACE VARIABLE) (RUSH/TITUS): 87 ML/MIN/1.73M²
EOSINOPHIL # BLD AUTO: 0.22 K/UL (ref 0–0.5)
EOSINOPHIL NFR BLD AUTO: 1.8 % (ref 1–4)
EOSINOPHIL NFR BLD MANUAL: 2 % (ref 1–4)
ERYTHROCYTE [DISTWIDTH] IN BLOOD BY AUTOMATED COUNT: 13.2 % (ref 11.5–14.5)
GLUCOSE SERPL-MCNC: 146 MG/DL (ref 70–105)
GLUCOSE SERPL-MCNC: 148 MG/DL (ref 70–105)
GLUCOSE SERPL-MCNC: 154 MG/DL (ref 74–106)
HCT VFR BLD AUTO: 31.8 % (ref 38–47)
HGB BLD-MCNC: 10.2 G/DL (ref 12–16)
HYPOCHROMIA BLD QL SMEAR: NORMAL
LYMPHOCYTES # BLD AUTO: 4.07 K/UL (ref 1–4.8)
LYMPHOCYTES NFR BLD AUTO: 33.1 % (ref 27–41)
LYMPHOCYTES NFR BLD MANUAL: 29 % (ref 27–41)
MCH RBC QN AUTO: 29.3 PG (ref 27–31)
MCHC RBC AUTO-ENTMCNC: 32.1 G/DL (ref 32–36)
MCV RBC AUTO: 91.4 FL (ref 80–96)
MONOCYTES # BLD AUTO: 1.37 K/UL (ref 0–0.8)
MONOCYTES NFR BLD AUTO: 11.1 % (ref 2–6)
MONOCYTES NFR BLD MANUAL: 5 % (ref 2–6)
MPC BLD CALC-MCNC: 11.6 FL (ref 9.4–12.4)
NEUTROPHILS # BLD AUTO: 6.57 K/UL (ref 1.8–7.7)
NEUTROPHILS NFR BLD AUTO: 53.5 % (ref 53–65)
NEUTS BAND NFR BLD MANUAL: 2 % (ref 1–5)
NEUTS SEG NFR BLD MANUAL: 61 % (ref 50–62)
NRBC BLD MANUAL-RTO: NORMAL %
PLATELET # BLD AUTO: 339 K/UL (ref 150–400)
PLATELET MORPHOLOGY: NORMAL
POIKILOCYTOSIS BLD QL SMEAR: NORMAL
POTASSIUM SERPL-SCNC: 3.2 MMOL/L (ref 3.5–5.1)
RBC # BLD AUTO: 3.48 M/UL (ref 4.2–5.4)
SODIUM SERPL-SCNC: 139 MMOL/L (ref 136–145)
T4 FREE SERPL-MCNC: 1.63 NG/DL (ref 0.76–1.46)
TARGETS BLD QL SMEAR: NORMAL
TSH SERPL DL<=0.005 MIU/L-ACNC: 0.57 UIU/ML (ref 0.36–3.74)
WBC # BLD AUTO: 12.29 K/UL (ref 4.5–11)

## 2023-02-17 PROCEDURE — 99305 1ST NF CARE MODERATE MDM 35: CPT | Mod: ,,, | Performed by: EMERGENCY MEDICINE

## 2023-02-17 PROCEDURE — 97116 GAIT TRAINING THERAPY: CPT | Mod: CQ

## 2023-02-17 PROCEDURE — 94640 AIRWAY INHALATION TREATMENT: CPT

## 2023-02-17 PROCEDURE — 25000003 PHARM REV CODE 250: Performed by: FAMILY MEDICINE

## 2023-02-17 PROCEDURE — 85025 COMPLETE CBC W/AUTO DIFF WBC: CPT | Performed by: NURSE PRACTITIONER

## 2023-02-17 PROCEDURE — 25000003 PHARM REV CODE 250: Performed by: EMERGENCY MEDICINE

## 2023-02-17 PROCEDURE — 99305 PR NURSING FACILITY CARE, INIT, MOD SEVERITY: ICD-10-PCS | Mod: ,,, | Performed by: EMERGENCY MEDICINE

## 2023-02-17 PROCEDURE — 11000004 HC SNF PRIVATE

## 2023-02-17 PROCEDURE — 99900035 HC TECH TIME PER 15 MIN (STAT)

## 2023-02-17 PROCEDURE — 97530 THERAPEUTIC ACTIVITIES: CPT | Mod: CQ

## 2023-02-17 PROCEDURE — 97110 THERAPEUTIC EXERCISES: CPT

## 2023-02-17 PROCEDURE — 80048 BASIC METABOLIC PNL TOTAL CA: CPT | Performed by: NURSE PRACTITIONER

## 2023-02-17 PROCEDURE — 97110 THERAPEUTIC EXERCISES: CPT | Mod: CQ

## 2023-02-17 PROCEDURE — 94761 N-INVAS EAR/PLS OXIMETRY MLT: CPT

## 2023-02-17 PROCEDURE — 25000003 PHARM REV CODE 250: Performed by: NURSE PRACTITIONER

## 2023-02-17 PROCEDURE — 97530 THERAPEUTIC ACTIVITIES: CPT

## 2023-02-17 PROCEDURE — 82962 GLUCOSE BLOOD TEST: CPT

## 2023-02-17 PROCEDURE — 84439 ASSAY OF FREE THYROXINE: CPT | Performed by: EMERGENCY MEDICINE

## 2023-02-17 RX ADMIN — GLIPIZIDE 5 MG: 2.5 TABLET, EXTENDED RELEASE ORAL at 09:02

## 2023-02-17 RX ADMIN — ALBUTEROL SULFATE 2 PUFF: 90 AEROSOL, METERED RESPIRATORY (INHALATION) at 07:02

## 2023-02-17 RX ADMIN — FLUTICASONE PROPIONATE 50 MCG: 50 SPRAY, METERED NASAL at 09:02

## 2023-02-17 RX ADMIN — OXYCODONE AND ACETAMINOPHEN 1 TABLET: 325; 5 TABLET ORAL at 02:02

## 2023-02-17 RX ADMIN — METOPROLOL SUCCINATE 25 MG: 25 TABLET, EXTENDED RELEASE ORAL at 09:02

## 2023-02-17 RX ADMIN — MELOXICAM 7.5 MG: 7.5 TABLET ORAL at 09:02

## 2023-02-17 RX ADMIN — LOSARTAN POTASSIUM 50 MG: 50 TABLET, FILM COATED ORAL at 09:02

## 2023-02-17 RX ADMIN — SENNOSIDES AND DOCUSATE SODIUM 1 TABLET: 8.6; 5 TABLET ORAL at 09:02

## 2023-02-17 RX ADMIN — APIXABAN 2.5 MG: 2.5 TABLET, FILM COATED ORAL at 09:02

## 2023-02-17 RX ADMIN — PREGABALIN 75 MG: 75 CAPSULE ORAL at 09:02

## 2023-02-17 RX ADMIN — EZETIMIBE 10 MG: 10 TABLET ORAL at 09:02

## 2023-02-17 RX ADMIN — PANTOPRAZOLE SODIUM 40 MG: 40 TABLET, DELAYED RELEASE ORAL at 09:02

## 2023-02-17 RX ADMIN — CLOPIDOGREL BISULFATE 75 MG: 75 TABLET ORAL at 09:02

## 2023-02-17 RX ADMIN — OXYCODONE AND ACETAMINOPHEN 1 TABLET: 325; 5 TABLET ORAL at 09:02

## 2023-02-17 RX ADMIN — DAPAGLIFLOZIN 10 MG: 10 TABLET, FILM COATED ORAL at 09:02

## 2023-02-17 RX ADMIN — CYCLOBENZAPRINE 10 MG: 10 TABLET, FILM COATED ORAL at 05:02

## 2023-02-17 RX ADMIN — VALACYCLOVIR HYDROCHLORIDE 500 MG: 500 TABLET, FILM COATED ORAL at 09:02

## 2023-02-17 RX ADMIN — PREGABALIN 150 MG: 75 CAPSULE ORAL at 09:02

## 2023-02-17 RX ADMIN — METFORMIN HYDROCHLORIDE 1000 MG: 500 TABLET ORAL at 09:02

## 2023-02-17 NOTE — NURSING
Pt requests muscle relaxer for muscle pain in right thigh. Flexeril 10mg PO given. Will follow up with effectiveness

## 2023-02-17 NOTE — NURSING
Nurses Note -- 4 Eyes      2/17/2023   0730      Skin assessed during: Q Shift Change      [x] No Pressure Injuries Present    []Prevention Measures Documented      [x] Yes- Altered Skin Integrity Present or Discovered   [] LDA Added if Not in Epic (Describe Wound)   [x] New Altered Skin Integrity was Present on Admit and Documented in LDA   [] Wound Image Taken    Wound Care Consulted? No    Attending Nurse:  ERIC RIVAS RN     Second RN/Staff Member:  Imtiaz Godoy LPN    Incision to right knee noted

## 2023-02-17 NOTE — PT/OT/SLP PROGRESS
Physical Therapy Treatment    Patient Name:  Olga Stephenson   MRN:  [id number]    Recommendations:     Discharge Recommendations: home with home health  Discharge Equipment Recommendations: walker, rolling  Barriers to discharge: Inaccessible home and Decreased caregiver support    Assessment:     Olga Stephenson is a 67 y.o. female admitted with a medical diagnosis of <principal problem not specified>.  She presents with the following impairments/functional limitations: weakness, impaired endurance, impaired self care skills, impaired functional mobility, impaired balance, decreased safety awareness, decreased ROM.    Rehab Prognosis: Good; patient would benefit from acute skilled PT services to address these deficits and reach maximum level of function.    Recent Surgery: * No surgery found *      Plan:     During this hospitalization, patient to be seen BID to address the identified rehab impairments via gait training, therapeutic activities, therapeutic exercises and progress toward the following goals:    Plan of Care Expires:  03/10/23    Subjective     Chief Complaint: knee pain/ stiffness  Patient/Family Comments/goals: Pt. States knee feels better today  Pain/Comfort:  Pain Rating 1: 7/10  Location - Side 1: Right  Location 1: knee  Pain Addressed 1: Reposition      Objective:     Communicated with Gloria Payne RN prior to session.  Patient found HOB elevated with peripheral IV upon PT entry to room.     General Precautions: Standard, fall  Orthopedic Precautions: RLE weight bearing as tolerated  Braces: N/A  Respiratory Status: Room air     Functional Mobility:  Bed Mobility:     Scooting: contact guard assistance and minimum assistance  Supine to Sit: contact guard assistance and minimum assistance  Transfers:     Sit to Stand:  stand by assistance and contact guard assistance with rolling walker  Bed to Chair: stand by assistance with  rolling walker  using  Step Transfer  Toilet Transfer:  supervision and stand by assistance with  rolling walker  and grab bars using  Step Transfer  Gait: Pt. Amb to and from PT gym from pt. Room with VC for knee ext. In stance phase, upright posture and fwd gaze.       AM-PAC 6 CLICK MOBILITY  Turning over in bed (including adjusting bedclothes, sheets and blankets)?: 4  Sitting down on and standing up from a chair with arms (e.g., wheelchair, bedside commode, etc.): 4  Moving from lying on back to sitting on the side of the bed?: 4  Moving to and from a bed to a chair (including a wheelchair)?: 4  Need to walk in hospital room?: 4  Climbing 3-5 steps with a railing?: 1  Basic Mobility Total Score: 21       Treatment & Education:  Pt. Participated in mobility per above and Nu-step activity x 10 mins with seat progression from level 21 to level 18. Followed with Seated HS Curls, LAQs (with assist into full ext, TC for holding contraction and slow eccentric contraction to lower foot) 2 x 10 reps, Quad sets with over pressure for HS stretching. Standing TKE 3 x 10 with green t-band and followed with Knee flexion stretches 4 x 30 seconds. Instructed pt. In stretching, sitting up in recliner with knee in flexed popositionand walking over weekend.      Patient left up in chair with call button in reach..    GOALS:   Multidisciplinary Problems       Physical Therapy Goals          Problem: Physical Therapy    Goal Priority Disciplines Outcome Goal Variances Interventions   Physical Therapy Goal     PT, PT/OT Ongoing, Progressing     Description: Goals to be met by: 2 weeks     Patient will increase functional independence with mobility by performin. Supine to sit with Stand-by Assistance  2. Sit to supine with Stand-by Assistance  3. Rolling to Left and Right with Modified [place].  4. Sit to stand transfer with Stand-by Assistance  5. Bed to chair transfer with Stand-by Assistance using Rolling Walker  6. Gait  x 150 feet with Stand-by Assistance using Rolling  Walker.     Goals to be met by: 4 weeks     Patient will increase functional independence with mobility by performin. Supine to sit with Modified [place]  2. Sit to supine with Modified [place]  3. Sit to stand transfer with Modified [place]  4. Bed to chair transfer with Modified [place] using Rolling Walker  5. Gait  x 300 feet with Modified [place] using Rolling Walker.                          Time Tracking:     PT Received On: 23  PT Start Time: 45     PT Stop Time: 934  PT Total Time (min): 49 min     Billable Minutes: Gait Training 17, Therapeutic Activity 12, and Therapeutic Exercise 20    Treatment Type: Treatment  PT/PTA: PTA     PTA Visit Number: 3     2023

## 2023-02-17 NOTE — PLAN OF CARE
Problem: Adult Inpatient Plan of Care  Goal: Plan of Care Review  Outcome: Ongoing, Progressing  Goal: Patient-Specific Goal (Individualized)  Outcome: Ongoing, Progressing  Goal: Absence of Hospital-Acquired Illness or Injury  Outcome: Ongoing, Progressing  Goal: Optimal Comfort and Wellbeing  Outcome: Ongoing, Progressing  Goal: Readiness for Transition of Care  Outcome: Ongoing, Progressing     Problem: Diabetes Comorbidity  Goal: Blood Glucose Level Within Targeted Range  Outcome: Ongoing, Progressing     Problem: Fall Injury Risk  Goal: Absence of Fall and Fall-Related Injury  Outcome: Ongoing, Progressing     Problem: Skin Injury Risk Increased  Goal: Skin Health and Integrity  Outcome: Ongoing, Progressing     Problem: Adjustment to Surgery (Knee Arthroplasty)  Goal: Optimal Coping  Outcome: Ongoing, Progressing     Problem: Bleeding (Knee Arthroplasty)  Goal: Absence of Bleeding  Outcome: Ongoing, Progressing     Problem: Bowel Motility Impaired (Knee Arthroplasty)  Goal: Effective Bowel Elimination  Outcome: Ongoing, Progressing     Problem: Fluid and Electrolyte Imbalance (Knee Arthroplasty)  Goal: Fluid and Electrolyte Balance  Outcome: Ongoing, Progressing

## 2023-02-17 NOTE — PT/OT/SLP PROGRESS
Occupational Therapy   Treatment    Name: Olga Stephenson  MRN: [id number]  Admitting Diagnosis:  R total knee replacement on 2/9/23       Recommendations:     Discharge Recommendations: outpatient PT  Discharge Equipment Recommendations:  walker, rolling  Barriers to discharge:       Assessment:     Olga Stephenson is a 67 y.o. female with a medical diagnosis of R total knee replacement on 2/9/23.  She presents with decreased balance, safety, strength. Performance deficits affecting function are weakness, impaired endurance, impaired self care skills, impaired functional mobility, impaired balance, decreased safety awareness, decreased ROM. Pt  tolerated therapy much better on today. Pt provided with LE stretches to perform over the weekend. Rehab Prognosis:  Good; patient would benefit from acute skilled OT services to address these deficits and reach maximum level of function.       Plan:     Patient to be seen 5 x/week to address the above listed problems via self-care/home management, therapeutic activities, therapeutic exercises  Plan of Care Expires:    Plan of Care Reviewed with: patient    Subjective     Pain/Comfort:  Pain Rating 1: 0/10  Location - Side 1: Right  Location - Orientation 1: lower  Location 1: knee    Objective:     Communicated with: Pt prior to session.  Patient found up in chair   upon OT entry to room.    General Precautions: Standard, fall    Orthopedic Precautions:   Braces:    Respiratory Status: Room air     Occupational Performance:     Bed Mobility:    Pt presented sitting upright in recliner upon Ot's arrival.      Functional Mobility/Transfers:  Patient completed Sit <> Stand Transfer with stand by assistance  with  rolling walker   Functional Mobility: Pt completed functional ambulation to the restroom using the RW for balance and safety.     Activities of Daily Living:  Sit to stand t/f with -Mod I for balance and safety      [facility] 6 Click ADL: 21    Treatment &  Education:  Therapeutic exercise:    While sitting in w/c the pt completed bicep curls 2 sets of 20 reps with 5# DB; chest press:2 sets of 20 reps with 5# DB. Pt educated on the importance of quad stretches and pt was provided with a few stretches by OT.     While sitting in the w/c, the pt completed w/c bump by stabilizing her toes against the wall and rolling the w/c forward to promote knee flexion ROM in order to sit on toilet .    Therapeutic activity    While sitting in the w/c the pt competed 2 sets of 50 reps of unlateral R leg presses on the trampoline to increase knee flexion and extension for functional mobility     Pt was provided with knee massaged to reduce tenderness of RLE.    Patient left up in chair with all lines intact and call button in reach    GOALS:   Multidisciplinary Problems       Occupational Therapy Goals          Problem: Occupational Therapy    Goal Priority Disciplines Outcome Interventions   Occupational Therapy Goal     OT, PT/OT Ongoing, Progressing    Description: Goals to be met by: 3/10/23     Patient will increase functional independence with ADLs by performing:    UE Dressing with Modified [place].  LE Dressing with Modified [place].  Grooming while standing with Modified [place].  Toileting from toilet with Modified [place] for hygiene and clothing management.   Sitanding x5-10 minutes with Modified [place].  Squat pivot transfers with Modified [place].  Step transfer with Modified [place]  Toilet transfer to toilet with Modified [place].                         Time Tracking:     OT Date of Treatment:    OT Start Time:  2:00  OT Stop Time:  2:30  OT Total Time (min):      Billable Minutes:Therapeutic activity 15  Therapeutic Exercise 15    OT/ROBERT: OT          2/17/2023

## 2023-02-17 NOTE — PT/OT/SLP PROGRESS
Physical Therapy Treatment    Patient Name:  Olga Stephenson   MRN:  [id number]    Recommendations:     Discharge Recommendations: home with home health  Discharge Equipment Recommendations: walker, rolling  Barriers to discharge: None    Assessment:     Olga Stephenson is a 67 y.o. female admitted with a medical diagnosis of R LE TKA.  She presents with the following impairments/functional limitations: weakness, impaired endurance, gait instability, decreased lower extremity function, pain, decreased safety awareness, decreased ROM, edema, orthopedic precautions.    Pt tolerated afternoon session well with less c/o of right knee pain and able to get into bed without any assistance with Right lower extremity on this date. Pt still able to maintain ambulation distance using RW SBA.    Rehab Prognosis: Good; patient would benefit from acute skilled PT services to address these deficits and reach maximum level of function.    Recent Surgery: * No surgery found *      Plan:     During this hospitalization, patient to be seen BID to address the identified rehab impairments via gait training and progress toward the following goals:    Plan of Care Expires:  03/10/23    Subjective     Chief Complaint: Pt reports decrease in pain level this afternoon.  Patient/Family Comments/goals: To get stronger and improve mobility to return home to [facility].  Pain/Comfort:  Pain Rating 1: 7/10  Location - Side 1: Right  Location - Orientation 1: lower  Location 1: knee  Pain Addressed 1: Distraction, Reposition      Objective:     Communicated with pt prior to session.  Patient found sitting in W/C    upon PT entry to room.     General Precautions: Standard, fall  Orthopedic Precautions: RLE weight bearing as tolerated  Braces: N/A  Respiratory Status: Room air     Functional Mobility:  Bed Mobility:    Sit to Supine: SBA  Transfers:     Sit to Stand:  contact guard assistance / SBA with rolling walker  Gait: x 100 ft total using RW CGA  / SBA  Balance: Static standing balance is good-      AM-PAC 6 CLICK MOBILITY  Turning over in bed (including adjusting bedclothes, sheets and blankets)?: 4  Sitting down on and standing up from a chair with arms (e.g., wheelchair, bedside commode, etc.): 4  Moving from lying on back to sitting on the side of the bed?: 4  Moving to and from a bed to a chair (including a wheelchair)?: 4  Need to walk in hospital room?: 4  Climbing 3-5 steps with a railing?: 1  Basic Mobility Total Score: 21       Treatment & Education: all to improve Right lower extremity ROM, strength, endurance,standing tolerance, and balance with all ADLs.   Sit to stand from W/C to RW- SBA  Ambulation from gym to room x 100 ft using RW SBA  Sit to supine- SBA  Scooting up in bed- SBA    Patient left supine with all lines intact and call button in reach.     GOALS:   Multidisciplinary Problems       Physical Therapy Goals          Problem: Physical Therapy    Goal Priority Disciplines Outcome Goal Variances Interventions   Physical Therapy Goal     PT, PT/OT Ongoing, Progressing     Description: Goals to be met by: 2 weeks     Patient will increase functional independence with mobility by performin. Supine to sit with Stand-by Assistance  2. Sit to supine with Stand-by Assistance  3. Rolling to Left and Right with Modified [place].  4. Sit to stand transfer with Stand-by Assistance  5. Bed to chair transfer with Stand-by Assistance using Rolling Walker  6. Gait  x 150 feet with Stand-by Assistance using Rolling Walker.     Goals to be met by: 4 weeks     Patient will increase functional independence with mobility by performin. Supine to sit with Modified [place]  2. Sit to supine with Modified [place]  3. Sit to stand transfer with Modified [place]  4. Bed to chair transfer with Modified [place] using Rolling Walker  5. Gait  x 300 feet with Modified [place] using Rolling Walker.                           Time Tracking:     PT Received On: 02/17/23  PT Start Time: 1543     PT Stop Time: 1553  PT Total Time (min): 10 min     Billable Minutes: Gait Training 10    Treatment Type: Treatment  PT/PTA: PTA     PTA Visit Number: 4     02/17/2023

## 2023-02-18 LAB
GLUCOSE SERPL-MCNC: 125 MG/DL (ref 70–105)
GLUCOSE SERPL-MCNC: 152 MG/DL (ref 70–105)

## 2023-02-18 PROCEDURE — 36416 COLLJ CAPILLARY BLOOD SPEC: CPT

## 2023-02-18 PROCEDURE — 94761 N-INVAS EAR/PLS OXIMETRY MLT: CPT

## 2023-02-18 PROCEDURE — 25000003 PHARM REV CODE 250: Performed by: FAMILY MEDICINE

## 2023-02-18 PROCEDURE — 25000003 PHARM REV CODE 250: Performed by: EMERGENCY MEDICINE

## 2023-02-18 PROCEDURE — 82962 GLUCOSE BLOOD TEST: CPT

## 2023-02-18 PROCEDURE — 99900035 HC TECH TIME PER 15 MIN (STAT)

## 2023-02-18 PROCEDURE — 94640 AIRWAY INHALATION TREATMENT: CPT

## 2023-02-18 PROCEDURE — 27000958

## 2023-02-18 PROCEDURE — 27000981 HC MATTRESS, ACCUCAIR DAILY RENTAL

## 2023-02-18 PROCEDURE — 11000004 HC SNF PRIVATE

## 2023-02-18 PROCEDURE — 25000003 PHARM REV CODE 250: Performed by: NURSE PRACTITIONER

## 2023-02-18 RX ADMIN — MELOXICAM 7.5 MG: 7.5 TABLET ORAL at 09:02

## 2023-02-18 RX ADMIN — PREGABALIN 150 MG: 75 CAPSULE ORAL at 08:02

## 2023-02-18 RX ADMIN — VALACYCLOVIR HYDROCHLORIDE 500 MG: 500 TABLET, FILM COATED ORAL at 09:02

## 2023-02-18 RX ADMIN — PREGABALIN 75 MG: 75 CAPSULE ORAL at 09:02

## 2023-02-18 RX ADMIN — ALBUTEROL SULFATE 2 PUFF: 90 AEROSOL, METERED RESPIRATORY (INHALATION) at 08:02

## 2023-02-18 RX ADMIN — OXYCODONE AND ACETAMINOPHEN 1 TABLET: 325; 5 TABLET ORAL at 01:02

## 2023-02-18 RX ADMIN — APIXABAN 2.5 MG: 2.5 TABLET, FILM COATED ORAL at 09:02

## 2023-02-18 RX ADMIN — APIXABAN 2.5 MG: 2.5 TABLET, FILM COATED ORAL at 08:02

## 2023-02-18 RX ADMIN — METFORMIN HYDROCHLORIDE 1000 MG: 500 TABLET ORAL at 08:02

## 2023-02-18 RX ADMIN — GLIPIZIDE 5 MG: 2.5 TABLET, EXTENDED RELEASE ORAL at 09:02

## 2023-02-18 RX ADMIN — CLOPIDOGREL BISULFATE 75 MG: 75 TABLET ORAL at 09:02

## 2023-02-18 RX ADMIN — METOPROLOL SUCCINATE 25 MG: 25 TABLET, EXTENDED RELEASE ORAL at 09:02

## 2023-02-18 RX ADMIN — OXYCODONE AND ACETAMINOPHEN 1 TABLET: 325; 5 TABLET ORAL at 09:02

## 2023-02-18 RX ADMIN — EZETIMIBE 10 MG: 10 TABLET ORAL at 08:02

## 2023-02-18 RX ADMIN — PANTOPRAZOLE SODIUM 40 MG: 40 TABLET, DELAYED RELEASE ORAL at 09:02

## 2023-02-18 RX ADMIN — OXYCODONE AND ACETAMINOPHEN 1 TABLET: 325; 5 TABLET ORAL at 08:02

## 2023-02-18 RX ADMIN — ACETAMINOPHEN 325MG 650 MG: 325 TABLET ORAL at 11:02

## 2023-02-18 RX ADMIN — ALBUTEROL SULFATE 2 PUFF: 90 AEROSOL, METERED RESPIRATORY (INHALATION) at 07:02

## 2023-02-18 RX ADMIN — DAPAGLIFLOZIN 10 MG: 10 TABLET, FILM COATED ORAL at 09:02

## 2023-02-18 RX ADMIN — LOSARTAN POTASSIUM 50 MG: 50 TABLET, FILM COATED ORAL at 09:02

## 2023-02-18 RX ADMIN — FLUTICASONE PROPIONATE 50 MCG: 50 SPRAY, METERED NASAL at 09:02

## 2023-02-18 NOTE — PLAN OF CARE
Problem: Adult Inpatient Plan of Care  Goal: Plan of Care Review  Outcome: Ongoing, Progressing  Goal: Patient-Specific Goal (Individualized)  Outcome: Ongoing, Progressing  Goal: Absence of Hospital-Acquired Illness or Injury  Outcome: Ongoing, Progressing  Goal: Optimal Comfort and Wellbeing  Outcome: Ongoing, Progressing  Goal: Readiness for Transition of Care  Outcome: Ongoing, Progressing     Problem: Diabetes Comorbidity  Goal: Blood Glucose Level Within Targeted Range  Outcome: Ongoing, Progressing     Problem: Fall Injury Risk  Goal: Absence of Fall and Fall-Related Injury  Outcome: Ongoing, Progressing     Problem: Skin Injury Risk Increased  Goal: Skin Health and Integrity  Outcome: Ongoing, Progressing     Problem: Adjustment to Surgery (Knee Arthroplasty)  Goal: Optimal Coping  Outcome: Ongoing, Progressing     Problem: Bleeding (Knee Arthroplasty)  Goal: Absence of Bleeding  Outcome: Ongoing, Progressing     Problem: Bowel Motility Impaired (Knee Arthroplasty)  Goal: Effective Bowel Elimination  Outcome: Ongoing, Progressing     Problem: Functional Ability Impaired (Knee Arthroplasty)  Goal: Optimal Functional Ability  Outcome: Ongoing, Progressing     Problem: Infection (Knee Arthroplasty)  Goal: Absence of Infection Signs and Symptoms  Outcome: Ongoing, Progressing     Problem: Neurovascular Compromise (Knee Arthroplasty)  Goal: Intact Neurovascular Status  Outcome: Ongoing, Progressing     Problem: Pain (Knee Arthroplasty)  Goal: Acceptable Pain Control  Outcome: Ongoing, Progressing

## 2023-02-19 PROBLEM — E87.6 HYPOKALEMIA: Status: ACTIVE | Noted: 2023-02-19

## 2023-02-19 PROBLEM — R79.89 ABNORMAL TSH: Status: ACTIVE | Noted: 2023-02-19

## 2023-02-19 LAB
GLUCOSE SERPL-MCNC: 124 MG/DL (ref 70–105)
GLUCOSE SERPL-MCNC: 154 MG/DL (ref 70–105)

## 2023-02-19 PROCEDURE — 25000003 PHARM REV CODE 250: Performed by: FAMILY MEDICINE

## 2023-02-19 PROCEDURE — 25000003 PHARM REV CODE 250: Performed by: EMERGENCY MEDICINE

## 2023-02-19 PROCEDURE — 27000958

## 2023-02-19 PROCEDURE — 99900035 HC TECH TIME PER 15 MIN (STAT)

## 2023-02-19 PROCEDURE — 82962 GLUCOSE BLOOD TEST: CPT

## 2023-02-19 PROCEDURE — 25000003 PHARM REV CODE 250: Performed by: NURSE PRACTITIONER

## 2023-02-19 PROCEDURE — 27000981 HC MATTRESS, ACCUCAIR DAILY RENTAL

## 2023-02-19 PROCEDURE — 11000004 HC SNF PRIVATE

## 2023-02-19 PROCEDURE — 94761 N-INVAS EAR/PLS OXIMETRY MLT: CPT

## 2023-02-19 PROCEDURE — 94640 AIRWAY INHALATION TREATMENT: CPT

## 2023-02-19 RX ORDER — POTASSIUM CHLORIDE 750 MG/1
40 TABLET, EXTENDED RELEASE ORAL ONCE
Status: COMPLETED | OUTPATIENT
Start: 2023-02-19 | End: 2023-02-19

## 2023-02-19 RX ADMIN — METFORMIN HYDROCHLORIDE 1000 MG: 500 TABLET ORAL at 09:02

## 2023-02-19 RX ADMIN — EZETIMIBE 10 MG: 10 TABLET ORAL at 09:02

## 2023-02-19 RX ADMIN — PREGABALIN 150 MG: 75 CAPSULE ORAL at 09:02

## 2023-02-19 RX ADMIN — OXYCODONE AND ACETAMINOPHEN 1 TABLET: 325; 5 TABLET ORAL at 03:02

## 2023-02-19 RX ADMIN — MELOXICAM 7.5 MG: 7.5 TABLET ORAL at 09:02

## 2023-02-19 RX ADMIN — PANTOPRAZOLE SODIUM 40 MG: 40 TABLET, DELAYED RELEASE ORAL at 09:02

## 2023-02-19 RX ADMIN — LOSARTAN POTASSIUM 50 MG: 50 TABLET, FILM COATED ORAL at 09:02

## 2023-02-19 RX ADMIN — POTASSIUM CHLORIDE 40 MEQ: 750 TABLET, EXTENDED RELEASE ORAL at 09:02

## 2023-02-19 RX ADMIN — METOPROLOL SUCCINATE 25 MG: 25 TABLET, EXTENDED RELEASE ORAL at 09:02

## 2023-02-19 RX ADMIN — OXYCODONE AND ACETAMINOPHEN 1 TABLET: 325; 5 TABLET ORAL at 09:02

## 2023-02-19 RX ADMIN — ACETAMINOPHEN 325MG 650 MG: 325 TABLET ORAL at 09:02

## 2023-02-19 RX ADMIN — OXYCODONE AND ACETAMINOPHEN 1 TABLET: 325; 5 TABLET ORAL at 06:02

## 2023-02-19 RX ADMIN — GLIPIZIDE 5 MG: 2.5 TABLET, EXTENDED RELEASE ORAL at 09:02

## 2023-02-19 RX ADMIN — APIXABAN 2.5 MG: 2.5 TABLET, FILM COATED ORAL at 09:02

## 2023-02-19 RX ADMIN — DAPAGLIFLOZIN 10 MG: 10 TABLET, FILM COATED ORAL at 09:02

## 2023-02-19 RX ADMIN — ALBUTEROL SULFATE 2 PUFF: 90 AEROSOL, METERED RESPIRATORY (INHALATION) at 08:02

## 2023-02-19 RX ADMIN — OXYCODONE AND ACETAMINOPHEN 1 TABLET: 325; 5 TABLET ORAL at 11:02

## 2023-02-19 RX ADMIN — VALACYCLOVIR HYDROCHLORIDE 500 MG: 500 TABLET, FILM COATED ORAL at 09:02

## 2023-02-19 RX ADMIN — FLUTICASONE PROPIONATE 50 MCG: 50 SPRAY, METERED NASAL at 09:02

## 2023-02-19 RX ADMIN — PREGABALIN 75 MG: 75 CAPSULE ORAL at 09:02

## 2023-02-19 RX ADMIN — CLOPIDOGREL BISULFATE 75 MG: 75 TABLET ORAL at 09:02

## 2023-02-19 RX ADMIN — ALBUTEROL SULFATE 2 PUFF: 90 AEROSOL, METERED RESPIRATORY (INHALATION) at 07:02

## 2023-02-19 NOTE — ASSESSMENT & PLAN NOTE
Patient's FSGs are controlled on current medication regimen.  Last A1c reviewed-   Lab Results   Component Value Date    HGBA1C 6.9 (H) 02/10/2023     Most recent fingerstick glucose reviewed- No results for input(s): POCTGLUCOSE in the last 24 hours.  Current correctional scale  High  Maintain anti-hyperglycemic dose as follows-   Antihyperglycemics (From admission, onward)    Start     Stop Route Frequency Ordered    02/11/23 0900  dapagliflozin tablet 10 mg         -- Oral Daily 02/10/23 1521    02/11/23 0745  glipiZIDE 24 hr tablet 5 mg         -- Oral With breakfast 02/10/23 1521    02/10/23 2100  metFORMIN tablet 1,000 mg         -- Oral Nightly 02/10/23 1521        Hold Oral hypoglycemics while patient is in the hospital if pt has hypoblycemia    02/17/2023  RD HAS BEEN CONSULTED AND REVIEWED BASIC DM DIETARY PRINCIPLES WITH THE PT; MEAL INTAKE IS 50%. PT GLU RANGE -205 ON DAPAGLIFLOZIN, GLIPIZIDE AND METFORMIN WITH A1C 6.9   Name band;

## 2023-02-19 NOTE — ASSESSMENT & PLAN NOTE
PT IS AT RISK FOR VTE  VTE PPX  ELIQUIS/PLAVIX TEDS/EARLY AMBULATION  WILL HOLD ESTRADIOL FOR NOW DUE TO INCREASED RISK OF VTE  02/17/2023  CONTINUE ELIQUIS/ PLAVIX/ TEDS   AMBULATION (100 FEET)

## 2023-02-19 NOTE — NURSING
Nurses Note -- 4 Eyes      2/19/2023   1:43 AM      Skin assessed during: Q Shift Change      [x] No Pressure Injuries Present    [x]Prevention Measures Documented      [x] Yes- Altered Skin Integrity Present or Discovered   [] LDA Added if Not in Epic (Describe Wound)   [x] New Altered Skin Integrity was Present on Admit and Documented in LDA   [] Wound Image Taken    Wound Care Consulted? No    Attending Nurse:  ABHISHEK BURKETT RN     Second RN/Staff Member:  Francie  Dsg over right knee incision per order, no pressure injuries

## 2023-02-19 NOTE — ASSESSMENT & PLAN NOTE
PT/OT WILL EVALUATE AND INITIATE A TREATMENT PLAN  WBAT    02/17/2023  PT IS PARTICIPATING WITH PT/OT PERFORMING BED MOBILITY WITH SBA, TRANSFERS WITH CGA AND SBA WITH RW, AND AMBULATING 100 FEET WITH CGA/SBA WITH RW.RD HAS BE

## 2023-02-19 NOTE — SUBJECTIVE & OBJECTIVE
Interval History:   PT IS ON POST OP DAY 8 R TKR WITH INCISION HEALING WELL AND PAIN WELL CONTROLLED WITH PERCOCET   PT IS PARTICIPATING WITH PT/OT AMBULATING 100 FEET WITH CGA/SBA WITH RW. RD HAS BEEN CONSULTED AND REVIEWED BASIC DM DIETARY PRINCIPLES WITH THE PT; MEAL INTAKE IS 50%. PT GLU RANGE -205 ON GLIPIZIDE, DAPAGLIFLOZIN,  AND METFORMIN WITH A1C 6.9  THE  PHARMACY IS MONITORING ALL MEDICATIONS..  PT HAS STABLE LABS EXCEPT FOR BMP: , K 3.2 , CBC: WBC 12.29 K, H/H 10.2/31.8.     Review of Systems   Constitutional:  Positive for activity change and fatigue. Negative for appetite change.   HENT: Negative.     Eyes: Negative.    Respiratory: Negative.     Cardiovascular:  Positive for leg swelling (R KNEE).   Gastrointestinal: Negative.    Endocrine: Negative.    Genitourinary: Negative.    Musculoskeletal:  Positive for arthralgias (R KNEE).   Skin:  Positive for wound (R KNEE POST OP).   Allergic/Immunologic: Positive for immunocompromised state (DM2).   Neurological:  Positive for weakness.   Hematological:  Bruises/bleeds easily.   Psychiatric/Behavioral: Negative.     Objective:     Vital Signs (Most Recent):  Temp: 98 °F (36.7 °C) (02/18/23 2000)  Pulse: 93 (02/18/23 2033)  Resp: 20 (02/18/23 2046)  BP: 127/66 (02/18/23 2000)  SpO2: 98 % (02/18/23 2033) Vital Signs (24h Range):  Temp:  [98 °F (36.7 °C)-98.4 °F (36.9 °C)] 98 °F (36.7 °C)  Pulse:  [87-98] 93  Resp:  [15-20] 20  SpO2:  [96 %-98 %] 98 %  BP: (120-127)/(64-66) 127/66     Weight: 96 kg (211 lb 10.3 oz)  Body mass index is 31.25 kg/m².  No intake or output data in the 24 hours ending 02/19/23 0010   Physical Exam  Constitutional:       Appearance: She is obese. She is ill-appearing.   HENT:      Head: Normocephalic and atraumatic.      Right Ear: Tympanic membrane normal.      Left Ear: Tympanic membrane normal.      Nose: Nose normal.      Mouth/Throat:      Mouth: Mucous membranes are moist.   Eyes:      Extraocular Movements:  Extraocular movements intact.      Pupils: Pupils are equal, round, and reactive to light.   Cardiovascular:      Rate and Rhythm: Normal rate and regular rhythm.      Pulses: Normal pulses.      Heart sounds: Normal heart sounds. No murmur heard.    No friction rub. No gallop.   Pulmonary:      Effort: Pulmonary effort is normal. No respiratory distress.      Breath sounds: No wheezing, rhonchi or rales.   Abdominal:      General: Abdomen is flat. Bowel sounds are normal. There is no distension.      Palpations: Abdomen is soft.      Tenderness: There is no abdominal tenderness.   Musculoskeletal:         General: Swelling (R KNEE) and tenderness (R KNEE) present.      Cervical back: Normal range of motion and neck supple.      Right lower leg: Edema (R KNEE) present.   Skin:     General: Skin is warm and dry.      Capillary Refill: Capillary refill takes less than 2 seconds.      Findings: Lesion (POST OP WOUND R KNEE HEALING WELL) present.   Neurological:      Mental Status: She is alert and oriented to person, place, and time.      Sensory: No sensory deficit.      Motor: Weakness (GENERALIZED) present.      Coordination: Coordination abnormal.      Gait: Gait normal.   Psychiatric:         Mood and Affect: Mood normal.         Behavior: Behavior normal.         Thought Content: Thought content normal.         Judgment: Judgment normal.       Significant Labs: All pertinent labs within the past 24 hours have been reviewed. SEE HPT    Significant Imaging: I have reviewed all pertinent imaging results/findings within the past 24 hours.NONE

## 2023-02-19 NOTE — NURSING
Nurses Note -- 4 Eyes      2/19/2023   1:44 AM      Skin assessed during: Q Shift Change      [x] No Pressure Injuries Present    [x]Prevention Measures Documented      [x] Yes- Altered Skin Integrity Present or Discovered   [] LDA Added if Not in Epic (Describe Wound)   [x] New Altered Skin Integrity was Present on Admit and Documented in LDA   [] Wound Image Taken    Wound Care Consulted? No    Attending Nurse:  ABHISHEK BURKETT RN     Second RN/Staff Member:  Rima  Dsg over right knee incision per order, no pressure injuries

## 2023-02-19 NOTE — HOSPITAL COURSE
02/17/2023 HOSPITAL DAY 7    PT IS ON POST OP DAY 8 R TKR WITH INCISION HEALING WELL AND PAIN WELL CONTROLLED WITH PERCOCET; NORCO WAS INEFFECTIVE.   PT IS PARTICIPATING WITH PT/OT PERFORMING BED MOBILITY WITH SBA, TRANSFERS WITH CGA AND SBA WITH RW, AND AMBULATING 100 FEET WITH CGA/SBA WITH RW.RD HAS BEEN CONSULTED AND REVIEWED BASIC DM DIETARY PRINCIPLES WITH THE PT; MEAL INTAKE IS 50%. PT GLU RANGE -205 ON GLIPIZIDE, DAPAGLIFLOZIN AND METFORMIN WITH A1C 6.9  THE  PHARMACY IS MONITORING ALL MEDICATIONS..  PT HAS STABLE LABS EXCEPT FOR BMP: , K 3.2 , CBC: WBC 12.29 K, H/H 10.2/31.8.     02/24/2023 HOSPITAL DAY  14  DISCHARGE  PT IS A 67 YR OLD BF ADMITTED TO [facility] SWING BED FOR PT/OT/REHABILITATION FOR MUSCLE WEAKNESS AND MEDICAL MANAGEMENT. PT WAS TRANSFERRED FROM [facility] WHERE SHE WAS ADMITTED FOR R TKR PER DR MCLAUGHLIN ON 02/09/2023. PT HAD AN UNCOMPLICATED POSTOPERATIVE COURSE AND  PT IMPROVED; BUT HAD PERSISTENT MUSCLE WEAKNESS AND DEBILITY, AND REQUIRED SWING BED FOR REHABILITATION. PT HAS HX CAD (MI, CABG 2011), DVT, DM2, HLD AND HTN.   PT WAS EVALUATED PER PT/OT AND A TREATMENT PLAN WAS INITIATED. PT PERFORMED BED MOBILITY WITH INDEPENDENCE, TRANSFERS WITH SUPERVISION AND AMBULATION 150 FEET WITH NO AD, SBA AND GOOD STRIDE, SLIGHTLY SHORTENED ON R WITH GOOD BALANCE NOTED PER PT/OT. PT REQUIRED CHANGING NORCO TO PERCOCET POSTOP WHICH WAS EFFECTIVE AND PT HAS RX PER ORTHO OF NORCO POST DISCHARGE. PT'S POST INCISION IS HEALING WELL. PT WAS CONCERNED REGARDING SWELLING AND INCREASED WARMTH ON POST OP DAY 15 AND ON EXAMINATION INCISION APPEARED NORMAL AND KNEE NORMAL POSTOP APPEARANCE WITH MINIMAL EDEMA; PT WAS ADVISED TO CONTINUE ICE THERAPY AND ANALGESICS.    PT HAS DM2 AND GLU RANGE THIS WEEK -140 AND PT WAS DISCHARGED TO CONTINUE DAPAGLIFLOZIN, GLIPIZIDE AND METFORMIN AND TO MONITOR FSBS QID.  THE PHARMACIST REVIEWED MEDICATIONS AND MADE RECOMMENDATIONS. PT WAS EVALUATED PER  THE DIETICIAN FOR NUTRITIONAL SUPPORT WITH WEIGHT 95.7 KG  ON ADMISSION AND 91.6 KF ON DISCHARGE AND ALBUMIN OF 3.4.  PT'S MEAL INTAKE WAS 85 %. PT IS ON PLAVIX AND WILL CONTINUE THIS POST DISCHARGE; ELIQUIS WAS DISCONTINUED ON DISCHARGE, AND ESTRADIOL WAS HELD ON ADMISSION D/T INCREASED VTE RISK.  PT'S POST OP H/H ON DISCHARGE WAS 10.4/33.4 WITH PLT  K, MAG WAS NORMAL 1.7, TSH 0.196 AND VITAMIN D WAS 16.2 AND PT WILL TAKE VIT D 50,000  WEEKLY FOR 8 WEEKS.  PT IS RETIRED AND LIVES IN [place], [place].    PT WAS ADVISED TO INCREASE REST AND FLUIDS.PT WAS ADVISED REGARDING FALL PRECAUTIONS AND TO USE ROLLING WALKER.PT WAS ADVISED TO TAKE ALL MEDICATIONS AS DIRECTED. PT WAS ADVISED REGARDING OPIOID SAFELY, PT WAS ADVISED TO TAKE OVER THE COUNTER TYLENOL AS NEEDED FOR MILD PAIN, MIRALAX FOR CONSTIPATION AND MAALOX OR MYLANTA FOR HEART BURN.  PT WAS ADVISED TO KEEP ALL FOLLOW UP MEDICAL APPOINTMENTS.  PT WILL CONTINUE PT/OT REHABILTATION OUTPATIENT AT [facility] STARTING 02/28/2023.

## 2023-02-19 NOTE — PROGRESS NOTES
[facility] - Medical Surgical Unit  Hospital Medicine  Progress Note    Patient Name: Olga Stephenson  MRN: [id number]  Patient Class: IP- Swing   Admission Date: 2/10/2023  Length of Stay: 9 days  Attending Physician: Juan Lama DO  Primary Care Provider: Lv Cartagena MD        Subjective:     Principal Problem:<principal problem not specified>        HPI:  PT IS A 67 YR OLD BF ADMITTED TO [facility] SWING BED FOR PT/OT/REHABILITATION FOR MUSCLE WEAKNESS AND MEDICAL MANAGEMENT. PT WAS TRANSFERRED FROM [facility] WHERE SHE WAS ADMITTED FOR R TKR PER DR MCLAUGHLIN ON 02/09/2023. PT HAD AN UNCOMPLICATED POSTOPERATIVE COURSE AND  PT IMPROVED; BUT HAS PERSISTENT MUSCLE WEAKNESS AND DEBILITY, AND WILL REQUIRE SWING BED FOR REHABILITATION. PT HAS HX CAD (MI, CABG 2011), DVT, DM2, HLD AND HTN.   PT WILL BE EVALUATED PER PT/OT AND A TREATMENT PLAN WILL BE INITIATED. THE PHARMACIST WILL REVIEW MEDICATIONS AND MAKE RECOMMENDATIONS. PT WILL SEE THE DIETICIAN  FOR NUTRITIONAL SUPPORT WITH WEIGHT  95.7 KG AND ALBUMIN OF 3.4. PT'S ABNORMAL ADMITTING LABS ARE: CMP:152, ALBUMIN 3.4, CBC:WBC 15.36 K, UA:KETONES 40. MAG IS NORMAL 1.7, TSH 0.196 AND VITAMIN D IS 16.2. PT WILL HAVE WEEKLY LABS.  PT IS RETIRED AND LIVES IN [place], [place].     CXR: NO ACUTE CHANGE    PT CODE STATUS IS FULL CODE  PT COVID STATUS IS NEG  PT HAS HAD COVID VACCINE  PT VTE PPX IS ELIQUIS/PLAVIX/TEDS/EARLY AMBULATION        Overview/Hospital Course:  02/17/2023 HOSPITAL DAY 7    PT IS ON POST OP DAY 8 R TKR WITH INCISION HEALING WELL AND PAIN WELL CONTROLLED WITH PERCOCET; NORCO WAS INEFFECTIVE.   PT IS PARTICIPATING WITH PT/OT PERFORMING BED MOBILITY WITH SBA, TRANSFERS WITH CGA AND SBA WITH RW, AND AMBULATING 100 FEET WITH CGA/SBA WITH RW.RD HAS BEEN CONSULTED AND REVIEWED BASIC DM DIETARY PRINCIPLES WITH THE PT; MEAL INTAKE IS 50%. PT GLU RANGE -205 ON GLIPIZIDE, DAPAGLIFLOZIN AND METFORMIN WITH A1C 6.9  THE  PHARMACY IS  MONITORING ALL MEDICATIONS..  PT HAS STABLE LABS EXCEPT FOR BMP: , K 3.2 , CBC: WBC 12.29 K, H/H 10.2/31.8.       Interval History:   PT IS ON POST OP DAY 8 R TKR WITH INCISION HEALING WELL AND PAIN WELL CONTROLLED WITH PERCOCET   PT IS PARTICIPATING WITH PT/OT AMBULATING 100 FEET WITH CGA/SBA WITH RW. RD HAS BEEN CONSULTED AND REVIEWED BASIC DM DIETARY PRINCIPLES WITH THE PT; MEAL INTAKE IS 50%. PT GLU RANGE -205 ON GLIPIZIDE, DAPAGLIFLOZIN,  AND METFORMIN WITH A1C 6.9  THE  PHARMACY IS MONITORING ALL MEDICATIONS..  PT HAS STABLE LABS EXCEPT FOR BMP: , K 3.2 , CBC: WBC 12.29 K, H/H 10.2/31.8.     Review of Systems   Constitutional:  Positive for activity change and fatigue. Negative for appetite change.   HENT: Negative.     Eyes: Negative.    Respiratory: Negative.     Cardiovascular:  Positive for leg swelling (R KNEE).   Gastrointestinal: Negative.    Endocrine: Negative.    Genitourinary: Negative.    Musculoskeletal:  Positive for arthralgias (R KNEE).   Skin:  Positive for wound (R KNEE POST OP).   Allergic/Immunologic: Positive for immunocompromised state (DM2).   Neurological:  Positive for weakness.   Hematological:  Bruises/bleeds easily.   Psychiatric/Behavioral: Negative.     Objective:     Vital Signs (Most Recent):  Temp: 98 °F (36.7 °C) (02/18/23 2000)  Pulse: 93 (02/18/23 2033)  Resp: 20 (02/18/23 2046)  BP: 127/66 (02/18/23 2000)  SpO2: 98 % (02/18/23 2033) Vital Signs (24h Range):  Temp:  [98 °F (36.7 °C)-98.4 °F (36.9 °C)] 98 °F (36.7 °C)  Pulse:  [87-98] 93  Resp:  [15-20] 20  SpO2:  [96 %-98 %] 98 %  BP: (120-127)/(64-66) 127/66     Weight: 96 kg (211 lb 10.3 oz)  Body mass index is 31.25 kg/m².  No intake or output data in the 24 hours ending 02/19/23 0010   Physical Exam  Constitutional:       Appearance: She is obese. She is ill-appearing.   HENT:      Head: Normocephalic and atraumatic.      Right Ear: Tympanic membrane normal.      Left Ear: Tympanic membrane normal.       Nose: Nose normal.      Mouth/Throat:      Mouth: Mucous membranes are moist.   Eyes:      Extraocular Movements: Extraocular movements intact.      Pupils: Pupils are equal, round, and reactive to light.   Cardiovascular:      Rate and Rhythm: Normal rate and regular rhythm.      Pulses: Normal pulses.      Heart sounds: Normal heart sounds. No murmur heard.    No friction rub. No gallop.   Pulmonary:      Effort: Pulmonary effort is normal. No respiratory distress.      Breath sounds: No wheezing, rhonchi or rales.   Abdominal:      General: Abdomen is flat. Bowel sounds are normal. There is no distension.      Palpations: Abdomen is soft.      Tenderness: There is no abdominal tenderness.   Musculoskeletal:         General: Swelling (R KNEE) and tenderness (R KNEE) present.      Cervical back: Normal range of motion and neck supple.      Right lower leg: Edema (R KNEE) present.   Skin:     General: Skin is warm and dry.      Capillary Refill: Capillary refill takes less than 2 seconds.      Findings: Lesion (POST OP WOUND R KNEE HEALING WELL) present.   Neurological:      Mental Status: She is alert and oriented to person, place, and time.      Sensory: No sensory deficit.      Motor: Weakness (GENERALIZED) present.      Coordination: Coordination abnormal.      Gait: Gait normal.   Psychiatric:         Mood and Affect: Mood normal.         Behavior: Behavior normal.         Thought Content: Thought content normal.         Judgment: Judgment normal.       Significant Labs: All pertinent labs within the past 24 hours have been reviewed. SEE HPT    Significant Imaging: I have reviewed all pertinent imaging results/findings within the past 24 hours.NONE      Assessment/Plan:      Abnormal TSH  TSH 45699  REPEAT NORMAL 0.575      Hypokalemia  PT HAS HYPOKALEMIA  K 3.2  WILL SUPPLEMENT      Vitamin D deficiency  PT HAS VITAMIN D DEFICIENCY  VIT D 16.2  SUPPLEMENTATION   02/17/2023  CONTINUE POC FOR 8 WEEKS    Status  post total right knee replacement  PT IS S/P R TKR ON 02/09/2023 PER DR ARNOLDO SEARS   PT/OT  VTE PPX  WOUND CARE    02/17/2023  PT IS ON POST OP DAY 8 R TKR WITH INCISION HEALING WELL AND PAIN WELL CONTROLLED WITH PERCOCET        Muscle weakness  PT/OT WILL EVALUATE AND INITIATE A TREATMENT PLAN  WBAT    02/17/2023  PT IS PARTICIPATING WITH PT/OT PERFORMING BED MOBILITY WITH SBA, TRANSFERS WITH CGA AND SBA WITH RW, AND AMBULATING 100 FEET WITH CGA/SBA WITH RW.RD HAS BE    DVT prophylaxis  PT IS AT RISK FOR VTE  VTE PPX  ELIQUIS/PLAVIX TEDS/EARLY AMBULATION  WILL HOLD ESTRADIOL FOR NOW DUE TO INCREASED RISK OF VTE  02/17/2023  CONTINUE ELIQUIS/ PLAVIX/ TEDS   AMBULATION (100 FEET)      Type 2 diabetes mellitus without complication, without long-term current use of insulin  Patient's FSGs are controlled on current medication regimen.  Last A1c reviewed-   Lab Results   Component Value Date    HGBA1C 6.9 (H) 02/10/2023     Most recent fingerstick glucose reviewed- No results for input(s): POCTGLUCOSE in the last 24 hours.  Current correctional scale  High  Maintain anti-hyperglycemic dose as follows-   Antihyperglycemics (From admission, onward)    Start     Stop Route Frequency Ordered    02/11/23 0900  dapagliflozin tablet 10 mg         -- Oral Daily 02/10/23 1521    02/11/23 0745  glipiZIDE 24 hr tablet 5 mg         -- Oral With breakfast 02/10/23 1521    02/10/23 2100  metFORMIN tablet 1,000 mg         -- Oral Nightly 02/10/23 1521        Hold Oral hypoglycemics while patient is in the hospital if pt has hypoblycemia    02/17/2023  RD HAS BEEN CONSULTED AND REVIEWED BASIC DM DIETARY PRINCIPLES WITH THE PT; MEAL INTAKE IS 50%. PT GLU RANGE -205 ON DAPAGLIFLOZIN, GLIPIZIDE AND METFORMIN WITH A1C 6.9    VTE Risk Mitigation (From admission, onward)         Ordered     apixaban tablet 2.5 mg  2 times daily         02/10/23 1521     IP VTE HIGH RISK PATIENT  Once         02/10/23 1506     Place [facility]   Until discontinued         02/10/23 1506     Place sequential compression device  Until discontinued         02/10/23 1506                Discharge Planning   ANNETTE:      Code Status: Full Code   Is the patient medically ready for discharge?:     Reason for patient still in hospital (select all that apply): Patient new problem, Patient trending condition, Laboratory test, Treatment, Consult recommendations and PT / OT recommendations  Discharge Plan A: Home, Home Health                  Sandy Bucio MD  Department of Hospital Medicine   [facility] - Medical Surgical Unit

## 2023-02-19 NOTE — ASSESSMENT & PLAN NOTE
PT IS S/P R TKR ON 02/09/2023 PER DR ARNOLDO SEARS   PT/OT  VTE PPX  WOUND CARE    02/17/2023  PT IS ON POST OP DAY 8 R TKR WITH INCISION HEALING WELL AND PAIN WELL CONTROLLED WITH PERCOCET

## 2023-02-19 NOTE — PLAN OF CARE
Problem: Adult Inpatient Plan of Care  Goal: Plan of Care Review  Outcome: Ongoing, Progressing  Goal: Patient-Specific Goal (Individualized)  Outcome: Ongoing, Progressing  Goal: Absence of Hospital-Acquired Illness or Injury  Outcome: Ongoing, Progressing  Goal: Optimal Comfort and Wellbeing  Outcome: Ongoing, Progressing  Goal: Readiness for Transition of Care  Outcome: Ongoing, Progressing     Problem: Diabetes Comorbidity  Goal: Blood Glucose Level Within Targeted Range  Outcome: Ongoing, Progressing     Problem: Fall Injury Risk  Goal: Absence of Fall and Fall-Related Injury  Outcome: Ongoing, Progressing     Problem: Skin Injury Risk Increased  Goal: Skin Health and Integrity  Outcome: Ongoing, Progressing     Problem: Adjustment to Surgery (Knee Arthroplasty)  Goal: Optimal Coping  Outcome: Ongoing, Progressing     Problem: Pain (Knee Arthroplasty)  Goal: Acceptable Pain Control  Outcome: Ongoing, Progressing

## 2023-02-20 LAB
GLUCOSE SERPL-MCNC: 126 MG/DL (ref 70–105)
GLUCOSE SERPL-MCNC: 140 MG/DL (ref 70–105)

## 2023-02-20 PROCEDURE — 25000003 PHARM REV CODE 250: Performed by: FAMILY MEDICINE

## 2023-02-20 PROCEDURE — 36416 COLLJ CAPILLARY BLOOD SPEC: CPT

## 2023-02-20 PROCEDURE — 27000981 HC MATTRESS, ACCUCAIR DAILY RENTAL

## 2023-02-20 PROCEDURE — 25000003 PHARM REV CODE 250: Performed by: EMERGENCY MEDICINE

## 2023-02-20 PROCEDURE — 25000003 PHARM REV CODE 250: Performed by: NURSE PRACTITIONER

## 2023-02-20 PROCEDURE — 97116 GAIT TRAINING THERAPY: CPT

## 2023-02-20 PROCEDURE — 11000004 HC SNF PRIVATE

## 2023-02-20 PROCEDURE — 97110 THERAPEUTIC EXERCISES: CPT

## 2023-02-20 PROCEDURE — 94761 N-INVAS EAR/PLS OXIMETRY MLT: CPT

## 2023-02-20 PROCEDURE — 99900035 HC TECH TIME PER 15 MIN (STAT)

## 2023-02-20 PROCEDURE — 27000958

## 2023-02-20 PROCEDURE — 94640 AIRWAY INHALATION TREATMENT: CPT

## 2023-02-20 PROCEDURE — 82962 GLUCOSE BLOOD TEST: CPT

## 2023-02-20 PROCEDURE — 97535 SELF CARE MNGMENT TRAINING: CPT

## 2023-02-20 RX ADMIN — FLUTICASONE PROPIONATE 50 MCG: 50 SPRAY, METERED NASAL at 08:02

## 2023-02-20 RX ADMIN — EZETIMIBE 10 MG: 10 TABLET ORAL at 08:02

## 2023-02-20 RX ADMIN — MELOXICAM 7.5 MG: 7.5 TABLET ORAL at 08:02

## 2023-02-20 RX ADMIN — OXYCODONE AND ACETAMINOPHEN 1 TABLET: 325; 5 TABLET ORAL at 06:02

## 2023-02-20 RX ADMIN — METFORMIN HYDROCHLORIDE 1000 MG: 500 TABLET ORAL at 08:02

## 2023-02-20 RX ADMIN — OXYCODONE AND ACETAMINOPHEN 1 TABLET: 325; 5 TABLET ORAL at 12:02

## 2023-02-20 RX ADMIN — CLOPIDOGREL BISULFATE 75 MG: 75 TABLET ORAL at 08:02

## 2023-02-20 RX ADMIN — VALACYCLOVIR HYDROCHLORIDE 500 MG: 500 TABLET, FILM COATED ORAL at 08:02

## 2023-02-20 RX ADMIN — PREGABALIN 150 MG: 75 CAPSULE ORAL at 08:02

## 2023-02-20 RX ADMIN — METOPROLOL SUCCINATE 25 MG: 25 TABLET, EXTENDED RELEASE ORAL at 08:02

## 2023-02-20 RX ADMIN — GLIPIZIDE 5 MG: 2.5 TABLET, EXTENDED RELEASE ORAL at 08:02

## 2023-02-20 RX ADMIN — APIXABAN 2.5 MG: 2.5 TABLET, FILM COATED ORAL at 08:02

## 2023-02-20 RX ADMIN — PREGABALIN 75 MG: 75 CAPSULE ORAL at 08:02

## 2023-02-20 RX ADMIN — OXYCODONE AND ACETAMINOPHEN 1 TABLET: 325; 5 TABLET ORAL at 07:02

## 2023-02-20 RX ADMIN — PANTOPRAZOLE SODIUM 40 MG: 40 TABLET, DELAYED RELEASE ORAL at 08:02

## 2023-02-20 RX ADMIN — DAPAGLIFLOZIN 10 MG: 10 TABLET, FILM COATED ORAL at 08:02

## 2023-02-20 RX ADMIN — LOSARTAN POTASSIUM 50 MG: 50 TABLET, FILM COATED ORAL at 08:02

## 2023-02-20 RX ADMIN — ERGOCALCIFEROL 50000 UNITS: 1.25 CAPSULE ORAL at 08:02

## 2023-02-20 RX ADMIN — ALBUTEROL SULFATE 2 PUFF: 90 AEROSOL, METERED RESPIRATORY (INHALATION) at 07:02

## 2023-02-20 NOTE — PT/OT/SLP PROGRESS
Physical Therapy Treatment    Patient Name:  Olga Stephenson   MRN:  [id number]    Recommendations:     Discharge Recommendations: home with home health  Discharge Equipment Recommendations: walker, rolling  Barriers to discharge: None    Assessment:     Olga Stephenson is a 67 y.o. female admitted with a medical diagnosis of R LE TKA.  She presents with the following impairments/functional limitations: weakness, impaired endurance, impaired functional mobility, gait instability, impaired balance, decreased lower extremity function, decreased safety awareness, pain, decreased ROM.    Pt tolerated afternoon session well with decreased c/o of right knee pain.  Pt progressing well toward functional goals.    Rehab Prognosis: Good; patient would benefit from acute skilled PT services to address these deficits and reach maximum level of function.    Recent Surgery: * No surgery found *      Plan:     During this hospitalization, patient to be seen BID to address the identified rehab impairments via gait training and progress toward the following goals:    Plan of Care Expires:  03/10/23    Subjective     Chief Complaint: Pt reports decrease in pain level this afternoon.  Patient/Family Comments/goals: To get stronger and improve mobility to return home to [facility].  Pain/Comfort:  Pain Rating 1: 2/10  Location - Side 1: Right  Location 1: knee  Pain Addressed 1: Reposition, Pre-medicate for activity, Distraction  Pain Rating Post-Intervention 1: 2/10  Pain Addressed 2: Pre-medicate for activity, Reposition      Objective:     Communicated with pt prior to session.  Patient found sitting in W/C    upon PT entry to room.     General Precautions: Standard, fall  Orthopedic Precautions: RLE weight bearing as tolerated  Braces: N/A  Respiratory Status: Room air     Functional Mobility:  Transfers:     Sit to Stand: SBA with rolling walker  Gait: x 300 ft total using RW SBA  Balance: Static standing balance is good      AM-PAC 6  CLICK MOBILITY  Turning over in bed (including adjusting bedclothes, sheets and blankets)?: 4  Sitting down on and standing up from a chair with arms (e.g., wheelchair, bedside commode, etc.): 3  Moving from lying on back to sitting on the side of the bed?: 4  Moving to and from a bed to a chair (including a wheelchair)?: 3  Need to walk in hospital room?: 3  Climbing 3-5 steps with a railing?: 3  Basic Mobility Total Score: 20       Treatment & Education: all to improve Right lower extremity ROM, strength, endurance,standing tolerance, and balance with all ADLs.   Sit to stand from W/C to RW- SBA  Ambulation in hallway x 300 ft using RW SBA    Patient left ambulatory in room/ruiz with  OT present.     GOALS:   Multidisciplinary Problems       Physical Therapy Goals          Problem: Physical Therapy    Goal Priority Disciplines Outcome Goal Variances Interventions   Physical Therapy Goal     PT, PT/OT Ongoing, Progressing     Description: Goals to be met by: 2 weeks     Patient will increase functional independence with mobility by performin. Supine to sit with Stand-by Assistance  2. Sit to supine with Stand-by Assistance  3. Rolling to Left and Right with Modified [place].  4. Sit to stand transfer with Stand-by Assistance  5. Bed to chair transfer with Stand-by Assistance using Rolling Walker  6. Gait  x 150 feet with Stand-by Assistance using Rolling Walker.     Goals to be met by: 4 weeks     Patient will increase functional independence with mobility by performin. Supine to sit with Modified [place]  2. Sit to supine with Modified [place]  3. Sit to stand transfer with Modified [place]  4. Bed to chair transfer with Modified [place] using Rolling Walker  5. Gait  x 300 feet with Modified [place] using Rolling Walker.                          Time Tracking:     PT Received On: 23  PT Start Time: 1530     PT Stop Time: 1547  PT Total Time (min): 17 min      Billable Minutes: Gait Training 17    Treatment Type: Treatment  PT/PTA: PT     PTA Visit Number: 0     02/20/2023

## 2023-02-20 NOTE — NURSING
Nurses Note -- 4 Eyes      2/19/2023   11:28 PM      Skin assessed during: Q Shift Change      [x] No Pressure Injuries Present    [x]Prevention Measures Documented      [x] Yes- Altered Skin Integrity Present or Discovered   [] LDA Added if Not in Epic (Describe Wound)   [x] New Altered Skin Integrity was Present on Admit and Documented in LDA   [] Wound Image Taken    Wound Care Consulted? No    Attending Nurse:  ABHISHEK BURKETT RN     Second RN/Staff Member:  Rima  See LDA

## 2023-02-20 NOTE — PROGRESS NOTES
Wt: 202# (wt decreased 9# from initial visit) RDN visited pt this a.m. and took additional prefs.  Pt denied complaints.  Meal intake ~85%.  B-205.   No problems with surgical incision.  Lab reviewed. Rec 1. [place] prefs.

## 2023-02-20 NOTE — PT/OT/SLP PROGRESS
Physical Therapy Treatment    Patient Name:  Olga Stephenson   MRN:  [id number]    Recommendations:     Discharge Recommendations: home with home health  Discharge Equipment Recommendations: walker, rolling  Barriers to discharge: None    Assessment:     Olga Stephenson is a 67 y.o. female admitted with a medical diagnosis of R LE TKA.  She presents with the following impairments/functional limitations: weakness, impaired endurance, impaired functional mobility, gait instability, impaired balance, decreased lower extremity function, decreased safety awareness, pain, decreased ROM.    Pt tolerated treatment well with minimal c/o of right knee pain.  Pt progressing well toward functional goals.    Rehab Prognosis: Good; patient would benefit from acute skilled PT services to address these deficits and reach maximum level of function.    Recent Surgery: * No surgery found *      Plan:     During this hospitalization, patient to be seen BID to address the identified rehab impairments via gait training, therapeutic exercises and progress toward the following goals:    Plan of Care Expires:  03/10/23    Subjective     Chief Complaint: Pt reports no pain at present and agrees to treatment in Rehab gym.  Patient/Family Comments/goals: To get stronger and improve mobility to return home to [facility].  Pain/Comfort:  Pain Rating 1: 0/10  Location - Side 1: Right  Location 1: knee  Pain Addressed 1: Reposition, Pre-medicate for activity, Distraction      Objective:     Communicated with pt prior to session.  Patient found sitting in recliner    upon PT entry to room.     General Precautions: Standard, fall  Orthopedic Precautions: RLE weight bearing as tolerated  Braces: N/A  Respiratory Status: Room air     Functional Mobility:  Transfers:     Sit to Stand:   SBA with rolling walker  Gait: x 100 ft total using RW SBA with reciprocal gait and minimal antalgic gait noted  Balance: Static standing balance is good-      AM-PAC 6  CLICK MOBILITY  Turning over in bed (including adjusting bedclothes, sheets and blankets)?: 4  Sitting down on and standing up from a chair with arms (e.g., wheelchair, bedside commode, etc.): 4  Moving from lying on back to sitting on the side of the bed?: 4  Moving to and from a bed to a chair (including a wheelchair)?: 3  Need to walk in hospital room?: 3  Climbing 3-5 steps with a railing?: 3  Basic Mobility Total Score: 21       Treatment & Education: all to improve Right lower extremity ROM, strength, endurance,standing tolerance, and balance with all ADLs.   Sit to stand from W/C to RW- SBA  Ambulation from gym to room x 100 ft using RW SBA  Nustep x 10 mins  W/C bumps x 5 mins  R LE ROM/strengthening including:  seated knee flexion x 5 reps with 20 sec hold, LAQ x 2 sets 15 with cueing to move through entire available ROM, HS curls with red tband x 2 sets 20, QS with R knee on static stretch x 2 sets 20    Patient left up in chair with call button in reach and cryocuff to R knee.     GOALS:   Multidisciplinary Problems       Physical Therapy Goals          Problem: Physical Therapy    Goal Priority Disciplines Outcome Goal Variances Interventions   Physical Therapy Goal     PT, PT/OT Ongoing, Progressing     Description: Goals to be met by: 2 weeks     Patient will increase functional independence with mobility by performin. Supine to sit with Stand-by Assistance  2. Sit to supine with Stand-by Assistance  3. Rolling to Left and Right with Modified [place].  4. Sit to stand transfer with Stand-by Assistance  5. Bed to chair transfer with Stand-by Assistance using Rolling Walker  6. Gait  x 150 feet with Stand-by Assistance using Rolling Walker.     Goals to be met by: 4 weeks     Patient will increase functional independence with mobility by performin. Supine to sit with Modified [place]  2. Sit to supine with Modified [place]  3. Sit to stand transfer with Modified  [place]  4. Bed to chair transfer with Modified [place] using Rolling Walker  5. Gait  x 300 feet with Modified [place] using Rolling Walker.                          Time Tracking:     PT Received On: 02/20/23  PT Start Time: 0840     PT Stop Time: 0927  PT Total Time (min): 47 min     Billable Minutes: Gait Training 13, Therapeutic Exercise 34    Treatment Type: Treatment  PT/PTA: PT     PTA Visit Number: 0     02/20/2023

## 2023-02-20 NOTE — NURSING
Nurses Note -- 4 Eyes      2/20/2023         Skin assessed during: Q Shift Change      [] No Pressure Injuries Present    []Prevention Measures Documented      [x] Yes- Altered Skin Integrity Present or Discovered   [] LDA Added if Not in Epic (Describe Wound)   [] New Altered Skin Integrity was Present on Admit and Documented in LDA   [] Wound Image Taken    Wound Care Consulted? No    Attending Nurse:  Debora Constantino RN     Second RN/Staff Member:  Rakan Duvall RN    Dressing noted clean, dry, intact to R knee

## 2023-02-20 NOTE — PLAN OF CARE
Problem: Adult Inpatient Plan of Care  Goal: Plan of Care Review  Outcome: Ongoing, Progressing  Goal: Patient-Specific Goal (Individualized)  Outcome: Ongoing, Progressing  Goal: Absence of Hospital-Acquired Illness or Injury  Outcome: Ongoing, Progressing  Goal: Optimal Comfort and Wellbeing  Outcome: Ongoing, Progressing  Goal: Readiness for Transition of Care  Outcome: Ongoing, Progressing     Problem: Diabetes Comorbidity  Goal: Blood Glucose Level Within Targeted Range  Outcome: Ongoing, Progressing     Problem: Fall Injury Risk  Goal: Absence of Fall and Fall-Related Injury  Outcome: Ongoing, Progressing     Problem: Skin Injury Risk Increased  Goal: Skin Health and Integrity  Outcome: Ongoing, Progressing     Problem: Adjustment to Surgery (Knee Arthroplasty)  Goal: Optimal Coping  Outcome: Ongoing, Progressing     Problem: Bleeding (Knee Arthroplasty)  Goal: Absence of Bleeding  Outcome: Ongoing, Progressing     Problem: Infection (Knee Arthroplasty)  Goal: Absence of Infection Signs and Symptoms  Outcome: Ongoing, Progressing     Problem: Functional Ability Impaired (Knee Arthroplasty)  Goal: Optimal Functional Ability  Outcome: Ongoing, Progressing     Problem: Neurovascular Compromise (Knee Arthroplasty)  Goal: Intact Neurovascular Status  Outcome: Ongoing, Progressing     Problem: Pain (Knee Arthroplasty)  Goal: Acceptable Pain Control  Outcome: Ongoing, Progressing

## 2023-02-21 LAB
GLUCOSE SERPL-MCNC: 121 MG/DL (ref 70–105)
GLUCOSE SERPL-MCNC: 124 MG/DL (ref 70–105)

## 2023-02-21 PROCEDURE — 97530 THERAPEUTIC ACTIVITIES: CPT

## 2023-02-21 PROCEDURE — 11000004 HC SNF PRIVATE

## 2023-02-21 PROCEDURE — 82962 GLUCOSE BLOOD TEST: CPT

## 2023-02-21 PROCEDURE — 27000958

## 2023-02-21 PROCEDURE — 27000981 HC MATTRESS, ACCUCAIR DAILY RENTAL

## 2023-02-21 PROCEDURE — 94761 N-INVAS EAR/PLS OXIMETRY MLT: CPT

## 2023-02-21 PROCEDURE — 97110 THERAPEUTIC EXERCISES: CPT

## 2023-02-21 PROCEDURE — 97116 GAIT TRAINING THERAPY: CPT | Mod: CQ

## 2023-02-21 PROCEDURE — 25000003 PHARM REV CODE 250: Performed by: NURSE PRACTITIONER

## 2023-02-21 PROCEDURE — 25000003 PHARM REV CODE 250: Performed by: FAMILY MEDICINE

## 2023-02-21 PROCEDURE — 97110 THERAPEUTIC EXERCISES: CPT | Mod: CQ

## 2023-02-21 PROCEDURE — 99900035 HC TECH TIME PER 15 MIN (STAT)

## 2023-02-21 PROCEDURE — 36416 COLLJ CAPILLARY BLOOD SPEC: CPT

## 2023-02-21 PROCEDURE — 94640 AIRWAY INHALATION TREATMENT: CPT

## 2023-02-21 PROCEDURE — 25000003 PHARM REV CODE 250: Performed by: EMERGENCY MEDICINE

## 2023-02-21 RX ADMIN — CLOPIDOGREL BISULFATE 75 MG: 75 TABLET ORAL at 08:02

## 2023-02-21 RX ADMIN — OXYCODONE AND ACETAMINOPHEN 1 TABLET: 325; 5 TABLET ORAL at 09:02

## 2023-02-21 RX ADMIN — FLUTICASONE PROPIONATE 50 MCG: 50 SPRAY, METERED NASAL at 08:02

## 2023-02-21 RX ADMIN — LOSARTAN POTASSIUM 50 MG: 50 TABLET, FILM COATED ORAL at 08:02

## 2023-02-21 RX ADMIN — PANTOPRAZOLE SODIUM 40 MG: 40 TABLET, DELAYED RELEASE ORAL at 08:02

## 2023-02-21 RX ADMIN — PREGABALIN 150 MG: 75 CAPSULE ORAL at 09:02

## 2023-02-21 RX ADMIN — SENNOSIDES AND DOCUSATE SODIUM 1 TABLET: 8.6; 5 TABLET ORAL at 09:02

## 2023-02-21 RX ADMIN — SENNOSIDES AND DOCUSATE SODIUM 1 TABLET: 8.6; 5 TABLET ORAL at 08:02

## 2023-02-21 RX ADMIN — ALUMINUM HYDROXIDE, MAGNESIUM HYDROXIDE, AND SIMETHICONE 15 ML: 200; 200; 20 SUSPENSION ORAL at 09:02

## 2023-02-21 RX ADMIN — EZETIMIBE 10 MG: 10 TABLET ORAL at 09:02

## 2023-02-21 RX ADMIN — ALBUTEROL SULFATE 2 PUFF: 90 AEROSOL, METERED RESPIRATORY (INHALATION) at 08:02

## 2023-02-21 RX ADMIN — ALBUTEROL SULFATE 2 PUFF: 90 AEROSOL, METERED RESPIRATORY (INHALATION) at 07:02

## 2023-02-21 RX ADMIN — METOPROLOL SUCCINATE 25 MG: 25 TABLET, EXTENDED RELEASE ORAL at 08:02

## 2023-02-21 RX ADMIN — DAPAGLIFLOZIN 10 MG: 10 TABLET, FILM COATED ORAL at 08:02

## 2023-02-21 RX ADMIN — APIXABAN 2.5 MG: 2.5 TABLET, FILM COATED ORAL at 08:02

## 2023-02-21 RX ADMIN — OXYCODONE AND ACETAMINOPHEN 1 TABLET: 325; 5 TABLET ORAL at 04:02

## 2023-02-21 RX ADMIN — MELOXICAM 7.5 MG: 7.5 TABLET ORAL at 08:02

## 2023-02-21 RX ADMIN — GLIPIZIDE 5 MG: 2.5 TABLET, EXTENDED RELEASE ORAL at 08:02

## 2023-02-21 RX ADMIN — METFORMIN HYDROCHLORIDE 1000 MG: 500 TABLET ORAL at 09:02

## 2023-02-21 RX ADMIN — OXYCODONE AND ACETAMINOPHEN 1 TABLET: 325; 5 TABLET ORAL at 12:02

## 2023-02-21 RX ADMIN — VALACYCLOVIR HYDROCHLORIDE 500 MG: 500 TABLET, FILM COATED ORAL at 08:02

## 2023-02-21 RX ADMIN — PREGABALIN 75 MG: 75 CAPSULE ORAL at 08:02

## 2023-02-21 NOTE — PT/OT/SLP PROGRESS
Occupational Therapy   Treatment    Name: Olga Stephenson  MRN: [id number]  Admitting Diagnosis: R TK    Recommendations:     Discharge Recommendations: home with home health  Discharge Equipment Recommendations:  walker, rolling  Barriers to discharge:       Assessment:     Olga Stephenson is a 67 y.o. female with a medical diagnosis of R total knee replacement.  She presents with . Performance deficits affecting function are weakness, impaired endurance, impaired functional mobility, impaired self care skills, decreased safety awareness.     Rehab Prognosis:  Good; patient would benefit from acute skilled OT services to address these deficits and reach maximum level of function.       Plan:     Patient to be seen 5 x/week to address the above listed problems via self-care/home management, therapeutic activities, therapeutic exercises, neuromuscular re-education  Plan of Care Expires:    Plan of Care Reviewed with: patient    Subjective     Pain/Comfort:  Pain Rating 1: 3/10  Location - Side 1: Right  Location 1: knee  Pain Addressed 1: Distraction    Objective:     Communicated with: PT and Pt prior to session.  Patient found up in chair with IN therapy gym with PT upon OT entry to room.    General Precautions: Standard, fall    Orthopedic Precautions:RLE weight bearing as tolerated  Braces:    Respiratory Status: Room air     Occupational Performance:     Bed Mobility:    N/A    Functional Mobility/Transfers:  Patient completed Sit <> Stand Transfer with supervision  with  no assistive device   Functional Mobility: Pt ambulated from gym to room with SVN using RW    Activities of Daily Living:        [facility] 6 Click ADL:      Treatment & Education:  Pt completed BUE elbow flex/ext, and chest press with 3# wt  and sh ABD/ADD with green Tband 3x15 each ex while sitting up in chair. Pt completed tabletop activity with pegs and pegboard while standing unsupported ~6 min to increase standing tolerance for improved I in  self care skills.     Patient left up in chair with call button in reach    GOALS:   Multidisciplinary Problems       Occupational Therapy Goals          Problem: Occupational Therapy    Goal Priority Disciplines Outcome Interventions   Occupational Therapy Goal     OT, PT/OT Ongoing, Progressing    Description: Goals to be met by: 3/10/23     Patient will increase functional independence with ADLs by performing:    UE Dressing with Modified [place].  LE Dressing with Modified [place].  Grooming while standing with Modified [place].  Toileting from toilet with Modified [place] for hygiene and clothing management.   Sitanding x5-10 minutes with Modified [place].  Squat pivot transfers with Modified [place].  Step transfer with Modified [place]  Toilet transfer to toilet with Modified [place].                         Time Tracking:     OT Date of Treatment: 02/21/23  OT Start Time: 0930  OT Stop Time: 0953  OT Total Time (min): 23 min    Billable Minutes:Therapeutic Activity 10  Therapeutic Exercise 13               2/21/2023

## 2023-02-21 NOTE — PT/OT/SLP PROGRESS
Occupational Therapy   Treatment    Name: Olga Stephenson  MRN: [id number]  Admitting Diagnosis:  R total knee replacement on 2/9/23       Recommendations:     Discharge Recommendations: outpatient PT  Discharge Equipment Recommendations:  walker, rolling  Barriers to discharge:       Assessment:     Olga Stephenson is a 67 y.o. female with a medical diagnosis of R total knee replacement on 2/9/23. She presents with decreased balance, safety, strength. Performance deficits affecting function are weakness, impaired endurance, impaired self care skills, impaired functional mobility, impaired balance, decreased safety awareness, decreased ROM. Pt  tolerated therapy much better on today. Pt provided with LE stretches to perform over the weekend.     Rehab Prognosis:  Good; patient would benefit from acute skilled OT services to address these deficits and reach maximum level of function.       Plan:     Patient to be seen 5 x/week to address the above listed problems via self-care/home management, therapeutic activities, therapeutic exercises  Plan of Care Expires:    Plan of Care Reviewed with: patient    Subjective     Pain/Comfort:  Pain Rating 1: 0/10  Location - Side 1: Right  Location - Orientation 1: lower  Location 1: knee    Objective:     Communicated with: Pt prior to session.  Patient found up in chair   upon OT entry to room.    General Precautions: Standard, fall    Orthopedic Precautions:   Braces:    Respiratory Status: Room air     Occupational Performance:     Bed Mobility:    Pt presented sitting upright in recliner upon Ot's arrival.      Functional Mobility/Transfers:  Patient completed Sit <> Stand Transfer with stand by assistance  with  rolling walker   Functional Mobility: Pt completed functional ambulation to the restroom using the RW for balance and safety. Pt completed functional mobility down the hallway w/o a/e.    Activities of Daily Living:  Sit to stand t/f with -Mod I for balance and  safety  UE dressing/LE dressing: I to don pants, socks, shirt  Sit to stand t/f with I      AMPAC 6 Click ADL: 21    Treatment & Education:  Therapeutic exercise:    While sitting in w/c the pt completed bicep curls 2 sets of 20 reps with 5# DB; chest press:2 sets of 20 reps with 5# DB.     Therapeutic activity  While standing the pt reached to place cards on a Velcro board to increase BUE ROM, functional reaching, standing endurance and dynamic balance to increase independence with ADLs.        Patient left up in chair with all lines intact and call button in reach    GOALS:   Multidisciplinary Problems       Occupational Therapy Goals          Problem: Occupational Therapy    Goal Priority Disciplines Outcome Interventions   Occupational Therapy Goal     OT, PT/OT Ongoing, Progressing    Description: Goals to be met by: 3/10/23     Patient will increase functional independence with ADLs by performing:    UE Dressing with Modified [place].  LE Dressing with Modified [place].  Grooming while standing with Modified [place].  Toileting from toilet with Modified [place] for hygiene and clothing management.   Sitanding x5-10 minutes with Modified [place].  Squat pivot transfers with Modified [place].  Step transfer with Modified [place]  Toilet transfer to toilet with Modified [place].                         Time Tracking:     OT Date of Treatment:    OT Start Time:  1:50  OT Stop Time:  2:15  OT Total Time (min):      Billable Minutes:Therapeutic activity 15  Therapeutic Exercise 15    OT/ROBERT: OT          2/20/2023

## 2023-02-21 NOTE — PT/OT/SLP PROGRESS
Physical Therapy Treatment    Patient Name:  Olga Stephenson   MRN:  [id number]    Recommendations:     Discharge Recommendations: home with home health  Discharge Equipment Recommendations: walker, rolling  Barriers to discharge: Inaccessible home and Decreased caregiver support    Assessment:     Olga Stephenson is a 67 y.o. female admitted with a medical diagnosis of <principal problem not specified>.  She presents with the following impairments/functional limitations: weakness, impaired endurance, impaired functional mobility, impaired self care skills, decreased safety awareness.    Rehab Prognosis: Good; patient would benefit from acute skilled PT services to address these deficits and reach maximum level of function.    Recent Surgery: * No surgery found *      Plan:     During this hospitalization, patient to be seen BID to address the identified rehab impairments via gait training, therapeutic exercises, therapeutic activities and progress toward the following goals:    Plan of Care Expires:  03/10/23    Subjective     Chief Complaint: knee pain    Patient/Family Comments/goals: Pt. States she has been doing a lot of stretching into flexion and is pleased with her progress.   Pain/Comfort:  Pain Rating 1: 6/10  Location - Side 1: Right  Location 1: knee  Pain Addressed 1: Distraction      Objective:     Communicated with Debora Constantino RN prior to session.  Patient found ambulatory in room/ruiz with peripheral IV upon PT entry to room.     General Precautions: Standard, fall  Orthopedic Precautions: RLE weight bearing as tolerated  Braces: N/A  Respiratory Status: Room air     Functional Mobility:  Transfers:     Sit to Stand:  independence with no AD and rolling walker  Gait: Pt. Amb. With RW 2 x 175 feet with good posture noted, but decreased knees ext in stance phase.       AM-PAC 6 CLICK MOBILITY  Turning over in bed (including adjusting bedclothes, sheets and blankets)?: 4  Sitting down on and  standing up from a chair with arms (e.g., wheelchair, bedside commode, etc.): 3  Moving from lying on back to sitting on the side of the bed?: 4  Moving to and from a bed to a chair (including a wheelchair)?: 3  Need to walk in hospital room?: 4  Climbing 3-5 steps with a railing?: 3  Basic Mobility Total Score: 21       Treatment & Education:  Pt. Performed mobility per above and Nu-Step activity with chair from 17 to 16 and increased resistance to level 2.   Pt. Performed LAQs and Marching x 20 reps each with 2# followed by knee flexion stretches 3x 30 seconds and HS stretches with overpressure from weights 3 x 40 seconds. Pt. With 100 degrees active knee flexion this date.     Patient left up in chair with  OT present..    GOALS:   Multidisciplinary Problems       Physical Therapy Goals          Problem: Physical Therapy    Goal Priority Disciplines Outcome Goal Variances Interventions   Physical Therapy Goal     PT, PT/OT Ongoing, Progressing     Description: Goals to be met by: 2 weeks     Patient will increase functional independence with mobility by performin. Supine to sit with Stand-by Assistance  2. Sit to supine with Stand-by Assistance  3. Rolling to Left and Right with Modified [place].  4. Sit to stand transfer with Stand-by Assistance  5. Bed to chair transfer with Stand-by Assistance using Rolling Walker  6. Gait  x 150 feet with Stand-by Assistance using Rolling Walker.     Goals to be met by: 4 weeks     Patient will increase functional independence with mobility by performin. Supine to sit with Modified [place]  2. Sit to supine with Modified [place]  3. Sit to stand transfer with Modified [place]  4. Bed to chair transfer with Modified [place] using Rolling Walker  5. Gait  x 300 feet with Modified [place] using Rolling Walker.                          Time Tracking:     PT Received On: 23  PT Start Time: 855     PT Stop Time: 929  PT Total Time  (min): 34 min     Billable Minutes: Gait Training 12 and Therapeutic Exercise 22    Treatment Type: Treatment  PT/PTA: PTA     PTA Visit Number: 1 02/21/2023

## 2023-02-21 NOTE — NURSING
Nurses Note -- 4 Eyes      2/21/2023         Skin assessed during: Q Shift Change      [] No Pressure Injuries Present    []Prevention Measures Documented      [x] Yes- Altered Skin Integrity Present or Discovered   [] LDA Added if Not in Epic (Describe Wound)   [] New Altered Skin Integrity was Present on Admit and Documented in LDA   [] Wound Image Taken    Wound Care Consulted? No    Attending Nurse:  Debora Constantino RN     Second RN/Staff Member:  Rakan Siu RN    Dressing to R knee clean, dry, intact

## 2023-02-21 NOTE — NURSING
Nurses Note -- 4 Eyes      2/21/2023   1:02 AM      Skin assessed during: Q Shift Change      [x] No Pressure Injuries Present    [x]Prevention Measures Documented      [x] Yes- Altered Skin Integrity Present or Discovered   [] LDA Added if Not in Epic (Describe Wound)   [x] New Altered Skin Integrity was Present on Admit and Documented in LDA   [] Wound Image Taken    Wound Care Consulted? No    Attending Nurse:  ABHISHEK BURKETT RN     Second RN/Staff Member:  Paula  No breakdown, see LDA

## 2023-02-21 NOTE — PLAN OF CARE
Problem: Adult Inpatient Plan of Care  Goal: Plan of Care Review  Outcome: Ongoing, Progressing  Goal: Patient-Specific Goal (Individualized)  Outcome: Ongoing, Progressing  Goal: Absence of Hospital-Acquired Illness or Injury  Outcome: Ongoing, Progressing  Goal: Optimal Comfort and Wellbeing  Outcome: Ongoing, Progressing  Goal: Readiness for Transition of Care  Outcome: Ongoing, Progressing     Problem: Diabetes Comorbidity  Goal: Blood Glucose Level Within Targeted Range  Outcome: Ongoing, Progressing     Problem: Fall Injury Risk  Goal: Absence of Fall and Fall-Related Injury  Outcome: Ongoing, Progressing     Problem: Adjustment to Surgery (Knee Arthroplasty)  Goal: Optimal Coping  Outcome: Ongoing, Progressing     Problem: Skin Injury Risk Increased  Goal: Skin Health and Integrity  Outcome: Ongoing, Progressing     Problem: Bleeding (Knee Arthroplasty)  Goal: Absence of Bleeding  Outcome: Ongoing, Progressing     Problem: Bowel Motility Impaired (Knee Arthroplasty)  Goal: Effective Bowel Elimination  Outcome: Ongoing, Progressing     Problem: Neurovascular Compromise (Knee Arthroplasty)  Goal: Intact Neurovascular Status  Outcome: Ongoing, Progressing     Problem: Pain (Knee Arthroplasty)  Goal: Acceptable Pain Control  Outcome: Ongoing, Progressing

## 2023-02-21 NOTE — PLAN OF CARE
[facility] - Medical Surgical Unit - Swing Bed   Interdisciplinary Team Meeting    Patient: Olga Stephenson   Today's Date: 2/21/2023   Estimated D/C Date: 2/24/23        Physician:Juan Lama D.O. Nurse Practitioner:    Pharmacy:[facility] Director:  MIRYAM Reddy   : Malina Javier RN Physical/Occupational Therapy: Denzel Voss OT   Speech Therapy: [facility] [place]    Nursing:  Lamine Garcia RN  Respiratory: Ericka Oviedo, Resp. Dietary:  Brice Gusman, Dietary  Other:      Nurse  New Symptoms/Problems:   Last BM: 2/19/23 Urine: continent Diarrhea: No   Constipated: No Bowel: continent [place]: No   Isolation: No D/C Date:  Date:    O2 Device: Room Air O2 Flow:   SpO2: 97 %   Nutrition: Diabetic  Speech/Swallowing: No current speech or swallowing issues  Aspiration Precautions: No  Cognition: WNL    Physical Therapy  Physical Therapy/Gait: ambulated 175 ft x 2  with RW ELOS: Plan to DC  2/24/23   Transfers: Modified Independent Range of Motion/Restrictions:      Occupational Therapy  Eating/Grooming: Modified Independent Toileting: Modified Independent   Bathing: Modified Independent Dressing (Upper Body): Modified Independent   Dressing (Lower Body): Modified Independent        Tx Plan/Recommendations reviewed with family and/or patient on (date) 2/20/23.  Additional family Conference/Training:   D/C Plan/Recommendations: Outpatient Rehab  ANNETTE: 2/24/23    Pharmacy  Medication Changes (see MD orders in chart): Yes  MD:Juan Lama D.O. Labs Reviewed: Yes New Lab Orders: Yes   MD/NP Signature:

## 2023-02-21 NOTE — PT/OT/SLP PROGRESS
Physical Therapy Treatment    Patient Name:  Olga Stephenson   MRN:  [id number]    Recommendations:     Discharge Recommendations: home with home health  Discharge Equipment Recommendations: walker, rolling  Barriers to discharge: Inaccessible home and Decreased caregiver support    Assessment:     Olga Stephenson is a 67 y.o. female admitted with a medical diagnosis of <principal problem not specified>.  She presents with the following impairments/functional limitations: weakness, impaired endurance, impaired functional mobility, impaired self care skills, decreased safety awareness. Pt. With good affect, participates well.     Rehab Prognosis: Good; patient would benefit from acute skilled PT services to address these deficits and reach maximum level of function.    Recent Surgery: * No surgery found *      Plan:     During this hospitalization, patient to be seen BID to address the identified rehab impairments via therapeutic exercises, therapeutic activities, gait training and progress toward the following goals:    Plan of Care Expires:  03/10/23    Subjective     Chief Complaint: knee pain    Patient/Family Comments/goals: Pt. States she will do what she needs to do to get home. Sates she has outpt. PT set up for next week.   Pain/Comfort:  Pain Rating 1: 6/10  Location - Side 1: Right  Location 1: knee  Pain Addressed 1: Distraction      Objective:     Communicated with Nursing prior to session.  Patient found up in chair with peripheral IV upon PT entry to room.     General Precautions: Standard, fall  Orthopedic Precautions: RLE weight bearing as tolerated  Braces: N/A  Respiratory Status: Room air     Functional Mobility:  Transfers:     Sit to Stand:  independence and modified independence with no AD and rolling walker  Gait: Pt. Participated in gait to and from PT gym with RW and VC for knee ext in stance phase  Balance: Pt. Participated in Side stepping 2 x 10 feet each to left and right with bilat  HHA, Tandem gait forward and backward 2 x 10 feet each, High knee marching and backward gait x 10 feet each with SBA . Pt. Also performed re-bounder activity with yellow ball x 3 mins with SBA.       AM-PAC 6 CLICK MOBILITY  Turning over in bed (including adjusting bedclothes, sheets and blankets)?: 4  Sitting down on and standing up from a chair with arms (e.g., wheelchair, bedside commode, etc.): 4  Moving from lying on back to sitting on the side of the bed?: 4  Moving to and from a bed to a chair (including a wheelchair)?: 4  Need to walk in hospital room?: 4  Climbing 3-5 steps with a railing?: 3  Basic Mobility Total Score: 23       Treatment & Education:  Pt. Participated in mobility per above and Nu-Step @ Level 2 x 10 mins.     Patient left up in chair with call button in reach..    GOALS:   Multidisciplinary Problems       Physical Therapy Goals          Problem: Physical Therapy    Goal Priority Disciplines Outcome Goal Variances Interventions   Physical Therapy Goal     PT, PT/OT Ongoing, Progressing     Description: Goals to be met by: 2 weeks     Patient will increase functional independence with mobility by performin. Supine to sit with Stand-by Assistance  2. Sit to supine with Stand-by Assistance  3. Rolling to Left and Right with Modified [place].  4. Sit to stand transfer with Stand-by Assistance  5. Bed to chair transfer with Stand-by Assistance using Rolling Walker  6. Gait  x 150 feet with Stand-by Assistance using Rolling Walker.     Goals to be met by: 4 weeks     Patient will increase functional independence with mobility by performin. Supine to sit with Modified [place]  2. Sit to supine with Modified [place]  3. Sit to stand transfer with Modified [place]  4. Bed to chair transfer with Modified [place] using Rolling Walker  5. Gait  x 300 feet with Modified [place] using Rolling Walker.                          Time Tracking:     PT Received On:  02/21/23  PT Start Time: 1534     PT Stop Time: 1555  PT Total Time (min): 21 min     Billable Minutes: Therapeutic Activity 21    Treatment Type: Treatment  PT/PTA: PTA     PTA Visit Number: 2     02/21/2023

## 2023-02-21 NOTE — PLAN OF CARE
Pt. Is progressing well with Therapy and pain management has been improved with medication changes recently. Will cont. To follow.

## 2023-02-22 DIAGNOSIS — Z96.651 STATUS POST TOTAL RIGHT KNEE REPLACEMENT: Primary | ICD-10-CM

## 2023-02-22 LAB
GLUCOSE SERPL-MCNC: 118 MG/DL (ref 70–105)
GLUCOSE SERPL-MCNC: 165 MG/DL (ref 70–105)

## 2023-02-22 PROCEDURE — 97530 THERAPEUTIC ACTIVITIES: CPT | Mod: CQ

## 2023-02-22 PROCEDURE — 97530 THERAPEUTIC ACTIVITIES: CPT

## 2023-02-22 PROCEDURE — 94640 AIRWAY INHALATION TREATMENT: CPT

## 2023-02-22 PROCEDURE — 25000003 PHARM REV CODE 250: Performed by: NURSE PRACTITIONER

## 2023-02-22 PROCEDURE — 94761 N-INVAS EAR/PLS OXIMETRY MLT: CPT

## 2023-02-22 PROCEDURE — 97140 MANUAL THERAPY 1/> REGIONS: CPT

## 2023-02-22 PROCEDURE — 25000003 PHARM REV CODE 250: Performed by: FAMILY MEDICINE

## 2023-02-22 PROCEDURE — 25000003 PHARM REV CODE 250: Performed by: EMERGENCY MEDICINE

## 2023-02-22 PROCEDURE — 11000004 HC SNF PRIVATE

## 2023-02-22 PROCEDURE — 99900035 HC TECH TIME PER 15 MIN (STAT)

## 2023-02-22 PROCEDURE — 97116 GAIT TRAINING THERAPY: CPT | Mod: CQ

## 2023-02-22 PROCEDURE — 82962 GLUCOSE BLOOD TEST: CPT

## 2023-02-22 PROCEDURE — 97110 THERAPEUTIC EXERCISES: CPT

## 2023-02-22 RX ADMIN — PANTOPRAZOLE SODIUM 40 MG: 40 TABLET, DELAYED RELEASE ORAL at 10:02

## 2023-02-22 RX ADMIN — METOPROLOL SUCCINATE 25 MG: 25 TABLET, EXTENDED RELEASE ORAL at 10:02

## 2023-02-22 RX ADMIN — OXYCODONE AND ACETAMINOPHEN 1 TABLET: 325; 5 TABLET ORAL at 06:02

## 2023-02-22 RX ADMIN — LOSARTAN POTASSIUM 50 MG: 50 TABLET, FILM COATED ORAL at 10:02

## 2023-02-22 RX ADMIN — ALBUTEROL SULFATE 2 PUFF: 90 AEROSOL, METERED RESPIRATORY (INHALATION) at 09:02

## 2023-02-22 RX ADMIN — MELOXICAM 7.5 MG: 7.5 TABLET ORAL at 10:02

## 2023-02-22 RX ADMIN — EZETIMIBE 10 MG: 10 TABLET ORAL at 09:02

## 2023-02-22 RX ADMIN — OXYCODONE AND ACETAMINOPHEN 1 TABLET: 325; 5 TABLET ORAL at 02:02

## 2023-02-22 RX ADMIN — METFORMIN HYDROCHLORIDE 1000 MG: 500 TABLET ORAL at 09:02

## 2023-02-22 RX ADMIN — VALACYCLOVIR HYDROCHLORIDE 500 MG: 500 TABLET, FILM COATED ORAL at 10:02

## 2023-02-22 RX ADMIN — ALBUTEROL SULFATE 2 PUFF: 90 AEROSOL, METERED RESPIRATORY (INHALATION) at 07:02

## 2023-02-22 RX ADMIN — CLOPIDOGREL BISULFATE 75 MG: 75 TABLET ORAL at 10:02

## 2023-02-22 RX ADMIN — GLIPIZIDE 5 MG: 2.5 TABLET, EXTENDED RELEASE ORAL at 07:02

## 2023-02-22 RX ADMIN — SENNOSIDES AND DOCUSATE SODIUM 1 TABLET: 8.6; 5 TABLET ORAL at 09:02

## 2023-02-22 RX ADMIN — OXYCODONE AND ACETAMINOPHEN 1 TABLET: 325; 5 TABLET ORAL at 07:02

## 2023-02-22 RX ADMIN — PREGABALIN 75 MG: 75 CAPSULE ORAL at 10:02

## 2023-02-22 RX ADMIN — PREGABALIN 150 MG: 75 CAPSULE ORAL at 09:02

## 2023-02-22 RX ADMIN — FLUTICASONE PROPIONATE 50 MCG: 50 SPRAY, METERED NASAL at 10:02

## 2023-02-22 RX ADMIN — DAPAGLIFLOZIN 10 MG: 10 TABLET, FILM COATED ORAL at 10:02

## 2023-02-22 NOTE — PT/OT/SLP PROGRESS
Occupational Therapy   Treatment    Name: Olga Stephenson  MRN: [id number]  Admitting Diagnosis:  R total knee replacement on 2/9/23       Recommendations:     Discharge Recommendations: outpatient PT  Discharge Equipment Recommendations:  walker, rolling  Barriers to discharge:       Assessment:     Olga Stephenson is a 67 y.o. female with a medical diagnosis of R total knee replacement on 2/9/23. She presents with decreased balance, safety, strength. Performance deficits affecting function are weakness, impaired endurance, impaired self care skills, impaired functional mobility, impaired balance, decreased safety awareness, decreased ROM. Pt  tolerated therapy much better on today. Pt provided with LE stretches.and states that she felt better afterward. Pt has improved with mobility and plans to d/c home.     Rehab Prognosis:  Good; patient would benefit from acute skilled OT services to address these deficits and reach maximum level of function.       Plan:     Patient to be seen 5 x/week to address the above listed problems via self-care/home management, therapeutic activities, therapeutic exercises  Plan of Care Expires:    Plan of Care Reviewed with: patient    Subjective     Pain/Comfort:  Pain Rating 1: 0/10  Location - Side 1: Right  Location - Orientation 1: lower  Location 1: knee    Objective:     Communicated with: Pt prior to session.  Patient found up in chair   upon OT entry to room.    General Precautions: Standard, fall    Orthopedic Precautions:   Braces:    Respiratory Status: Room air     Occupational Performance:     Bed Mobility:    Pt presented sitting upright in recliner upon Ot's arrival.      Functional Mobility/Transfers:  Patient completed Sit <> Stand Transfer with stand by assistance  with  rolling walker   Functional Mobility: Pt completed functional ambulation outside w/o adaptive equipment to increase BLE and balance on uneven inclined surface.     Activities of Daily Living:  Sit  to stand t/f with -Mod I for balance and safety  UE dressing/LE dressing: I to don pants, socks, shirt  Sit to stand t/f with I      AMPAC 6 Click ADL: 21    Treatment & Education:  Manual:    While sitting in chair, the pt was administered manual knee flexion stretches and extension stretches to reduce pain and tightness which restricts ROM.     Therapeutic activity  While standing the pt completed unilateral standing, squats on the trampoline 2 sets of 20 reps reps) to increase BLE strengh and balance for functional mobility.     Patient left up in chair with all lines intact and call button in reach    GOALS:   Multidisciplinary Problems       Occupational Therapy Goals          Problem: Occupational Therapy    Goal Priority Disciplines Outcome Interventions   Occupational Therapy Goal     OT, PT/OT Ongoing, Progressing    Description: Goals to be met by: 3/10/23     Patient will increase functional independence with ADLs by performing:    UE Dressing with Modified [place].  LE Dressing with Modified [place].  Grooming while standing with Modified [place].  Toileting from toilet with Modified [place] for hygiene and clothing management.   Sitanding x5-10 minutes with Modified [place].  Squat pivot transfers with Modified [place].  Step transfer with Modified [place]  Toilet transfer to toilet with Modified [place].                         Time Tracking:     OT Date of Treatment:    OT Start Time:  11:30  OT Stop Time:  12:00  OT Total Time (min):      Billable Minutes:Therapeutic activity 15  Manual 15    OT/ROBERT: OT          2/22/2023

## 2023-02-22 NOTE — PLAN OF CARE
Problem: Occupational Therapy  Goal: Occupational Therapy Goal  Description: Goals to be met by: 3/10/23     Patient will increase functional independence with ADLs by performing:    UE Dressing with Modified [place].-GOAL MET  LE Dressing with Modified [place].-GOAL MET  Grooming while standing with Modified [place].-GOAL MET  Toileting from toilet with Modified [place] for hygiene and clothing management. -GOAL MET  Sitanding x5-10 minutes with Modified [place].GOAL MET  Squat pivot transfers with Modified [place].GOAL MET  Step transfer with Modified [place]AL [place]  Toilet transfer to toilet with Modified [place].GOAL MET    Outcome: Ongoing, Progressing

## 2023-02-22 NOTE — PT/OT/SLP PROGRESS
Physical Therapy Treatment    Patient Name:  Olga Stephenson   MRN:  [id number]    Recommendations:     Discharge Recommendations: outpatient PT  Discharge Equipment Recommendations: walker, rolling  Barriers to discharge: Inaccessible home and Decreased caregiver support    Assessment:     Olga Stephenson is a 67 y.o. female admitted with a medical diagnosis of Right TKR.  She presents with the following impairments/functional limitations: weakness, impaired endurance, gait instability, impaired balance, decreased lower extremity function, pain.     Pt able to ambulate with no AD fairly well Supervision no LOB on level services without c/o increased pain. Pt reports she is going home end of week and will be coming to outpatient PT early next week. Pt has met all ST and most LT goals on this date.     Rehab Prognosis: Good; patient would benefit from acute skilled PT services to address these deficits and reach maximum level of function.    Recent Surgery: * No surgery found *      Plan:     During this hospitalization, patient to be seen BID to address the identified rehab impairments via gait training, therapeutic activities and progress toward the following goals:    Plan of Care Expires:  03/10/23    Subjective     Chief Complaint: Pt has no new complaints with her right knee.    Patient/Family Comments/goals: Outpatient therapy next week    Pain/Comfort:  Pain Rating 1: 6/10  Location - Side 1: Right  Location - Orientation 1: lower  Location 1: knee  Pain Addressed 1: Pre-medicate for activity      Objective:     Communicated with patient prior to session.  Patient found up in chair with   upon PT entry to room.     General Precautions: Standard, fall  Orthopedic Precautions: RLE weight bearing as tolerated  Braces: N/A  Respiratory Status: Room air     Functional Mobility:  Transfers:     Sit to Stand:  independence and modified independence with no AD and rolling walker  Gait: 200 ft no AD Supervision    Balance: F+     AM-PAC 6 CLICK MOBILITY  Turning over in bed (including adjusting bedclothes, sheets and blankets)?: 4  Sitting down on and standing up from a chair with arms (e.g., wheelchair, bedside commode, etc.): 4  Moving from lying on back to sitting on the side of the bed?: 4  Moving to and from a bed to a chair (including a wheelchair)?: 4  Need to walk in hospital room?: 4  Climbing 3-5 steps with a railing?: 4  Basic Mobility Total Score: 24       Treatment & Education:  Toilet t/f- Vonda  Nustep x 10 min  Ascending/ descending 4 steps x 4 laps Vonda   Ambulation in hallway no AD x 200 ft Supervision  Ambulation in therapy gym x 20 ft using quad cane - Supervision     Patient left up in chair with call button in reach.    GOALS:   Multidisciplinary Problems       Physical Therapy Goals          Problem: Physical Therapy    Goal Priority Disciplines Outcome Goal Variances Interventions   Physical Therapy Goal     PT, PT/OT Ongoing, Progressing     Description: Goals to be met by: 2 weeks     Patient will increase functional independence with mobility by performin. Supine to sit with Stand-by Assistance  2. Sit to supine with Stand-by Assistance  3. Rolling to Left and Right with Modified [place].  4. Sit to stand transfer with Stand-by Assistance  5. Bed to chair transfer with Stand-by Assistance using Rolling Walker  6. Gait  x 150 feet with Stand-by Assistance using Rolling Walker.     Goals to be met by: 4 weeks     Patient will increase functional independence with mobility by performin. Supine to sit with Modified [place]  2. Sit to supine with Modified [place]  3. Sit to stand transfer with Modified [place]  4. Bed to chair transfer with Modified [place] using Rolling Walker  5. Gait  x 300 feet with Modified [place] using Rolling Walker.                          Time Tracking:     PT Received On: 23  PT Start Time: 934     PT Stop Time: 1005  PT  Total Time (min): 31 min     Billable Minutes: Therapeutic Activity 15 and Gait training 16    Treatment Type: Treatment  PT/PTA: PTA     PTA Visit Number: 3     02/22/2023

## 2023-02-22 NOTE — PT/OT/SLP PROGRESS
Physical Therapy Treatment    Patient Name:  Olga Stephenson   MRN:  [id number]    Recommendations:     Discharge Recommendations: home with home health  Discharge Equipment Recommendations: walker, rolling  Barriers to discharge:  ongoing rehab    Assessment:     Olga Stephenson is a 67 y.o. female admitted with a medical diagnosis of <principal problem not specified>.  She presents with the following impairments/functional limitations: weakness, impaired endurance, impaired functional mobility, pain, decreased lower extremity function, decreased ROM, orthopedic precautions. Pt tolerated session fairly well, reported a decrease in pain after manual therapy session.     Rehab Prognosis: Good; patient would benefit from acute skilled PT services to address these deficits and reach maximum level of function.    Recent Surgery: * No surgery found *      Plan:     During this hospitalization, patient to be seen BID to address the identified rehab impairments via gait training, therapeutic exercises, therapeutic activities and progress toward the following goals:    Plan of Care Expires:  03/10/23    Subjective     Chief Complaint: R TKA  Patient/Family Comments/goals: agreeable to PT  Pain/Comfort:  Pain Rating 1: 7/10  Location - Side 1: Right  Location 1: knee  Pain Addressed 1: Distraction, Reposition  Pain Rating Post-Intervention 1: 6/10      Objective:     Communicated with OTR prior to session.  Patient found up in chair with   upon PT entry to room.     General Precautions: Standard, fall  Orthopedic Precautions: RLE weight bearing as tolerated  Braces: N/A  Respiratory Status: Room air     Functional Mobility:  Transfers:     Sit to Stand:  supervision with no AD  Gait: 150ft x 2 with no AD, SBA, good stride but slightly shortened on R  Balance: Good      AM-PAC 6 CLICK MOBILITY          Treatment & Education:  Nustep exercise x 10 minutes with 0 rest breaks, resistance level 3.      Pt tolerated step  ups x  10 reps with RLE leading on 8 inch box with increased focus of R knee flexion versus ext. Pt then instructed in standing resisted R terminal knee extension with blue theraband x 15 reps.     Manual therapy to R knee consisting of manual lymph drainage, petrissage technique with pressure applied distally to proximally, anteriorly to posteriorly. Pt then tolerated distraction of R knee with vibration to reduce tightness and pain.     Patient left ambulatory in room/ruiz.     GOALS:   Multidisciplinary Problems       Physical Therapy Goals          Problem: Physical Therapy    Goal Priority Disciplines Outcome Goal Variances Interventions   Physical Therapy Goal     PT, PT/OT Ongoing, Progressing     Description: Goals to be met by: 2 weeks     Patient will increase functional independence with mobility by performin. Supine to sit with Stand-by Assistance  2. Sit to supine with Stand-by Assistance  3. Rolling to Left and Right with Modified [place].  4. Sit to stand transfer with Stand-by Assistance  5. Bed to chair transfer with Stand-by Assistance using Rolling Walker  6. Gait  x 150 feet with Stand-by Assistance using Rolling Walker.     Goals to be met by: 4 weeks     Patient will increase functional independence with mobility by performin. Supine to sit with Modified [place]  2. Sit to supine with Modified [place]  3. Sit to stand transfer with Modified [place]  4. Bed to chair transfer with Modified [place] using Rolling Walker  5. Gait  x 300 feet with Modified [place] using Rolling Walker.                          Time Tracking:     PT Received On: 23  PT Start Time: 1525     PT Stop Time: 1600  PT Total Time (min): 35 min     Billable Minutes: Therapeutic Exercise 20; manual therapy 15     Treatment Type: Treatment  PT/PTA: PT     PTA Visit Number: 0     2023

## 2023-02-23 LAB
GLUCOSE SERPL-MCNC: 110 MG/DL (ref 70–105)
GLUCOSE SERPL-MCNC: 142 MG/DL (ref 70–105)

## 2023-02-23 PROCEDURE — 97110 THERAPEUTIC EXERCISES: CPT | Mod: CQ

## 2023-02-23 PROCEDURE — 94761 N-INVAS EAR/PLS OXIMETRY MLT: CPT

## 2023-02-23 PROCEDURE — 82962 GLUCOSE BLOOD TEST: CPT

## 2023-02-23 PROCEDURE — 99900035 HC TECH TIME PER 15 MIN (STAT)

## 2023-02-23 PROCEDURE — 94640 AIRWAY INHALATION TREATMENT: CPT

## 2023-02-23 PROCEDURE — 25000003 PHARM REV CODE 250: Performed by: NURSE PRACTITIONER

## 2023-02-23 PROCEDURE — 36416 COLLJ CAPILLARY BLOOD SPEC: CPT

## 2023-02-23 PROCEDURE — 27000939

## 2023-02-23 PROCEDURE — 11000004 HC SNF PRIVATE

## 2023-02-23 PROCEDURE — 97116 GAIT TRAINING THERAPY: CPT | Mod: CQ

## 2023-02-23 PROCEDURE — 25000003 PHARM REV CODE 250: Performed by: FAMILY MEDICINE

## 2023-02-23 PROCEDURE — 25000003 PHARM REV CODE 250: Performed by: EMERGENCY MEDICINE

## 2023-02-23 RX ADMIN — CLOPIDOGREL BISULFATE 75 MG: 75 TABLET ORAL at 08:02

## 2023-02-23 RX ADMIN — PANTOPRAZOLE SODIUM 40 MG: 40 TABLET, DELAYED RELEASE ORAL at 08:02

## 2023-02-23 RX ADMIN — METFORMIN HYDROCHLORIDE 1000 MG: 500 TABLET ORAL at 08:02

## 2023-02-23 RX ADMIN — OXYCODONE AND ACETAMINOPHEN 1 TABLET: 325; 5 TABLET ORAL at 08:02

## 2023-02-23 RX ADMIN — MELOXICAM 7.5 MG: 7.5 TABLET ORAL at 08:02

## 2023-02-23 RX ADMIN — EZETIMIBE 10 MG: 10 TABLET ORAL at 08:02

## 2023-02-23 RX ADMIN — ALBUTEROL SULFATE 2 PUFF: 90 AEROSOL, METERED RESPIRATORY (INHALATION) at 08:02

## 2023-02-23 RX ADMIN — PREGABALIN 75 MG: 75 CAPSULE ORAL at 08:02

## 2023-02-23 RX ADMIN — VALACYCLOVIR HYDROCHLORIDE 500 MG: 500 TABLET, FILM COATED ORAL at 08:02

## 2023-02-23 RX ADMIN — PREGABALIN 150 MG: 75 CAPSULE ORAL at 08:02

## 2023-02-23 RX ADMIN — METOPROLOL SUCCINATE 25 MG: 25 TABLET, EXTENDED RELEASE ORAL at 08:02

## 2023-02-23 RX ADMIN — FLUTICASONE PROPIONATE 50 MCG: 50 SPRAY, METERED NASAL at 08:02

## 2023-02-23 RX ADMIN — ALBUTEROL SULFATE 2 PUFF: 90 AEROSOL, METERED RESPIRATORY (INHALATION) at 07:02

## 2023-02-23 RX ADMIN — LOSARTAN POTASSIUM 50 MG: 50 TABLET, FILM COATED ORAL at 08:02

## 2023-02-23 RX ADMIN — OXYCODONE AND ACETAMINOPHEN 1 TABLET: 325; 5 TABLET ORAL at 07:02

## 2023-02-23 RX ADMIN — GLIPIZIDE 5 MG: 2.5 TABLET, EXTENDED RELEASE ORAL at 08:02

## 2023-02-23 RX ADMIN — DAPAGLIFLOZIN 10 MG: 10 TABLET, FILM COATED ORAL at 08:02

## 2023-02-23 RX ADMIN — OXYCODONE AND ACETAMINOPHEN 1 TABLET: 325; 5 TABLET ORAL at 02:02

## 2023-02-23 NOTE — PT/OT/SLP PROGRESS
Physical Therapy Treatment    Patient Name:  Olga Stephenson   MRN:  [id number]    Recommendations:     Discharge Recommendations: home with home health  Discharge Equipment Recommendations: walker, rolling  Barriers to discharge: Inaccessible home and Decreased caregiver support    Assessment:     Olga Stephenson is a 67 y.o. female admitted with a medical diagnosis of Right TKR.  She presents with the following impairments/functional limitations: weakness, impaired endurance, gait instability, impaired balance, decreased lower extremity function, pain.     Pt has progressed well with her functional mobility and independence with ADLs. Pt plans to discharge home tomorrow and receive outpatient PT.    Rehab Prognosis: Good; patient would benefit from acute skilled PT services to address these deficits and reach maximum level of function.    Recent Surgery: * No surgery found *      Plan:     During this hospitalization, patient to be seen 5 x/week to address the identified rehab impairments via gait training, therapeutic exercises and progress toward the following goals:    Plan of Care Expires:  03/10/23    Subjective     Chief Complaint: Pt has no new complaints with her right knee.    Patient/Family Comments/goals: Outpatient therapy next week    Pain/Comfort:  Pain Rating 1: 6/10  Location - Side 1: Right  Location - Orientation 1: lower  Location 1: knee  Pain Addressed 1: Pre-medicate for activity      Objective:     Communicated with patient prior to session.  Patient found up HOB elevated with   upon PT entry to room.     General Precautions: Standard, fall  Orthopedic Precautions: RLE weight bearing as tolerated  Braces: N/A  Respiratory Status: Room air     Functional Mobility:  Transfers:     Sit to Stand:  independence and modified independence with no AD and rolling walker  Gait: 200 ft no AD Supervision   Balance: F+     AM-PAC 6 CLICK MOBILITY  Turning over in bed (including adjusting bedclothes,  sheets and blankets)?: 4  Sitting down on and standing up from a chair with arms (e.g., wheelchair, bedside commode, etc.): 4  Moving from lying on back to sitting on the side of the bed?: 4  Moving to and from a bed to a chair (including a wheelchair)?: 4  Need to walk in hospital room?: 4  Climbing 3-5 steps with a railing?: 4  Basic Mobility Total Score: 24       Treatment & Education:  Nustep x 10 min  Standing L hip flexion, Straight leg raises, abduction, extension, Hamstring curls, marches, mini squats, 2 x 10  Heel raises 2 x 10  Ambulation in hallway no AD x 200 ft Supervision  Seated ball squeezes x 50    Patient left up in chair with call button in reach.    GOALS:   Multidisciplinary Problems       Physical Therapy Goals          Problem: Physical Therapy    Goal Priority Disciplines Outcome Goal Variances Interventions   Physical Therapy Goal     PT, PT/OT Ongoing, Progressing     Description: Goals to be met by: 2 weeks     Patient will increase functional independence with mobility by performin. Supine to sit with Stand-by Assistance  2. Sit to supine with Stand-by Assistance  3. Rolling to Left and Right with Modified [place].  4. Sit to stand transfer with Stand-by Assistance  5. Bed to chair transfer with Stand-by Assistance using Rolling Walker  6. Gait  x 150 feet with Stand-by Assistance using Rolling Walker.     Goals to be met by: 4 weeks     Patient will increase functional independence with mobility by performin. Supine to sit with Modified [place]  2. Sit to supine with Modified [place]  3. Sit to stand transfer with Modified [place]  4. Bed to chair transfer with Modified [place] using Rolling Walker  5. Gait  x 300 feet with Modified [place] using Rolling Walker.                          Time Tracking:     PT Received On: 23  PT Start Time: 0950     PT Stop Time: 1020  PT Total Time (min): 30 min     Billable Minutes: Therapeutic Exercise  15 and Gait training 15    Treatment Type: Treatment  PT/PTA: PTA     PTA Visit Number: 1 02/23/2023

## 2023-02-23 NOTE — PLAN OF CARE
Swing Bed Recertification    Patient Name: Olga Stephenson                                                            MRN: [id number]   : 1955   Diagnosis: S/P total knee replacement, right [Z96.651]     I certify that continued skilled inpatient care is necessary for the following reasons: to cont. With PT/OT services 5 days/week, pain management, and supportive care.    Estimated discharge date:  23

## 2023-02-23 NOTE — PT/OT/SLP DISCHARGE
Occupational Therapy Discharge Summary    Olga Stephenson  MRN: [id number]   Principal Problem: R TKR      Patient Discharged from acute Occupational Therapy on 2/22/23.  Please refer to prior OT note dated 2/22/23 for functional status.    Assessment:      Patient has met all goals and is not appropriate for therapy.    Objective:     GOALS:   Multidisciplinary Problems       Occupational Therapy Goals          Problem: Occupational Therapy    Goal Priority Disciplines Outcome Interventions   Occupational Therapy Goal     OT, PT/OT Ongoing, Progressing    Description: Goals to be met by: 3/10/23     Patient will increase functional independence with ADLs by performing:    UE Dressing with Modified [place].  LE Dressing with Modified [place].  Grooming while standing with Modified [place].  Toileting from toilet with Modified [place] for hygiene and clothing management.   Sitanding x5-10 minutes with Modified [place].  Squat pivot transfers with Modified [place].  Step transfer with Modified [place]  Toilet transfer to toilet with Modified [place].                         Reasons for Discontinuation of Therapy Services  Transfer to alternate level of care.      Plan:     Patient Discharged to: Outpatient Therapy Services    2/23/2023

## 2023-02-23 NOTE — PT/OT/SLP PROGRESS
Physical Therapy Treatment    Patient Name:  Olga Stephenson   MRN:  [id number]    Recommendations:     Discharge Recommendations: home with home health  Discharge Equipment Recommendations: walker, rolling  Barriers to discharge: Decreased caregiver support    Assessment:     Olga Stephenson is a 67 y.o. female admitted with a medical diagnosis of Right TKR.  She presents with the following impairments/functional limitations: weakness, impaired endurance, gait instability, impaired balance, decreased lower extremity function, pain.     Pt ambulated well with no AD Supervision x 300 ft on level surfaces and no LOB this afternoon session.     Rehab Prognosis: Good; patient would benefit from acute skilled PT services to address these deficits and reach maximum level of function.    Recent Surgery: * No surgery found *      Plan:     During this hospitalization, patient to be seen BID to address the identified rehab impairments via gait training and progress toward the following goals:    Plan of Care Expires:  03/10/23    Subjective     Chief Complaint: Pt and nursing reports she will have her staples removed after she participates with PT.     Patient/Family Comments/goals: Outpatient therapy next week    Pain/Comfort:  Pain Rating 1: 6/10  Location - Side 1: Right  Location - Orientation 1: lower  Location 1: knee  Pain Addressed 1: Pre-medicate for activity      Objective:     Communicated with patient and nursing prior to session.  Patient found seated in chair receiving pain medication with   upon PT entry to room.     General Precautions: Standard, fall  Orthopedic Precautions: RLE weight bearing as tolerated  Braces: N/A  Respiratory Status: Room air     Functional Mobility:  Transfers:     Sit to Stand:  independence and modified independence with no AD and rolling walker  Gait: 300 ft no AD Supervision   Balance: F+     AM-PAC 6 CLICK MOBILITY  Turning over in bed (including adjusting bedclothes, sheets  and blankets)?: 4  Sitting down on and standing up from a chair with arms (e.g., wheelchair, bedside commode, etc.): 4  Moving from lying on back to sitting on the side of the bed?: 4  Moving to and from a bed to a chair (including a wheelchair)?: 4  Need to walk in hospital room?: 4  Climbing 3-5 steps with a railing?: 4  Basic Mobility Total Score: 24       Treatment & Education:  Ambulation in hallway x 300 ft no AD Supervision on level surfaces no LOB  Patient left up in chair with call button in reach.    GOALS:   Multidisciplinary Problems       Physical Therapy Goals          Problem: Physical Therapy    Goal Priority Disciplines Outcome Goal Variances Interventions   Physical Therapy Goal     PT, PT/OT Ongoing, Progressing     Description: Goals to be met by: 2 weeks     Patient will increase functional independence with mobility by performin. Supine to sit with Stand-by Assistance  2. Sit to supine with Stand-by Assistance  3. Rolling to Left and Right with Modified [place].  4. Sit to stand transfer with Stand-by Assistance  5. Bed to chair transfer with Stand-by Assistance using Rolling Walker  6. Gait  x 150 feet with Stand-by Assistance using Rolling Walker.     Goals to be met by: 4 weeks     Patient will increase functional independence with mobility by performin. Supine to sit with Modified [place]  2. Sit to supine with Modified [place]  3. Sit to stand transfer with Modified [place]  4. Bed to chair transfer with Modified [place] using Rolling Walker  5. Gait  x 300 feet with Modified [place] using Rolling Walker.                          Time Tracking:     PT Received On: 23  PT Start Time: 1437     PT Stop Time: 1447  PT Total Time (min): 10 min     Billable Minutes: Gait training 10    Treatment Type: Treatment  PT/PTA: PTA     PTA Visit Number: 2     2023

## 2023-02-23 NOTE — NURSING
Nurses Note -- 4 Eyes      2/23/2023   10:12 AM      Skin assessed during: Q Shift Change      [x] No Pressure Injuries Present    []Prevention Measures Documented      [] Yes- Altered Skin Integrity Present or Discovered   [] LDA Added if Not in Epic (Describe Wound)   [] New Altered Skin Integrity was Present on Admit and Documented in LDA   [] Wound Image Taken    Wound Care Consulted? No    Attending Nurse:  Kathrin Jewell LPN     Second RN/Staff Member:  ELLE GARCIA    RESTING IN BED AWAKE. ALERT AND ORIENTED. RESPIRATIONS EVEN AND NON LABORED. SAFETY MEASURES IN PLACE. C/O MILD PAIN; WILL TX DURING AM MED [employer]. COMMUNICATION BOARD UPDATED. OFF GOING NURSE PRESENT PERFORMING BEDSIDE SHIFT REPORT (ELLE GARCIA). ELLE REYES WILL RESOURCE ME WITH THIS PT. LENA WILL COME OUT TODAY PER ORDER. PEDRITO ESTES

## 2023-02-23 NOTE — PROGRESS NOTES
Nurses Note -- 4 Eyes      2/22/2023   7:59 PM      Skin assessed during: Q Shift Change      [x] No Pressure Injuries Present    []Prevention Measures Documented      [x] Yes- Altered Skin Integrity Present or Discovered   [] LDA Added if Not in Epic (Describe Wound)   [x] New Altered Skin Integrity was Present on Admit and Documented in LDA   [] Wound Image Taken    Wound Care Consulted? No    Attending Nurse:  RADHA MARTINEZ RN     Second RN/Staff Tennille Dunn RN

## 2023-02-23 NOTE — NURSING
Pt called call bell requesting pain pill at this time. Visitors noted in room at this time. Call bell within reach; Pt in chair noted; chair locked. BP checked.

## 2023-02-24 VITALS
SYSTOLIC BLOOD PRESSURE: 124 MMHG | BODY MASS INDEX: 29.92 KG/M2 | DIASTOLIC BLOOD PRESSURE: 61 MMHG | RESPIRATION RATE: 18 BRPM | HEART RATE: 95 BPM | TEMPERATURE: 98 F | OXYGEN SATURATION: 98 % | HEIGHT: 69 IN | WEIGHT: 202 LBS

## 2023-02-24 LAB
ANION GAP SERPL CALCULATED.3IONS-SCNC: 10 MMOL/L (ref 7–16)
BASOPHILS # BLD AUTO: 0.06 K/UL (ref 0–0.2)
BASOPHILS NFR BLD AUTO: 0.6 % (ref 0–1)
BASOPHILS NFR BLD MANUAL: 1 % (ref 0–1)
BUN SERPL-MCNC: 13 MG/DL (ref 7–18)
BUN/CREAT SERPL: 17 (ref 6–20)
CALCIUM SERPL-MCNC: 9.2 MG/DL (ref 8.5–10.1)
CHLORIDE SERPL-SCNC: 104 MMOL/L (ref 98–107)
CO2 SERPL-SCNC: 28 MMOL/L (ref 21–32)
CREAT SERPL-MCNC: 0.78 MG/DL (ref 0.55–1.02)
DIFFERENTIAL METHOD BLD: ABNORMAL
EGFR (NO RACE VARIABLE) (RUSH/TITUS): 83 ML/MIN/1.73M²
EOSINOPHIL # BLD AUTO: 0.21 K/UL (ref 0–0.5)
EOSINOPHIL NFR BLD AUTO: 2.1 % (ref 1–4)
EOSINOPHIL NFR BLD MANUAL: 1 % (ref 1–4)
ERYTHROCYTE [DISTWIDTH] IN BLOOD BY AUTOMATED COUNT: 14.2 % (ref 11.5–14.5)
GLUCOSE SERPL-MCNC: 134 MG/DL (ref 74–106)
HCT VFR BLD AUTO: 33.4 % (ref 38–47)
HGB BLD-MCNC: 10.4 G/DL (ref 12–16)
LYMPHOCYTES # BLD AUTO: 4.18 K/UL (ref 1–4.8)
LYMPHOCYTES NFR BLD AUTO: 41.1 % (ref 27–41)
LYMPHOCYTES NFR BLD MANUAL: 39 % (ref 27–41)
MCH RBC QN AUTO: 29.3 PG (ref 27–31)
MCHC RBC AUTO-ENTMCNC: 31.1 G/DL (ref 32–36)
MCV RBC AUTO: 94.1 FL (ref 80–96)
MONOCYTES # BLD AUTO: 0.83 K/UL (ref 0–0.8)
MONOCYTES NFR BLD AUTO: 8.2 % (ref 2–6)
MONOCYTES NFR BLD MANUAL: 6 % (ref 2–6)
MPC BLD CALC-MCNC: 10.9 FL (ref 9.4–12.4)
NEUTROPHILS # BLD AUTO: 4.89 K/UL (ref 1.8–7.7)
NEUTROPHILS NFR BLD AUTO: 48 % (ref 53–65)
NEUTS SEG NFR BLD MANUAL: 53 % (ref 50–62)
NRBC BLD MANUAL-RTO: ABNORMAL %
PLATELET # BLD AUTO: 449 K/UL (ref 150–400)
PLATELET MORPHOLOGY: ABNORMAL
POTASSIUM SERPL-SCNC: 3.4 MMOL/L (ref 3.5–5.1)
RBC # BLD AUTO: 3.55 M/UL (ref 4.2–5.4)
RBC MORPH BLD: NORMAL
SODIUM SERPL-SCNC: 139 MMOL/L (ref 136–145)
WBC # BLD AUTO: 10.17 K/UL (ref 4.5–11)

## 2023-02-24 PROCEDURE — 94761 N-INVAS EAR/PLS OXIMETRY MLT: CPT

## 2023-02-24 PROCEDURE — 80048 BASIC METABOLIC PNL TOTAL CA: CPT | Performed by: NURSE PRACTITIONER

## 2023-02-24 PROCEDURE — 99316 NF DSCHRG MGMT 30 MIN+: CPT | Mod: ,,, | Performed by: EMERGENCY MEDICINE

## 2023-02-24 PROCEDURE — 99900035 HC TECH TIME PER 15 MIN (STAT)

## 2023-02-24 PROCEDURE — 94640 AIRWAY INHALATION TREATMENT: CPT

## 2023-02-24 PROCEDURE — 25000003 PHARM REV CODE 250: Performed by: NURSE PRACTITIONER

## 2023-02-24 PROCEDURE — 27000939

## 2023-02-24 PROCEDURE — 25000003 PHARM REV CODE 250: Performed by: FAMILY MEDICINE

## 2023-02-24 PROCEDURE — 25000003 PHARM REV CODE 250: Performed by: EMERGENCY MEDICINE

## 2023-02-24 PROCEDURE — 85025 COMPLETE CBC W/AUTO DIFF WBC: CPT | Performed by: NURSE PRACTITIONER

## 2023-02-24 PROCEDURE — 99316 PR NURSING FAC DISCHRGE DAY,MORE 30 MIN: ICD-10-PCS | Mod: ,,, | Performed by: EMERGENCY MEDICINE

## 2023-02-24 RX ORDER — ERGOCALCIFEROL 1.25 MG/1
50000 CAPSULE ORAL
Qty: 6 CAPSULE | Refills: 0 | Status: SHIPPED | OUTPATIENT
Start: 2023-02-27 | End: 2023-04-04

## 2023-02-24 RX ORDER — POTASSIUM CHLORIDE 750 MG/1
20 TABLET, EXTENDED RELEASE ORAL ONCE
Status: COMPLETED | OUTPATIENT
Start: 2023-02-24 | End: 2023-02-24

## 2023-02-24 RX ORDER — POTASSIUM CHLORIDE 20 MEQ/1
20 TABLET, EXTENDED RELEASE ORAL DAILY
Qty: 3 TABLET | Refills: 0 | Status: SHIPPED | OUTPATIENT
Start: 2023-02-24 | End: 2023-02-27

## 2023-02-24 RX ADMIN — CLOPIDOGREL BISULFATE 75 MG: 75 TABLET ORAL at 10:02

## 2023-02-24 RX ADMIN — DAPAGLIFLOZIN 10 MG: 10 TABLET, FILM COATED ORAL at 01:02

## 2023-02-24 RX ADMIN — PANTOPRAZOLE SODIUM 40 MG: 40 TABLET, DELAYED RELEASE ORAL at 10:02

## 2023-02-24 RX ADMIN — FLUTICASONE PROPIONATE 50 MCG: 50 SPRAY, METERED NASAL at 10:02

## 2023-02-24 RX ADMIN — POTASSIUM CHLORIDE 20 MEQ: 750 TABLET, EXTENDED RELEASE ORAL at 01:02

## 2023-02-24 RX ADMIN — MELOXICAM 7.5 MG: 7.5 TABLET ORAL at 10:02

## 2023-02-24 RX ADMIN — PREGABALIN 75 MG: 75 CAPSULE ORAL at 10:02

## 2023-02-24 RX ADMIN — OXYCODONE AND ACETAMINOPHEN 1 TABLET: 325; 5 TABLET ORAL at 10:02

## 2023-02-24 RX ADMIN — METOPROLOL SUCCINATE 25 MG: 25 TABLET, EXTENDED RELEASE ORAL at 10:02

## 2023-02-24 RX ADMIN — GLIPIZIDE 5 MG: 2.5 TABLET, EXTENDED RELEASE ORAL at 10:02

## 2023-02-24 RX ADMIN — ALBUTEROL SULFATE 2 PUFF: 90 AEROSOL, METERED RESPIRATORY (INHALATION) at 08:02

## 2023-02-24 RX ADMIN — VALACYCLOVIR HYDROCHLORIDE 500 MG: 500 TABLET, FILM COATED ORAL at 10:02

## 2023-02-24 RX ADMIN — LOSARTAN POTASSIUM 50 MG: 50 TABLET, FILM COATED ORAL at 10:02

## 2023-02-24 NOTE — NURSING
Pt d/c at this time via personal vehicle with daughter. Belongings, including clothes, walker, knee immobilizer returned. Discharge instructions and medications reviewed with pt. Pt denies wanting to take cryo-cuff home. Informed pt to put ice on right knee in ziploc bag wrapped in pillow case. Informed pt of need to  ergocalciferol & potassium from [facility] pharmacy. Pt verbalized understanding. No acute distress noted at time of discharge.

## 2023-02-24 NOTE — PROGRESS NOTES
Nurses Note -- 4 Eyes      2/23/2023   8:22 PM      Skin assessed during: Q Shift Change      [x] No Pressure Injuries Present    []Prevention Measures Documented      [x] Yes- Altered Skin Integrity Present or Discovered   [] LDA Added if Not in Epic (Describe Wound)   [x] New Altered Skin Integrity was Present on Admit and Documented in LDA   [] Wound Image Taken    Wound Care Consulted? No    Attending Nurse:  RADHA MARTINEZ RN     Second RN/Staff Member:  KATERIN Trent LPN

## 2023-02-24 NOTE — NURSING
@ 17:32PM RESTING IN RECLINER CHAIR FINISHING UP HER DINNER AND TALKING/VISITING WITH HER FAMILY. FAMILY PRESENT @ BEDSIDE. NO DISTRESS NOTED. NO C/O PAIN @ THE TIME. SAFETY MEASURES IN PLACE. NO CONCERNS VOICED. ALERT AND ORIENTED. RESPIRAITONS EVEN AND NON LABORED. WILL REPORT OFF TO ONCOMING SHIFT.

## 2023-02-24 NOTE — NURSING
Dr. Bucio notified pt worried about right knee incision warm & swollen. No redness or drainage noted. Dr. Bucio states she will look at site before pt discharges.

## 2023-02-24 NOTE — DISCHARGE INSTRUCTIONS
PT WAS ADVISED TO INCREASE REST AND FLUIDS.  PT WAS ADVISED REGARDING FALL PRECAUTIONS AND TO USE ROLLING WALKER.  PT WAS ADVISED TO TAKE ALL MEDICATIONS AS DIRECTED. PT WAS ADVISED REGARDING OPIOID SAFELY, PT WAS ADVISED TO TAKE OVER THE COUNTER TYLENOL AS NEEDED FOR MILD PAIN, MIRALAX FOR CONSTIPATION AND MAALOX OR MYLANTA FOR HEART BURN.  PT WAS ADVISED TO KEEP ALL FOLLOW UP MEDICAL APPOINTMENTS.

## 2023-02-24 NOTE — PLAN OF CARE
[facility] - Medical Surgical Unit  Discharge Final Note    Primary Care Provider: Lv Cartagena MD    Expected Discharge Date:     Final Discharge Note (most recent)       Final Note - 02/24/23 1158          Final Note    Assessment Type Final Discharge Note     Anticipated Discharge Disposition Home or Self Care     What phone number can be called within the next 1-3 days to see how you are doing after discharge? [phone]     Hospital Resources/Appts/Education Provided Provided patient/caregiver with written discharge plan information;Appointments scheduled by Navigator/Coordinator        Post-Acute Status    Post-Acute Authorization Other     Post-Acute Placement Status Patient List Provided   [facility]    Coverage Medicare     Patient choice form signed by patient/caregiver List with quality metrics by geographic area provided     Discharge Delays None known at this time                 Pt. Will d/c home today to f/u with Dr. Tolliver as scheduled. Pt. Also has a f/u appt. Scheduled with [facility] to begin Therapy next week. Pt. Has RW that was delivered to her room already. No other d/c needs were identified.    Important Message from Medicare  Important Message from Medicare regarding Discharge Appeal Rights: Given to patient/caregiver, Explained to patient/caregiver, Signed/date by patient/caregiver     Date IMM was signed: 02/24/23  Time IMM was signed: 1200

## 2023-02-27 ENCOUNTER — PATIENT OUTREACH (OUTPATIENT)
Dept: ADMINISTRATIVE | Facility: CLINIC | Age: 68
End: 2023-02-27

## 2023-02-27 NOTE — PROGRESS NOTES
C3 nurse spoke with Olga Stephenson for a TCC post hospital discharge follow up call. The patient has a scheduled HOSFU appointment with Lv Cartagena MD  on 3/3/23 @ 903. TCC call not completed d/t pt did not have medications available at time to call. Will call pt back and hospital follow up appt time and date and OOC number.

## 2023-02-28 ENCOUNTER — CLINICAL SUPPORT (OUTPATIENT)
Dept: REHABILITATION | Facility: HOSPITAL | Age: 68
End: 2023-02-28
Payer: MEDICARE

## 2023-02-28 DIAGNOSIS — Z96.651 STATUS POST TOTAL RIGHT KNEE REPLACEMENT: ICD-10-CM

## 2023-02-28 DIAGNOSIS — Z96.651 STATUS POST TOTAL KNEE REPLACEMENT, RIGHT: Primary | ICD-10-CM

## 2023-02-28 DIAGNOSIS — M25.661 DECREASED RANGE OF MOTION (ROM) OF RIGHT KNEE: Primary | ICD-10-CM

## 2023-02-28 PROCEDURE — 97161 PT EVAL LOW COMPLEX 20 MIN: CPT

## 2023-02-28 PROCEDURE — 97110 THERAPEUTIC EXERCISES: CPT

## 2023-02-28 NOTE — DISCHARGE SUMMARY
[facility] - Medical Surgical Unit  Hospital Medicine  Discharge Summary      Patient Name: Olga Stephenson  MRN: [id number]  MARK: [id number]  Patient Class: IP- Swing  Admission Date: 2/10/2023  Hospital Length of Stay: 14 days  Discharge Date and Time: 2/24/2023  2:14 PM  Attending Physician: No att. providers found   Discharging Provider: Sandy Bucio MD  Primary Care Provider: Lv Cartagena MD    Primary Care Team: Networked reference to record PCT     HPI:   PT IS A 67 YR OLD BF ADMITTED TO [facility] SWING BED FOR PT/OT/REHABILITATION FOR MUSCLE WEAKNESS AND MEDICAL MANAGEMENT. PT WAS TRANSFERRED FROM [facility] WHERE SHE WAS ADMITTED FOR R TKR PER DR MCLAUGHLIN ON 02/09/2023. PT HAD AN UNCOMPLICATED POSTOPERATIVE COURSE AND  PT IMPROVED; BUT HAS PERSISTENT MUSCLE WEAKNESS AND DEBILITY, AND WILL REQUIRE SWING BED FOR REHABILITATION. PT HAS HX CAD (MI, CABG 2011), DVT, DM2, HLD AND HTN.   PT WILL BE EVALUATED PER PT/OT AND A TREATMENT PLAN WILL BE INITIATED. THE PHARMACIST WILL REVIEW MEDICATIONS AND MAKE RECOMMENDATIONS. PT WILL SEE THE DIETICIAN  FOR NUTRITIONAL SUPPORT WITH WEIGHT  95.7 KG AND ALBUMIN OF 3.4. PT'S ABNORMAL ADMITTING LABS ARE: CMP:152, ALBUMIN 3.4, CBC:WBC 15.36 K, UA:KETONES 40. MAG IS NORMAL 1.7, TSH 0.196 AND VITAMIN D IS 16.2. PT WILL HAVE WEEKLY LABS.  PT IS RETIRED AND LIVES IN [place], [place].     CXR: NO ACUTE CHANGE    PT CODE STATUS IS FULL CODE  PT COVID STATUS IS NEG  PT HAS HAD COVID VACCINE  PT VTE PPX IS ELIQUIS/PLAVIX/TEDS/EARLY AMBULATION        * No surgery found *      Hospital Course:   02/17/2023 HOSPITAL DAY 7    PT IS ON POST OP DAY 8 R TKR WITH INCISION HEALING WELL AND PAIN WELL CONTROLLED WITH PERCOCET; NORCO WAS INEFFECTIVE.   PT IS PARTICIPATING WITH PT/OT PERFORMING BED MOBILITY WITH SBA, TRANSFERS WITH CGA AND SBA WITH RW, AND AMBULATING 100 FEET WITH CGA/SBA WITH RW.RD HAS BEEN CONSULTED AND REVIEWED BASIC DM DIETARY PRINCIPLES WITH THE  PT; MEAL INTAKE IS 50%. PT GLU RANGE -205 ON GLIPIZIDE, DAPAGLIFLOZIN AND METFORMIN WITH A1C 6.9  THE  PHARMACY IS MONITORING ALL MEDICATIONS..  PT HAS STABLE LABS EXCEPT FOR BMP: , K 3.2 , CBC: WBC 12.29 K, H/H 10.2/31.8.     02/24/2023 HOSPITAL DAY  14  DISCHARGE  PT IS A 67 YR OLD BF ADMITTED TO [facility] SWING BED FOR PT/OT/REHABILITATION FOR MUSCLE WEAKNESS AND MEDICAL MANAGEMENT. PT WAS TRANSFERRED FROM [facility] WHERE SHE WAS ADMITTED FOR R TKR PER DR MCLAUGHLIN ON 02/09/2023. PT HAD AN UNCOMPLICATED POSTOPERATIVE COURSE AND  PT IMPROVED; BUT HAD PERSISTENT MUSCLE WEAKNESS AND DEBILITY, AND REQUIRED SWING BED FOR REHABILITATION. PT HAS HX CAD (MI, CABG 2011), DVT, DM2, HLD AND HTN.   PT WAS EVALUATED PER PT/OT AND A TREATMENT PLAN WAS INITIATED. PT PERFORMED BED MOBILITY WITH INDEPENDENCE, TRANSFERS WITH SUPERVISION AND AMBULATION 150 FEET WITH NO AD, SBA AND GOOD STRIDE, SLIGHTLY SHORTENED ON R WITH GOOD BALANCE NOTED PER PT/OT. PT REQUIRED CHANGING NORCO TO PERCOCET POSTOP WHICH WAS EFFECTIVE AND PT HAS RX PER ORTHO OF NORCO POST DISCHARGE. PT'S POST INCISION IS HEALING WELL. PT WAS CONCERNED REGARDING SWELLING AND INCREASED WARMTH ON POST OP DAY 15 AND ON EXAMINATION INCISION APPEARED NORMAL AND KNEE NORMAL POSTOP APPEARANCE WITH MINIMAL EDEMA; PT WAS ADVISED TO CONTINUE ICE THERAPY AND ANALGESICS.    PT HAS DM2 AND GLU RANGE THIS WEEK -140 AND PT WAS DISCHARGED TO CONTINUE DAPAGLIFLOZIN, GLIPIZIDE AND METFORMIN AND TO MONITOR FSBS QID.  THE PHARMACIST REVIEWED MEDICATIONS AND MADE RECOMMENDATIONS. PT WAS EVALUATED PER THE DIETICIAN FOR NUTRITIONAL SUPPORT WITH WEIGHT 95.7 KG  ON ADMISSION AND 91.6 KF ON DISCHARGE AND ALBUMIN OF 3.4.  PT'S MEAL INTAKE WAS 85 %. PT IS ON PLAVIX AND WILL CONTINUE THIS POST DISCHARGE; ELIQUIS WAS DISCONTINUED ON DISCHARGE, AND ESTRADIOL WAS HELD ON ADMISSION D/T INCREASED VTE RISK.  PT'S POST OP H/H ON DISCHARGE WAS 10.4/33.4 WITH PLT  K, MAG  WAS NORMAL 1.7, TSH 0.196 AND VITAMIN D WAS 16.2 AND PT WILL TAKE VIT D 50,000  WEEKLY FOR 8 WEEKS.  PT IS RETIRED AND LIVES IN [place], [place].    PT WAS ADVISED TO INCREASE REST AND FLUIDS.PT WAS ADVISED REGARDING FALL PRECAUTIONS AND TO USE ROLLING WALKER.PT WAS ADVISED TO TAKE ALL MEDICATIONS AS DIRECTED. PT WAS ADVISED REGARDING OPIOID SAFELY, PT WAS ADVISED TO TAKE OVER THE COUNTER TYLENOL AS NEEDED FOR MILD PAIN, MIRALAX FOR CONSTIPATION AND MAALOX OR MYLANTA FOR HEART BURN.  PT WAS ADVISED TO KEEP ALL FOLLOW UP MEDICAL APPOINTMENTS.  PT WILL CONTINUE PT/OT REHABILTATION OUTPATIENT AT [facility] STARTING 02/28/2023.          Goals of Care Treatment Preferences:  Code Status: Full Code      Consults:   Consults (From admission, onward)        Status Ordering Provider     Inpatient consult to Registered Dietitian/Nutritionist  Once        Provider:  (Not yet assigned)    Completed MARCELINO JENKINS          Renal/  Hypokalemia  PT HAS HYPOKALEMIA  K 3.2  WILL SUPPLEMENT  PT HAS K 3.4 AND WILL CONTINUE K REPLACEMENT      Hematology  DVT prophylaxis  PT IS AT RISK FOR VTE  VTE PPX  ELIQUIS/PLAVIX TEDS/EARLY AMBULATION  WILL HOLD ESTRADIOL FOR NOW DUE TO INCREASED RISK OF VTE  02/17/2023  CONTINUE ELIQUIS/ PLAVIX/ TEDS   AMBULATION (100 FEET)  02/24/2023  PT WILL CONTINUE PLAVIX AND TEDS  HOLE ESTRADIOL FOR NOW  DC ELIQUIS-PT AMBULATING 150 FEET      Endocrine  Abnormal TSH  TSH 0.1196  REPEAT NORMAL 0.575  02/24/2023   FURTHER RX NOT INDICATED      Vitamin D deficiency  PT HAS VITAMIN D DEFICIENCY  VIT D 16.2  SUPPLEMENTATION   02/17/2023  CONTINUE POC FOR 8 WEEKS  02/24/2023  PT WILL CONTINUE POC    Type 2 diabetes mellitus without complication, without long-term current use of insulin  Patient's FSGs are controlled on current medication regimen.  Last A1c reviewed-   Lab Results   Component Value Date    HGBA1C 6.9 (H) 02/10/2023     Most recent fingerstick glucose reviewed- No results for input(s):  POCTGLUCOSE in the last 24 hours.  Current correctional scale  High  Maintain anti-hyperglycemic dose as follows-   Antihyperglycemics (From admission, onward)    None        Hold Oral hypoglycemics while patient is in the hospital if pt has hypoblycemia    02/17/2023  RD HAS BEEN CONSULTED AND REVIEWED BASIC DM DIETARY PRINCIPLES WITH THE PT; MEAL INTAKE IS 50%. PT GLU RANGE -205 ON DAPAGLIFLOZIN, GLIPIZIDE AND METFORMIN WITH A1C 6.9  02/24/2023  PT WILL BE DISCHARGE TO RESUME DAPAGLIFLOZIN,  METFORMIN, GLIPIZIDE   GLU RANGE -140    Orthopedic  Status post total right knee replacement  PT IS S/P R TKR ON 02/09/2023 PER DR ARNOLDO SEARS   PT/OT  VTE PPX  WOUND CARE    02/17/2023  PT IS ON POST OP DAY 8 R TKR WITH INCISION HEALING WELL AND PAIN WELL CONTROLLED WITH PERCOCET  02/24/2023  PT REQUIRED CHANGING NORCO TO PERCOCET POSTOP WHICH WAS EFFECTIVE AND PT HAS RX PER ORTHO OF NORCO POST DISCHARGE. PT'S POST OP INCISION IS HEALING WELL. PT WAS CONCERNED REGARDING SWELLING AND INCREASED WARMTH ON POST OP DAY 15 AND ON EXAMINATION INCISION APPEARED NORMAL AND KNEE NORMAL POSTOP APPEARANCE WITH MINIMAL EDEMA; PT WAS ADVISED TO CONTINUE ICE THERAPY AND ANALGESICS.  PT COMPLETED INPATIENT PT/OT  PT'S PAIN WAS WELL CONTROLLED ON PERCOCET AND PT HAS NORCO RX PER ORTHO FOR POST DISCHARGE PAIN MANAGEMENT      Muscle weakness  PT/OT WILL EVALUATE AND INITIATE A TREATMENT PLAN  WBAT    02/17/2023  PT IS PARTICIPATING WITH PT/OT PERFORMING BED MOBILITY WITH SBA, TRANSFERS WITH CGA AND SBA WITH RW, AND AMBULATING 100 FEET WITH CGA/SBA WITH RW.RD HAS BE    02/24/2023 DISCHARGE   PT WAS EVALUATED PER PT/OT AND A TREATMENT PLAN WAS INITIATED. PT PERFORMED BED MOBILITY WITH INDEPENDENCE, TRANSFERS WITH SUPERVISION AND AMBULATION 150 FEET WITH NO AD, SBA AND GOOD STRIDE, SLIGHTLY SHORTENED ON R WITH GOOD BALANCE NOTED PER PT/OT.  PT WILL CONTINUE REHAB WITH OUTPATIENT PT/OR AT [facility] STARTING ON  2/28/2023.    Final Active Diagnoses:    Diagnosis Date Noted POA    Hypokalemia [E87.6] 02/19/2023 No    Abnormal TSH [R79.89] 02/19/2023 Yes    Muscle weakness [M62.81] 02/12/2023 Yes    Status post total right knee replacement [Z96.651] 02/12/2023 Not Applicable    Vitamin D deficiency [E55.9] 02/12/2023 Yes    DVT prophylaxis [Z29.9] 06/15/2022 Not Applicable    Type 2 diabetes mellitus without complication, without long-term current use of insulin [E11.9] 04/16/2021 Yes      Problems Resolved During this Admission:       Discharged Condition: stable    Disposition: Home or Self Care    Follow Up:    Patient Instructions:      Diet diabetic     Diet Cardiac     Notify your health care provider if you experience any of the following:  temperature >100.4     Notify your health care provider if you experience any of the following:  severe uncontrolled pain     No dressing needed     Activity as tolerated       Significant Diagnostic Studies: Labs: All labs within the past 24 hours have been reviewed    Pending Diagnostic Studies:     None         Medications:  Reconciled Home Medications:      Medication List      START taking these medications    ergocalciferol 50,000 unit Cap  Commonly known as: ERGOCALCIFEROL  Take 1 capsule (50,000 Units total) by mouth every 7 days. for 6 doses        CHANGE how you take these medications    cyclobenzaprine 10 MG tablet  Commonly known as: FLEXERIL  1 tablet nightly as needed.  What changed: Another medication with the same name was removed. Continue taking this medication, and follow the directions you see here.     ezetimibe 10 mg tablet  Commonly known as: ZETIA  Take 1 tablet by mouth every evening.  What changed: Another medication with the same name was removed. Continue taking this medication, and follow the directions you see here.     FARXIGA 10 mg tablet  Generic drug: dapagliflozin  Take 1 tablet (10 mg total) by mouth once daily.  What changed: Another  medication with the same name was removed. Continue taking this medication, and follow the directions you see here.     glipiZIDE 5 MG Tr24  Take 1 tablet (5 mg total) by mouth daily with breakfast.  What changed: Another medication with the same name was removed. Continue taking this medication, and follow the directions you see here.     losartan 50 MG tablet  Commonly known as: COZAAR  Take 1 tablet (50 mg total) by mouth once daily.  What changed: Another medication with the same name was removed. Continue taking this medication, and follow the directions you see here.     metoprolol succinate 25 MG 24 hr tablet  Commonly known as: TOPROL-XL  Take 1 tablet by mouth once daily.  What changed: Another medication with the same name was removed. Continue taking this medication, and follow the directions you see here.     pregabalin 75 MG capsule  Commonly known as: LYRICA  Take 150 mg by mouth nightly.  What changed: Another medication with the same name was removed. Continue taking this medication, and follow the directions you see here.        CONTINUE taking these medications    albuterol 90 mcg/actuation inhaler  Commonly known as: PROVENTIL/VENTOLIN HFA  Inhale 2 puffs into the lungs 2 (two) times a day.     chlorpheniramine-phenylephrine 4-10 mg per tablet  Take 1 tablet by mouth every 4 (four) hours as needed for Congestion.     clopidogreL 75 mg tablet  Commonly known as: PLAVIX  Take 1 tablet (75 mg total) by mouth once daily.     fluticasone propionate 50 mcg/actuation nasal spray  Commonly known as: FLONASE  1 spray (50 mcg total) by Each Nostril route once daily.     metFORMIN 1000 MG tablet  Commonly known as: GLUCOPHAGE  Take 1,000 mg by mouth nightly.     multivitamin capsule  Take 1 capsule by mouth once daily.     pantoprazole 40 MG tablet  Commonly known as: PROTONIX  Take 1 tablet (40 mg total) by mouth once daily.     valACYclovir 500 MG tablet  Commonly known as: VALTREX  Take 1 tablet (500 mg  total) by mouth once daily.        STOP taking these medications    apixaban 2.5 mg Tab  Commonly known as: ELIQUIS     estradioL 0.1 mg/24 hr Ptsw  Commonly known as: VIVELLE-DOT     famotidine 20 MG tablet  Commonly known as: PEPCID     loratadine 10 mg tablet  Commonly known as: CLARITIN     meloxicam 15 MG tablet  Commonly known as: MOBIC     omeprazole 20 MG capsule  Commonly known as: PRILOSEC     OXYGEN-AIR DELIVERY SYSTEMS MISC     tiZANidine 4 MG tablet  Commonly known as: ZANAFLEX     traMADoL 50 mg tablet  Commonly known as: ULTRAM        ASK your doctor about these medications    HYDROcodone-acetaminophen  mg per tablet  Commonly known as: NORCO  Take 1 tablet by mouth every 4 (four) hours as needed for Pain.  Ask about: Should I take this medication?     potassium chloride SA 20 MEQ tablet  Commonly known as: K-DUR,KLOR-CON  Take 1 tablet (20 mEq total) by mouth once daily. for 3 days  Ask about: Should I take this medication?            Indwelling Lines/Drains at time of discharge:   Lines/Drains/Airways     None                 Time spent on the discharge of patient: 60 minutes         Sandy Bucio MD  Department of Hospital Medicine  [facility] - Medical Surgical Unit

## 2023-02-28 NOTE — PROGRESS NOTES
C3 nurse spoke with Olga Stephenson  to complete TCC post hospital discharge follow up call. The patient has a scheduled [facility] appointment with Lv Cartagnea MD  on 3/3/23 @ 102.

## 2023-02-28 NOTE — PLAN OF CARE
[facility]  Physical Therapy Initial Evaluation    Name: Olga Stephenson  Clinic Number: [id number]    Therapy Diagnosis:   Encounter Diagnoses   Name Primary?    Status post total right knee replacement     Decreased range of motion (ROM) of right knee Yes     Physician: Juan Lama DO/Unruly    Physician Orders: PT Eval and Treat   Medical Diagnosis from Referral: right total knee replacement   Evaluation Date: 2023  Authorization Period Expiration: 24  Plan of Care Expiration: 23  Visit # / Visits authorized: 18    Time In: 1102  Time Out: 1130  Total Appointment Time (timed & untimed codes): 28 minutes    Precautions: Standard    Subjective   Date of onset: 23  History of current condition - Olga reports: joint stiffness and intermittent right knee pain following right TKR on 23. Patient referred to outpatient therapy following her [facility] hospital stay for continued joint replacement rehab.     Medical History:   Past Medical History:   Diagnosis Date    Acute superficial gastritis without hemorrhage 2022    Adenomatous polyp of cecum 06/15/2022    Adenomatous polyp of transverse colon 06/15/2022    Anticoagulant long-term use     Arthritis     Asthma     Coronary artery disease     Diabetes mellitus, type 2 2014    Diverticula, colon 06/15/2022    DVT (deep venous thrombosis)     Epigastric pain 2022    Heart attack     History of colon polyps 06/15/2022    Hyperlipidemia     Hypertension 2014    Polyp of hepatic flexure of colon 06/15/2022    Screening for colon cancer 06/15/2022    Thyroid disease        Surgical History:   Olga Stephenson  has a past surgical history that includes Esophagogastroduodenoscopy (03/10/2021); Colonoscopy (2017); Hysterectomy;  section; Bilateral oophorectomy (Bilateral); Cholecystectomy; arthroplasty, knee, total, using computer-assisted navigation (Right, 2023); and Coronary artery bypass graft  (2011).    Medications:   Olga has a current medication list which includes the following prescription(s): albuterol, chlorpheniramine-phenylephrine, clopidogrel, cyclobenzaprine, farxiga, ergocalciferol, ezetimibe, fluticasone propionate, glipizide, losartan, metformin, metoprolol succinate, multivitamin, pantoprazole, pregabalin, valacyclovir, and [DISCONTINUED] hydrocortisone.    Allergies:   Review of patient's allergies indicates:   Allergen Reactions    Jardiance [empagliflozin] Anaphylaxis     Headaches     Trulicity [dulaglutide]      Abdominal pain        Imaging, none:     Prior Therapy: yes  Occupation: retired  Prior Level of Function: independent  Current Level of Function: independent    Pain:  Current 0/10, worst 8/10, best 0/10   Location: right knee   Description: Aching  Aggravating Factors: Walking  Easing Factors: pain medication    Pts goals: to get around better and get stiffness out of knee    Objective     Incisions: right anterior knee      Range of motion:  Motion Right   Hip flexion  WITHIN FUNCTIONAL LIMITS   Hip extension  WITHIN FUNCTIONAL LIMITS   Hip abduction  WITHIN FUNCTIONAL LIMITS   Hip adduction  WITHIN FUNCTIONAL LIMITS   Internal rotation  WITHIN FUNCTIONAL LIMITS   External rotation  WITHIN FUNCTIONAL LIMITS   Knee extension   -10   Knee flexion   105   Ankle DF  WITHIN FUNCTIONAL LIMITS   Ankle PF  WITHIN FUNCTIONAL LIMITS   Ankle Inversion  WITHIN FUNCTIONAL LIMITS   Ankle Eversion  WITHIN FUNCTIONAL LIMITS     Right knee PROM: -8 extension to 112 flexion  Right extension lag:-25  Manual muscle test   Muscle Right    Hip flexion  MMT strength: 5/5   Hip extension  MMT strength: 5/5   Hip abduction  MMT strength: 5/5   Hip adduction  MMT strength: 5/5   Hip internal rotation  MMT strength: 5/5   Hip external rotation  MMT strength: 5/5   Knee extension  MMT strength: 3-/5   Knee flexion  MMT strength: 3-/5   Ankle DF  MMT strength: 5/5   Ankle PF  MMT strength: 5/5   Ankle  inversion  MMT strength: 5/5   Ankle eversion  MMT strength: 5/5       Gait:  Weight bearing precautions: WBAT  Assistive device: straight cane  Ambulation distance and deviations: mild limitation in knee flexion swing phase right kne        TREATMENT   Treatment Time In: 1117  Treatment Time Out: 1127  Total Treatment time (time-based codes) separate from Evaluation: 10 minutes    Olga received the treatments listed below:  THERAPEUTIC EXERCISES to develop strength and ROM for 10 minutes including hamstring curls 3 x 15 with green Theraband, standing heel raises 1 x 20; squats 2 x 10    Home Exercises and Patient Education Provided: to be completed next session.      Assessment   Olga is a 67 y.o. female referred to outpatient Physical Therapy with a medical diagnosis of right total knee replacement . Pt presents with right knee edema, decreased ROM, LE muscle weakness and mild gait deviations    Pt prognosis is Good.   Pt will benefit from skilled outpatient Physical Therapy to address the deficits stated above and in the chart below, provide pt/family education, and to maximize pt's level of independence.     Plan of care discussed with patient: Yes  Pt's spiritual, cultural and educational needs considered and patient is agreeable to the plan of care and goals as stated below:     Anticipated Barriers for therapy: none      Goals:  Short Term Goals: 3 weeks   Pt will increase right knee flexion to 120 degrees, knee extension to -5 degrees, and quad lag to -5 degrees to allow patient normal gait pattern.  Pt will increase right knee strength to 3+/5 to allow patient to increase functional activity and mobility  Pt will tolerate 30 to 40 minutes of exercise/activity with 0/10 right knee pain.  Patient will be independent with home exercise program.    Long Term Goals: 6 weeks   1. Pt will increase right knee flexion to 130 degrees, knee extension to 0 degrees, and quad lag to 0 degrees to allow patient normal gait  mechanics for community mobility.  2. Pt will increase right knee strength to 4/5 to allow patient to safely return to prior level of function   3. Patient will ambulate in community with normal gait mechanics without assistive device to return to PLOF.    Plan   Plan of care Certification: 2/28/2023 to 4/14/23.    Outpatient Physical Therapy 3 times weekly for 6 weeks to include the following interventions: Electrical Stimulation IFC, Manual Therapy, Moist Heat/ Ice, Neuromuscular Re-ed, Patient Education, Therapeutic Activities, and Therapeutic Exercise.     Supervised visit: Case conference with Rebecca Garcia PTA and POC reviewed.     Mert Archer, PT      Physician Signature:________________________________________________      Date:____________________________________________________________

## 2023-02-28 NOTE — ASSESSMENT & PLAN NOTE
Patient's FSGs are controlled on current medication regimen.  Last A1c reviewed-   Lab Results   Component Value Date    HGBA1C 6.9 (H) 02/10/2023     Most recent fingerstick glucose reviewed- No results for input(s): POCTGLUCOSE in the last 24 hours.  Current correctional scale  High  Maintain anti-hyperglycemic dose as follows-   Antihyperglycemics (From admission, onward)    None        Hold Oral hypoglycemics while patient is in the hospital if pt has hypoblycemia    02/17/2023  RD HAS BEEN CONSULTED AND REVIEWED BASIC DM DIETARY PRINCIPLES WITH THE PT; MEAL INTAKE IS 50%. PT GLU RANGE -205 ON DAPAGLIFLOZIN, GLIPIZIDE AND METFORMIN WITH A1C 6.9  02/24/2023  PT WILL BE DISCHARGE TO RESUME DAPAGLIFLOZIN,  METFORMIN, GLIPIZIDE   GLU RANGE -140

## 2023-02-28 NOTE — ASSESSMENT & PLAN NOTE
PT IS AT RISK FOR VTE  VTE PPX  ELIQUIS/PLAVIX TEDS/EARLY AMBULATION  WILL HOLD ESTRADIOL FOR NOW DUE TO INCREASED RISK OF VTE  02/17/2023  CONTINUE ELIQUIS/ PLAVIX/ TEDS   AMBULATION (100 FEET)  02/24/2023  PT WILL CONTINUE PLAVIX AND TEDS  HOLE ESTRADIOL FOR NOW  DC ELIQUIS-PT AMBULATING 150 FEET

## 2023-02-28 NOTE — ASSESSMENT & PLAN NOTE
PT IS S/P R TKR ON 02/09/2023 PER DR ARNOLDO SEARS   PT/OT  VTE PPX  WOUND CARE    02/17/2023  PT IS ON POST OP DAY 8 R TKR WITH INCISION HEALING WELL AND PAIN WELL CONTROLLED WITH PERCOCET  02/24/2023  PT REQUIRED CHANGING NORCO TO PERCOCET POSTOP WHICH WAS EFFECTIVE AND PT HAS RX PER ORTHO OF NORCO POST DISCHARGE. PT'S POST OP INCISION IS HEALING WELL. PT WAS CONCERNED REGARDING SWELLING AND INCREASED WARMTH ON POST OP DAY 15 AND ON EXAMINATION INCISION APPEARED NORMAL AND KNEE NORMAL POSTOP APPEARANCE WITH MINIMAL EDEMA; PT WAS ADVISED TO CONTINUE ICE THERAPY AND ANALGESICS.  PT COMPLETED INPATIENT PT/OT  PT'S PAIN WAS WELL CONTROLLED ON PERCOCET AND PT HAS NORCO RX PER ORTHO FOR POST DISCHARGE PAIN MANAGEMENT

## 2023-02-28 NOTE — ASSESSMENT & PLAN NOTE
PT/OT WILL EVALUATE AND INITIATE A TREATMENT PLAN  WBAT    02/17/2023  PT IS PARTICIPATING WITH PT/OT PERFORMING BED MOBILITY WITH SBA, TRANSFERS WITH CGA AND SBA WITH RW, AND AMBULATING 100 FEET WITH CGA/SBA WITH RW.RD HAS BE    02/24/2023 DISCHARGE   PT WAS EVALUATED PER PT/OT AND A TREATMENT PLAN WAS INITIATED. PT PERFORMED BED MOBILITY WITH INDEPENDENCE, TRANSFERS WITH SUPERVISION AND AMBULATION 150 FEET WITH NO AD, SBA AND GOOD STRIDE, SLIGHTLY SHORTENED ON R WITH GOOD BALANCE NOTED PER PT/OT.  PT WILL CONTINUE REHAB WITH OUTPATIENT PT/OR AT [facility] STARTING ON 2/28/2023.

## 2023-02-28 NOTE — ASSESSMENT & PLAN NOTE
PT HAS VITAMIN D DEFICIENCY  VIT D 16.2  SUPPLEMENTATION   02/17/2023  CONTINUE POC FOR 8 WEEKS  02/24/2023  PT WILL CONTINUE POC

## 2023-03-01 ENCOUNTER — OFFICE VISIT (OUTPATIENT)
Dept: ORTHOPEDICS | Facility: CLINIC | Age: 68
End: 2023-03-01
Payer: MEDICARE

## 2023-03-01 ENCOUNTER — HOSPITAL ENCOUNTER (OUTPATIENT)
Dept: RADIOLOGY | Facility: HOSPITAL | Age: 68
Discharge: HOME | End: 2023-03-01
Attending: ORTHOPAEDIC SURGERY
Payer: MEDICARE

## 2023-03-01 DIAGNOSIS — Z47.89 ENCOUNTER FOR ORTHOPEDIC FOLLOW-UP CARE: Primary | ICD-10-CM

## 2023-03-01 DIAGNOSIS — Z96.651 STATUS POST TOTAL KNEE REPLACEMENT, RIGHT: ICD-10-CM

## 2023-03-01 PROCEDURE — 99024 POSTOP FOLLOW-UP VISIT: CPT | Mod: ,,, | Performed by: ORTHOPAEDIC SURGERY

## 2023-03-01 PROCEDURE — 99024 PR POST-OP FOLLOW-UP VISIT: ICD-10-PCS | Mod: ,,, | Performed by: ORTHOPAEDIC SURGERY

## 2023-03-01 PROCEDURE — 73560 X-RAY EXAM OF KNEE 1 OR 2: CPT | Mod: 26,RT,, | Performed by: ORTHOPAEDIC SURGERY

## 2023-03-01 PROCEDURE — 73560 X-RAY EXAM OF KNEE 1 OR 2: CPT | Mod: TC,RT

## 2023-03-01 PROCEDURE — 73560 XR KNEE 1 OR 2 VIEW RIGHT: ICD-10-PCS | Mod: 26,RT,, | Performed by: ORTHOPAEDIC SURGERY

## 2023-03-01 PROCEDURE — 99213 OFFICE O/P EST LOW 20 MIN: CPT | Mod: PBBFAC | Performed by: ORTHOPAEDIC SURGERY

## 2023-03-01 RX ORDER — HYDROCODONE BITARTRATE AND ACETAMINOPHEN 10; 325 MG/1; MG/1
1 TABLET ORAL EVERY 6 HOURS PRN
Qty: 28 TABLET | Refills: 0 | Status: SHIPPED | OUTPATIENT
Start: 2023-03-01 | End: 2023-08-23 | Stop reason: SDUPTHER

## 2023-03-01 NOTE — PROGRESS NOTES
Radiology Interpretation        Patient Name: Olga Stephenson  Date: 3/1/2023  YOB: 1955  MRN# [id number]        ORDERING DIAGNOSIS:    Encounter Diagnosis   Name Primary?    Encounter for orthopedic follow-up care Yes           Two views right knee skeletally mature individual there is normal mineralization no fractures or subluxations no bony lesions total knee arthroplasty in place no loosening or shift alignment.  Impression total knee arthroplasty in place right knee            Erick Tolliver MD

## 2023-03-02 ENCOUNTER — CLINICAL SUPPORT (OUTPATIENT)
Dept: REHABILITATION | Facility: HOSPITAL | Age: 68
End: 2023-03-02
Payer: MEDICARE

## 2023-03-02 DIAGNOSIS — Z96.651 STATUS POST TOTAL RIGHT KNEE REPLACEMENT: ICD-10-CM

## 2023-03-02 DIAGNOSIS — M25.661 DECREASED RANGE OF MOTION (ROM) OF RIGHT KNEE: Primary | ICD-10-CM

## 2023-03-02 PROCEDURE — 97110 THERAPEUTIC EXERCISES: CPT

## 2023-03-02 NOTE — PROGRESS NOTES
Physical Therapy Daily Note     Name: Olga Stephenson  Clinic Number: [id number]  Diagnosis:   Encounter Diagnoses   Name Primary?    Decreased range of motion (ROM) of right knee Yes    Status post total right knee replacement      Physician: Juan Lama, DO  Precautions: standard  Visit #: 2 of 18  PTA Visit #: 0  Time In: 1107  Time Out: 1136    Subjective     Pt reports: My knee is stiff and hurting this morning.  Pain Scale: Olga rates pain on a scale of 0-10 to be 7 currently.    Objective     Olga received individual therapeutic exercises to develop strength and ROM for 39 minutes including:  Scifit stepper 6 minutes level 4  Quad sets 2 x 1 minute  Mat table: SLR, hip abduction, hip extension, hamstring curls 2 x 10  Seated hamstring curls green Theraband 3 x 10  Seated long arc quad 2# 3 x 10  Supine knee flexion stretch with sheet 10 x 5 second hold  Squats x 10   Heel raises x 20      Written Home Exercises Provided: completed with printed handout.  Pt demo good understanding of the education provided. Olga demonstrated good return demonstration of activities.     Education provided re:  Olga received verbal and tactile cues to facilitate proper execution of exercises and body mechanics.   No spiritual or educational barriers to learning.    Assessment     Patient tolerated treatment fair with pain and discomfort in right knee during exercises, however, pain did not increase during treatment.    This is a 67 y.o. female referred to outpatient physical therapy and presents with a medical diagnosis of right total knee replacement  and demonstrates limitations as described in the problem list. Pt prognosis is Good. Pt will continue to benefit from skilled outpatient physical therapy to address the deficits listed in the problem list, provide pt/family education and to maximize pt's level of independence in the home and community environment.      Goals as follows:  Short Term Goals: 3 weeks   Pt will increase right knee flexion to 120 degrees, knee extension to -5 degrees, and quad lag to -5 degrees to allow patient normal gait pattern.  Pt will increase right knee strength to 3+/5 to allow patient to increase functional activity and mobility  Pt will tolerate 30 to 40 minutes of exercise/activity with 0/10 right knee pain.  Patient will be independent with home exercise program.    Long Term Goals: 6 weeks   1. Pt will increase right knee flexion to 130 degrees, knee extension to 0 degrees, and quad lag to 0 degrees to allow patient normal gait mechanics for community mobility.  2. Pt will increase right knee strength to 4/5 to allow patient to safely return to prior level of function   3. Patient will ambulate in community with normal gait mechanics without assistive device to return to PLOF.     Plan     Continue with established Plan of Care towards PT goals.    Therapist: Mert Archer, PT  3/2/2023

## 2023-03-07 ENCOUNTER — CLINICAL SUPPORT (OUTPATIENT)
Dept: REHABILITATION | Facility: HOSPITAL | Age: 68
End: 2023-03-07
Payer: MEDICARE

## 2023-03-07 DIAGNOSIS — M25.661 DECREASED RANGE OF MOTION (ROM) OF RIGHT KNEE: Primary | ICD-10-CM

## 2023-03-07 DIAGNOSIS — Z96.651 STATUS POST TOTAL RIGHT KNEE REPLACEMENT: ICD-10-CM

## 2023-03-07 PROCEDURE — 97110 THERAPEUTIC EXERCISES: CPT

## 2023-03-09 ENCOUNTER — CLINICAL SUPPORT (OUTPATIENT)
Dept: REHABILITATION | Facility: HOSPITAL | Age: 68
End: 2023-03-09
Payer: MEDICARE

## 2023-03-09 DIAGNOSIS — M25.661 DECREASED RANGE OF MOTION (ROM) OF RIGHT KNEE: Primary | ICD-10-CM

## 2023-03-09 DIAGNOSIS — Z96.651 STATUS POST TOTAL RIGHT KNEE REPLACEMENT: ICD-10-CM

## 2023-03-09 PROCEDURE — 97110 THERAPEUTIC EXERCISES: CPT

## 2023-03-09 NOTE — PROGRESS NOTES
"                                                    Physical Therapy Daily Note     Name: Olga Stephenson  Clinic Number: [id number]  Diagnosis:   Encounter Diagnoses   Name Primary?    Decreased range of motion (ROM) of right knee Yes    Status post total right knee replacement      Physician: Juan Lama, DO  Precautions: standard  Visit #: 4 of 18  PTA Visit #: 0  Time In: 1105  Time Out: 1145    Subjective     Pt reports: "my knee is stiff and hurting this morning due to change in weather."  Pain Scale: Olga rates pain on a scale of 0-10 to be  6  right knee currently.    Objective     Olga received individual therapeutic exercises to develop strength and ROM for 41 minutes including:  Scifit stepper 6 minutes level 4.5  Quad sets 2 x 1 minute  Mat table: SLR, hip abduction, hip extension, hamstring curls 2 x 10 1.5#  Supine knee flexion stretch with sheet 10 x 5 second hold  Seated hamstring curls green Theraband 3 x 10  Seated long arc quad 2# 3 x 10  Squats 2 x 10   8 inch box step forward 2 x 10      Objective measurements  3/9/23  Right Knee ROM/Strength  AROM Strength   Knee extension   -8 3+   Knee flexion   117 3-     PROM right knee: extension -5 flexion 125  Right extension lag: -17    Written Home Exercises Provided: completed.       Education provided re:  Olga received verbal and tactile cues to facilitate proper execution of exercises and body mechanics.   No spiritual or educational barriers to learning.    Assessment     Patient tolerated therapy well despite having some increased right knee pain. Patient able to increased ROM measurements to right knee.    This is a 67 y.o. female referred to outpatient physical therapy and presents with a medical diagnosis of right total knee replacement  and demonstrates limitations as described in the problem list. Pt prognosis is Good. Pt will continue to benefit from skilled outpatient physical therapy to address the deficits listed in the problem " list, provide pt/family education and to maximize pt's level of independence in the home and community environment.     Goals as follows:  Short Term Goals: 3 weeks   Pt will increase right knee flexion to 120 degrees, knee extension to -5 degrees, and quad lag to -5 degrees to allow patient normal gait pattern.  Pt will increase right knee strength to 3+/5 to allow patient to increase functional activity and mobility  Pt will tolerate 30 to 40 minutes of exercise/activity with 0/10 right knee pain.  Patient will be independent with home exercise program. Goal Met.    Long Term Goals: 6 weeks   1. Pt will increase right knee flexion to 130 degrees, knee extension to 0 degrees, and quad lag to 0 degrees to allow patient normal gait mechanics for community mobility.  2. Pt will increase right knee strength to 4/5 to allow patient to safely return to prior level of function   3. Patient will ambulate in community with normal gait mechanics without assistive device to return to PLOF.     Plan     Continue with established Plan of Care towards PT goals.    Therapist: Mert Archer, PT  3/9/2023

## 2023-03-13 ENCOUNTER — APPOINTMENT (OUTPATIENT)
Dept: RADIOLOGY | Facility: CLINIC | Age: 68
End: 2023-03-13
Attending: INTERNAL MEDICINE
Payer: MEDICARE

## 2023-03-13 ENCOUNTER — OFFICE VISIT (OUTPATIENT)
Dept: FAMILY MEDICINE | Facility: CLINIC | Age: 68
End: 2023-03-13
Payer: MEDICARE

## 2023-03-13 ENCOUNTER — TELEPHONE (OUTPATIENT)
Dept: FAMILY MEDICINE | Facility: CLINIC | Age: 68
End: 2023-03-13
Payer: MEDICARE

## 2023-03-13 VITALS
HEART RATE: 75 BPM | SYSTOLIC BLOOD PRESSURE: 124 MMHG | BODY MASS INDEX: 28.79 KG/M2 | OXYGEN SATURATION: 97 % | DIASTOLIC BLOOD PRESSURE: 80 MMHG | TEMPERATURE: 98 F | WEIGHT: 194.38 LBS | RESPIRATION RATE: 17 BRPM | HEIGHT: 69 IN

## 2023-03-13 DIAGNOSIS — L03.115 CELLULITIS OF KNEE, RIGHT: Primary | ICD-10-CM

## 2023-03-13 DIAGNOSIS — L03.115 CELLULITIS OF KNEE, RIGHT: ICD-10-CM

## 2023-03-13 DIAGNOSIS — Z78.0 POSTMENOPAUSAL: ICD-10-CM

## 2023-03-13 LAB
BASOPHILS # BLD AUTO: 0.07 K/UL (ref 0–0.2)
BASOPHILS NFR BLD AUTO: 0.9 % (ref 0–1)
DIFFERENTIAL METHOD BLD: ABNORMAL
EOSINOPHIL # BLD AUTO: 0.1 K/UL (ref 0–0.5)
EOSINOPHIL NFR BLD AUTO: 1.2 % (ref 1–4)
ERYTHROCYTE [DISTWIDTH] IN BLOOD BY AUTOMATED COUNT: 13.2 % (ref 11.5–14.5)
ERYTHROCYTE [SEDIMENTATION RATE] IN BLOOD BY WESTERGREN METHOD: 8 MM/HR (ref 0–30)
HCT VFR BLD AUTO: 43 % (ref 38–47)
HGB BLD-MCNC: 13.3 G/DL (ref 12–16)
IMM GRANULOCYTES # BLD AUTO: 0.01 K/UL (ref 0–0.04)
IMM GRANULOCYTES NFR BLD: 0.1 % (ref 0–0.4)
LYMPHOCYTES # BLD AUTO: 3.16 K/UL (ref 1–4.8)
LYMPHOCYTES NFR BLD AUTO: 39.3 % (ref 27–41)
MCH RBC QN AUTO: 28.5 PG (ref 27–31)
MCHC RBC AUTO-ENTMCNC: 30.9 G/DL (ref 32–36)
MCV RBC AUTO: 92.1 FL (ref 80–96)
MONOCYTES # BLD AUTO: 0.64 K/UL (ref 0–0.8)
MONOCYTES NFR BLD AUTO: 8 % (ref 2–6)
MPC BLD CALC-MCNC: 12.3 FL (ref 9.4–12.4)
NEUTROPHILS # BLD AUTO: 4.07 K/UL (ref 1.8–7.7)
NEUTROPHILS NFR BLD AUTO: 50.5 % (ref 53–65)
NRBC # BLD AUTO: 0 X10E3/UL
NRBC, AUTO (.00): 0 %
PLATELET # BLD AUTO: 274 K/UL (ref 150–400)
RBC # BLD AUTO: 4.67 M/UL (ref 4.2–5.4)
WBC # BLD AUTO: 8.05 K/UL (ref 4.5–11)

## 2023-03-13 PROCEDURE — 85651 RBC SED RATE NONAUTOMATED: CPT | Mod: ,,, | Performed by: CLINICAL MEDICAL LABORATORY

## 2023-03-13 PROCEDURE — 85025 COMPLETE CBC W/AUTO DIFF WBC: CPT | Mod: ,,, | Performed by: CLINICAL MEDICAL LABORATORY

## 2023-03-13 PROCEDURE — 99214 PR OFFICE/OUTPT VISIT, EST, LEVL IV, 30-39 MIN: ICD-10-PCS | Mod: ,,, | Performed by: INTERNAL MEDICINE

## 2023-03-13 PROCEDURE — 85025 CBC WITH DIFFERENTIAL: ICD-10-PCS | Mod: ,,, | Performed by: CLINICAL MEDICAL LABORATORY

## 2023-03-13 PROCEDURE — 99214 OFFICE O/P EST MOD 30 MIN: CPT | Mod: ,,, | Performed by: INTERNAL MEDICINE

## 2023-03-13 PROCEDURE — 73560 X-RAY EXAM OF KNEE 1 OR 2: CPT | Mod: TC,RHCUB,RT | Performed by: INTERNAL MEDICINE

## 2023-03-13 PROCEDURE — 85651 SEDIMENTATION RATE, AUTOMATED: ICD-10-PCS | Mod: ,,, | Performed by: CLINICAL MEDICAL LABORATORY

## 2023-03-13 RX ORDER — PAROXETINE 10 MG/1
10 TABLET, FILM COATED ORAL DAILY
Qty: 30 TABLET | Refills: 3 | Status: SHIPPED | OUTPATIENT
Start: 2023-03-13 | End: 2023-08-05 | Stop reason: CLARIF

## 2023-03-13 RX ORDER — CEPHALEXIN 500 MG/1
500 CAPSULE ORAL 3 TIMES DAILY
Qty: 21 CAPSULE | Refills: 0 | Status: SHIPPED | OUTPATIENT
Start: 2023-03-13 | End: 2023-03-28 | Stop reason: SDUPTHER

## 2023-03-13 NOTE — PROGRESS NOTES
Subjective:       Patient ID: Olga Stephenson is a 67 y.o. female.    Chief Complaint: Follow-up (Hospital , pressure between eyes ) and Knee Pain (Right knee pain daily )    HPI  .  Patient seen and evaluated today.  Patient is status post right total knee replacement.  She complains of pain in the right knees.  She also complains of swelling in the right knee.  The patient is postmenopausal and has not taking her estrogen in approximately 1 month.  Patient requesting estrogen however I informed the patient that it would be best to start Paxil and also I am going to give her gyn referral for postmenopausal symptoms.  On exam right knee is swollen tender to touch and warm to touch.  She does have cellulitis of the right knee will check a sedimentation rate, x-ray of the right knee, CBC, start Keflex 500 mg 1 p.o. 3 times a day    Current Medications:    Current Outpatient Medications:     albuterol (PROVENTIL/VENTOLIN HFA) 90 mcg/actuation inhaler, Inhale 2 puffs into the lungs 2 (two) times a day., Disp: 18 g, Rfl: 1    clopidogreL (PLAVIX) 75 mg tablet, Take 1 tablet (75 mg total) by mouth once daily., Disp: 30 tablet, Rfl: 1    cyclobenzaprine (FLEXERIL) 10 MG tablet, 1 tablet nightly as needed., Disp: , Rfl:     dapagliflozin (FARXIGA) 10 mg tablet, Take 1 tablet (10 mg total) by mouth once daily., Disp: 90 tablet, Rfl: 3    ergocalciferol (ERGOCALCIFEROL) 50,000 unit Cap, Take 1 capsule (50,000 Units total) by mouth every 7 days. for 6 doses, Disp: 6 capsule, Rfl: 0    ezetimibe (ZETIA) 10 mg tablet, Take 1 tablet by mouth every evening., Disp: , Rfl:     fluticasone propionate (FLONASE) 50 mcg/actuation nasal spray, 1 spray (50 mcg total) by Each Nostril route once daily., Disp: 16 g, Rfl: 3    glipiZIDE 5 MG TR24, Take 1 tablet (5 mg total) by mouth daily with breakfast., Disp: 90 tablet, Rfl: 3    HYDROcodone-acetaminophen (NORCO)  mg per tablet, Take 1 tablet by mouth every 6 (six) hours as needed  for Pain., Disp: 28 tablet, Rfl: 0    losartan (COZAAR) 50 MG tablet, Take 1 tablet (50 mg total) by mouth once daily., Disp: 90 tablet, Rfl: 3    metFORMIN (GLUCOPHAGE) 1000 MG tablet, Take 1,000 mg by mouth nightly., Disp: , Rfl:     metoprolol succinate (TOPROL-XL) 25 MG 24 hr tablet, Take 1 tablet by mouth once daily., Disp: , Rfl:     multivitamin capsule, Take 1 capsule by mouth once daily., Disp: , Rfl:     pantoprazole (PROTONIX) 40 MG tablet, Take 1 tablet (40 mg total) by mouth once daily., Disp: 90 tablet, Rfl: 3    pregabalin (LYRICA) 75 MG capsule, Take 150 mg by mouth nightly., Disp: , Rfl:     valACYclovir (VALTREX) 500 MG tablet, Take 1 tablet (500 mg total) by mouth once daily., Disp: 90 tablet, Rfl: 1    cephALEXin (KEFLEX) 500 MG capsule, Take 1 capsule (500 mg total) by mouth 3 (three) times daily., Disp: 21 capsule, Rfl: 0    chlorpheniramine-phenylephrine 4-10 mg per tablet, Take 1 tablet by mouth every 4 (four) hours as needed for Congestion. (Patient not taking: Reported on 3/13/2023), Disp: 30 tablet, Rfl: 2    paroxetine (PAXIL) 10 MG tablet, Take 1 tablet (10 mg total) by mouth once daily., Disp: 30 tablet, Rfl: 3           Review of Systems   Constitutional:  Negative for appetite change, fatigue and fever.   Respiratory:  Negative for shortness of breath.    Cardiovascular:  Negative for chest pain.   Gastrointestinal:  Negative for abdominal pain and constipation.   Endocrine: Negative for polydipsia, polyphagia and polyuria.   Genitourinary:  Negative for difficulty urinating, frequency and hot flashes.   Musculoskeletal:  Positive for arthralgias and joint swelling.   Allergic/Immunologic: Negative for environmental allergies.   Neurological:  Negative for dizziness and light-headedness.   Psychiatric/Behavioral:  Negative for agitation.               Vitals:    03/13/23 1048   BP: 124/80   BP Location: Right arm   Patient Position: Sitting   BP Method: Large (Automatic)   Pulse: 75  "  Resp: 17   Temp: 98 °F (36.7 °C)   TempSrc: Temporal   SpO2: 97%   Weight: 88.2 kg (194 lb 6.4 oz)   Height: 5' 9" (1.753 m)        Physical Exam  Vitals and nursing note reviewed.   Constitutional:       Appearance: Normal appearance.   Cardiovascular:      Rate and Rhythm: Normal rate and regular rhythm.      Pulses: Normal pulses.      Heart sounds: Normal heart sounds.   Pulmonary:      Effort: Pulmonary effort is normal.      Breath sounds: Normal breath sounds.   Abdominal:      General: Abdomen is flat. Bowel sounds are normal.      Palpations: Abdomen is soft.   Musculoskeletal:         General: Swelling present. Normal range of motion.      Comments: Warm and tender to touch.  Right knee   Skin:     General: Skin is warm and dry.   Neurological:      General: No focal deficit present.      Mental Status: She is alert and oriented to person, place, and time. Mental status is at baseline.         Last Labs:     Admission on 02/10/2023, Discharged on 02/24/2023   Component Date Value    Sodium 02/10/2023 138     Potassium 02/10/2023 4.4     Chloride 02/10/2023 102     CO2 02/10/2023 28     Anion Gap 02/10/2023 12     Glucose 02/10/2023 152 (H)     BUN 02/10/2023 10     Creatinine 02/10/2023 0.94     BUN/Creatinine Ratio 02/10/2023 11     Calcium 02/10/2023 9.2     eGFR 02/10/2023 67     Prealbumin 02/10/2023 19 (L)     WBC 02/10/2023 15.36 (H)     RBC 02/10/2023 4.57     Hemoglobin 02/10/2023 13.6     Hematocrit 02/10/2023 42.2     MCV 02/10/2023 92.3     MCH 02/10/2023 29.8     MCHC 02/10/2023 32.2     RDW 02/10/2023 13.9     Platelet Count 02/10/2023 193     MPV 02/10/2023 12.7 (H)     Neutrophils % 02/10/2023 69.5 (H)     Lymphocytes % 02/10/2023 17.5 (L)     Neutrophils, Abs 02/10/2023 10.67 (H)     Lymphocytes, Absolute 02/10/2023 2.69     Diff Type 02/10/2023 Manual     Monocytes % 02/10/2023 12.6 (H)     Eosinophils % 02/10/2023 0.2 (L)     Basophils % 02/10/2023 0.2     Monocytes, Absolute 02/10/2023 " 1.94 (H)     Eosinophils, Absolute 02/10/2023 0.03     Basophils, Absolute 02/10/2023 0.03     Segmented Neutrophils, M* 02/10/2023 64 (H)     Bands, Man % 02/10/2023 3     Lymphocytes, Man % 02/10/2023 20 (L)     Monocytes, Man % 02/10/2023 13 (H)     Platelet Morphology 02/10/2023 Normal     RBC Morphology 02/10/2023 Normal     Hemoglobin A1C 02/10/2023 6.9 (H)     Estimated Average Glucose 02/10/2023 144     Magnesium 02/10/2023 1.7     Vitamin D 25-Hydroxy, Bl* 02/10/2023 16.2     TSH 02/10/2023 0.196 (L)     POC Glucose 02/10/2023 159 (H)     POC Glucose 02/11/2023 171 (H)     Color, UA 02/11/2023 Yellow     Clarity, UA 02/11/2023 Clear     pH, UA 02/11/2023 5.5     Leukocytes, UA 02/11/2023 Negative     Nitrites, UA 02/11/2023 Negative     Protein, UA 02/11/2023 Negative     Glucose, UA 02/11/2023 500 (A)     Ketones, UA 02/11/2023 40 (A)     Urobilinogen, UA 02/11/2023 0.2     Bilirubin, UA 02/11/2023 Negative     Blood, UA 02/11/2023 Negative     Specific Gravity, UA 02/11/2023 1.015     POC Glucose 02/11/2023 140 (H)     POC Glucose 02/11/2023 207 (H)     POC Glucose 02/11/2023 149 (H)     POC Glucose 02/12/2023 141 (H)     POC Glucose 02/12/2023 168 (H)     POC Glucose 02/12/2023 126 (H)     POC Glucose 02/12/2023 153 (H)     POC Glucose 02/13/2023 162 (H)     POC Glucose 02/13/2023 127 (H)     POC Glucose 02/13/2023 160 (H)     POC Glucose 02/14/2023 152 (H)     POC Glucose 02/14/2023 139 (H)     POC Glucose 02/15/2023 164 (H)     POC Glucose 02/15/2023 147 (H)     POC Glucose 02/16/2023 138 (H)     POC Glucose 02/10/2023 184 (H)     POC Glucose 02/16/2023 205 (H)     Free T4 02/17/2023 1.63 (H)     TSH 02/17/2023 0.575     POC Glucose 02/17/2023 148 (H)     Sodium 02/17/2023 139     Potassium 02/17/2023 3.2 (L)     Chloride 02/17/2023 101     CO2 02/17/2023 28     Anion Gap 02/17/2023 13     Glucose 02/17/2023 154 (H)     BUN 02/17/2023 9     Creatinine 02/17/2023 0.75     BUN/Creatinine Ratio 02/17/2023  12     Calcium 02/17/2023 9.2     eGFR 02/17/2023 87     WBC 02/17/2023 12.29 (H)     RBC 02/17/2023 3.48 (L)     Hemoglobin 02/17/2023 10.2 (L)     Hematocrit 02/17/2023 31.8 (L)     MCV 02/17/2023 91.4     MCH 02/17/2023 29.3     MCHC 02/17/2023 32.1     RDW 02/17/2023 13.2     Platelet Count 02/17/2023 339     MPV 02/17/2023 11.6     Neutrophils % 02/17/2023 53.5     Lymphocytes % 02/17/2023 33.1     Neutrophils, Abs 02/17/2023 6.57     Lymphocytes, Absolute 02/17/2023 4.07     Diff Type 02/17/2023 Manual     Monocytes % 02/17/2023 11.1 (H)     Eosinophils % 02/17/2023 1.8     Basophils % 02/17/2023 0.5     Monocytes, Absolute 02/17/2023 1.37 (H)     Eosinophils, Absolute 02/17/2023 0.22     Basophils, Absolute 02/17/2023 0.06     Segmented Neutrophils, M* 02/17/2023 61     Bands, Man % 02/17/2023 2     Lymphocytes, Man % 02/17/2023 29     Monocytes, Man % 02/17/2023 5     Eosinophils, Man % 02/17/2023 2     Basophils, Man % 02/17/2023 1     Platelet Morphology 02/17/2023 Normal     Anisocytosis 02/17/2023 1+     Poikilocytosis 02/17/2023 1+     Target Cells 02/17/2023 Few     Hypochromic 02/17/2023 Few     Atypical Lymphocytes 02/17/2023 Few     POC Glucose 02/17/2023 146 (H)     POC Glucose 02/18/2023 152 (H)     POC Glucose 02/18/2023 125 (H)     POC Glucose 02/19/2023 154 (H)     POC Glucose 02/19/2023 124 (H)     POC Glucose 02/20/2023 126 (H)     POC Glucose 02/20/2023 140 (H)     POC Glucose 02/21/2023 121 (H)     POC Glucose 02/21/2023 124 (H)     POC Glucose 02/22/2023 165 (H)     POC Glucose 02/22/2023 118 (H)     POC Glucose 02/23/2023 142 (H)     POC Glucose 02/23/2023 110 (H)     Sodium 02/24/2023 139     Potassium 02/24/2023 3.4 (L)     Chloride 02/24/2023 104     CO2 02/24/2023 28     Anion Gap 02/24/2023 10     Glucose 02/24/2023 134 (H)     BUN 02/24/2023 13     Creatinine 02/24/2023 0.78     BUN/Creatinine Ratio 02/24/2023 17     Calcium 02/24/2023 9.2     eGFR 02/24/2023 83     WBC 02/24/2023  10.17     RBC 02/24/2023 3.55 (L)     Hemoglobin 02/24/2023 10.4 (L)     Hematocrit 02/24/2023 33.4 (L)     MCV 02/24/2023 94.1     MCH 02/24/2023 29.3     MCHC 02/24/2023 31.1 (L)     RDW 02/24/2023 14.2     Platelet Count 02/24/2023 449 (H)     MPV 02/24/2023 10.9     Neutrophils % 02/24/2023 48.0 (L)     Lymphocytes % 02/24/2023 41.1 (H)     Neutrophils, Abs 02/24/2023 4.89     Lymphocytes, Absolute 02/24/2023 4.18     Diff Type 02/24/2023 Manual     Monocytes % 02/24/2023 8.2 (H)     Eosinophils % 02/24/2023 2.1     Basophils % 02/24/2023 0.6     Monocytes, Absolute 02/24/2023 0.83 (H)     Eosinophils, Absolute 02/24/2023 0.21     Basophils, Absolute 02/24/2023 0.06     Segmented Neutrophils, M* 02/24/2023 53     Lymphocytes, Man % 02/24/2023 39     Monocytes, Man % 02/24/2023 6     Eosinophils, Man % 02/24/2023 1     Basophils, Man % 02/24/2023 1     Platelet Morphology 02/24/2023 Increased (A)     RBC Morphology 02/24/2023 Normal        Last Imaging:  X-Ray Knee 1 or 2 View Right  Narrative: EXAMINATION:  XR KNEE 1 OR 2 VIEW RIGHT    CLINICAL HISTORY:  Cellulitis of right lower limb    COMPARISON:  Right knee x-ray March 1, 2023    TECHNIQUE:  Frontal and lateral views of the right knee.    FINDINGS:  Total right knee arthroplasty.  No convincing acute fracture or dislocation or evidence of orthopedic hardware failure.  Suspect suprapatellar joint effusion.  Impression: As above.    Point of Service: [facility]    Electronically signed by: Lj Gonzalez  Date:    03/13/2023  Time:    12:06         **Labs and x-rays personally reviewed by me    ** reviewed      Objective:        Assessment:       1. Cellulitis of knee, right  CBC Auto Differential    Sedimentation Rate    X-Ray Knee 1 or 2 View Right    CBC Auto Differential    Sedimentation Rate      2. Postmenopausal  Ambulatory referral/consult to Obstetrics / Gynecology           Plan:         [unfilled]

## 2023-03-13 NOTE — TELEPHONE ENCOUNTER
----- Message from Lv Cartagena MD sent at 3/13/2023 12:20 PM CDT -----  Need to see in  1 week please  abnl results     1314- call made to pt regarding above message. Pt made an appt for 3/23/23; informed her to bring all meds to the appt. No other questions asked at this time.

## 2023-03-14 ENCOUNTER — CLINICAL SUPPORT (OUTPATIENT)
Dept: REHABILITATION | Facility: HOSPITAL | Age: 68
End: 2023-03-14
Payer: MEDICARE

## 2023-03-14 ENCOUNTER — TELEPHONE (OUTPATIENT)
Dept: FAMILY MEDICINE | Facility: CLINIC | Age: 68
End: 2023-03-14
Payer: MEDICARE

## 2023-03-14 DIAGNOSIS — Z96.651 STATUS POST TOTAL RIGHT KNEE REPLACEMENT: ICD-10-CM

## 2023-03-14 DIAGNOSIS — M25.661 DECREASED RANGE OF MOTION (ROM) OF RIGHT KNEE: Primary | ICD-10-CM

## 2023-03-14 PROCEDURE — 97110 THERAPEUTIC EXERCISES: CPT

## 2023-03-14 NOTE — TELEPHONE ENCOUNTER
----- Message from Lv Cartagena MD sent at 3/14/2023 10:57 AM CDT -----  Need to see in  1 week please  abnl results     1333- call made to pt regarding above message. No answer left message.

## 2023-03-14 NOTE — PROGRESS NOTES
"                                                    Physical Therapy Daily Note     Name: Olga Stephenson  Clinic Number: [id number]  Diagnosis:   Encounter Diagnoses   Name Primary?    Decreased range of motion (ROM) of right knee Yes    Status post total right knee replacement      Physician: Juan Lama, DO  Precautions: standard  Visit #: 5 of 18  PTA Visit #: 0  Time In: 1110  Time Out: 1150    Subjective     Pt reports: "my knee is stiff today but it doesn't hurt right now but feels like it slips. I saw Dr. Cartagena yesterday and he took and x-ray of my knee."  Pain Scale: Olga rates pain on a scale of 0-10 to be  0  right knee prior to therapy.    Objective     Olga received individual therapeutic exercises to develop strength and ROM for 30 minutes including:  Scifit stepper 6 minutes level 4.0  Quad sets 2 x 1 minute  Mat table: SLR, hip abduction, hip extension, hamstring curls 2 x 10 1.5#  Supine knee flexion stretch with sheet 10 x 5 second hold  Seated hamstring curls green Theraband 3 x 15  Seated long arc quad 2# 2 x 15  Not today-Squats 2 x 10   8 inch box step forward 2 x 10      Objective measurements  3/9/23  Right Knee ROM/Strength  AROM Strength   Knee extension   -8 3+   Knee flexion   117 3-     PROM right knee: extension -5 flexion 125  Right extension lag: -17    Written Home Exercises Provided: completed.       Education provided re:  Olga received verbal and tactile cues to facilitate proper execution of exercises and body mechanics.   No spiritual or educational barriers to learning.    Assessment     Patient reports no pain in knee except for end ROM of right knee flexion and rates flexion pain at 8/10. Patient also reported during therapy session that she feels the knee joint slip during knee flexion exercises. Right knee is warm to touch and holding edema. Right knee x-ray performed on 3/13/23 per Dr. Cartagena with results as follows:   FINDINGS:  Total right knee arthroplasty.  No " convincing acute fracture or dislocation or evidence of orthopedic hardware failure.  Suspect suprapatellar joint effusion.  Patient instructed to continue with cryotherapy at home to minimize edema.    This is a 67 y.o. female referred to outpatient physical therapy and presents with a medical diagnosis of right total knee replacement  and demonstrates limitations as described in the problem list. Pt prognosis is Good. Pt will continue to benefit from skilled outpatient physical therapy to address the deficits listed in the problem list, provide pt/family education and to maximize pt's level of independence in the home and community environment.     Goals as follows:  Short Term Goals: 3 weeks   Pt will increase right knee flexion to 120 degrees, knee extension to -5 degrees, and quad lag to -5 degrees to allow patient normal gait pattern.  Pt will increase right knee strength to 3+/5 to allow patient to increase functional activity and mobility  Pt will tolerate 30 to 40 minutes of exercise/activity with 0/10 right knee pain.  Patient will be independent with home exercise program. Goal Met.    Long Term Goals: 6 weeks   1. Pt will increase right knee flexion to 130 degrees, knee extension to 0 degrees, and quad lag to 0 degrees to allow patient normal gait mechanics for community mobility.  2. Pt will increase right knee strength to 4/5 to allow patient to safely return to prior level of function   3. Patient will ambulate in community with normal gait mechanics without assistive device to return to [facility].     Plan     Continue with established Plan of Care towards PT goals.    Therapist: Mert Archer, PT  3/14/2023

## 2023-03-21 ENCOUNTER — CLINICAL SUPPORT (OUTPATIENT)
Dept: REHABILITATION | Facility: HOSPITAL | Age: 68
End: 2023-03-21
Payer: MEDICARE

## 2023-03-21 DIAGNOSIS — Z96.651 STATUS POST TOTAL RIGHT KNEE REPLACEMENT: ICD-10-CM

## 2023-03-21 DIAGNOSIS — M25.661 DECREASED RANGE OF MOTION (ROM) OF RIGHT KNEE: Primary | ICD-10-CM

## 2023-03-21 PROCEDURE — 97110 THERAPEUTIC EXERCISES: CPT

## 2023-03-21 NOTE — PROGRESS NOTES
"                                                    Physical Therapy Daily Note     Name: Olga Stephenson  Clinic Number: [id number]  Diagnosis:   Encounter Diagnoses   Name Primary?    Decreased range of motion (ROM) of right knee Yes    Status post total right knee replacement      Physician: Juan Lama, DO  Precautions: standard  Visit #: 6 of 18  PTA Visit #: 0  Time In: 1101  Time Out: 1141    Subjective     Pt reports: "my knee andra sometimes and still swells but I am not in pain."  Pain Scale: Olga rates pain on a scale of 0-10 to be  0  right knee prior to therapy.    Objective     Olga received individual therapeutic exercises to develop strength and ROM for 40 minutes including:  Scifit stepper 6 minutes level 4.0  Quad sets 2 minutes  Mat table: SLR, hip abduction, hip extension, hamstring curls 2 x 10 2#  Supine knee flexion stretch with sheet 10 x 5 second hold  Seated hamstring curls green Theraband 3 x 15  Seated long arc quad 2# 2 x 15  Standing SLR, hip flexion, knee flexion 2# 2 x 15  6 inch box step forward no hand support 2 x 10  Right hamstring stretch 6 x 10 seconds      Objective measurements  3/21/23  Right Knee ROM/Strength  AROM Strength   Knee extension   -8 3+   Knee flexion   113 3-     3/21/23  PROM right knee: extension 0 flexion 120  Right extension lag: -17    Written Home Exercises Provided: completed.       Education provided re:  Olga received verbal and tactile cues to facilitate proper execution of exercises and body mechanics.   No spiritual or educational barriers to learning.    Assessment     Patient having some difficulty today with knee flexion and extension ROM and therapist noticed some right knee buckling during ambulation.     This is a 67 y.o. female referred to outpatient physical therapy and presents with a medical diagnosis of right total knee replacement  and demonstrates limitations as described in the problem list. Pt prognosis is Good. Pt will " continue to benefit from skilled outpatient physical therapy to address the deficits listed in the problem list, provide pt/family education and to maximize pt's level of independence in the home and community environment.     Goals as follows:  Short Term Goals: 3 weeks   Pt will increase right knee flexion to 120 degrees, knee extension to -5 degrees, and quad lag to -5 degrees to allow patient normal gait pattern.  Pt will increase right knee strength to 3+/5 to allow patient to increase functional activity and mobility  Pt will tolerate 30 to 40 minutes of exercise/activity with 0/10 right knee pain. Goal Met.  Patient will be independent with home exercise program. Goal Met.    Long Term Goals: 6 weeks   1. Pt will increase right knee flexion to 130 degrees, knee extension to 0 degrees, and quad lag to 0 degrees to allow patient normal gait mechanics for community mobility.  2. Pt will increase right knee strength to 4/5 to allow patient to safely return to prior level of function   3. Patient will ambulate in community with normal gait mechanics without assistive device to return to PLOF.     Plan     Continue with established Plan of Care towards PT goals.    Therapist: Mert Archer, PT  3/21/2023

## 2023-03-24 ENCOUNTER — CLINICAL SUPPORT (OUTPATIENT)
Dept: REHABILITATION | Facility: HOSPITAL | Age: 68
End: 2023-03-24
Payer: MEDICARE

## 2023-03-24 DIAGNOSIS — M25.661 DECREASED RANGE OF MOTION (ROM) OF RIGHT KNEE: Primary | ICD-10-CM

## 2023-03-24 DIAGNOSIS — Z96.651 STATUS POST TOTAL RIGHT KNEE REPLACEMENT: ICD-10-CM

## 2023-03-24 PROCEDURE — 97032 APPL MODALITY 1+ESTIM EA 15: CPT

## 2023-03-24 PROCEDURE — 97110 THERAPEUTIC EXERCISES: CPT

## 2023-03-24 NOTE — PROGRESS NOTES
"                                                    Physical Therapy Daily Note     Name: Olga Stephenson  Clinic Number: [id number]  Diagnosis:   Encounter Diagnoses   Name Primary?    Decreased range of motion (ROM) of right knee Yes    Status post total right knee replacement      Physician: Juan Lama, DO  Precautions: standard  Visit #: 7 of 18  PTA Visit #: 0  Time In: 1445  Time Out: 1520    Subjective     Pt reports: "my knee is painful and swollen since early this morning."  Pain Scale: Olga rates pain on a scale of 0-10 to be  8  right knee prior to therapy.    Objective     Olga received individual therapeutic exercises to develop strength and ROM for 20 minutes including:  Scifit stepper 6 minutes level 4.0  Quad sets 2 minutes  Mat table: SLR, hip abduction, hip extension, hamstring curls 3 x 10     Not performed this visit due to increased knee pain and edema  Supine knee flexion stretch with sheet 10 x 5 second hold  Seated hamstring curls green Theraband 3 x 15  Seated long arc quad 2# 2 x 15  Standing SLR, hip flexion, knee flexion 2# 2 x 15  6 inch box step forward no hand support 2 x 10  Right hamstring stretch 6 x 10 seconds      The patient received the following supervised modalities after being cleared for contradictions: IFC Electrical Stimulation:  Olga received IFC Electrical Stimulation for pain control applied to the right knee. Pt received stimulation at 100 % scan at a frequency of 80/150 Hz at 20 to 24 cV for 15 minutes. Olga tolderated treatment well without any adverse effects.      Objective measurements  3/21/23  Right Knee ROM/Strength  AROM Strength   Knee extension   -8 3+   Knee flexion   113 3-     3/21/23  PROM right knee: extension 0 flexion 120  Right extension lag: -17    Written Home Exercises Provided: completed.       Education provided re:  Olga instructed to start using RW whenever up and walking to decrease weight bearing load of left knee until she can see " doctor.  No spiritual or educational barriers to learning.    Assessment     Treatment session modified today with limited exercises due to pain and edema. Therapist added IFC stimulation and cold pack to left knee for pain and edema management. Patient will follow up with orthopedic doctor on 3/28/23.    This is a 67 y.o. female referred to outpatient physical therapy and presents with a medical diagnosis of right total knee replacement  and demonstrates limitations as described in the problem list. Pt prognosis is Good. Pt will continue to benefit from skilled outpatient physical therapy to address the deficits listed in the problem list, provide pt/family education and to maximize pt's level of independence in the home and community environment.     Goals as follows:  Short Term Goals: 3 weeks   Pt will increase right knee flexion to 120 degrees, knee extension to -5 degrees, and quad lag to -5 degrees to allow patient normal gait pattern.  Pt will increase right knee strength to 3+/5 to allow patient to increase functional activity and mobility  Pt will tolerate 30 to 40 minutes of exercise/activity with 0/10 right knee pain. Goal Met.  Patient will be independent with home exercise program. Goal Met.    Long Term Goals: 6 weeks   1. Pt will increase right knee flexion to 130 degrees, knee extension to 0 degrees, and quad lag to 0 degrees to allow patient normal gait mechanics for community mobility.  2. Pt will increase right knee strength to 4/5 to allow patient to safely return to prior level of function   3. Patient will ambulate in community with normal gait mechanics without assistive device to return to PLOF.     Plan     Continue with established Plan of Care towards PT goals.    Therapist: Mert Archer, PT  3/24/2023

## 2023-03-28 ENCOUNTER — OFFICE VISIT (OUTPATIENT)
Dept: FAMILY MEDICINE | Facility: CLINIC | Age: 68
End: 2023-03-28
Payer: MEDICARE

## 2023-03-28 ENCOUNTER — CLINICAL SUPPORT (OUTPATIENT)
Dept: REHABILITATION | Facility: HOSPITAL | Age: 68
End: 2023-03-28
Payer: MEDICARE

## 2023-03-28 VITALS
SYSTOLIC BLOOD PRESSURE: 135 MMHG | TEMPERATURE: 97 F | RESPIRATION RATE: 18 BRPM | DIASTOLIC BLOOD PRESSURE: 89 MMHG | WEIGHT: 199.81 LBS | HEIGHT: 69 IN | BODY MASS INDEX: 29.59 KG/M2 | HEART RATE: 83 BPM | OXYGEN SATURATION: 95 %

## 2023-03-28 DIAGNOSIS — R41.3 MEMORY LOSS: ICD-10-CM

## 2023-03-28 DIAGNOSIS — M25.661 DECREASED RANGE OF MOTION (ROM) OF RIGHT KNEE: Primary | ICD-10-CM

## 2023-03-28 DIAGNOSIS — R41.3 OTHER AMNESIA: ICD-10-CM

## 2023-03-28 DIAGNOSIS — E53.8 VITAMIN B 12 DEFICIENCY: ICD-10-CM

## 2023-03-28 DIAGNOSIS — M17.11 OSTEOARTHRITIS OF RIGHT KNEE, UNSPECIFIED OSTEOARTHRITIS TYPE: ICD-10-CM

## 2023-03-28 DIAGNOSIS — R79.89 ABNORMAL TSH: ICD-10-CM

## 2023-03-28 DIAGNOSIS — E03.8 TSH DEFICIENCY: ICD-10-CM

## 2023-03-28 DIAGNOSIS — L03.90 CELLULITIS, UNSPECIFIED CELLULITIS SITE: ICD-10-CM

## 2023-03-28 DIAGNOSIS — E55.9 VITAMIN D DEFICIENCY: Primary | ICD-10-CM

## 2023-03-28 DIAGNOSIS — R60.9 EDEMA, UNSPECIFIED TYPE: ICD-10-CM

## 2023-03-28 LAB
25(OH)D3 SERPL-MCNC: 26.2 NG/ML
TSH SERPL DL<=0.005 MIU/L-ACNC: 0.44 UIU/ML (ref 0.36–3.74)
VIT B12 SERPL-MCNC: 223 PG/ML (ref 193–986)

## 2023-03-28 PROCEDURE — 82607 VITAMIN B-12: CPT | Mod: ,,, | Performed by: CLINICAL MEDICAL LABORATORY

## 2023-03-28 PROCEDURE — 99214 OFFICE O/P EST MOD 30 MIN: CPT | Mod: ,,, | Performed by: INTERNAL MEDICINE

## 2023-03-28 PROCEDURE — 97110 THERAPEUTIC EXERCISES: CPT | Mod: CQ

## 2023-03-28 PROCEDURE — 82306 VITAMIN D 25 HYDROXY: CPT | Mod: ,,, | Performed by: CLINICAL MEDICAL LABORATORY

## 2023-03-28 PROCEDURE — 97014 ELECTRIC STIMULATION THERAPY: CPT | Mod: CQ

## 2023-03-28 PROCEDURE — 82607 VITAMIN B12: ICD-10-PCS | Mod: ,,, | Performed by: CLINICAL MEDICAL LABORATORY

## 2023-03-28 PROCEDURE — 84443 ASSAY THYROID STIM HORMONE: CPT | Mod: ,,, | Performed by: CLINICAL MEDICAL LABORATORY

## 2023-03-28 PROCEDURE — 84443 TSH: ICD-10-PCS | Mod: ,,, | Performed by: CLINICAL MEDICAL LABORATORY

## 2023-03-28 PROCEDURE — 99214 PR OFFICE/OUTPT VISIT, EST, LEVL IV, 30-39 MIN: ICD-10-PCS | Mod: ,,, | Performed by: INTERNAL MEDICINE

## 2023-03-28 PROCEDURE — 82306 VITAMIN D: ICD-10-PCS | Mod: ,,, | Performed by: CLINICAL MEDICAL LABORATORY

## 2023-03-28 NOTE — PROGRESS NOTES
"                                                    Physical Therapy Daily Note     Name: Olga Stephenson  Clinic Number: [id number]  Diagnosis: Right total knee replacement   Encounter Diagnosis   Name Primary?    Decreased range of motion (ROM) of right knee Yes     Physician: Juan Lama, DO  Precautions: standard  Visit #: 8 of 18  PTA Visit #: 1  Time In: 1107  Time Out: 1142    Subjective     Pt reports: "My knee feels better than Friday but still swollen and warm. It's been popping too when I roll over in bed or bend it. I go to my family doctor today. They want to discuss my x-ray on my knee I had done."  Pain Scale: Olga rates pain on a scale of 0-10 to be  7  right knee prior to therapy.    Objective     Olga received individual therapeutic exercises to develop strength and ROM for 20 minutes including:  Scifit stepper 6 minutes level 4.0  Quad sets 2 minutes  Mat table: SLR, hip abduction, hip extension, hamstring curls 3 x 10     Not performed this visit due to increased knee pain and edema  Supine knee flexion stretch with sheet 10 x 5 second hold  Seated hamstring curls green Theraband 3 x 15  Seated long arc quad 2# 2 x 15  Standing SLR, hip flexion, knee flexion 2# 2 x 15  6 inch box step forward no hand support 2 x 10  Right hamstring stretch 6 x 10 seconds      The patient received the following supervised modalities after being cleared for contradictions: IFC Electrical Stimulation:  Olga received IFC Electrical Stimulation for pain control applied to the right knee with ice pack applied over right knee. Pt received stimulation at 100 % scan at a frequency of 80/150 Hz at 11.5- 14.0 cV for 15 minutes. Olga tolderated treatment well without any adverse effects.      Objective measurements  3/21/23  Right Knee ROM/Strength  AROM Strength   Knee extension   -8 3+   Knee flexion   113 3-     3/21/23  PROM right knee: extension 0 flexion 120  Right extension lag: -17    Written Home Exercises " Provided: completed.       Education provided re:  Olga instructed to start using RW whenever up and walking to decrease weight bearing load of right knee until she can see doctor.  No spiritual or educational barriers to learning.    Assessment     Treatment session still modified today due to edema and pain level along with c/o popping with right Hamstring area. Pt tolerated mat exercises fairly well without extreme difficulty. Pt reports right knee pain 6/10 after PT and modalities.     This is a 67 y.o. female referred to outpatient physical therapy and presents with a medical diagnosis of right total knee replacement  and demonstrates limitations as described in the problem list. Pt prognosis is Good. Pt will continue to benefit from skilled outpatient physical therapy to address the deficits listed in the problem list, provide pt/family education and to maximize pt's level of independence in the home and community environment.     Goals as follows:  Short Term Goals: 3 weeks   Pt will increase right knee flexion to 120 degrees, knee extension to -5 degrees, and quad lag to -5 degrees to allow patient normal gait pattern.  Pt will increase right knee strength to 3+/5 to allow patient to increase functional activity and mobility  Pt will tolerate 30 to 40 minutes of exercise/activity with 0/10 right knee pain. Goal Met.  Patient will be independent with home exercise program. Goal Met.    Long Term Goals: 6 weeks   1. Pt will increase right knee flexion to 130 degrees, knee extension to 0 degrees, and quad lag to 0 degrees to allow patient normal gait mechanics for community mobility.  2. Pt will increase right knee strength to 4/5 to allow patient to safely return to prior level of function   3. Patient will ambulate in community with normal gait mechanics without assistive device to return to PLOF.     Plan     Continue with established Plan of Care towards PT goals.    Therapist: Rebecca Garcia,  PTA  3/28/2023

## 2023-03-29 PROBLEM — L03.90 CELLULITIS: Status: ACTIVE | Noted: 2023-03-29

## 2023-03-29 PROBLEM — R60.9 EDEMA: Status: ACTIVE | Noted: 2023-03-29

## 2023-03-29 PROBLEM — E53.8 VITAMIN B 12 DEFICIENCY: Status: ACTIVE | Noted: 2023-03-29

## 2023-03-29 PROBLEM — R41.3 OTHER AMNESIA: Status: ACTIVE | Noted: 2023-03-29

## 2023-03-29 PROBLEM — E03.8 TSH DEFICIENCY: Status: ACTIVE | Noted: 2023-03-29

## 2023-03-29 RX ORDER — CEPHALEXIN 500 MG/1
500 CAPSULE ORAL 3 TIMES DAILY
Qty: 21 CAPSULE | Refills: 0 | Status: SHIPPED | OUTPATIENT
Start: 2023-03-29 | End: 2023-08-05 | Stop reason: CLARIF

## 2023-03-29 NOTE — PROGRESS NOTES
Subjective:       Patient ID: Olga Stephenson is a 67 y.o. female.    Chief Complaint: Results (X-ray ) and Knee Pain (Right )    Knee Pain     .  Patient complains of multiple problems today.  She complains of pain and swelling in the right knee.  Complains of pain and swelling in her right calf.  Also she is history of vitamin-D deficiency.  She stated she is been having memory loss.  In regards to the right knee she does have cellulitis on exam which is mild I will order Keflex 500 mg 1 p.o. 3 times a day.  In regards to the memory loss will order a CT scan of her brain consult Neurology check a vitamin B12 and a TSH level.  She is vitamin-D deficiency therefore will order a vitamin-D level.  Additional plans is to refer the patient back to Dr. Tolliver her orthopedic surgeon.  Patient has undergone a right total knee replacement.  I did review the x-rays and the x-rays are unremarkable.  However the patient continues to states that her knee is getting worse with pain swelling warmth and tenderness.    Current Medications:    Current Outpatient Medications:     albuterol (PROVENTIL/VENTOLIN HFA) 90 mcg/actuation inhaler, USE 2 INHALATIONS TWICE A DAY, Disp: 17 g, Rfl: 3    cephALEXin (KEFLEX) 500 MG capsule, Take 1 capsule (500 mg total) by mouth 3 (three) times daily., Disp: 21 capsule, Rfl: 0    clopidogreL (PLAVIX) 75 mg tablet, Take 1 tablet (75 mg total) by mouth once daily., Disp: 30 tablet, Rfl: 1    cyclobenzaprine (FLEXERIL) 10 MG tablet, 1 tablet nightly as needed., Disp: , Rfl:     dapagliflozin (FARXIGA) 10 mg tablet, Take 1 tablet (10 mg total) by mouth once daily., Disp: 90 tablet, Rfl: 3    ergocalciferol (ERGOCALCIFEROL) 50,000 unit Cap, Take 1 capsule (50,000 Units total) by mouth every 7 days. for 6 doses, Disp: 6 capsule, Rfl: 0    ezetimibe (ZETIA) 10 mg tablet, Take 1 tablet by mouth every evening., Disp: , Rfl:     fluticasone propionate (FLONASE) 50 mcg/actuation nasal spray, 1 spray (50  mcg total) by Each Nostril route once daily., Disp: 16 g, Rfl: 3    glipiZIDE 5 MG TR24, Take 1 tablet (5 mg total) by mouth daily with breakfast., Disp: 90 tablet, Rfl: 3    HYDROcodone-acetaminophen (NORCO)  mg per tablet, Take 1 tablet by mouth every 6 (six) hours as needed for Pain., Disp: 28 tablet, Rfl: 0    losartan (COZAAR) 50 MG tablet, Take 1 tablet (50 mg total) by mouth once daily., Disp: 90 tablet, Rfl: 3    metFORMIN (GLUCOPHAGE) 1000 MG tablet, Take 1,000 mg by mouth nightly., Disp: , Rfl:     metoprolol succinate (TOPROL-XL) 25 MG 24 hr tablet, Take 1 tablet by mouth once daily., Disp: , Rfl:     multivitamin capsule, Take 1 capsule by mouth once daily., Disp: , Rfl:     pantoprazole (PROTONIX) 40 MG tablet, Take 1 tablet (40 mg total) by mouth once daily., Disp: 90 tablet, Rfl: 3    paroxetine (PAXIL) 10 MG tablet, Take 1 tablet (10 mg total) by mouth once daily., Disp: 30 tablet, Rfl: 3    pregabalin (LYRICA) 75 MG capsule, Take 150 mg by mouth nightly., Disp: , Rfl:     valACYclovir (VALTREX) 500 MG tablet, Take 1 tablet (500 mg total) by mouth once daily., Disp: 90 tablet, Rfl: 1    chlorpheniramine-phenylephrine 4-10 mg per tablet, Take 1 tablet by mouth every 4 (four) hours as needed for Congestion. (Patient not taking: Reported on 3/13/2023), Disp: 30 tablet, Rfl: 2           Review of Systems   Constitutional:  Negative for appetite change, fatigue and fever.   Respiratory:  Negative for shortness of breath.    Cardiovascular:  Positive for leg swelling. Negative for chest pain.   Gastrointestinal:  Negative for abdominal pain and constipation.   Endocrine: Negative for polydipsia, polyphagia and polyuria.   Genitourinary:  Negative for difficulty urinating, frequency and hot flashes.   Allergic/Immunologic: Negative for environmental allergies.   Neurological:  Negative for dizziness and light-headedness.   Psychiatric/Behavioral:  Negative for agitation.               Vitals:     "03/28/23 1418   BP: 135/89   BP Location: Left arm   Patient Position: Sitting   BP Method: Large (Automatic)   Pulse: 83   Resp: 18   Temp: 97.4 °F (36.3 °C)   TempSrc: Temporal   SpO2: 95%   Weight: 90.6 kg (199 lb 12.8 oz)   Height: 5' 9" (1.753 m)        Physical Exam  Vitals and nursing note reviewed.   Constitutional:       Appearance: Normal appearance.   Cardiovascular:      Rate and Rhythm: Normal rate and regular rhythm.      Pulses: Normal pulses.      Heart sounds: Normal heart sounds.   Pulmonary:      Effort: Pulmonary effort is normal.      Breath sounds: Normal breath sounds.   Abdominal:      General: Abdomen is flat. Bowel sounds are normal.      Palpations: Abdomen is soft.   Musculoskeletal:         General: Swelling and tenderness present. Normal range of motion.   Skin:     General: Skin is warm and dry.   Neurological:      General: No focal deficit present.      Mental Status: She is alert and oriented to person, place, and time. Mental status is at baseline.         Last Labs:     Office Visit on 03/28/2023   Component Date Value    Vitamin D 25-Hydroxy, Bl* 03/28/2023 26.2     Vitamin B12 03/28/2023 223     TSH 03/28/2023 0.441    Office Visit on 03/13/2023   Component Date Value    ESR Westergren 03/13/2023 8     WBC 03/13/2023 8.05     RBC 03/13/2023 4.67     Hemoglobin 03/13/2023 13.3     Hematocrit 03/13/2023 43.0     MCV 03/13/2023 92.1     MCH 03/13/2023 28.5     MCHC 03/13/2023 30.9 (L)     RDW 03/13/2023 13.2     Platelet Count 03/13/2023 274     MPV 03/13/2023 12.3     Neutrophils % 03/13/2023 50.5 (L)     Lymphocytes % 03/13/2023 39.3     Monocytes % 03/13/2023 8.0 (H)     Eosinophils % 03/13/2023 1.2     Basophils % 03/13/2023 0.9     Immature Granulocytes % 03/13/2023 0.1     nRBC, Auto 03/13/2023 0.0     Neutrophils, Abs 03/13/2023 4.07     Lymphocytes, Absolute 03/13/2023 3.16     Monocytes, Absolute 03/13/2023 0.64     Eosinophils, Absolute 03/13/2023 0.10     Basophils, " Absolute 03/13/2023 0.07     Immature Granulocytes, A* 03/13/2023 0.01     nRBC, Absolute 03/13/2023 0.00     Diff Type 03/13/2023 Auto        Last Imaging:  X-Ray Knee 1 or 2 View Right  Narrative: EXAMINATION:  XR KNEE 1 OR 2 VIEW RIGHT    CLINICAL HISTORY:  Cellulitis of right lower limb    COMPARISON:  Right knee x-ray March 1, 2023    TECHNIQUE:  Frontal and lateral views of the right knee.    FINDINGS:  Total right knee arthroplasty.  No convincing acute fracture or dislocation or evidence of orthopedic hardware failure.  Suspect suprapatellar joint effusion.  Impression: As above.    Point of Service: [facility]    Electronically signed by: Lj Gonzalez  Date:    03/13/2023  Time:    12:06         **Labs and x-rays personally reviewed by me    ** reviewed      Objective:        Assessment:       1. Vitamin D deficiency  Vitamin D    Vitamin D      2. Vitamin B 12 deficiency  Vitamin B12    Vitamin B12      3. Abnormal TSH  TSH    TSH      4. TSH deficiency  TSH    TSH      5. Other amnesia  CT Head Without Contrast      6. Osteoarthritis of right knee, unspecified osteoarthritis type  Ambulatory referral/consult to Orthopedics      7. Cellulitis, unspecified cellulitis site  US Lower Extremity Veins Right      8. Memory loss  Ambulatory referral/consult to Neurology      9. Edema, unspecified type  US Lower Extremity Veins Right           Plan:         [unfilled]

## 2023-03-31 ENCOUNTER — CLINICAL SUPPORT (OUTPATIENT)
Dept: REHABILITATION | Facility: HOSPITAL | Age: 68
End: 2023-03-31
Payer: MEDICARE

## 2023-03-31 DIAGNOSIS — M25.661 DECREASED RANGE OF MOTION (ROM) OF RIGHT KNEE: Primary | ICD-10-CM

## 2023-03-31 PROCEDURE — 97110 THERAPEUTIC EXERCISES: CPT | Mod: CQ

## 2023-03-31 PROCEDURE — 97014 ELECTRIC STIMULATION THERAPY: CPT | Mod: CQ

## 2023-03-31 NOTE — PROGRESS NOTES
"                                                    Physical Therapy Daily Note     Name: Olga Stephenson  Clinic Number: [id number]  Diagnosis: Right total knee replacement   Encounter Diagnosis   Name Primary?    Decreased range of motion (ROM) of right knee Yes     Physician: Juan Lama, DO  Precautions: standard  Visit #: 9 of 18  PTA Visit #: 2  Time In: 1104  Time Out: 1200    Subjective     Pt reports: "Doctor said my knee looked fine. Last night my Hamstrings were so tight."  Pain Scale: Olga rates pain on a scale of 0-10 to be  7  right knee prior to therapy.    Objective     Olga received individual therapeutic exercises to develop strength and ROM for 41 minutes including:  Scifit stepper 6 minutes level 2.5  Quad sets 2 minutes  Mat table: Quad Sets x 1 min, SLR, hip abduction, hip extension, hamstring curls 2 x 10   Standing SLR, Hamstring curls, knee flexion 2# 2 x 15  Mini squats 2 x 10   Heel slides 10 x 10 s/h   Right hamstring stretch 5 x 10 s/h       The patient received the following supervised modalities after being cleared for contradictions: IFC Electrical Stimulation:  Olga received PreMod Electrical Stimulation for pain control applied to the right Hamstring  with moist hot pack applied with appropriate layers . Pt received settings: Cycle time: continuous, Freq: 80/150 Hz , Intensity : 17.5- 20.0 Volts CV for 15 minutes. Olga tolderated treatment well without any adverse effects.      Objective measurements  3/31/23  Right Knee ROM/Strength  AROM Strength   Knee extension   -8 3+   Knee flexion   116 3-     3/21/23  PROM right knee: extension 0 flexion 120  Right extension lag: -17    Written Home Exercises Provided: completed.       Education provided re:  Olga instructed to start using RW whenever up and walking to decrease weight bearing load of right knee until she can see doctor.  No spiritual or educational barriers to learning.    Assessment     Pt tolerated PT fairly well " with c/o Hamstring tightness during knee flexion activities but tolerable to perform all exercises listed above. No increased c/o right knee pain noted.     This is a 67 y.o. female referred to outpatient physical therapy and presents with a medical diagnosis of right total knee replacement  and demonstrates limitations as described in the problem list. Pt prognosis is Good. Pt will continue to benefit from skilled outpatient physical therapy to address the deficits listed in the problem list, provide pt/family education and to maximize pt's level of independence in the home and community environment.     Goals as follows:  Short Term Goals: 3 weeks   Pt will increase right knee flexion to 120 degrees, knee extension to -5 degrees, and quad lag to -5 degrees to allow patient normal gait pattern.  Pt will increase right knee strength to 3+/5 to allow patient to increase functional activity and mobility  Pt will tolerate 30 to 40 minutes of exercise/activity with 0/10 right knee pain. Goal Met.  Patient will be independent with home exercise program. Goal Met.    Long Term Goals: 6 weeks   1. Pt will increase right knee flexion to 130 degrees, knee extension to 0 degrees, and quad lag to 0 degrees to allow patient normal gait mechanics for community mobility.  2. Pt will increase right knee strength to 4/5 to allow patient to safely return to prior level of function   3. Patient will ambulate in community with normal gait mechanics without assistive device to return to [facility].     Plan     Continue with established Plan of Care towards PT goals.    Therapist: Rebecca Garcia, PTA  3/31/2023

## 2023-04-04 ENCOUNTER — CLINICAL SUPPORT (OUTPATIENT)
Dept: REHABILITATION | Facility: HOSPITAL | Age: 68
End: 2023-04-04
Payer: MEDICARE

## 2023-04-04 DIAGNOSIS — M25.661 DECREASED RANGE OF MOTION (ROM) OF RIGHT KNEE: Primary | ICD-10-CM

## 2023-04-04 DIAGNOSIS — Z96.651 STATUS POST TOTAL RIGHT KNEE REPLACEMENT: ICD-10-CM

## 2023-04-04 PROCEDURE — 97110 THERAPEUTIC EXERCISES: CPT

## 2023-04-04 NOTE — PROGRESS NOTES
"                                                    Physical Therapy Daily Note     Name: Olga Stephenson  Clinic Number: [id number]  Diagnosis: Right total knee replacement   Encounter Diagnoses   Name Primary?    Decreased range of motion (ROM) of right knee Yes    Status post total right knee replacement      Physician: Juan Lama, DO  Precautions: standard  Visit #: 10 of 18  PTA Visit #: 2  Time In: 1102  Time Out: 1140    Subjective     Pt reports: "My knee feels a little better today."  Pain Scale: Olga rates pain on a scale of 0-10 to be  6  right knee prior to therapy.    Objective     Olga received individual therapeutic exercises to develop strength and ROM for 38 minutes including:  Scifit stepper 6 minutes level 3  Seated Hamstring curls 2 x 15 green theraband  Seated long arc quad 2 x 15 2#  Mat table: Quad Sets x 2 min, SLR, hip abduction, hip extension, hamstring curls 2 x 10 2#  Heel slides stretch 10 x 10 s/h   Right hamstring stretch 10 x 10 s/h   Prone right knee extension prolonged stretch 3 x 2 min with 2#    8 inch box step 2 x 10      Not this visit-The patient received the following supervised modalities after being cleared for contradictions: IFC Electrical Stimulation:  Olga received PreMod Electrical Stimulation for pain control applied to the right () applied with appropriate layers . Pt received settings: Cycle time: continuous, Freq: () Hz , Intensity : () Volts CV for () minutes. Olga tolderated treatment well without any adverse effects.      Objective measurements  4/4/23  Right Knee ROM/Strength  AROM Strength   Knee extension   -8 4-   Knee flexion   120 4     4/4/23  PROM right knee: extension 0 flexion 123  Right extension lag: -15    Written Home Exercises Provided: completed.       Education provided re:  Olga instructed to start using RW whenever up and walking to decrease weight bearing load of right knee until she can see doctor.  No spiritual or educational " barriers to learning.    Assessment     Patient demonstrated good measurements for knee ROM and strength. Does need to increase knee extension and improve extensor lag.    This is a 67 y.o. female referred to outpatient physical therapy and presents with a medical diagnosis of right total knee replacement  and demonstrates limitations as described in the problem list. Pt prognosis is Good. Pt will continue to benefit from skilled outpatient physical therapy to address the deficits listed in the problem list, provide pt/family education and to maximize pt's level of independence in the home and community environment.     Goals as follows:  Short Term Goals: 3 weeks   Pt will increase right knee flexion to 120 degrees, knee extension to -5 degrees, and quad lag to -5 degrees to allow patient normal gait pattern. Flexion met.  Pt will increase right knee strength to 3+/5 to allow patient to increase functional activity and mobility. Goal Met.  Pt will tolerate 30 to 40 minutes of exercise/activity with 0/10 right knee pain. Goal Met.  Patient will be independent with home exercise program. Goal Met.    Long Term Goals: 6 weeks   1. Pt will increase right knee flexion to 130 degrees, knee extension to 0 degrees, and quad lag to 0 degrees to allow patient normal gait mechanics for community mobility.  2. Pt will increase right knee strength to 4/5 to allow patient to safely return to prior level of function.   3. Patient will ambulate in community with normal gait mechanics without assistive device to return to PLOF.     Plan     Continue with established Plan of Care towards PT goals.    Therapist: Mert Archer, PT  4/4/2023

## 2023-04-04 NOTE — PLAN OF CARE
Name: Olga Stephenson  Clinic Number: [id number]  Diagnosis: Right total knee replacement   Encounter Diagnoses   Name Primary?    Decreased range of motion (ROM) of right knee Yes    Status post total right knee replacement      Physician: Juan Lama,   Precautions: standard  Visit #: 10 of 18    Subjective:  Olga is participating well in therapy. Her chief complaint is posterior knee pain 6/10, swelling and stiffness.         Objective:  Objective measurements  4/4/23  Right Knee ROM/Strength  AROM Strength   Knee extension   -8 4-   Knee flexion   120 4     4/4/23  PROM right knee: extension 0 flexion 123  Right extension lag: -15       Assessment:  Patient demonstrated good measurements for knee ROM and strength. Does need to increase knee extension and improve extensor lag.       Goals  Short Term Goals: 3 weeks   Pt will increase right knee flexion to 120 degrees, knee extension to -5 degrees, and quad lag to -5 degrees to allow patient normal gait pattern. Flexion met.  Pt will increase right knee strength to 3+/5 to allow patient to increase functional activity and mobility. Goal Met.  Pt will tolerate 30 to 40 minutes of exercise/activity with 0/10 right knee pain. Goal Met.  Patient will be independent with home exercise program. Goal Met.    Long Term Goals: 6 weeks   1. Pt will increase right knee flexion to 130 degrees, knee extension to 0 degrees, and quad lag to 0 degrees to allow patient normal gait mechanics for community mobility.  2. Pt will increase right knee strength to 4/5 to allow patient to safely return to prior level of function.   3. Patient will ambulate in community with normal gait mechanics without assistive device to return to [facility].    Plan of care:  Continue with present plan of care.    Mert Archer, PT

## 2023-04-06 ENCOUNTER — CLINICAL SUPPORT (OUTPATIENT)
Dept: REHABILITATION | Facility: HOSPITAL | Age: 68
End: 2023-04-06
Payer: MEDICARE

## 2023-04-06 DIAGNOSIS — Z47.89 ENCOUNTER FOR ORTHOPEDIC FOLLOW-UP CARE: Primary | ICD-10-CM

## 2023-04-06 DIAGNOSIS — M25.661 DECREASED RANGE OF MOTION (ROM) OF RIGHT KNEE: Primary | ICD-10-CM

## 2023-04-06 PROCEDURE — 97110 THERAPEUTIC EXERCISES: CPT | Mod: CQ

## 2023-04-06 PROCEDURE — 97014 ELECTRIC STIMULATION THERAPY: CPT | Mod: CQ

## 2023-04-06 NOTE — PROGRESS NOTES
"                                                    Physical Therapy Daily Note     Name: Olga Stephenson  Clinic Number: [id number]  Diagnosis: Right total knee replacement   Encounter Diagnosis   Name Primary?    Decreased range of motion (ROM) of right knee Yes     Physician: Juan Lama, DO  Precautions: standard  Visit #: 11 of 18  PTA Visit #: 1  Time In: 1108  Time Out: 1153    Subjective     Pt reports: "My knee is stiff again."  Pain Scale: Olga rates pain on a scale of 0-10 to be  5  right knee prior to therapy.    Objective     Olga received individual therapeutic exercises to develop strength and ROM for 30 minutes including:  Scifit stepper 6 minutes level 3  Seated Hamstring curls 2 x 15 green theraband  Seated long arc quad 2 x 15 2#  Quad Sets x 2 min  R SLR, hip abduction, hip extension, hamstring curls 2 x 10 2#  Heel slides stretch 10 x 10 s/h   Right hamstring stretch 10 x 10 s/h   8 inch box step 2 x 10-- not today      Not this visit-The patient received the following supervised modalities after being cleared for contradictions: IFC Electrical Stimulation:  Olga received PreMod Electrical Stimulation for pain control applied to the right hamstring area applied with moist heat pack with appropriate layers . Pt received settings: Cycle time: continuous, Freq: 80/150 Hz , Intensity : 14.5 Volts CV for 15 minutes. Olga tolderated treatment well without any adverse effects.      Objective measurements  4/4/23  Right Knee ROM/Strength  AROM Strength   Knee extension   -8 4-   Knee flexion   120 4     4/4/23  PROM right knee: extension 0 flexion 123  Right extension lag: -15    Written Home Exercises Provided: completed.       Education provided re:  Olga instructed to start using RW whenever up and walking to decrease weight bearing load of right knee until she can see doctor.  No spiritual or educational barriers to learning.    Assessment     Patient able to tolerate all seated, supine, and " prone exercises well.    This is a 67 y.o. female referred to outpatient physical therapy and presents with a medical diagnosis of right total knee replacement  and demonstrates limitations as described in the problem list. Pt prognosis is Good. Pt will continue to benefit from skilled outpatient physical therapy to address the deficits listed in the problem list, provide pt/family education and to maximize pt's level of independence in the home and community environment.     Goals as follows:  Short Term Goals: 3 weeks   Pt will increase right knee flexion to 120 degrees, knee extension to -5 degrees, and quad lag to -5 degrees to allow patient normal gait pattern. Flexion met.  Pt will increase right knee strength to 3+/5 to allow patient to increase functional activity and mobility. Goal Met.  Pt will tolerate 30 to 40 minutes of exercise/activity with 0/10 right knee pain. Goal Met.  Patient will be independent with home exercise program. Goal Met.    Long Term Goals: 6 weeks   1. Pt will increase right knee flexion to 130 degrees, knee extension to 0 degrees, and quad lag to 0 degrees to allow patient normal gait mechanics for community mobility.  2. Pt will increase right knee strength to 4/5 to allow patient to safely return to prior level of function.   3. Patient will ambulate in community with normal gait mechanics without assistive device to return to PLOF.     Plan     Continue with established Plan of Care towards PT goals.    Therapist: Rebecca Garcia, DIANA  4/6/2023

## 2023-04-10 ENCOUNTER — OFFICE VISIT (OUTPATIENT)
Dept: ORTHOPEDICS | Facility: CLINIC | Age: 68
End: 2023-04-10
Payer: MEDICARE

## 2023-04-10 ENCOUNTER — HOSPITAL ENCOUNTER (OUTPATIENT)
Dept: RADIOLOGY | Facility: HOSPITAL | Age: 68
Discharge: HOME | End: 2023-04-10
Attending: ORTHOPAEDIC SURGERY
Payer: MEDICARE

## 2023-04-10 DIAGNOSIS — M17.11 OSTEOARTHRITIS OF RIGHT KNEE, UNSPECIFIED OSTEOARTHRITIS TYPE: ICD-10-CM

## 2023-04-10 DIAGNOSIS — Z47.89 ENCOUNTER FOR ORTHOPEDIC FOLLOW-UP CARE: ICD-10-CM

## 2023-04-10 DIAGNOSIS — Z96.651 STATUS POST TOTAL KNEE REPLACEMENT, RIGHT: Primary | ICD-10-CM

## 2023-04-10 PROCEDURE — 73560 X-RAY EXAM OF KNEE 1 OR 2: CPT | Mod: TC,RT

## 2023-04-10 PROCEDURE — 73560 X-RAY EXAM OF KNEE 1 OR 2: CPT | Mod: 26,RT,, | Performed by: ORTHOPAEDIC SURGERY

## 2023-04-10 PROCEDURE — 73560 XR KNEE 1 OR 2 VIEW RIGHT: ICD-10-PCS | Mod: 26,RT,, | Performed by: ORTHOPAEDIC SURGERY

## 2023-04-10 PROCEDURE — 99214 OFFICE O/P EST MOD 30 MIN: CPT | Mod: PBBFAC | Performed by: ORTHOPAEDIC SURGERY

## 2023-04-10 PROCEDURE — 99024 POSTOP FOLLOW-UP VISIT: CPT | Mod: ,,, | Performed by: ORTHOPAEDIC SURGERY

## 2023-04-10 PROCEDURE — 99024 PR POST-OP FOLLOW-UP VISIT: ICD-10-PCS | Mod: ,,, | Performed by: ORTHOPAEDIC SURGERY

## 2023-04-10 RX ORDER — TIZANIDINE 4 MG/1
4 TABLET ORAL EVERY 6 HOURS PRN
Qty: 30 TABLET | Refills: 1 | Status: SHIPPED | OUTPATIENT
Start: 2023-04-10 | End: 2023-04-20

## 2023-04-10 NOTE — PROGRESS NOTES
Patient is here for follow-up of her total knee arthroplasty on the right.  She comes into full extension.  She has flexion past 110 degrees let her weightbear as tolerates she is having little bit of spasm I wrote her for some tizanidine I will follow back up in 3 months

## 2023-04-10 NOTE — PROGRESS NOTES
Radiology Interpretation        Patient Name: Olga Stephenson  Date: 4/10/2023  YOB: 1955  MRN# [id number]        ORDERING DIAGNOSIS:    Encounter Diagnoses   Name Primary?    Osteoarthritis of right knee, unspecified osteoarthritis type     Status post total knee replacement, right Yes                       Erick Tolliver MD                 Two views right knee skeletally mature individual total knee arthroplasty in place no loosening fractures or subluxations no bony lesions impression total knee arthroplasty in place right knee no loosening

## 2023-04-11 ENCOUNTER — CLINICAL SUPPORT (OUTPATIENT)
Dept: REHABILITATION | Facility: HOSPITAL | Age: 68
End: 2023-04-11
Payer: MEDICARE

## 2023-04-11 DIAGNOSIS — Z96.651 STATUS POST TOTAL RIGHT KNEE REPLACEMENT: ICD-10-CM

## 2023-04-11 DIAGNOSIS — M25.661 DECREASED RANGE OF MOTION (ROM) OF RIGHT KNEE: Primary | ICD-10-CM

## 2023-04-11 PROCEDURE — 97110 THERAPEUTIC EXERCISES: CPT

## 2023-04-11 NOTE — PROGRESS NOTES
"                                                    Physical Therapy Daily Note     Name: Olga Stephenson  Clinic Number: [id number]  Diagnosis: Right total knee replacement   Encounter Diagnoses   Name Primary?    Decreased range of motion (ROM) of right knee Yes    Status post total right knee replacement      Physician: Juan Lama, DO  Precautions: standard  Visit #: 12 of 18  PTA Visit #: 1  Time In: 1100  Time Out: 1130    Subjective     Pt reports: "My knee doesn't hurt today but it was sore yesterday."  Pain Scale: Olga rates pain on a scale of 0-10 to be  0  right knee prior to therapy.    Objective     Olga received individual therapeutic exercises to develop strength and ROM for 30 minutes including:  Scifit stepper 6 minutes level 3  Seated Hamstring curls 2 x 15 blue theraband  Seated long arc quad 2 x 15 2.5#  Quad Sets x 2 min  R SLR, hip abduction, hip extension, hamstring curls 2 x 10 2.5#  Heel slides knee flexion stretch 10 x 10 s/h   Right hamstring stretch 10 x 15 s/h   8 inch box step 2 x 10      Not this visit-The patient received the following supervised modalities after being cleared for contradictions: IFC Electrical Stimulation:  Ogla received PreMod Electrical Stimulation for pain control applied to the () area applied with () with appropriate layers . Pt received settings: Cycle time: continuous, Freq: () Hz , Intensity : () Volts CV for () minutes. Olga tolderated treatment well without any adverse effects.      Objective measurements  4/4/23  Right Knee ROM/Strength  AROM Strength   Knee extension   -8 4-   Knee flexion   120 4     4/4/23  PROM right knee: extension 0 flexion 123  Right extension lag: -15    Written Home Exercises Provided: completed.       Education provided re:  Olga instructed to start using RW whenever up and walking to decrease weight bearing load of right knee until she can see doctor.  No spiritual or educational barriers to learning.    Assessment "     Patient tolerated therapy well and able to progress to 2.5# on resistive exercises to promote muscle strengthening.    This is a 67 y.o. female referred to outpatient physical therapy and presents with a medical diagnosis of right total knee replacement  and demonstrates limitations as described in the problem list. Pt prognosis is Good. Pt will continue to benefit from skilled outpatient physical therapy to address the deficits listed in the problem list, provide pt/family education and to maximize pt's level of independence in the home and community environment.     Goals as follows:  Short Term Goals: 3 weeks   Pt will increase right knee flexion to 120 degrees, knee extension to -5 degrees, and quad lag to -5 degrees to allow patient normal gait pattern. Flexion met.  Pt will increase right knee strength to 3+/5 to allow patient to increase functional activity and mobility. Goal Met.  Pt will tolerate 30 to 40 minutes of exercise/activity with 0/10 right knee pain. Goal Met.  Patient will be independent with home exercise program. Goal Met.    Long Term Goals: 6 weeks   1. Pt will increase right knee flexion to 130 degrees, knee extension to 0 degrees, and quad lag to 0 degrees to allow patient normal gait mechanics for community mobility.  2. Pt will increase right knee strength to 4/5 to allow patient to safely return to prior level of function.   3. Patient will ambulate in community with normal gait mechanics without assistive device to return to [facility].     Plan     Continue with established Plan of Care towards PT goals.    Therapist: Mert Archer, PT  4/11/2023

## 2023-04-13 ENCOUNTER — CLINICAL SUPPORT (OUTPATIENT)
Dept: REHABILITATION | Facility: HOSPITAL | Age: 68
End: 2023-04-13
Payer: MEDICARE

## 2023-04-13 DIAGNOSIS — M25.661 DECREASED RANGE OF MOTION (ROM) OF RIGHT KNEE: Primary | ICD-10-CM

## 2023-04-13 DIAGNOSIS — Z96.651 STATUS POST TOTAL RIGHT KNEE REPLACEMENT: ICD-10-CM

## 2023-04-13 PROCEDURE — 97110 THERAPEUTIC EXERCISES: CPT

## 2023-04-13 NOTE — PROGRESS NOTES
"                                                    Physical Therapy Daily Note     Name: Olga Stephenson  Clinic Number: [id number]  Diagnosis: Right total knee replacement   Encounter Diagnoses   Name Primary?    Decreased range of motion (ROM) of right knee Yes    Status post total right knee replacement      Physician: Juan Lama, DO  Precautions: standard  Visit #: 13 of 18  PTA Visit #: 1  Time In: 1100  Time Out: 1130    Subjective     Pt reports: "My knee feels stiff today and a little painful."  Pain Scale: Olga rates pain on a scale of 0-10 to be  3  right knee prior to therapy.    Objective     Olga received individual therapeutic exercises to develop strength and ROM for 30 minutes including:  Scifit stepper 6 minutes level 3  Seated Hamstring curls 2 x 15 blue theraband  Seated long arc quad 2 x 15 2.5#  Quad Sets x 2 min  R SLR, hip abduction, hip extension, hamstring curls 2 x 10 2.5#  Heel slides knee flexion stretch 10 x 10 s/h   Right hamstring stretch 10 x 15 s/h   8 inch box step forward and lateral 2 x 10      Not this visit-The patient received the following supervised modalities after being cleared for contradictions: IFC Electrical Stimulation:  Olga received PreMod Electrical Stimulation for pain control applied to the () area applied with () with appropriate layers . Pt received settings: Cycle time: continuous, Freq: () Hz , Intensity : () Volts CV for () minutes. Olga tolderated treatment well without any adverse effects.      Objective measurements  4/13/23  Right Knee ROM/Strength  AROM Strength   Knee extension   -8 4-   Knee flexion   118 4     4/13/23  PROM right knee: extension 0 flexion 123  Right extension lag: -5    Written Home Exercises Provided: completed.       Education provided re:  Olga instructed to start using RW whenever up and walking to decrease weight bearing load of right knee until she can see doctor.  No spiritual or educational barriers to " learning.    Assessment     Objective measurements performed and patient is demonstrating good flexion measurements but needs some improvement on knee extension.    This is a 67 y.o. female referred to outpatient physical therapy and presents with a medical diagnosis of right total knee replacement  and demonstrates limitations as described in the problem list. Pt prognosis is Good. Pt will continue to benefit from skilled outpatient physical therapy to address the deficits listed in the problem list, provide pt/family education and to maximize pt's level of independence in the home and community environment.     Goals as follows:  Short Term Goals: 3 weeks   Pt will increase right knee flexion to 120 degrees, knee extension to -5 degrees, and quad lag to -5 degrees to allow patient normal gait pattern. Flexion met.  Pt will increase right knee strength to 3+/5 to allow patient to increase functional activity and mobility. Goal Met.  Pt will tolerate 30 to 40 minutes of exercise/activity with 0/10 right knee pain. Goal Met.  Patient will be independent with home exercise program. Goal Met.    Long Term Goals: 6 weeks   1. Pt will increase right knee flexion to 130 degrees, knee extension to 0 degrees, and quad lag to 0 degrees to allow patient normal gait mechanics for community mobility.  2. Pt will increase right knee strength to 4/5 to allow patient to safely return to prior level of function.   3. Patient will ambulate in community with normal gait mechanics without assistive device to return to [facility].     Plan     Continue with established Plan of Care towards PT goals.    Therapist: Mert Archer, PT  4/13/2023

## 2023-04-18 ENCOUNTER — CLINICAL SUPPORT (OUTPATIENT)
Dept: REHABILITATION | Facility: HOSPITAL | Age: 68
End: 2023-04-18
Payer: MEDICARE

## 2023-04-18 DIAGNOSIS — Z96.651 STATUS POST TOTAL RIGHT KNEE REPLACEMENT: ICD-10-CM

## 2023-04-18 DIAGNOSIS — M25.661 DECREASED RANGE OF MOTION (ROM) OF RIGHT KNEE: Primary | ICD-10-CM

## 2023-04-18 PROCEDURE — 97110 THERAPEUTIC EXERCISES: CPT

## 2023-04-18 NOTE — PROGRESS NOTES
"                                                    Physical Therapy Daily Note     Name: Olga Stephenson  Clinic Number: [id number]  Diagnosis: Right total knee replacement   Encounter Diagnoses   Name Primary?    Decreased range of motion (ROM) of right knee Yes    Status post total right knee replacement      Physician: Juan Lama, DO  Precautions: standard  Visit #: 14 of 18  PTA Visit #: 1  Time In: 1110  Time Out: 1135    Subjective     Pt reports: "My knee feels pretty good today."  Pain Scale: Olga rates pain on a scale of 0-10 to be  0  right knee prior to therapy.    Objective     Olga received individual therapeutic exercises to develop strength and ROM for 25 minutes including:  Scifit stepper 6 minutes level 3  Seated Hamstring curls 2 x 15 blue theraband  Seated long arc quad 2 x 15 2.5#  Quad Sets x 2 min  R SLR, hip abduction, hip extension, hamstring curls 2 x 10 2.5#  Heel slides knee flexion stretch 10 x 10 s/h   Right hamstring stretch 10 x 15 s/h   8 inch box step forward and lateral 2 x 10      Not this visit-The patient received the following supervised modalities after being cleared for contradictions: IFC Electrical Stimulation:  Olga received PreMod Electrical Stimulation for pain control applied to the () area applied with () with appropriate layers . Pt received settings: Cycle time: continuous, Freq: () Hz , Intensity : () Volts CV for () minutes. Olga tolderated treatment well without any adverse effects.      Objective measurements  4/13/23  Right Knee ROM/Strength  AROM Strength   Knee extension   -8 4-   Knee flexion   118 4     4/13/23  PROM right knee: extension 0 flexion 123  Right extension lag: -5    Written Home Exercises Provided: completed.       Education provided re:  Olga did not receive new education today.  No spiritual or educational barriers to learning.    Assessment     Patient tolerated all exercises well without pain. Progress to LTG's.    This is a 67 " y.o. female referred to outpatient physical therapy and presents with a medical diagnosis of right total knee replacement  and demonstrates limitations as described in the problem list. Pt prognosis is Good. Pt will continue to benefit from skilled outpatient physical therapy to address the deficits listed in the problem list, provide pt/family education and to maximize pt's level of independence in the home and community environment.     Goals as follows:  Short Term Goals: 3 weeks   Pt will increase right knee flexion to 120 degrees, knee extension to -5 degrees, and quad lag to -5 degrees to allow patient normal gait pattern. Flexion met.  Pt will increase right knee strength to 3+/5 to allow patient to increase functional activity and mobility. Goal Met.  Pt will tolerate 30 to 40 minutes of exercise/activity with 0/10 right knee pain. Goal Met.  Patient will be independent with home exercise program. Goal Met.    Long Term Goals: 6 weeks   1. Pt will increase right knee flexion to 130 degrees, knee extension to 0 degrees, and quad lag to 0 degrees to allow patient normal gait mechanics for community mobility.  2. Pt will increase right knee strength to 4/5 to allow patient to safely return to prior level of function.   3. Patient will ambulate in community with normal gait mechanics without assistive device to return to PLOF.     Plan     Continue with established Plan of Care towards PT goals.    Therapist: Mert Archer, PT  4/18/2023

## 2023-04-20 ENCOUNTER — CLINICAL SUPPORT (OUTPATIENT)
Dept: REHABILITATION | Facility: HOSPITAL | Age: 68
End: 2023-04-20
Payer: MEDICARE

## 2023-04-20 DIAGNOSIS — M25.661 DECREASED RANGE OF MOTION (ROM) OF RIGHT KNEE: Primary | ICD-10-CM

## 2023-04-20 PROCEDURE — 97110 THERAPEUTIC EXERCISES: CPT | Mod: CQ

## 2023-04-20 NOTE — PROGRESS NOTES
Physical Therapy Daily Note     Name: Olga Stephenson  Clinic Number: [id number]  Diagnosis: Right total knee replacement   Encounter Diagnosis   Name Primary?    Decreased range of motion (ROM) of right knee Yes     Physician: Juan Lama, DO  Precautions: standard  Visit #: 15 of 18  PTA Visit #: 1  Time In: 1104  Time Out: 1140    Subjective     Pt reports: No new complaints.   Pain Scale: Olga rates pain on a scale of 0-10 to be  0  right knee prior to therapy.    Objective     Olga received individual therapeutic exercises to develop strength and ROM for 36 minutes including:  Scifit stepper 6 minutes level 4  Step lunge stretch 10 x 10 s/h   Seated Hamstring curls 2 x 15 blue theraband  Seated long arc quad 2 x 15 2.5#  Quad Sets x 2 min  R SLR, hip abduction, hip extension, hamstring curls 2 x 10 2.5#  Heel slides knee flexion stretch 10 x 10 s/h   Right hamstring stretch 10 x 15 s/h   8 inch box step forward and lateral 2 x 10      Not this visit-The patient received the following supervised modalities after being cleared for contradictions: IFC Electrical Stimulation:  Olga received PreMod Electrical Stimulation for pain control applied to the () area applied with () with appropriate layers . Pt received settings: Cycle time: continuous, Freq: () Hz , Intensity : () Volts CV for () minutes. Olga tolderated treatment well without any adverse effects.      Objective measurements  4/13/23  Right Knee ROM/Strength  AROM Strength   Knee extension   -8 4-   Knee flexion   118 4     4/13/23  PROM right knee: extension 0 flexion 123  Right extension lag: -5    Written Home Exercises Provided: completed.       Education provided re:  Olga did not receive new education today.  No spiritual or educational barriers to learning.    Assessment     Patient tolerated all exercises well without right knee pain. Still c/o Hamstring tightness.     This is a 67 y.o.  female referred to outpatient physical therapy and presents with a medical diagnosis of right total knee replacement  and demonstrates limitations as described in the problem list. Pt prognosis is Good. Pt will continue to benefit from skilled outpatient physical therapy to address the deficits listed in the problem list, provide pt/family education and to maximize pt's level of independence in the home and community environment.     Goals as follows:  Short Term Goals: 3 weeks   Pt will increase right knee flexion to 120 degrees, knee extension to -5 degrees, and quad lag to -5 degrees to allow patient normal gait pattern. Flexion met.  Pt will increase right knee strength to 3+/5 to allow patient to increase functional activity and mobility. Goal Met.  Pt will tolerate 30 to 40 minutes of exercise/activity with 0/10 right knee pain. Goal Met.  Patient will be independent with home exercise program. Goal Met.    Long Term Goals: 6 weeks   1. Pt will increase right knee flexion to 130 degrees, knee extension to 0 degrees, and quad lag to 0 degrees to allow patient normal gait mechanics for community mobility.  2. Pt will increase right knee strength to 4/5 to allow patient to safely return to prior level of function.   3. Patient will ambulate in community with normal gait mechanics without assistive device to return to PLOF.     Plan     Continue with established Plan of Care towards PT goals.    Therapist: Rebecca Garcia, PTA  4/20/2023

## 2023-04-25 ENCOUNTER — PATIENT OUTREACH (OUTPATIENT)
Dept: ADMINISTRATIVE | Facility: HOSPITAL | Age: 68
End: 2023-04-25

## 2023-04-25 ENCOUNTER — CLINICAL SUPPORT (OUTPATIENT)
Dept: REHABILITATION | Facility: HOSPITAL | Age: 68
End: 2023-04-25
Payer: MEDICARE

## 2023-04-25 DIAGNOSIS — Z96.651 STATUS POST TOTAL RIGHT KNEE REPLACEMENT: ICD-10-CM

## 2023-04-25 DIAGNOSIS — M25.661 DECREASED RANGE OF MOTION (ROM) OF RIGHT KNEE: Primary | ICD-10-CM

## 2023-04-25 PROCEDURE — 97110 THERAPEUTIC EXERCISES: CPT

## 2023-04-25 PROCEDURE — 97035 APP MDLTY 1+ULTRASOUND EA 15: CPT

## 2023-04-25 NOTE — PROGRESS NOTES
Physical Therapy Daily Note     Name: Olga Stephenson  Clinic Number: [id number]  Diagnosis: Right total knee replacement   Encounter Diagnoses   Name Primary?    Decreased range of motion (ROM) of right knee Yes    Status post total right knee replacement      Physician: Juan Lama, DO  Precautions: standard  Visit #: 16 of 18  PTA Visit #: 1  Time In: 1105  Time Out: 1143    Subjective     Pt reports: My knee is OK but my hamstring and back hurts today.  Pain Scale: Olga rates pain on a scale of 0-10 to be  8  for low back and right hamstring prior to therapy.    Objective     Olga received individual therapeutic exercises to develop strength and ROM for 30 minutes including:  Scifit stepper 6 minutes level 4    Seated Hamstring curls 2 x 15 blue theraband  Seated long arc quad 2 x 15 2.5#  Quad Sets x 2 min  R SLR, hip abduction, hip extension, hamstring curls 2 x 15 2.5#  Side lying right IT band stretch 30 seconds    Not performed this visit  Step lunge stretch 10 x 10 s/h   Heel slides knee flexion stretch 10 x 10 s/h   Right hamstring stretch 10 x 15 s/h   8 inch box step forward and lateral 2 x 10    Palpation: patient has a very tender raised knot on the insertion of right IT band.    The patient received the following direct contact modalities after being cleared for contraindications: Ultrasound:  Olga received ultrasound to manage pain and inflammation at 100 % duty cycle applied to the right iliotibial band insertion at an intensity of 1.0 to 1.5 W/cm2  for a duration of 8 minutes. Patient tolerated treatment fair with some discomfort upon application. Therapist was in attendance throughout intervention.      Objective measurements  4/13/23  Right Knee ROM/Strength  AROM Strength   Knee extension   -8 4-   Knee flexion   118 4     4/13/23  PROM right knee: extension 0 flexion 123  Right extension lag: -5    Written Home Exercises Provided:  completed.       Education provided re:  Olga .received education on ice massage today.  No spiritual or educational barriers to learning.    Assessment     Patient has right iliotibial band tightness and dysfunction which is causing her significant pain. Patient also had low back pain today. Therapist will continue to address IT band pain with modalities and stretching and patient instructed to perform ice massage at home to manage inflammation.     This is a 67 y.o. female referred to outpatient physical therapy and presents with a medical diagnosis of right total knee replacement  and demonstrates limitations as described in the problem list. Pt prognosis is Good. Pt will continue to benefit from skilled outpatient physical therapy to address the deficits listed in the problem list, provide pt/family education and to maximize pt's level of independence in the home and community environment.     Goals as follows:  Short Term Goals: 3 weeks   Pt will increase right knee flexion to 120 degrees, knee extension to -5 degrees, and quad lag to -5 degrees to allow patient normal gait pattern. Flexion met.  Pt will increase right knee strength to 3+/5 to allow patient to increase functional activity and mobility. Goal Met.  Pt will tolerate 30 to 40 minutes of exercise/activity with 0/10 right knee pain. Goal Met.  Patient will be independent with home exercise program. Goal Met.    Long Term Goals: 6 weeks   1. Pt will increase right knee flexion to 130 degrees, knee extension to 0 degrees, and quad lag to 0 degrees to allow patient normal gait mechanics for community mobility.  2. Pt will increase right knee strength to 4/5 to allow patient to safely return to prior level of function.   3. Patient will ambulate in community with normal gait mechanics without assistive device to return to PLOF.     Plan     Continue with established Plan of Care towards PT goals.    Therapist: Mert Archer,  PT  4/25/2023

## 2023-04-26 NOTE — PROGRESS NOTES
Health Maintenance Due   Topic Date Due    TETANUS VACCINE  Never done    Sign Pain Contract  Never done    Complete Opioid Risk Tool  Never done    DEXA Scan  Never done    COVID-19 Vaccine (4 - Booster for Moderna series) 02/11/2022    Eye Exam  02/01/2023    Shingles Vaccine (2 of 3) 03/20/2023    Mammogram  05/23/2023

## 2023-04-27 ENCOUNTER — CLINICAL SUPPORT (OUTPATIENT)
Dept: REHABILITATION | Facility: HOSPITAL | Age: 68
End: 2023-04-27
Payer: MEDICARE

## 2023-04-27 DIAGNOSIS — Z96.651 STATUS POST TOTAL RIGHT KNEE REPLACEMENT: ICD-10-CM

## 2023-04-27 DIAGNOSIS — M25.661 DECREASED RANGE OF MOTION (ROM) OF RIGHT KNEE: Primary | ICD-10-CM

## 2023-04-27 PROCEDURE — 97035 APP MDLTY 1+ULTRASOUND EA 15: CPT

## 2023-04-27 PROCEDURE — 97110 THERAPEUTIC EXERCISES: CPT

## 2023-04-27 NOTE — PROGRESS NOTES
Physical Therapy Daily Note     Name: Olga Stephenson  Clinic Number: [id number]  Diagnosis: Right total knee replacement   Encounter Diagnoses   Name Primary?    Decreased range of motion (ROM) of right knee Yes    Status post total right knee replacement      Physician: Juan Lama, DO  Precautions: standard  Visit #: 17 of 18  PTA Visit #: 1  Time In: 1400  Time Out: 1440    Subjective     Pt reports: My knee and back hurt today.  Pain Scale: Olga rates pain on a scale of 0-10 to be  8  for low back and right IT band prior to therapy.    Objective     Olga received individual therapeutic exercises to develop strength and ROM for 30 minutes including:  Scifit stepper 6 minutes level 4  Seated Hamstring curls 2 x 15 blue theraband  Seated long arc quad 2 x 15 2.5#  Quad Sets x 2 min  R SLR, hip abduction, hip extension, hamstring curls 2 x 15 2.5#  Heel slides knee flexion stretch 10 x 10 s/h   Right hamstring stretch 10 x 15 s/h   (After ultrasound) Standing right IT band stretch 30 seconds      Palpation:  at insertion of right IT band.    The patient received the following direct contact modalities after being cleared for contraindications: Ultrasound:  Olga received ultrasound to manage pain and inflammation at 100 % duty cycle applied to the right iliotibial band insertion at an intensity of 1.0 to 1.5 W/cm2  for a duration of 8 minutes. Patient tolerated treatment fair with some discomfort upon application. Therapist was in attendance throughout intervention.      Objective measurements  4/13/23  Right Knee ROM/Strength  AROM Strength   Knee extension   -8 4-   Knee flexion   118 4     4/13/23  PROM right knee: extension 0 flexion 123  Right extension lag: -5    Written Home Exercises Provided: completed.       Education provided re:  Olga .received education on ice massage today.  No spiritual or educational barriers to  learning.    Assessment     Patient continues to have a painful right IT band which makes flexion and extension of right knee difficult. Patient also presents with right gait limp today due to pain.    This is a 67 y.o. female referred to outpatient physical therapy and presents with a medical diagnosis of right total knee replacement  and demonstrates limitations as described in the problem list. Pt prognosis is Good. Pt will continue to benefit from skilled outpatient physical therapy to address the deficits listed in the problem list, provide pt/family education and to maximize pt's level of independence in the home and community environment.     Goals as follows:  Short Term Goals: 3 weeks   Pt will increase right knee flexion to 120 degrees, knee extension to -5 degrees, and quad lag to -5 degrees to allow patient normal gait pattern. Flexion met.  Pt will increase right knee strength to 3+/5 to allow patient to increase functional activity and mobility. Goal Met.  Pt will tolerate 30 to 40 minutes of exercise/activity with 0/10 right knee pain. Goal Met.  Patient will be independent with home exercise program. Goal Met.    Long Term Goals: 6 weeks   1. Pt will increase right knee flexion to 130 degrees, knee extension to 0 degrees, and quad lag to 0 degrees to allow patient normal gait mechanics for community mobility.  2. Pt will increase right knee strength to 4/5 to allow patient to safely return to prior level of function.   3. Patient will ambulate in community with normal gait mechanics without assistive device to return to PLOF.     Plan     Continue with established Plan of Care towards PT goals.    Therapist: Mert Archer, PT  4/27/2023

## 2023-05-01 ENCOUNTER — OFFICE VISIT (OUTPATIENT)
Dept: NEUROLOGY | Facility: CLINIC | Age: 68
End: 2023-05-01
Payer: MEDICARE

## 2023-05-01 VITALS
BODY MASS INDEX: 29.47 KG/M2 | WEIGHT: 199 LBS | DIASTOLIC BLOOD PRESSURE: 98 MMHG | HEART RATE: 97 BPM | HEIGHT: 69 IN | SYSTOLIC BLOOD PRESSURE: 152 MMHG | OXYGEN SATURATION: 99 % | RESPIRATION RATE: 18 BRPM

## 2023-05-01 DIAGNOSIS — R41.3 MEMORY LOSS: Primary | ICD-10-CM

## 2023-05-01 PROCEDURE — 99204 PR OFFICE/OUTPT VISIT, NEW, LEVL IV, 45-59 MIN: ICD-10-PCS | Mod: S$PBB,,, | Performed by: PSYCHIATRY & NEUROLOGY

## 2023-05-01 PROCEDURE — 99204 OFFICE O/P NEW MOD 45 MIN: CPT | Mod: S$PBB,,, | Performed by: PSYCHIATRY & NEUROLOGY

## 2023-05-01 PROCEDURE — 99215 OFFICE O/P EST HI 40 MIN: CPT | Mod: PBBFAC | Performed by: PSYCHIATRY & NEUROLOGY

## 2023-05-01 NOTE — PATIENT INSTRUCTIONS
Recommend weekly vit B12 injec's x6-8 weeks per PCP office  Stress reduction and improve  Control risk factors   Improve sleep hygiene   Stop playing on Iphone at night and no eating/drinking 3 hours prior to bedtime   Consider resuming estrogen if PCP OK - she is now having irritabiitily and hot flashes  F/u 6 months

## 2023-05-01 NOTE — PROGRESS NOTES
Subjective:       Patient ID: Olga Stephenson is a 67 y.o. female     Chief Complaint:    Chief Complaint   Patient presents with    Memory Loss     Pt states she has she has reoccurring headaches        Allergies:  Jardiance [empagliflozin] and Trulicity [dulaglutide]    Current Medications:    Outpatient Encounter Medications as of 5/1/2023   Medication Sig Dispense Refill    albuterol (PROVENTIL/VENTOLIN HFA) 90 mcg/actuation inhaler USE 2 INHALATIONS TWICE A DAY 17 g 3    cephALEXin (KEFLEX) 500 MG capsule Take 1 capsule (500 mg total) by mouth 3 (three) times daily. 21 capsule 0    clopidogreL (PLAVIX) 75 mg tablet Take 1 tablet (75 mg total) by mouth once daily. 30 tablet 1    cyclobenzaprine (FLEXERIL) 10 MG tablet 1 tablet nightly as needed.      dapagliflozin (FARXIGA) 10 mg tablet Take 1 tablet (10 mg total) by mouth once daily. 90 tablet 3    ezetimibe (ZETIA) 10 mg tablet Take 1 tablet by mouth every evening.      fluticasone propionate (FLONASE) 50 mcg/actuation nasal spray 1 spray (50 mcg total) by Each Nostril route once daily. 16 g 3    glipiZIDE 5 MG TR24 Take 1 tablet (5 mg total) by mouth daily with breakfast. 90 tablet 3    HYDROcodone-acetaminophen (NORCO)  mg per tablet Take 1 tablet by mouth every 6 (six) hours as needed for Pain. 28 tablet 0    losartan (COZAAR) 50 MG tablet Take 1 tablet (50 mg total) by mouth once daily. 90 tablet 3    metFORMIN (GLUCOPHAGE) 1000 MG tablet Take 1,000 mg by mouth nightly.      metoprolol succinate (TOPROL-XL) 25 MG 24 hr tablet Take 1 tablet by mouth once daily.      multivitamin capsule Take 1 capsule by mouth once daily.      pantoprazole (PROTONIX) 40 MG tablet Take 1 tablet (40 mg total) by mouth once daily. 90 tablet 3    paroxetine (PAXIL) 10 MG tablet Take 1 tablet (10 mg total) by mouth once daily. 30 tablet 3    pregabalin (LYRICA) 75 MG capsule Take 150 mg by mouth nightly.      valACYclovir (VALTREX) 500 MG tablet Take 1 tablet (500 mg  total) by mouth once daily. 90 tablet 1    chlorpheniramine-phenylephrine 4-10 mg per tablet Take 1 tablet by mouth every 4 (four) hours as needed for Congestion. (Patient not taking: Reported on 3/13/2023) 30 tablet 2    [] ergocalciferol (ERGOCALCIFEROL) 50,000 unit Cap Take 1 capsule (50,000 Units total) by mouth every 7 days. for 6 doses 6 capsule 0    [] tiZANidine (ZANAFLEX) 4 MG tablet Take 1 tablet (4 mg total) by mouth every 6 (six) hours as needed. 30 tablet 1    [DISCONTINUED] hydrocortisone 2.5 % cream        No facility-administered encounter medications on file as of 2023.       History of Present Illness  68 yo BF w/ few years hx of vague memory issues, forgetfulness- symptoms began roughly after acute death of  about 4yrs ago and correlating w/ last 3 yrs of COVID pandemic, knee replacement and chronic pain/ MSK issues, hormonal problems, low vit B12 = 223 ( I usually treat under 600 w/ weekly injec's), also w/ new boyfriend who is source of stress, also poor sleep which exacerbates all aobve and causes mental fogginess, her seperated son who has also been living with her for last couple of years - he is also asking for money and for her to cosign car loans   She handles all ADL's well, drives indep w/o getting lost, handles all finances and legal w/o difficutly, knows general orientation questions and holds good conversation w/ excellent humor  NO overt elements of dementia          Past Medical History:   Diagnosis Date    Acute superficial gastritis without hemorrhage 2022    Adenomatous polyp of cecum 06/15/2022    Adenomatous polyp of transverse colon 06/15/2022    Anticoagulant long-term use     Arthritis     Asthma     Coronary artery disease     Diabetes mellitus, type 2 2014    Diverticula, colon 06/15/2022    DVT (deep venous thrombosis)     Epigastric pain 2022    Heart attack     History of colon polyps 06/15/2022    Hyperlipidemia     Hypertension  "2014    Polyp of hepatic flexure of colon 06/15/2022    Screening for colon cancer 06/15/2022    Thyroid disease        Past Surgical History:   Procedure Laterality Date    ARTHROPLASTY, KNEE, TOTAL, USING COMPUTER-ASSISTED NAVIGATION Right 2023    Procedure: ARTHROPLASTY, KNEE, TOTAL, USING COMPUTER-ASSISTED NAVIGATION;  Surgeon: Erick Tolliver MD;  Location: [facility];  Service: Orthopedics;  Laterality: Right;    BILATERAL OOPHORECTOMY Bilateral     35 years ago,  1-2 years after hyst     SECTION      CHOLECYSTECTOMY      COLONOSCOPY  2017    repeat in 3 years    CORONARY ARTERY BYPASS GRAFT  2011    ESOPHAGOGASTRODUODENOSCOPY  03/10/2021    HYSTERECTOMY         Social History  Ms. Stephenson  reports that she has quit smoking. She has been exposed to tobacco smoke. She has never used smokeless tobacco. She reports that she does not currently use alcohol. She reports that she does not use drugs.    Family History  Ms.'s Stephenson family history includes Dementia in her mother; Diabetes in her brother and mother; Heart disease in her mother; No Known Problems in her father; Thyroid disease in her mother.    Review of Systems  Review of Systems   Psychiatric/Behavioral:  Positive for depression and memory loss.    All other systems reviewed and are negative.   Objective:   BP (!) 152/98 (BP Location: Left arm, Patient Position: Sitting, BP Method: Large (Manual))   Pulse 97   Resp 18   Ht 5' 9" (1.753 m)   Wt 90.3 kg (199 lb)   SpO2 99%   BMI 29.39 kg/m²    NEUROLOGICAL EXAMINATION:     MENTAL STATUS   Oriented to person, place, and time.   Attention: decreased. Concentration: decreased.   Speech: speech is normal   Level of consciousness: alert  Knowledge: good.        Mild depression from grief /bereavement      CRANIAL NERVES   Cranial nerves II through XII intact.     MOTOR EXAM     Strength   Strength 5/5 throughout.     GAIT AND COORDINATION        Antalgic gait      Physical " Exam  Vitals reviewed.   Constitutional:       Appearance: She is normal weight.   Neurological:      General: No focal deficit present.      Mental Status: She is alert and oriented to person, place, and time. Mental status is at baseline.      Cranial Nerves: Cranial nerves 2-12 are intact.      Motor: Motor strength is normal.   Psychiatric:         Speech: Speech normal.        Assessment:     Memory loss  Comments:  no overt dementia-sx  purely secondary to mult life stressors   Orders:  -     Ambulatory referral/consult to Neurology         Primary Diagnosis and ICD10  Memory loss [R41.3]    Plan:     Patient Instructions   Recommend weekly vit B12 injec's x6-8 weeks per PCP office  Stress reduction and improve  Control risk factors   Improve sleep hygiene   Stop playing on Iphone at night and no eating/drinking 3 hours prior to bedtime   Consider resuming estrogen if PCP OK - she is now having irritabiitily and hot flashes  F/u 6 months       There are no discontinued medications.    Requested Prescriptions      No prescriptions requested or ordered in this encounter

## 2023-05-02 ENCOUNTER — CLINICAL SUPPORT (OUTPATIENT)
Dept: REHABILITATION | Facility: HOSPITAL | Age: 68
End: 2023-05-02
Payer: MEDICARE

## 2023-05-02 DIAGNOSIS — Z96.651 STATUS POST TOTAL RIGHT KNEE REPLACEMENT: ICD-10-CM

## 2023-05-02 DIAGNOSIS — M25.661 DECREASED RANGE OF MOTION (ROM) OF RIGHT KNEE: Primary | ICD-10-CM

## 2023-05-02 PROCEDURE — 97110 THERAPEUTIC EXERCISES: CPT

## 2023-05-02 PROCEDURE — 97035 APP MDLTY 1+ULTRASOUND EA 15: CPT

## 2023-05-02 NOTE — PLAN OF CARE
Outpatient Therapy Discharge Summary     Name: Olga Stephenson  Clinic Number: [id number]  Diagnosis: Right total knee replacement   Encounter Diagnoses   Name Primary?    Decreased range of motion (ROM) of right knee Yes    Status post total right knee replacement      Physician: Juan Lama DO  Evaluation Date: 2/28/23  Date of Last visit: 5/2/23  Total Visits Received: 18    Objective measurements  5/2/23  Right Knee ROM/Strength  AROM Strength   Knee extension   -8 4   Knee flexion   120 5     5/2/23  PROM right knee: extension 0 flexion 125  Right extension lag: -5      Assessment    Olga Stephenson has completed therapy prescription and was able to progress towards most goals.    Goals:  Short Term Goals: 3 weeks   Pt will increase right knee flexion to 120 degrees, knee extension to -5 degrees, and quad lag to -5 degrees to allow patient normal gait pattern. Flexion met.  Pt will increase right knee strength to 3+/5 to allow patient to increase functional activity and mobility. Goal Met.  Pt will tolerate 30 to 40 minutes of exercise/activity with 0/10 right knee pain. Goal Met.  Patient will be independent with home exercise program. Goal Met.    Long Term Goals: 6 weeks   1. Pt will increase right knee flexion to 130 degrees, knee extension to 0 degrees, and quad lag to 0 degrees to allow patient normal gait mechanics for community mobility.  2. Pt will increase right knee strength to 4/5 to allow patient to safely return to prior level of function. Goal Met.  3. Patient will ambulate in community with normal gait mechanics without assistive device to return to PLOF. Goal Met.    Discharge reason: Patient has completed the physician's prescription and Patient has reached the maximum rehab potential for the present time    Plan   This patient is discharged from Physical Therapy.      Mert Archer, PT

## 2023-05-02 NOTE — PROGRESS NOTES
Physical Therapy Daily Note     Name: Olga Stephenson  Clinic Number: [id number]  Diagnosis: Right total knee replacement   Encounter Diagnoses   Name Primary?    Decreased range of motion (ROM) of right knee Yes    Status post total right knee replacement      Physician: Juan Lama, DO  Precautions: standard  Visit #: 18 of 18  PTA Visit #: 1  Time In: 1113  Time Out: 1145    Subjective     Pt reports: My knee hurts a little but my back hurts more.  Pain Scale: Olga rates pain on a scale of 0-10 to be  6  for low back and 2/10 right IT band prior to therapy.    Objective     Olga received individual therapeutic exercises to develop strength and ROM for 20 minutes including:  Scifit stepper 6 minutes level 3  Seated Hamstring curls 2 x 15 blue theraband  Seated long arc quad 2 x 15 2.5#  Quad Sets x 2 min  R SLR, hip abduction, hip extension, hamstring curls 2 x 15 2.5#  Heel slides knee flexion stretch 10 x 10 s/h   Right hamstring stretch 10 x 15 s/h       Palpation: mild tenderness at insertion of right IT band.    The patient received the following direct contact modalities after being cleared for contraindications: Ultrasound:  Olga received ultrasound to manage pain and inflammation at 100 % duty cycle applied to the right iliotibial band insertion at an intensity of 1.0 W/cm2  for a duration of 8 minutes. Patient tolerated treatment fair with some discomfort upon application. Therapist was in attendance throughout intervention.      Objective measurements  5/2/23  Right Knee ROM/Strength  AROM Strength   Knee extension   -8 4   Knee flexion   120 5     5/2/23  PROM right knee: extension 0 flexion 125  Right extension lag: -5    Written Home Exercises Provided: completed.       Education provided re:  Olga did not receive education today.  No spiritual or educational barriers to learning.    Assessment     Patient has completed therapy prescription  and was able to progress towards most goals.    This is a 67 y.o. female referred to outpatient physical therapy and presents with a medical diagnosis of right total knee replacement  and demonstrates limitations as described in the problem list. Pt prognosis is Good. Pt will continue to benefit from skilled outpatient physical therapy to address the deficits listed in the problem list, provide pt/family education and to maximize pt's level of independence in the home and community environment.     Goals as follows:  Short Term Goals: 3 weeks   Pt will increase right knee flexion to 120 degrees, knee extension to -5 degrees, and quad lag to -5 degrees to allow patient normal gait pattern. Flexion met.  Pt will increase right knee strength to 3+/5 to allow patient to increase functional activity and mobility. Goal Met.  Pt will tolerate 30 to 40 minutes of exercise/activity with 0/10 right knee pain. Goal Met.  Patient will be independent with home exercise program. Goal Met.    Long Term Goals: 6 weeks   1. Pt will increase right knee flexion to 130 degrees, knee extension to 0 degrees, and quad lag to 0 degrees to allow patient normal gait mechanics for community mobility.  2. Pt will increase right knee strength to 4/5 to allow patient to safely return to prior level of function. Goal Met.  3. Patient will ambulate in community with normal gait mechanics without assistive device to return to PLOF. Goal Met.     Plan     Continue with established Plan of Care towards PT goals.    Therapist: Mert Archer, PT  5/2/2023                                                                                      Physical Therapy Daily Note     Name: Olga Stephenson  [facility] Number: [id number]  Diagnosis: Right total knee replacement   Encounter Diagnoses   Name Primary?    Decreased range of motion (ROM) of right knee Yes    Status post total right knee replacement      Physician: Juan Lama, DO  Precautions:  standard  Visit #: 17 of 18  PTA Visit #: 1  Time In: 1400  Time Out: 1440    Subjective     Pt reports: My knee and back hurt today.  Pain Scale: Olga rates pain on a scale of 0-10 to be  8  for low back and right IT band prior to therapy.    Objective     Olga received individual therapeutic exercises to develop strength and ROM for 30 minutes including:  Scifit stepper 6 minutes level 4  Seated Hamstring curls 2 x 15 blue theraband  Seated long arc quad 2 x 15 2.5#  Quad Sets x 2 min  R SLR, hip abduction, hip extension, hamstring curls 2 x 15 2.5#  Heel slides knee flexion stretch 10 x 10 s/h   Right hamstring stretch 10 x 15 s/h   (After ultrasound) Standing right IT band stretch 30 seconds      Palpation:  at insertion of right IT band.    The patient received the following direct contact modalities after being cleared for contraindications: Ultrasound:  Olga received ultrasound to manage pain and inflammation at 100 % duty cycle applied to the right iliotibial band insertion at an intensity of 1.0 to 1.5 W/cm2  for a duration of 8 minutes. Patient tolerated treatment fair with some discomfort upon application. Therapist was in attendance throughout intervention.      Objective measurements  4/13/23  Right Knee ROM/Strength  AROM Strength   Knee extension   -8 4-   Knee flexion   118 4     4/13/23  PROM right knee: extension 0 flexion 123  Right extension lag: -5    Written Home Exercises Provided: completed.       Education provided re:  Olga .received education on ice massage today.  No spiritual or educational barriers to learning.    Assessment     Patient continues to have a painful right IT band which makes flexion and extension of right knee difficult. Patient also presents with right gait limp today due to pain.    This is a 67 y.o. female referred to outpatient physical therapy and presents with a medical diagnosis of right total knee replacement  and demonstrates limitations as described  in the problem list. Pt prognosis is Good. Pt will continue to benefit from skilled outpatient physical therapy to address the deficits listed in the problem list, provide pt/family education and to maximize pt's level of independence in the home and community environment.     Goals as follows:  Short Term Goals: 3 weeks   Pt will increase right knee flexion to 120 degrees, knee extension to -5 degrees, and quad lag to -5 degrees to allow patient normal gait pattern. Flexion met.  Pt will increase right knee strength to 3+/5 to allow patient to increase functional activity and mobility. Goal Met.  Pt will tolerate 30 to 40 minutes of exercise/activity with 0/10 right knee pain. Goal Met.  Patient will be independent with home exercise program. Goal Met.    Long Term Goals: 6 weeks   1. Pt will increase right knee flexion to 130 degrees, knee extension to 0 degrees, and quad lag to 0 degrees to allow patient normal gait mechanics for community mobility.  2. Pt will increase right knee strength to 4/5 to allow patient to safely return to prior level of function.   3. Patient will ambulate in community with normal gait mechanics without assistive device to return to PLOF.     Plan     Continue with established Plan of Care towards PT goals.    Therapist: Mert Archer, PT  5/2/2023

## 2023-05-04 ENCOUNTER — HOSPITAL ENCOUNTER (OUTPATIENT)
Dept: RADIOLOGY | Facility: HOSPITAL | Age: 68
Discharge: HOME | End: 2023-05-04
Attending: INTERNAL MEDICINE
Payer: MEDICARE

## 2023-05-04 DIAGNOSIS — R41.3 OTHER AMNESIA: ICD-10-CM

## 2023-05-04 PROCEDURE — 70450 CT HEAD WITHOUT CONTRAST: ICD-10-PCS | Mod: 26,,, | Performed by: RADIOLOGY

## 2023-05-04 PROCEDURE — 70450 CT HEAD/BRAIN W/O DYE: CPT | Mod: TC

## 2023-05-04 PROCEDURE — 70450 CT HEAD/BRAIN W/O DYE: CPT | Mod: 26,,, | Performed by: RADIOLOGY

## 2023-05-05 ENCOUNTER — TELEPHONE (OUTPATIENT)
Dept: FAMILY MEDICINE | Facility: CLINIC | Age: 68
End: 2023-05-05
Payer: MEDICARE

## 2023-05-05 NOTE — TELEPHONE ENCOUNTER
----- Message from Lv Cartagena MD sent at 5/4/2023  3:02 PM CDT -----  Need to see in  1 week please  abnl results     Pt has an appt for 5/9/23

## 2023-05-09 ENCOUNTER — OFFICE VISIT (OUTPATIENT)
Dept: FAMILY MEDICINE | Facility: CLINIC | Age: 68
End: 2023-05-09
Payer: MEDICARE

## 2023-05-09 ENCOUNTER — APPOINTMENT (OUTPATIENT)
Dept: RADIOLOGY | Facility: CLINIC | Age: 68
End: 2023-05-09
Attending: INTERNAL MEDICINE
Payer: MEDICARE

## 2023-05-09 VITALS
HEART RATE: 86 BPM | TEMPERATURE: 97 F | WEIGHT: 202 LBS | BODY MASS INDEX: 29.92 KG/M2 | SYSTOLIC BLOOD PRESSURE: 135 MMHG | HEIGHT: 69 IN | RESPIRATION RATE: 17 BRPM | DIASTOLIC BLOOD PRESSURE: 82 MMHG | OXYGEN SATURATION: 99 %

## 2023-05-09 DIAGNOSIS — R22.41 LOCALIZED SWELLING OF RIGHT LOWER LEG: ICD-10-CM

## 2023-05-09 DIAGNOSIS — Z78.0 POSTMENOPAUSAL: Primary | ICD-10-CM

## 2023-05-09 DIAGNOSIS — Z13.1 SCREENING FOR DIABETES MELLITUS (DM): ICD-10-CM

## 2023-05-09 DIAGNOSIS — M17.11 OSTEOARTHRITIS OF RIGHT KNEE, UNSPECIFIED OSTEOARTHRITIS TYPE: ICD-10-CM

## 2023-05-09 DIAGNOSIS — E11.9 DM (DIABETES MELLITUS): ICD-10-CM

## 2023-05-09 DIAGNOSIS — Z71.89 COMPLEX CARE COORDINATION: ICD-10-CM

## 2023-05-09 LAB
EST. AVERAGE GLUCOSE BLD GHB EST-MCNC: 134 MG/DL
HBA1C MFR BLD HPLC: 6.6 % (ref 4.5–6.6)

## 2023-05-09 PROCEDURE — 83036 HEMOGLOBIN GLYCOSYLATED A1C: CPT | Mod: ,,, | Performed by: CLINICAL MEDICAL LABORATORY

## 2023-05-09 PROCEDURE — 99214 OFFICE O/P EST MOD 30 MIN: CPT | Mod: ,,, | Performed by: INTERNAL MEDICINE

## 2023-05-09 PROCEDURE — 73560 X-RAY EXAM OF KNEE 1 OR 2: CPT | Mod: TC,RHCUB,RT | Performed by: INTERNAL MEDICINE

## 2023-05-09 PROCEDURE — 83036 HEMOGLOBIN A1C: ICD-10-PCS | Mod: ,,, | Performed by: CLINICAL MEDICAL LABORATORY

## 2023-05-09 PROCEDURE — 99214 PR OFFICE/OUTPT VISIT, EST, LEVL IV, 30-39 MIN: ICD-10-PCS | Mod: ,,, | Performed by: INTERNAL MEDICINE

## 2023-05-09 RX ORDER — ASPIRIN 81 MG/1
81 TABLET ORAL DAILY
Qty: 90 TABLET | Refills: 1 | Status: SHIPPED | OUTPATIENT
Start: 2023-05-09 | End: 2023-08-05 | Stop reason: CLARIF

## 2023-05-09 RX ORDER — ESTRADIOL 0.1 MG/D
1 FILM, EXTENDED RELEASE TRANSDERMAL
Qty: 15 PATCH | Refills: 0 | Status: SHIPPED | OUTPATIENT
Start: 2023-05-11 | End: 2023-08-17 | Stop reason: SDUPTHER

## 2023-05-09 NOTE — PROGRESS NOTES
Care gaps discussed with patient. Patient states she is unsure if she has had her tetanus vaccine. Patient refused dexa scan. Patient is due for eye exam, patient states she will schedule her eye exam. Patient is up to date on her shingles vaccine. Patient is due for A1C, will order today. Patient has a mammogram scheduled.

## 2023-05-09 NOTE — PROGRESS NOTES
Subjective:       Patient ID: Olga Stephenson is a 68 y.o. female.    Chief Complaint: Knee Pain (Right )    Knee Pain     .  Patient complains of moderate pain in her right knee also complains of pain behind her knee and complains of pain in her right inner thigh area she also complains of severe postmenopausal symptoms.  Patient is asking for refill out estradiol.  I told her that I will refill the estradiol however patient informed that we need to try to wean the estradiol due to a slight increased risk of breast cancer.  Patient stated that she does not care she gets breast cancer she wants to estradiol because it works.  I reiterated again to the patient that we are going to resume the estradiol temporarily and we do want to wean it and the patient will follow-up with gyn regarding her postmenopausal symptoms.  CT scan of her brain shows chronic microvascular ischemic changes will put on low-dose aspirin.  Because of the knee pain on the x-ray her right knee  Because of the leg pain and the tenderness in the right calf and tenderness behind the right knee and the right inner thigh will order venous duplex study.    Screening test hemoglobin A1c today.    Current Medications:    Current Outpatient Medications:     albuterol (PROVENTIL/VENTOLIN HFA) 90 mcg/actuation inhaler, USE 2 INHALATIONS TWICE A DAY, Disp: 17 g, Rfl: 3    clopidogreL (PLAVIX) 75 mg tablet, Take 1 tablet (75 mg total) by mouth once daily., Disp: 30 tablet, Rfl: 1    cyclobenzaprine (FLEXERIL) 10 MG tablet, 1 tablet nightly as needed., Disp: , Rfl:     dapagliflozin (FARXIGA) 10 mg tablet, Take 1 tablet (10 mg total) by mouth once daily., Disp: 90 tablet, Rfl: 3    ezetimibe (ZETIA) 10 mg tablet, Take 1 tablet by mouth every evening., Disp: , Rfl:     fluticasone propionate (FLONASE) 50 mcg/actuation nasal spray, 1 spray (50 mcg total) by Each Nostril route once daily., Disp: 16 g, Rfl: 3    glipiZIDE 5 MG TR24, Take 1 tablet (5 mg total) by  mouth daily with breakfast., Disp: 90 tablet, Rfl: 3    HYDROcodone-acetaminophen (NORCO)  mg per tablet, Take 1 tablet by mouth every 6 (six) hours as needed for Pain., Disp: 28 tablet, Rfl: 0    losartan (COZAAR) 50 MG tablet, Take 1 tablet (50 mg total) by mouth once daily., Disp: 90 tablet, Rfl: 3    metFORMIN (GLUCOPHAGE) 1000 MG tablet, Take 1,000 mg by mouth nightly., Disp: , Rfl:     metoprolol succinate (TOPROL-XL) 25 MG 24 hr tablet, Take 1 tablet by mouth once daily., Disp: , Rfl:     multivitamin capsule, Take 1 capsule by mouth once daily., Disp: , Rfl:     pantoprazole (PROTONIX) 40 MG tablet, Take 1 tablet (40 mg total) by mouth once daily., Disp: 90 tablet, Rfl: 3    paroxetine (PAXIL) 10 MG tablet, Take 1 tablet (10 mg total) by mouth once daily., Disp: 30 tablet, Rfl: 3    pregabalin (LYRICA) 75 MG capsule, Take 150 mg by mouth nightly., Disp: , Rfl:     valACYclovir (VALTREX) 500 MG tablet, Take 1 tablet (500 mg total) by mouth once daily., Disp: 90 tablet, Rfl: 1    aspirin (ECOTRIN) 81 MG EC tablet, Take 1 tablet (81 mg total) by mouth once daily., Disp: 90 tablet, Rfl: 1    cephALEXin (KEFLEX) 500 MG capsule, Take 1 capsule (500 mg total) by mouth 3 (three) times daily. (Patient not taking: Reported on 5/9/2023), Disp: 21 capsule, Rfl: 0    chlorpheniramine-phenylephrine 4-10 mg per tablet, Take 1 tablet by mouth every 4 (four) hours as needed for Congestion. (Patient not taking: Reported on 3/13/2023), Disp: 30 tablet, Rfl: 2    [START ON 5/11/2023] estradioL (VIVELLE-DOT) 0.1 mg/24 hr PTSW, Place 1 patch onto the skin twice a week., Disp: 15 patch, Rfl: 0           Review of Systems   Constitutional:  Positive for diaphoresis and fatigue. Negative for appetite change and fever.   Respiratory:  Negative for shortness of breath.    Cardiovascular:  Negative for chest pain.   Gastrointestinal:  Negative for abdominal pain and constipation.   Endocrine: Negative for polydipsia, polyphagia  "and polyuria.   Genitourinary:  Negative for difficulty urinating, frequency and hot flashes.   Allergic/Immunologic: Negative for environmental allergies.   Neurological:  Negative for dizziness and light-headedness.   Psychiatric/Behavioral:  Negative for agitation.               Vitals:    05/09/23 0954   BP: 135/82   BP Location: Left arm   Patient Position: Sitting   BP Method: Large (Automatic)   Pulse: 86   Resp: 17   Temp: 97.2 °F (36.2 °C)   TempSrc: Temporal   SpO2: 99%   Weight: 91.6 kg (202 lb)   Height: 5' 9" (1.753 m)        Physical Exam  Vitals and nursing note reviewed.   Constitutional:       Appearance: Normal appearance.   Cardiovascular:      Rate and Rhythm: Normal rate and regular rhythm.      Pulses: Normal pulses.      Heart sounds: Normal heart sounds.   Pulmonary:      Effort: Pulmonary effort is normal.      Breath sounds: Normal breath sounds.   Abdominal:      General: Abdomen is flat. Bowel sounds are normal.      Palpations: Abdomen is soft.   Musculoskeletal:         General: Normal range of motion.   Skin:     General: Skin is warm and dry.   Neurological:      General: No focal deficit present.      Mental Status: She is alert and oriented to person, place, and time. Mental status is at baseline.         Last Labs:     No visits with results within 1 Month(s) from this visit.   Latest known visit with results is:   Office Visit on 03/28/2023   Component Date Value    Vitamin D 25-Hydroxy, Bl* 03/28/2023 26.2     Vitamin B12 03/28/2023 223     TSH 03/28/2023 0.441        Last Imaging:  X-Ray Knee 1 or 2 View Right  Narrative: EXAMINATION:  XR KNEE 1 OR 2 VIEW RIGHT    CLINICAL HISTORY:  Unilateral primary osteoarthritis, right knee    COMPARISON:  Right knee x-ray April 10, 2023    TECHNIQUE:  Frontal and lateral views of the right knee.    FINDINGS:  Total right knee arthroplasty.  No evidence of acute fracture or dislocation or evidence of orthopedic hardware failure.  Impression: " As above.    Point of Service: [facility]    Electronically signed by: Lj Gonzalez  Date:    05/09/2023  Time:    11:07         **Labs and x-rays personally reviewed by me    ** reviewed      Objective:        Assessment:       1. Postmenopausal  Ambulatory referral/consult to Gynecology      2. Osteoarthritis of right knee, unspecified osteoarthritis type  X-Ray Knee 1 or 2 View Right    US Lower Extremity Veins Right      3. Screening for diabetes mellitus (DM)  Hemoglobin A1C    Hemoglobin A1C      4. DM (diabetes mellitus)  Hemoglobin A1C    Hemoglobin A1C      5. Localized swelling of right lower leg  US Lower Extremity Veins Right           Plan:         [unfilled]

## 2023-05-10 ENCOUNTER — TELEPHONE (OUTPATIENT)
Dept: FAMILY MEDICINE | Facility: CLINIC | Age: 68
End: 2023-05-10
Payer: MEDICARE

## 2023-05-10 NOTE — TELEPHONE ENCOUNTER
----- Message from Lv Cartagena MD sent at 5/10/2023 11:13 AM CDT -----  Need to see in  1 week please  abnl results     1312 Call made to pt regarding above message; no answer; left voicemail for pt to return call to clinic

## 2023-05-11 ENCOUNTER — TELEPHONE (OUTPATIENT)
Dept: FAMILY MEDICINE | Facility: CLINIC | Age: 68
End: 2023-05-11
Payer: MEDICARE

## 2023-05-11 NOTE — TELEPHONE ENCOUNTER
1400 Pt returned call to clinic regarding the need for a follow up appt; pt scheduled to come in on 5/22/23;

## 2023-05-15 PROBLEM — K92.2 GIB (GASTROINTESTINAL BLEEDING): Status: RESOLVED | Noted: 2022-06-24 | Resolved: 2023-05-15

## 2023-05-16 DIAGNOSIS — E11.69 TYPE 2 DIABETES MELLITUS WITH OTHER SPECIFIED COMPLICATION, WITHOUT LONG-TERM CURRENT USE OF INSULIN: ICD-10-CM

## 2023-05-22 ENCOUNTER — OFFICE VISIT (OUTPATIENT)
Dept: FAMILY MEDICINE | Facility: CLINIC | Age: 68
End: 2023-05-22
Payer: MEDICARE

## 2023-05-22 VITALS
BODY MASS INDEX: 29.83 KG/M2 | HEIGHT: 69 IN | RESPIRATION RATE: 18 BRPM | SYSTOLIC BLOOD PRESSURE: 126 MMHG | WEIGHT: 201.38 LBS | HEART RATE: 82 BPM | OXYGEN SATURATION: 98 % | TEMPERATURE: 98 F | DIASTOLIC BLOOD PRESSURE: 75 MMHG

## 2023-05-22 DIAGNOSIS — E11.9 TYPE 2 DIABETES MELLITUS WITHOUT COMPLICATION, WITHOUT LONG-TERM CURRENT USE OF INSULIN: ICD-10-CM

## 2023-05-22 DIAGNOSIS — N76.0 ACUTE VAGINITIS: ICD-10-CM

## 2023-05-22 DIAGNOSIS — Z78.0 POSTMENOPAUSAL: Primary | ICD-10-CM

## 2023-05-22 PROCEDURE — 99214 PR OFFICE/OUTPT VISIT, EST, LEVL IV, 30-39 MIN: ICD-10-PCS | Mod: ,,, | Performed by: INTERNAL MEDICINE

## 2023-05-22 PROCEDURE — 99214 OFFICE O/P EST MOD 30 MIN: CPT | Mod: ,,, | Performed by: INTERNAL MEDICINE

## 2023-05-22 RX ORDER — FLUCONAZOLE 200 MG/1
200 TABLET ORAL DAILY
Qty: 3 TABLET | Refills: 1 | Status: SHIPPED | OUTPATIENT
Start: 2023-05-22 | End: 2023-08-05 | Stop reason: CLARIF

## 2023-05-23 PROBLEM — N76.0 ACUTE VAGINITIS: Status: ACTIVE | Noted: 2023-05-23

## 2023-05-23 RX ORDER — GLIPIZIDE 5 MG/1
5 TABLET, FILM COATED, EXTENDED RELEASE ORAL
Qty: 90 TABLET | Refills: 1 | Status: SHIPPED | OUTPATIENT
Start: 2023-05-23 | End: 2023-08-09 | Stop reason: SDUPTHER

## 2023-05-23 NOTE — PROGRESS NOTES
Subjective:       Patient ID: Olga Stephenson is a 68 y.o. female.    Chief Complaint: Results (Abnl lab results )    HPI  .  68-year-old female presents with history of diabetes, also has vaginitis complaint.  Requesting Diflucan.  Today she is doing relatively well otherwise.  The plan today Diflucan 200 milligrams 1 p.o. q.day, health maintenance issues order a DEXA scan and a tetanus toxoid vaccination.    Current Medications:    Current Outpatient Medications:     albuterol (PROVENTIL/VENTOLIN HFA) 90 mcg/actuation inhaler, USE 2 INHALATIONS TWICE A DAY, Disp: 17 g, Rfl: 3    aspirin (ECOTRIN) 81 MG EC tablet, Take 1 tablet (81 mg total) by mouth once daily., Disp: 90 tablet, Rfl: 1    blood sugar diagnostic (FREESTYLE LITE STRIPS) Strp, USE 1 STRIP DAILY TO MONITOR GLUCOSE, Disp: 100 strip, Rfl: 3    clopidogreL (PLAVIX) 75 mg tablet, Take 1 tablet (75 mg total) by mouth once daily., Disp: 30 tablet, Rfl: 1    cyclobenzaprine (FLEXERIL) 10 MG tablet, 1 tablet nightly as needed., Disp: , Rfl:     dapagliflozin (FARXIGA) 10 mg tablet, Take 1 tablet (10 mg total) by mouth once daily., Disp: 90 tablet, Rfl: 3    estradioL (VIVELLE-DOT) 0.1 mg/24 hr PTSW, Place 1 patch onto the skin twice a week., Disp: 15 patch, Rfl: 0    ezetimibe (ZETIA) 10 mg tablet, Take 1 tablet by mouth every evening., Disp: , Rfl:     fluticasone propionate (FLONASE) 50 mcg/actuation nasal spray, 1 spray (50 mcg total) by Each Nostril route once daily., Disp: 16 g, Rfl: 3    glipiZIDE 5 MG TR24, Take 1 tablet (5 mg total) by mouth daily with breakfast., Disp: 90 tablet, Rfl: 3    HYDROcodone-acetaminophen (NORCO)  mg per tablet, Take 1 tablet by mouth every 6 (six) hours as needed for Pain., Disp: 28 tablet, Rfl: 0    losartan (COZAAR) 50 MG tablet, Take 1 tablet (50 mg total) by mouth once daily., Disp: 90 tablet, Rfl: 3    metFORMIN (GLUCOPHAGE) 1000 MG tablet, Take 1,000 mg by mouth nightly., Disp: , Rfl:     metoprolol succinate  "(TOPROL-XL) 25 MG 24 hr tablet, Take 1 tablet by mouth once daily., Disp: , Rfl:     multivitamin capsule, Take 1 capsule by mouth once daily., Disp: , Rfl:     pantoprazole (PROTONIX) 40 MG tablet, Take 1 tablet (40 mg total) by mouth once daily., Disp: 90 tablet, Rfl: 3    paroxetine (PAXIL) 10 MG tablet, Take 1 tablet (10 mg total) by mouth once daily., Disp: 30 tablet, Rfl: 3    pregabalin (LYRICA) 75 MG capsule, Take 150 mg by mouth nightly., Disp: , Rfl:     valACYclovir (VALTREX) 500 MG tablet, Take 1 tablet (500 mg total) by mouth once daily., Disp: 90 tablet, Rfl: 1    cephALEXin (KEFLEX) 500 MG capsule, Take 1 capsule (500 mg total) by mouth 3 (three) times daily. (Patient not taking: Reported on 5/9/2023), Disp: 21 capsule, Rfl: 0    chlorpheniramine-phenylephrine 4-10 mg per tablet, Take 1 tablet by mouth every 4 (four) hours as needed for Congestion. (Patient not taking: Reported on 3/13/2023), Disp: 30 tablet, Rfl: 2    fluconazole (DIFLUCAN) 200 MG Tab, Take 1 tablet (200 mg total) by mouth once daily., Disp: 3 tablet, Rfl: 1           Review of Systems   Constitutional:  Negative for appetite change, fatigue and fever.   Respiratory:  Negative for shortness of breath.    Cardiovascular:  Negative for chest pain.   Gastrointestinal:  Negative for abdominal pain and constipation.   Endocrine: Negative for polydipsia, polyphagia and polyuria.   Genitourinary:  Positive for vaginal discharge. Negative for difficulty urinating, frequency and hot flashes.   Allergic/Immunologic: Negative for environmental allergies.   Neurological:  Negative for dizziness and light-headedness.   Psychiatric/Behavioral:  Negative for agitation.               Vitals:    05/22/23 0939   BP: 126/75   BP Location: Right arm   Patient Position: Sitting   BP Method: Large (Automatic)   Pulse: 82   Resp: 18   Temp: 97.6 °F (36.4 °C)   TempSrc: Temporal   SpO2: 98%   Weight: 91.4 kg (201 lb 6.4 oz)   Height: 5' 9" (1.753 m)    "     Physical Exam  Vitals and nursing note reviewed.   Constitutional:       Appearance: Normal appearance.   Cardiovascular:      Rate and Rhythm: Normal rate and regular rhythm.      Pulses: Normal pulses.      Heart sounds: Normal heart sounds.   Pulmonary:      Effort: Pulmonary effort is normal.      Breath sounds: Normal breath sounds.   Abdominal:      General: Abdomen is flat. Bowel sounds are normal.      Palpations: Abdomen is soft.   Musculoskeletal:         General: Normal range of motion.   Skin:     General: Skin is warm and dry.   Neurological:      General: No focal deficit present.      Mental Status: She is alert and oriented to person, place, and time. Mental status is at baseline.         Last Labs:     Office Visit on 05/09/2023   Component Date Value    Hemoglobin A1C 05/09/2023 6.6     Estimated Average Glucose 05/09/2023 134        Last Imaging:  X-Ray Knee 1 or 2 View Right  Narrative: EXAMINATION:  XR KNEE 1 OR 2 VIEW RIGHT    CLINICAL HISTORY:  Unilateral primary osteoarthritis, right knee    COMPARISON:  Right knee x-ray April 10, 2023    TECHNIQUE:  Frontal and lateral views of the right knee.    FINDINGS:  Total right knee arthroplasty.  No evidence of acute fracture or dislocation or evidence of orthopedic hardware failure.  Impression: As above.    Point of Service: [facility]    Electronically signed by: Lj Gonzalez  Date:    05/09/2023  Time:    11:07         **Labs and x-rays personally reviewed by me    ** reviewed      Objective:        Assessment:       1. Postmenopausal  DXA Bone Density Axial Skeleton 1 or more sites      2. Acute vaginitis        3. Type 2 diabetes mellitus without complication, without long-term current use of insulin             Plan:         [unfilled]

## 2023-07-05 DIAGNOSIS — Z96.651 STATUS POST TOTAL KNEE REPLACEMENT, RIGHT: Primary | ICD-10-CM

## 2023-07-10 ENCOUNTER — OFFICE VISIT (OUTPATIENT)
Dept: ORTHOPEDICS | Facility: CLINIC | Age: 68
End: 2023-07-10
Payer: MEDICARE

## 2023-07-10 ENCOUNTER — HOSPITAL ENCOUNTER (OUTPATIENT)
Dept: RADIOLOGY | Facility: HOSPITAL | Age: 68
Discharge: HOME | End: 2023-07-10
Attending: ORTHOPAEDIC SURGERY
Payer: MEDICARE

## 2023-07-10 VITALS — HEIGHT: 69 IN | BODY MASS INDEX: 29.77 KG/M2 | WEIGHT: 201 LBS

## 2023-07-10 DIAGNOSIS — Z96.651 STATUS POST TOTAL KNEE REPLACEMENT, RIGHT: Primary | ICD-10-CM

## 2023-07-10 DIAGNOSIS — Z96.651 STATUS POST TOTAL KNEE REPLACEMENT, RIGHT: ICD-10-CM

## 2023-07-10 PROCEDURE — 73560 X-RAY EXAM OF KNEE 1 OR 2: CPT | Mod: TC,RT

## 2023-07-10 PROCEDURE — 99213 PR OFFICE/OUTPT VISIT, EST, LEVL III, 20-29 MIN: ICD-10-PCS | Mod: S$PBB,25,, | Performed by: ORTHOPAEDIC SURGERY

## 2023-07-10 PROCEDURE — 20600 DRAIN/INJ JOINT/BURSA W/O US: CPT | Mod: 50,PBBFAC | Performed by: ORTHOPAEDIC SURGERY

## 2023-07-10 PROCEDURE — 20600 SMALL JOINT ASPIRATION/INJECTION: R THUMB CMC, L THUMB CMC: ICD-10-PCS | Mod: 50,S$PBB,, | Performed by: ORTHOPAEDIC SURGERY

## 2023-07-10 PROCEDURE — 20605 DRAIN/INJ JOINT/BURSA W/O US: CPT | Mod: PBBFAC | Performed by: ORTHOPAEDIC SURGERY

## 2023-07-10 PROCEDURE — 73560 X-RAY EXAM OF KNEE 1 OR 2: CPT | Mod: 26,RT,, | Performed by: ORTHOPAEDIC SURGERY

## 2023-07-10 PROCEDURE — 99214 OFFICE O/P EST MOD 30 MIN: CPT | Mod: PBBFAC,25 | Performed by: ORTHOPAEDIC SURGERY

## 2023-07-10 PROCEDURE — 73560 XR KNEE 1 OR 2 VIEW RIGHT: ICD-10-PCS | Mod: 26,RT,, | Performed by: ORTHOPAEDIC SURGERY

## 2023-07-10 PROCEDURE — 99213 OFFICE O/P EST LOW 20 MIN: CPT | Mod: S$PBB,25,, | Performed by: ORTHOPAEDIC SURGERY

## 2023-07-10 RX ORDER — HYDROCODONE BITARTRATE AND ACETAMINOPHEN 10; 325 MG/1; MG/1
1 TABLET ORAL EVERY 6 HOURS PRN
Qty: 28 TABLET | Refills: 0 | Status: SHIPPED | OUTPATIENT
Start: 2023-07-10

## 2023-07-10 RX ORDER — BUPIVACAINE HYDROCHLORIDE 2.5 MG/ML
25 INJECTION, SOLUTION EPIDURAL; INFILTRATION; INTRACAUDAL
Status: DISCONTINUED | OUTPATIENT
Start: 2023-07-10 | End: 2023-07-10 | Stop reason: HOSPADM

## 2023-07-10 RX ORDER — TRIAMCINOLONE ACETONIDE 40 MG/ML
20 INJECTION, SUSPENSION INTRA-ARTICULAR; INTRAMUSCULAR
Status: DISCONTINUED | OUTPATIENT
Start: 2023-07-10 | End: 2023-07-10 | Stop reason: HOSPADM

## 2023-07-10 RX ADMIN — TRIAMCINOLONE ACETONIDE 20 MG: 40 INJECTION, SUSPENSION INTRA-ARTICULAR; INTRAMUSCULAR at 09:07

## 2023-07-10 RX ADMIN — BUPIVACAINE HYDROCHLORIDE 25 MG: 2.5 INJECTION, SOLUTION EPIDURAL; INFILTRATION; INTRACAUDAL; PERINEURAL at 09:07

## 2023-07-10 NOTE — PROGRESS NOTES
Patient is here for follow-up of right total knee arthroplasty.  Her x-rays show no loosening of the components.  She is tight over her biceps femoris tendon.  I will have the therapist work stretch out.  Patient also has bilateral thumb basilar arthritis.  I injected each thumb with 0.5 cc Marcaine 0.5 cc Kenalog after sterilely prepping.  Verbal consent was obtained.  Patient tolerated procedure well.  I will follow back up in 6 months.

## 2023-07-10 NOTE — PROCEDURES
Small Joint Aspiration/Injection: R thumb CMC, L thumb CMC    Date/Time: 7/10/2023 9:50 AM  Performed by: Erick Tolliver MD  Authorized by: Erick Tolliver MD     Consent Done?:  Yes (Verbal)  Indications:  Arthritis  Prep: patient was prepped and draped in usual sterile fashion      Location:  Thumb  Site:  R thumb CMC and L thumb CMC  Ultrasonic guidance for needle placement?: No    Needle size:  25 G  Medications:  25 mg BUPivacaine (PF) 0.25% (2.5 mg/ml) 0.25 % (2.5 mg/mL); 20 mg triamcinolone acetonide 40 mg/mL  Patient tolerance:  Patient tolerated the procedure well with no immediate complications

## 2023-07-10 NOTE — PROGRESS NOTES
Radiology Interpretation        Patient Name: Olga Stephenson  Date: 7/10/2023  YOB: 1955  MRN# [id number]        ORDERING DIAGNOSIS:    Encounter Diagnosis   Name Primary?    Status post total knee replacement, right Yes        Two views AP lateral right knee skeletally mature individual there is total knee arthroplasty in place no loosening no fractures no subluxations no shift alignment impression total knee arthroplasty in place right knee no loosening             Erick Tolliver MD

## 2023-07-19 LAB
LEFT EYE DM RETINOPATHY: NEGATIVE
RIGHT EYE DM RETINOPATHY: NEGATIVE

## 2023-07-20 ENCOUNTER — PATIENT MESSAGE (OUTPATIENT)
Dept: ADMINISTRATIVE | Facility: HOSPITAL | Age: 68
End: 2023-07-20

## 2023-07-27 ENCOUNTER — CLINICAL SUPPORT (OUTPATIENT)
Dept: REHABILITATION | Facility: HOSPITAL | Age: 68
End: 2023-07-27
Payer: MEDICARE

## 2023-07-27 DIAGNOSIS — Z96.651 STATUS POST TOTAL KNEE REPLACEMENT, RIGHT: ICD-10-CM

## 2023-07-27 DIAGNOSIS — M25.561 CHRONIC PAIN OF RIGHT KNEE: Primary | ICD-10-CM

## 2023-07-27 DIAGNOSIS — G89.29 CHRONIC PAIN OF RIGHT KNEE: Primary | ICD-10-CM

## 2023-07-27 PROCEDURE — 97035 APP MDLTY 1+ULTRASOUND EA 15: CPT

## 2023-07-27 PROCEDURE — 97161 PT EVAL LOW COMPLEX 20 MIN: CPT

## 2023-07-27 PROCEDURE — 97110 THERAPEUTIC EXERCISES: CPT

## 2023-07-27 NOTE — PLAN OF CARE
[facility]   Physical Therapy Initial Evaluation      Name: Olga Stephenson  Clinic Number: [id number]    Therapy Diagnosis:   Encounter Diagnoses   Name Primary?    Status post total knee replacement, right     Chronic pain of right knee Yes        Physician: Erick Tolliver MD    Physician Orders:  -EVAL AND TREAT 2-3 TIMES A WK FOR 6-8 WEEKS, ROM & STRENGTHENING, MODALITIES AT THERAPIST DISCRETION, HOME EXERCISE PROGRAM  Medical Diagnosis from Referral: Z96.651 (ICD-10-CM) - Status post total knee replacement, right  2/9/23; Right total knee Biceps Tendonitis stretches  Evaluation Date: 7/27/2023  Authorization Period Expiration: 9/8/23  Plan of Care Expiration: 9/8/23  Visit # / Visits authorized: 1/ 12   FOTO: 51/100    Precautions: Standard     Time In: 1106  Time Out: 1146  Total Appointment Time (timed & untimed codes): 40 minutes    Subjective     Date of onset: 2/9/23    History of current condition - Olga reports: she is s/p right total knee replacement and is still having right knee pain and tightness of posterior knee. She has been through formal knee replacement therapy and now referred back to therapy to address these problems.    Falls: no    Imaging: none:     Prior Therapy: yes  Social History:  lives alone  Occupation: n/a  Prior Level of Function: independent  Current Level of Function: independent    Pain:  Current 6/10, worst 9/10, best 5/10   Location: right knee  posterior  Description: Tight  Aggravating Factors: Walking  Easing Factors:  exercise and stretching    Patients goals: get the tightness out of my knee.     Medical History:   Past Medical History:   Diagnosis Date    Acute superficial gastritis without hemorrhage 06/08/2022    Adenomatous polyp of cecum 06/15/2022    Adenomatous polyp of transverse colon 06/15/2022    Anticoagulant long-term use     Arthritis     Asthma     Coronary artery disease     Diabetes mellitus, type 2 2014    Diverticula,  colon 06/15/2022    DVT (deep venous thrombosis)     Epigastric pain 2022    Heart attack     History of colon polyps 06/15/2022    Hyperlipidemia     Hypertension 2014    Polyp of hepatic flexure of colon 06/15/2022    Screening for colon cancer 06/15/2022    Thyroid disease        Surgical History:   Olga Stephenson  has a past surgical history that includes Esophagogastroduodenoscopy (03/10/2021); Colonoscopy (2017); Hysterectomy;  section; Bilateral oophorectomy (Bilateral); Cholecystectomy; arthroplasty, knee, total, using computer-assisted navigation (Right, 2023); and Coronary artery bypass graft ().    Medications:   Olga has a current medication list which includes the following prescription(s): albuterol, aspirin, blood sugar diagnostic, cephalexin, chlorpheniramine-phenylephrine, clopidogrel, cyclobenzaprine, farxiga, estradiol, ezetimibe, fluconazole, fluticasone propionate, glipizide, hydrocodone-acetaminophen, hydrocodone-acetaminophen, losartan, metformin, metoprolol succinate, multivitamin, pantoprazole, paroxetine, pregabalin, valacyclovir, and [DISCONTINUED] hydrocortisone.    Allergies:   Review of patient's allergies indicates:   Allergen Reactions    Jardiance [empagliflozin] Anaphylaxis     Headaches     Trulicity [dulaglutide]      Abdominal pain        Objective          Range of motion:  Motion Right   Hip flexion  WITHIN FUNCTIONAL LIMITS   Hip extension  WITHIN FUNCTIONAL LIMITS   Hip abduction  WITHIN FUNCTIONAL LIMITS   Hip adduction  WITHIN FUNCTIONAL LIMITS   Internal rotation  WITHIN FUNCTIONAL LIMITS   External rotation  WITHIN FUNCTIONAL LIMITS   Knee extension   -10   Knee flexion  WITHIN FUNCTIONAL LIMITS   Ankle DF  WITHIN FUNCTIONAL LIMITS   Ankle PF  WITHIN FUNCTIONAL LIMITS   Ankle Inversion  WITHIN FUNCTIONAL LIMITS   Ankle Eversion  WITHIN FUNCTIONAL LIMITS       Manual muscle test   Muscle Right    Hip flexion  MMT strength: 5/5   Hip  extension  MMT strength: 5/5   Hip abduction  MMT strength: 5/5   Hip adduction  MMT strength: 5/5   Hip internal rotation  MMT strength: 5/5   Hip external rotation  MMT strength: 5/5   Knee extension  MMT strength: 5/5   Knee flexion  MMT strength: 5/5   Ankle DF  MMT strength: 5/5   Ankle PF  MMT strength: 5/5   Ankle inversion  MMT strength: 5/5   Ankle eversion  MMT strength: 5/5       Gait:  Weight bearing precautions: FWB  Assistive device: none  Ambulation distance and deviations: community no deviations  Stairs: n/a  Comments:        B. Knee  Lochman's test: right Negative   Anterior drawer: right Negative   Posterior drawer: right Negative   Varus stress test: right Negative  Valgus stress test: right Negative   PFJ grind test: right Negative   McMurrays: right Negative         Limitation/Restriction for FOTO Knee Survey    Therapist reviewed FOTO scores for Olga Stephenson on 7/27/2023.   FOTO documents entered into [employer] - see Media section.    Limitation Score: 51%         Treatment     Total Treatment time (time-based codes) separate from Evaluation: 23 minutes     Olga received the treatments listed below:      therapeutic exercises to develop ROM and flexibility for 15 minutes including:  Supine right hamstring stretch 10 x 10 seconds  Standing right calf stretch 10 x 10 seconds      direct contact modalities after being cleared for contraindications: Ultrasound:  Olga received ultrasound to manage pain and inflammation at 100 % duty cycle applied to the right gastrocnemius and hamstring insertion on lateral side at an intensity of 1.5 W/cm2  for a duration of 8 minutes. Patient tolerated treatment well without adverse effects. Therapist was in attendance throughout intervention.      Patient Education and Home Exercises     Education provided:   - plan of care discussed with patient.    Written Home Exercises Provided: yes. Exercises were reviewed and Olga was able to demonstrate them prior to the  end of the session.  Olga demonstrated good  understanding of the education provided and she received a printed handout.  Assessment     Olga is a 68 y.o. female referred to outpatient Physical Therapy with a medical diagnosis of Status post total knee replacement, right  Right total knee Biceps Tendonitis. Patient presents with right knee pain and joint tightness.    Patient prognosis is Good.   Patient will benefit from skilled outpatient Physical Therapy to address the deficits stated above and in the chart below, provide patient /family education, and to maximize patientt's level of independence.     Plan of care discussed with patient: Yes  Patient's spiritual, cultural and educational needs considered and patient is agreeable to the plan of care and goals as stated below:     Anticipated Barriers for therapy: none    Medical Necessity is demonstrated by the following  History  Co-morbidities and personal factors that may impact the plan of care [] LOW: no personal factors / co-morbidities  [x] MODERATE: 1-2 personal factors / co-morbidities  [] HIGH: 3+ personal factors / co-morbidities    Moderate / High Support Documentation:   Co-morbidities affecting plan of care:   Past Medical History:   Diagnosis Date    Acute superficial gastritis without hemorrhage 06/08/2022    Adenomatous polyp of cecum 06/15/2022    Adenomatous polyp of transverse colon 06/15/2022    Anticoagulant long-term use     Arthritis     Asthma     Coronary artery disease     Diabetes mellitus, type 2 2014    Diverticula, colon 06/15/2022    DVT (deep venous thrombosis)     Epigastric pain 06/08/2022    Heart attack 2011    History of colon polyps 06/15/2022    Hyperlipidemia     Hypertension 2014    Polyp of hepatic flexure of colon 06/15/2022    Screening for colon cancer 06/15/2022    Thyroid disease        Personal Factors:   no deficits     Examination  Body Structures and Functions, activity limitations and participation restrictions  that may impact the plan of care [x] LOW: addressing 1-2 elements  [] MODERATE: 3+ elements  [] HIGH: 4+ elements (please support below)    Moderate / High Support Documentation:      Clinical Presentation [x] LOW: stable  [] MODERATE: Evolving  [] HIGH: Unstable     Decision Making/ Complexity Score: low       Goals:  Short Term Goals: 3 weeks   Pt will increase right knee extension to -5 degrees, and quad lag to -5 degrees to allow patient normal gait pattern.  Pt will demonstrate independent performance of home exercise program.  Pt will report decrease in pain right knee to 3/10 to improve quality of life.    Long Term Goals: 6 weeks   1. Pt will increase right knee extension to 0 degrees, and quad lag to 0 degrees to allow patient normal gait mechanics for community mobility.  2. Pt will report decrease in pain knee to 0/10 to improve quality of life.    Plan     Plan of care Certification: 7/27/2023 to 9/8/23.    Outpatient Physical Therapy 2 times weekly for 6 weeks to include the following interventions: Electrical Stimulation IFC, Manual Therapy, Moist Heat/ Ice, Neuromuscular Re-ed, Therapeutic Exercise, and Ultrasound.     Mert Archer, PT

## 2023-08-01 ENCOUNTER — CLINICAL SUPPORT (OUTPATIENT)
Dept: REHABILITATION | Facility: HOSPITAL | Age: 68
End: 2023-08-01
Payer: MEDICARE

## 2023-08-01 DIAGNOSIS — M25.561 CHRONIC PAIN OF RIGHT KNEE: Primary | ICD-10-CM

## 2023-08-01 DIAGNOSIS — G89.29 CHRONIC PAIN OF RIGHT KNEE: Primary | ICD-10-CM

## 2023-08-01 PROCEDURE — 97110 THERAPEUTIC EXERCISES: CPT

## 2023-08-01 PROCEDURE — 97035 APP MDLTY 1+ULTRASOUND EA 15: CPT

## 2023-08-01 NOTE — PROGRESS NOTES
[facility]   Physical Therapy Treatment Note      Name: Olga Stephenson  Clinic Number: [id number]    Therapy Diagnosis: No diagnosis found.  Physician: Erick Tolliver MD    Visit Date: 8/1/2023    Physician: Erick Tolliver MD     Physician Orders:  -EVAL AND TREAT 2-3 TIMES A WK FOR 6-8 WEEKS, ROM & STRENGTHENING, MODALITIES AT THERAPIST DISCRETION, HOME EXERCISE PROGRAM  Medical Diagnosis from Referral: Z96.651 (ICD-10-CM) - Status post total knee replacement, right  2/9/23; Right total knee Biceps Tendonitis stretches  Evaluation Date: 7/27/2023  Authorization Period Expiration: 9/8/23  Plan of Care Expiration: 9/8/23  Visit # / Visits authorized: 2/ 12   FOTO: 51/100     Precautions: Standard      Time In: 1110  Time Out: 1155  Total Appointment Time (timed & untimed codes): 45 minutes    PTA Visit #: 0/5       Subjective     Pt reports: pain behind R knee.  She was compliant with home exercise program.  Response to previous treatment: Pt reports pain relief after last visit, but reports it didn't last.  Functional change: NA    Pain: 7/10  Location: right knee      Objective      Objective Measures updated at progress report unless specified.     Treatment     Olga received the treatments listed below:      therapeutic exercises to develop ROM and flexibility for 35 minutes including:  Scifit stepper x 5 mins level 1.5   Supine right hamstring stretch 10 x 10 seconds with strap  Standing right calf stretch 10 x 10 seconds  Supine static hamstring stretch with bolster under heel and passive overpressure x5 reps 15 s/h  TKE with small bolster 2x15 with 5 s/h      direct contact modalities after being cleared for contraindications: Ultrasound:  Olga received ultrasound to manage pain and inflammation at 100 % duty cycle applied to the hamstring insertion on lateral side of R knee at an intensity of  1.5 W/cm2  for a duration of 8 minutes. Patient tolerated treatment well without  adverse effects. Therapist was in attendance throughout intervention.        Patient Education and Home Exercises       Education provided:   - received verbal and tactile cues to facilitate proper execution of exercises and body mechanics.     Written Home Exercises Provided: Patient instructed to cont prior HEP. Exercises were reviewed and Olga was able to demonstrate them prior to the end of the session.  Olga demonstrated good  understanding of the education provided. See EMR under Patient Instructions for exercises provided during therapy sessions.    Assessment     Pt tolerated today's treatment well with no complaints reported.  Pt reported decrease in pain level to 6 and improved flexibility following treatment.       Olga Is progressing well towards her goals.   Pt prognosis is Excellent.     Pt will continue to benefit from skilled outpatient physical therapy to address the deficits listed in the problem list box on initial evaluation, provide pt/family education and to maximize pt's level of independence in the home and community environment.     Pt's spiritual, cultural and educational needs considered and pt agreeable to plan of care and goals.     Anticipated barriers to physical therapy: none    Goals:  Short Term Goals: 3 weeks   Pt will increase right knee extension to -5 degrees, and quad lag to -5 degrees to allow patient normal gait pattern.  Pt will demonstrate independent performance of home exercise program.  Pt will report decrease in pain right knee to 3/10 to improve quality of life.     Long Term Goals: 6 weeks   1. Pt will increase right knee extension to 0 degrees, and quad lag to 0 degrees to allow patient normal gait mechanics for community mobility.  2. Pt will report decrease in pain knee to 0/10 to improve quality of life.       Plan     Continue with POC.    Lianet Hurtado, PT

## 2023-08-03 ENCOUNTER — CLINICAL SUPPORT (OUTPATIENT)
Dept: REHABILITATION | Facility: HOSPITAL | Age: 68
End: 2023-08-03
Payer: MEDICARE

## 2023-08-03 DIAGNOSIS — M25.561 CHRONIC PAIN OF RIGHT KNEE: Primary | ICD-10-CM

## 2023-08-03 DIAGNOSIS — G89.29 CHRONIC PAIN OF RIGHT KNEE: Primary | ICD-10-CM

## 2023-08-03 PROCEDURE — 97110 THERAPEUTIC EXERCISES: CPT

## 2023-08-03 PROCEDURE — 97035 APP MDLTY 1+ULTRASOUND EA 15: CPT

## 2023-08-03 NOTE — PROGRESS NOTES
[facility]   Physical Therapy Treatment Note      Name: Olga Stephenson  Clinic Number: [id number]    Therapy Diagnosis:   Encounter Diagnosis   Name Primary?    Chronic pain of right knee Yes     Physician: Erick Tolliver MD    Visit Date: 8/3/2023    Physician: Erick Tolliver MD     Physician Orders:  -EVAL AND TREAT 2-3 TIMES A WK FOR 6-8 WEEKS, ROM & STRENGTHENING, MODALITIES AT THERAPIST DISCRETION, HOME EXERCISE PROGRAM  Medical Diagnosis from Referral: Z96.651 (ICD-10-CM) - Status post total knee replacement, right  2/9/23; Right total knee Biceps Tendonitis stretches  Evaluation Date: 7/27/2023  Authorization Period Expiration: 9/8/23  Plan of Care Expiration: 9/8/23  Visit # / Visits authorized: 3/ 12   FOTO: 51/100     Precautions: Standard      Time In: 1108  Time Out: 08872  Total Appointment Time (timed & untimed codes): 38 minutes    PTA Visit #: 0/5       Subjective     Pt reports: her pain in right knee has not changed. Reports her knee gives out on her.  She was compliant with home exercise program.  Response to previous treatment: After therapy pain is improved but right back the next morning.  Functional change: NA    Pain: 7/10  Location: right knee      Objective      Objective Measures updated at progress report unless specified.     Treatment     Olga received the treatments listed below:      therapeutic exercises to develop ROM and flexibility for 30 minutes including:  Scifit stepper x 5 mins level 1.5   Supine right hamstring stretch 10 x 10 seconds with strap  Supine knee extension stretch with bolster under heel and passive overpressure x5 reps 15 s/h  TKE with small bolster 2x15 with 5 s/h  Seated right calf stretch with strap 10 x 10 seconds      direct contact modalities after being cleared for contraindications: Ultrasound:  Olga received ultrasound to manage pain and inflammation at 100 % duty cycle applied to the hamstring insertion on lateral side  of R knee at an intensity of  1.5 W/cm2  for a duration of 8 minutes. Patient tolerated treatment well without adverse effects. Therapist was in attendance throughout intervention.        Patient Education and Home Exercises       Education provided:   - received verbal and tactile cues to facilitate proper execution of exercises and body mechanics.     Written Home Exercises Provided: Patient instructed to cont prior HEP. Exercises were reviewed and Olga was able to demonstrate them prior to the end of the session.  Olga demonstrated good  understanding of the education provided. See EMR under Patient Instructions for exercises provided during therapy sessions.    Assessment     Olga tolerates treatment well but therapy so far has not improved her pain. Discussed Dry Needling with her and she is open to trying it. Will consider Dry Needling if no change in pain in the next two visits.      Olga Is progressing well towards her goals.   Pt prognosis is Excellent.     Pt will continue to benefit from skilled outpatient physical therapy to address the deficits listed in the problem list box on initial evaluation, provide pt/family education and to maximize pt's level of independence in the home and community environment.     Pt's spiritual, cultural and educational needs considered and pt agreeable to plan of care and goals.     Anticipated barriers to physical therapy: none    Goals:  Short Term Goals: 3 weeks   Pt will increase right knee extension to -5 degrees, and quad lag to -5 degrees to allow patient normal gait pattern.  Pt will demonstrate independent performance of home exercise program.  Pt will report decrease in pain right knee to 3/10 to improve quality of life.     Long Term Goals: 6 weeks   1. Pt will increase right knee extension to 0 degrees, and quad lag to 0 degrees to allow patient normal gait mechanics for community mobility.  2. Pt will report decrease in pain knee to 0/10 to improve quality of  life.       Plan     Continue with POC.    Mert Archer, PT

## 2023-08-05 ENCOUNTER — HOSPITAL ENCOUNTER (EMERGENCY)
Facility: HOSPITAL | Age: 68
Discharge: HOME | End: 2023-08-05
Payer: MEDICARE

## 2023-08-05 VITALS
TEMPERATURE: 98 F | WEIGHT: 208 LBS | SYSTOLIC BLOOD PRESSURE: 142 MMHG | OXYGEN SATURATION: 100 % | HEART RATE: 96 BPM | BODY MASS INDEX: 30.81 KG/M2 | DIASTOLIC BLOOD PRESSURE: 77 MMHG | HEIGHT: 69 IN | RESPIRATION RATE: 16 BRPM

## 2023-08-05 DIAGNOSIS — M94.0 COSTOCHONDRITIS: Primary | ICD-10-CM

## 2023-08-05 PROCEDURE — 99284 EMERGENCY DEPT VISIT MOD MDM: CPT

## 2023-08-05 PROCEDURE — 63600175 PHARM REV CODE 636 W HCPCS: Performed by: PHYSICIAN ASSISTANT

## 2023-08-05 PROCEDURE — 96372 THER/PROPH/DIAG INJ SC/IM: CPT | Performed by: PHYSICIAN ASSISTANT

## 2023-08-05 RX ORDER — KETOROLAC TROMETHAMINE 30 MG/ML
INJECTION, SOLUTION INTRAMUSCULAR; INTRAVENOUS
Status: DISPENSED
Start: 2023-08-05 | End: 2023-08-06

## 2023-08-05 RX ORDER — KETOROLAC TROMETHAMINE 30 MG/ML
30 INJECTION, SOLUTION INTRAMUSCULAR; INTRAVENOUS
Status: COMPLETED | OUTPATIENT
Start: 2023-08-05 | End: 2023-08-05

## 2023-08-05 RX ADMIN — KETOROLAC TROMETHAMINE 30 MG: 30 INJECTION, SOLUTION INTRAMUSCULAR; INTRAVENOUS at 10:08

## 2023-08-06 NOTE — ED TRIAGE NOTES
L ribcage pain since 0300 this morning.  Rates pain at 9 when moving.  Patient states it catches sometimes some times.  Patient states she does have arthritis.  Aubrie of diabetes.  Patient states she has not taken anything for pain or discomfort today.

## 2023-08-06 NOTE — ED PROVIDER NOTES
Encounter Date: 2023       History     Chief Complaint   Patient presents with    Abdominal Pain     Patient is a 68-year-old female with history of left rib discomfort secondary to no known injury, she states she got up at 3:00 a.m. this morning and it was hurting, it hurts when she moves hurts when she last.    She denies any injury.    She denies any coughing.    She denies any fever chills or night sweats.    She did not take any anti-inflammatories.    She has a past medical history of hypertension, asthma, coronary artery disease, heart attack, thyroid disease, epigastric pain, gastritis, colon cancer, colon polyps, diverticula, polyps, diabetes, hyperlipidemia, arthritis, anticoagulant, DVT  Past surgical history is positive for colonoscopy, hysterectomy, , bilateral oophorectomy, cholecystectomy  Patient denies any current smoking, denies any alcohol or drug use      Review of patient's allergies indicates:   Allergen Reactions    Jardiance [empagliflozin] Anaphylaxis     Headaches     Trulicity [dulaglutide]      Abdominal pain    Ozempic [semaglutide] Nausea And Vomiting     Past Medical History:   Diagnosis Date    Acute superficial gastritis without hemorrhage 2022    Adenomatous polyp of cecum 06/15/2022    Adenomatous polyp of transverse colon 06/15/2022    Anticoagulant long-term use     Arthritis     Asthma     Coronary artery disease     Diabetes mellitus, type 2 2014    Diverticula, colon 06/15/2022    DVT (deep venous thrombosis)     Epigastric pain 2022    Heart attack     History of colon polyps 06/15/2022    Hyperlipidemia     Hypertension 2014    Polyp of hepatic flexure of colon 06/15/2022    Screening for colon cancer 06/15/2022    Thyroid disease      Past Surgical History:   Procedure Laterality Date    ARTHROPLASTY, KNEE, TOTAL, USING COMPUTER-ASSISTED NAVIGATION Right 2023    Procedure: ARTHROPLASTY, KNEE, TOTAL, USING COMPUTER-ASSISTED NAVIGATION;   Surgeon: Erick Tolliver MD;  Location: [facility];  Service: Orthopedics;  Laterality: Right;    BILATERAL OOPHORECTOMY Bilateral     35 years ago,  1-2 years after hyst     SECTION      CHOLECYSTECTOMY      COLONOSCOPY  2017    repeat in 3 years    CORONARY ARTERY BYPASS GRAFT  2011    ESOPHAGOGASTRODUODENOSCOPY  03/10/2021    HYSTERECTOMY       Family History   Problem Relation Age of Onset    Diabetes Mother     Heart disease Mother     Dementia Mother     Thyroid disease Mother     No Known Problems Father     Diabetes Brother     Breast cancer Neg Hx     Colon cancer Neg Hx     Ovarian cancer Neg Hx      Social History     Tobacco Use    Smoking status: Former     Current packs/day: 0.00     Passive exposure: Past    Smokeless tobacco: Never   Substance Use Topics    Alcohol use: Not Currently    Drug use: Never     Review of Systems   Musculoskeletal:         Left-sided lower rib discomfort   All other systems reviewed and are negative.      Physical Exam     Initial Vitals [23 2203]   BP Pulse Resp Temp SpO2   (!) 154/78 101 16 98.3 °F (36.8 °C) 100 %      MAP       --         Physical Exam    Nursing note and vitals reviewed.  Constitutional: No distress.   Obese   HENT:   Head: Atraumatic.   Eyes: EOM are normal.   Neck: Neck supple.   Cardiovascular:  Normal rate, regular rhythm and normal heart sounds.           Pulmonary/Chest: Breath sounds normal. No respiratory distress.   Abdominal: Abdomen is soft. Bowel sounds are normal.   Mild tenderness to palpation to the left lower ribs.  Patient also left, and reported pain in her ribs   Musculoskeletal:      Cervical back: Neck supple.     Neurological: She is alert and oriented to person, place, and time.   Skin: Skin is dry.   Psychiatric: She has a normal mood and affect.         Medical Screening Exam   See Full Note    ED Course   Procedures  Labs Reviewed - No data to display       Imaging Results    None           Medications   ketorolac injection 30 mg (has no administration in time range)     Medical Decision Making:   Initial Assessment:   Patient is a 68-year-old female with history of left rib discomfort secondary to no known injury, she states she got up at 3:00 a.m. this morning and it was hurting, it hurts when she moves hurts when she last.    She denies any injury.    She denies any coughing.    She denies any fever chills or night sweats.    She did not take any anti-inflammatories.    She has a past medical history of hypertension, asthma, coronary artery disease, heart attack, thyroid disease, epigastric pain, gastritis, colon cancer, colon polyps, diverticula, polyps, diabetes, hyperlipidemia, arthritis, anticoagulant, DVT  Past surgical history is positive for colonoscopy, hysterectomy, , bilateral oophorectomy, cholecystectomy  Patient denies any current smoking, denies any alcohol or drug use  Differential Diagnosis:   Costochondritis  ED Management:  Patient will be given Toradol 30 mg IM.    She will follow up with her primary care provider symptoms do not resolve in 3-5 days.    She will return to the emergency department if any new or worsening symptoms arise.                         Clinical Impression:   Final diagnoses:  [M94.0] Costochondritis (Primary)        ED Disposition Condition    Discharge Stable          ED Prescriptions    None       Follow-up Information    None          Austyn Matrel PA  [phone]

## 2023-08-09 ENCOUNTER — OFFICE VISIT (OUTPATIENT)
Dept: FAMILY MEDICINE | Facility: CLINIC | Age: 68
End: 2023-08-09
Payer: MEDICARE

## 2023-08-09 ENCOUNTER — APPOINTMENT (OUTPATIENT)
Dept: RADIOLOGY | Facility: CLINIC | Age: 68
End: 2023-08-09
Attending: INTERNAL MEDICINE
Payer: MEDICARE

## 2023-08-09 VITALS
DIASTOLIC BLOOD PRESSURE: 71 MMHG | TEMPERATURE: 97 F | HEIGHT: 69 IN | BODY MASS INDEX: 31.01 KG/M2 | SYSTOLIC BLOOD PRESSURE: 134 MMHG | WEIGHT: 209.38 LBS | HEART RATE: 80 BPM | OXYGEN SATURATION: 96 % | RESPIRATION RATE: 18 BRPM

## 2023-08-09 DIAGNOSIS — M17.11 OSTEOARTHRITIS OF RIGHT KNEE, UNSPECIFIED OSTEOARTHRITIS TYPE: ICD-10-CM

## 2023-08-09 DIAGNOSIS — T14.8XXA MUSCLE STRAIN: ICD-10-CM

## 2023-08-09 DIAGNOSIS — T14.8XXA MUSCLE STRAIN: Primary | ICD-10-CM

## 2023-08-09 DIAGNOSIS — R60.0 LEG EDEMA: ICD-10-CM

## 2023-08-09 LAB
BILIRUB UR QL STRIP: NEGATIVE
CLARITY UR: CLEAR
COLOR UR: NORMAL
GLUCOSE UR STRIP-MCNC: >1000 MG/DL
KETONES UR STRIP-SCNC: NEGATIVE MG/DL
LEUKOCYTE ESTERASE UR QL STRIP: NEGATIVE
NITRITE UR QL STRIP: NEGATIVE
PH UR STRIP: 5 PH UNITS
PROT UR QL STRIP: NEGATIVE
RBC # UR STRIP: NEGATIVE /UL
SP GR UR STRIP: 1.02
UROBILINOGEN UR STRIP-ACNC: NORMAL MG/DL

## 2023-08-09 PROCEDURE — 81003 URINALYSIS AUTO W/O SCOPE: CPT | Mod: QW,,, | Performed by: CLINICAL MEDICAL LABORATORY

## 2023-08-09 PROCEDURE — 74018 RADEX ABDOMEN 1 VIEW: CPT | Mod: TC,RHCUB | Performed by: INTERNAL MEDICINE

## 2023-08-09 PROCEDURE — 81003 URINALYSIS, REFLEX TO URINE CULTURE: ICD-10-PCS | Mod: QW,,, | Performed by: CLINICAL MEDICAL LABORATORY

## 2023-08-09 PROCEDURE — 99214 OFFICE O/P EST MOD 30 MIN: CPT | Mod: ,,, | Performed by: INTERNAL MEDICINE

## 2023-08-09 PROCEDURE — 99214 PR OFFICE/OUTPT VISIT, EST, LEVL IV, 30-39 MIN: ICD-10-PCS | Mod: ,,, | Performed by: INTERNAL MEDICINE

## 2023-08-09 RX ORDER — TIZANIDINE 4 MG/1
TABLET ORAL
COMMUNITY
Start: 2023-05-27

## 2023-08-10 RX ORDER — LOSARTAN POTASSIUM 50 MG/1
50 TABLET ORAL DAILY
Qty: 90 TABLET | Refills: 3 | Status: SHIPPED | OUTPATIENT
Start: 2023-08-10 | End: 2023-08-23 | Stop reason: SDUPTHER

## 2023-08-10 RX ORDER — GLIPIZIDE 5 MG/1
5 TABLET, FILM COATED, EXTENDED RELEASE ORAL
Qty: 90 TABLET | Refills: 1 | Status: SHIPPED | OUTPATIENT
Start: 2023-08-10 | End: 2023-08-23 | Stop reason: SDUPTHER

## 2023-08-10 RX ORDER — NAPROXEN 500 MG/1
500 TABLET ORAL 2 TIMES DAILY PRN
Qty: 20 TABLET | Refills: 0 | Status: SHIPPED | OUTPATIENT
Start: 2023-08-10 | End: 2024-01-08

## 2023-08-10 RX ORDER — VALACYCLOVIR HYDROCHLORIDE 500 MG/1
500 TABLET, FILM COATED ORAL DAILY
Qty: 90 TABLET | Refills: 1 | Status: SHIPPED | OUTPATIENT
Start: 2023-08-10 | End: 2023-09-05 | Stop reason: SDUPTHER

## 2023-08-11 PROBLEM — R60.0 LEG EDEMA: Status: ACTIVE | Noted: 2023-08-11

## 2023-08-11 PROBLEM — T14.8XXA MUSCLE STRAIN: Status: ACTIVE | Noted: 2023-08-11

## 2023-08-11 NOTE — PROGRESS NOTES
Subjective:       Patient ID: Olga Stephenson is a 68 y.o. female.    Chief Complaint: Flank Pain  Patient seen and evaluated today patient is awake alert.  Patient has moderate pain in the right knee patient states that the pain is an 8/10 patient also complains of right leg edema.    Patient has chronic diabetes mellitus, chronic hypertension and also the patient is complaining of muscle strain on the left side.  The plan today will be KUB, venous Doppler studies of the right lower extremity and start Naprosyn 500 mg 1 p.o. b.i.d. p.r.n. pain.  Patient does have left-sided CVA tenderness want to make sure it is not a UTI pyelonephritis therefore will check a UA CNS today.  Flank Pain  Pertinent negatives include no abdominal pain, chest pain or fever.     .    Current Medications:    Current Outpatient Medications:     albuterol (PROVENTIL/VENTOLIN HFA) 90 mcg/actuation inhaler, USE 2 INHALATIONS TWICE A DAY, Disp: 17 g, Rfl: 3    blood sugar diagnostic (FREESTYLE LITE STRIPS) Strp, USE 1 STRIP DAILY TO MONITOR GLUCOSE, Disp: 100 strip, Rfl: 3    clopidogreL (PLAVIX) 75 mg tablet, Take 1 tablet (75 mg total) by mouth once daily., Disp: 30 tablet, Rfl: 1    cyclobenzaprine (FLEXERIL) 10 MG tablet, 1 tablet nightly as needed., Disp: , Rfl:     dapagliflozin (FARXIGA) 10 mg tablet, Take 1 tablet (10 mg total) by mouth once daily., Disp: 90 tablet, Rfl: 3    estradioL (VIVELLE-DOT) 0.1 mg/24 hr PTSW, Place 1 patch onto the skin twice a week., Disp: 15 patch, Rfl: 0    ezetimibe (ZETIA) 10 mg tablet, Take 1 tablet by mouth every evening., Disp: , Rfl:     fluticasone propionate (FLONASE) 50 mcg/actuation nasal spray, 1 spray (50 mcg total) by Each Nostril route once daily., Disp: 16 g, Rfl: 3    HYDROcodone-acetaminophen (NORCO)  mg per tablet, Take 1 tablet by mouth every 6 (six) hours as needed for Pain., Disp: 28 tablet, Rfl: 0    HYDROcodone-acetaminophen (NORCO)  mg per tablet, Take 1 tablet by mouth  "every 6 (six) hours as needed for Pain., Disp: 28 tablet, Rfl: 0    metFORMIN (GLUCOPHAGE) 1000 MG tablet, Take 1,000 mg by mouth nightly., Disp: , Rfl:     metoprolol succinate (TOPROL-XL) 25 MG 24 hr tablet, Take 1 tablet by mouth once daily., Disp: , Rfl:     multivitamin capsule, Take 1 capsule by mouth once daily., Disp: , Rfl:     pantoprazole (PROTONIX) 40 MG tablet, Take 1 tablet (40 mg total) by mouth once daily., Disp: 90 tablet, Rfl: 3    pregabalin (LYRICA) 75 MG capsule, Take 150 mg by mouth nightly., Disp: , Rfl:     tiZANidine (ZANAFLEX) 4 MG tablet, Take by mouth., Disp: , Rfl:     glipiZIDE 5 MG TR24, Take 1 tablet (5 mg total) by mouth daily with breakfast., Disp: 90 tablet, Rfl: 1    losartan (COZAAR) 50 MG tablet, Take 1 tablet (50 mg total) by mouth once daily., Disp: 90 tablet, Rfl: 3    naproxen (NAPROSYN) 500 MG tablet, Take 1 tablet (500 mg total) by mouth 2 (two) times daily as needed (pain)., Disp: 20 tablet, Rfl: 0    valACYclovir (VALTREX) 500 MG tablet, Take 1 tablet (500 mg total) by mouth once daily., Disp: 90 tablet, Rfl: 1           Review of Systems   Constitutional:  Negative for appetite change, fatigue and fever.   Respiratory:  Negative for shortness of breath.    Cardiovascular:  Negative for chest pain.   Gastrointestinal:  Negative for abdominal pain and constipation.   Endocrine: Negative for polydipsia, polyphagia and polyuria.   Genitourinary:  Positive for flank pain. Negative for difficulty urinating, frequency and hot flashes.   Allergic/Immunologic: Negative for environmental allergies.   Neurological:  Negative for dizziness and light-headedness.   Psychiatric/Behavioral:  Negative for agitation.                 Vitals:    08/09/23 0958   BP: 134/71   BP Location: Right arm   Patient Position: Sitting   BP Method: Large (Automatic)   Pulse: 80   Resp: 18   Temp: 97.4 °F (36.3 °C)   TempSrc: Temporal   SpO2: 96%   Weight: 95 kg (209 lb 6.4 oz)   Height: 5' 9" (1.753 " m)        Physical Exam  Vitals and nursing note reviewed.   Constitutional:       Appearance: Normal appearance.   Cardiovascular:      Rate and Rhythm: Normal rate and regular rhythm.      Pulses: Normal pulses.      Heart sounds: Normal heart sounds.   Pulmonary:      Effort: Pulmonary effort is normal.      Breath sounds: Normal breath sounds.   Abdominal:      General: Abdomen is flat. Bowel sounds are normal.      Palpations: Abdomen is soft.   Musculoskeletal:         General: Normal range of motion.   Skin:     General: Skin is warm and dry.   Neurological:      General: No focal deficit present.      Mental Status: She is alert and oriented to person, place, and time. Mental status is at baseline.           Last Labs:     Office Visit on 08/09/2023   Component Date Value    Color, UA 08/09/2023 Light-Yellow     Clarity, UA 08/09/2023 Clear     pH, UA 08/09/2023 5.0     Leukocytes, UA 08/09/2023 Negative     Nitrites, UA 08/09/2023 Negative     Protein, UA 08/09/2023 Negative     Glucose, UA 08/09/2023 >1000     Ketones, UA 08/09/2023 Negative     Urobilinogen, UA 08/09/2023 Normal     Bilirubin, UA 08/09/2023 Negative     Blood, UA 08/09/2023 Negative     Specific Gravity, UA 08/09/2023 1.017        Last Imaging:  X-Ray KUB  Narrative: EXAMINATION:  XR KUB    CLINICAL HISTORY:  Abdominal pain    COMPARISON:  None available    TECHNIQUE:  XR KUB    FINDINGS:  No free fluid or free air seen.  The bowel gas pattern appears within normal limits.  No abnormal calcifications are present.  Multiple clips from previous surgeries.  No other abnormality is identified.  Impression: No evidence of acute process demonstrated    Electronically signed by: Ervin Fonseca  Date:    08/09/2023  Time:    14:54         **Labs and x-rays personally reviewed by me    ** reviewed      Objective:        Assessment:       1. Muscle strain  X-Ray KUB    Urinalysis, Reflex to Urine Culture    Urinalysis, Reflex to Urine Culture       2. Osteoarthritis of right knee, unspecified osteoarthritis type  US Lower Extremity Veins Right      3. Leg edema  US Lower Extremity Veins Right           Plan:         [unfilled]

## 2023-08-14 PROBLEM — Z13.1 SCREENING FOR DIABETES MELLITUS (DM): Status: RESOLVED | Noted: 2023-05-09 | Resolved: 2023-08-14

## 2023-08-15 ENCOUNTER — CLINICAL SUPPORT (OUTPATIENT)
Dept: REHABILITATION | Facility: HOSPITAL | Age: 68
End: 2023-08-15
Payer: MEDICARE

## 2023-08-15 DIAGNOSIS — M25.561 CHRONIC PAIN OF RIGHT KNEE: Primary | ICD-10-CM

## 2023-08-15 DIAGNOSIS — G89.29 CHRONIC PAIN OF RIGHT KNEE: Primary | ICD-10-CM

## 2023-08-15 PROCEDURE — 97110 THERAPEUTIC EXERCISES: CPT | Mod: CQ

## 2023-08-15 PROCEDURE — 97035 APP MDLTY 1+ULTRASOUND EA 15: CPT | Mod: CQ

## 2023-08-15 RX ORDER — FLUCONAZOLE 200 MG/1
200 TABLET ORAL
Qty: 3 TABLET | Refills: 1 | Status: SHIPPED | OUTPATIENT
Start: 2023-08-15 | End: 2023-12-04

## 2023-08-15 NOTE — PROGRESS NOTES
[facility]   Physical Therapy Treatment Note      Name: Olga Stephenson  Clinic Number: [id number]    Therapy Diagnosis:   Encounter Diagnosis   Name Primary?    Chronic pain of right knee Yes     Physician: Erick Tolliver MD    Visit Date: 8/15/2023    Physician: Erick Tolliver MD     Physician Orders:  -EVAL AND TREAT 2-3 TIMES A WK FOR 6-8 WEEKS, ROM & STRENGTHENING, MODALITIES AT THERAPIST DISCRETION, HOME EXERCISE PROGRAM  Medical Diagnosis from Referral: Z96.651 (ICD-10-CM) - Status post total knee replacement, right  2/9/23; Right total knee Biceps Tendonitis stretches  Evaluation Date: 7/27/2023  Authorization Period Expiration: 9/8/23  Plan of Care Expiration: 9/8/23  Visit # / Visits authorized: 4/12   FOTO: 51/100     Precautions: Standard      Time In: 1103  Time Out: 1141  Total Appointment Time (timed & untimed codes): 38 minutes    PTA Visit #: 1/5       Subjective     Pt reports: My knee is about the same, Still bothering me.   She was compliant with home exercise program.  Response to previous treatment: Pt has been a week due to sickness and Right lower extremity pain.   Functional change: NA    Pain: 7/10  Location: right knee      Objective          Treatment     Olga received the treatments listed below:      therapeutic exercises to develop ROM and flexibility for 30 minutes including:  Scifit stepper x 6 min level 2.0  Supine right hamstring stretch 10 x 10 seconds with strap  Supine knee extension stretch with bolster under heel and passive overpressure x5 reps 15 s/h  TKE with small bolster 2x15 with 5 s/h  Supine R Straight leg raises  2 x 10   Seated right calf stretch with strap 10 x 10 seconds  Standing Terminal knee extension gr tband 2 x 10  Standing calf stretch 5 x 10 s/h     direct contact modalities after being cleared for contraindications: Ultrasound:  Olga received ultrasound to manage pain and inflammation at 100 % duty cycle applied to the  hamstring insertion on lateral side of R knee at an intensity of  1.5 W/cm2  for a duration of 8 minutes. Patient tolerated treatment well without adverse effects. Therapist was in attendance throughout intervention.        Patient Education and Home Exercises       Education provided:   - received verbal and tactile cues to facilitate proper execution of exercises and body mechanics.     Written Home Exercises Provided: Patient instructed to cont prior HEP. Exercises were reviewed and Olga was able to demonstrate them prior to the end of the session.  Olga demonstrated good  understanding of the education provided. See EMR under Patient Instructions for exercises provided during therapy sessions.    Assessment     Olga tolerates her PT treatment fairly well so far but still no improvement with her pain level with Right lower extremity at this time. Will continue to decrease pain and improve ROM.     Olga Is progressing well towards her goals.   Pt prognosis is Excellent.     Pt will continue to benefit from skilled outpatient physical therapy to address the deficits listed in the problem list box on initial evaluation, provide pt/family education and to maximize pt's level of independence in the home and community environment.     Pt's spiritual, cultural and educational needs considered and pt agreeable to plan of care and goals.     Anticipated barriers to physical therapy: none    Goals:  Short Term Goals: 3 weeks   Pt will increase right knee extension to -5 degrees, and quad lag to -5 degrees to allow patient normal gait pattern.  Pt will demonstrate independent performance of home exercise program.  Pt will report decrease in pain right knee to 3/10 to improve quality of life.     Long Term Goals: 6 weeks   1. Pt will increase right knee extension to 0 degrees, and quad lag to 0 degrees to allow patient normal gait mechanics for community mobility.  2. Pt will report decrease in pain knee to 0/10 to improve  quality of life.       Plan     Continue with POC.    Rebecca Garcai, PTA

## 2023-08-17 ENCOUNTER — OFFICE VISIT (OUTPATIENT)
Dept: OBSTETRICS AND GYNECOLOGY | Facility: CLINIC | Age: 68
End: 2023-08-17
Payer: MEDICARE

## 2023-08-17 VITALS
OXYGEN SATURATION: 100 % | WEIGHT: 204 LBS | SYSTOLIC BLOOD PRESSURE: 118 MMHG | HEIGHT: 69 IN | DIASTOLIC BLOOD PRESSURE: 70 MMHG | HEART RATE: 78 BPM | RESPIRATION RATE: 19 BRPM | BODY MASS INDEX: 30.21 KG/M2

## 2023-08-17 DIAGNOSIS — Z87.19 HISTORY OF GI BLEED: ICD-10-CM

## 2023-08-17 DIAGNOSIS — Z79.890 POSTMENOPAUSAL HORMONE THERAPY: Primary | ICD-10-CM

## 2023-08-17 DIAGNOSIS — Z78.0 POSTMENOPAUSAL: ICD-10-CM

## 2023-08-17 PROCEDURE — 99203 OFFICE O/P NEW LOW 30 MIN: CPT | Mod: S$PBB,,, | Performed by: ADVANCED PRACTICE MIDWIFE

## 2023-08-17 PROCEDURE — 99214 OFFICE O/P EST MOD 30 MIN: CPT | Mod: PBBFAC | Performed by: ADVANCED PRACTICE MIDWIFE

## 2023-08-17 PROCEDURE — 99203 PR OFFICE/OUTPT VISIT, NEW, LEVL III, 30-44 MIN: ICD-10-PCS | Mod: S$PBB,,, | Performed by: ADVANCED PRACTICE MIDWIFE

## 2023-08-17 RX ORDER — ESTRADIOL 0.1 MG/D
1 FILM, EXTENDED RELEASE TRANSDERMAL
Qty: 24 PATCH | Refills: 3 | Status: SHIPPED | OUTPATIENT
Start: 2023-08-17 | End: 2024-07-18

## 2023-08-17 NOTE — PROGRESS NOTES
Subjective     Patient ID: Olga Stephenson is a 68 y.o. female.    Chief Complaint: postmenopausal (Pt needs a refill on her hormone patches.)    Patient here wanting refill on hormone patches.  Has tried weaning off but hot flashes will return and pt wishes to continue.  Scheduled for MMG at the end of August or early September.  Colonoscopy 2023 , polyps  No history of abnormal pap  Denies any pelvic pain, discharge, odor or itching.           Review of Systems   Constitutional: Negative.    HENT: Negative.     Eyes: Negative.    Respiratory: Negative.     Cardiovascular: Negative.    Gastrointestinal: Negative.    Genitourinary: Negative.  Positive for hot flashes.   Musculoskeletal: Negative.    Integumentary:  Negative.   Allergic/Immunologic: Negative.    Neurological: Negative.    Psychiatric/Behavioral: Negative.            Objective     Physical Exam  Vitals reviewed.   Constitutional:       Appearance: Normal appearance.   HENT:      Head: Normocephalic.   Cardiovascular:      Rate and Rhythm: Normal rate and regular rhythm.   Pulmonary:      Effort: Pulmonary effort is normal.      Breath sounds: Normal breath sounds.   Abdominal:      General: Abdomen is flat.      Palpations: Abdomen is soft.   Musculoskeletal:         General: Normal range of motion.      Cervical back: Normal range of motion.   Skin:     General: Skin is warm and dry.   Neurological:      Mental Status: She is alert and oriented to person, place, and time.   Psychiatric:         Mood and Affect: Mood normal.         Behavior: Behavior normal.            Assessment and Plan     1. Postmenopausal hormone therapy  -     estradioL (VIVELLE-DOT) 0.1 mg/24 hr PTSW; Place 1 patch onto the skin twice a week.  Dispense: 24 patch; Refill: 3    2. Postmenopausal  -     Ambulatory referral/consult to Gynecology    3. History of GI bleed  -     Ambulatory referral/consult to Gastroenterology; Future; Expected date: 09/17/2023        Referral to  GI for follow up  Continue patches as prescribed. Discussed recommendations to stop HRT after age 65, pt aware and wishes to continue.       Follow up in about 1 year (around 8/17/2024) for Annual Exam.

## 2023-08-22 ENCOUNTER — CLINICAL SUPPORT (OUTPATIENT)
Dept: REHABILITATION | Facility: HOSPITAL | Age: 68
End: 2023-08-22
Payer: MEDICARE

## 2023-08-22 DIAGNOSIS — M25.561 CHRONIC PAIN OF RIGHT KNEE: Primary | ICD-10-CM

## 2023-08-22 DIAGNOSIS — G89.29 CHRONIC PAIN OF RIGHT KNEE: Primary | ICD-10-CM

## 2023-08-22 PROCEDURE — 97032 APPL MODALITY 1+ESTIM EA 15: CPT | Mod: CQ

## 2023-08-22 PROCEDURE — 97110 THERAPEUTIC EXERCISES: CPT | Mod: CQ

## 2023-08-22 NOTE — PROGRESS NOTES
"[facility]   Physical Therapy Treatment Note      Name: Olga Stephenson  Clinic Number: [id number]    Therapy Diagnosis:   Encounter Diagnosis   Name Primary?    Chronic pain of right knee Yes     Physician: Erick Tolliver MD    Visit Date: 8/22/2023    Physician: Erick Tolliver MD     Physician Orders:  -EVAL AND TREAT 2-3 TIMES A WK FOR 6-8 WEEKS, ROM & STRENGTHENING, MODALITIES AT THERAPIST DISCRETION, HOME EXERCISE PROGRAM  Medical Diagnosis from Referral: Z96.651 (ICD-10-CM) - Status post total knee replacement, right  2/9/23; Right total knee Biceps Tendonitis stretches  Evaluation Date: 7/27/2023  Authorization Period Expiration: 9/8/23  Plan of Care Expiration: 9/8/23  Visit#: 4/12   PTA Visit #: 2/5   FOTO: 51/100     Precautions: Standard      Time In: 1105  Time Out: 1145  Total Appointment Time (timed & untimed codes): 40 minutes          Subjective     Pt reports: "the pain isn't that bad about a 6  She was compliant with home exercise program.  Response to previous treatment: Pt responded well to treatment  Functional change: NA    Pain: 6/10  Location: right knee      Objective          Treatment     Olga received the treatments listed below:      therapeutic exercises to develop ROM and flexibility for 32 minutes including:  Scifit stepper x 6 min level 2.0  Supine right hamstring stretch 10 x 10 seconds with strap  Supine knee extension stretch with bolster under heel and passive overpressure x5 reps 15 s/h  TKE with small bolster 2x15 with 5 s/h  Supine R Straight leg raises  2 x 10   Seated right calf stretch with strap 10 x 10 seconds  Standing Terminal knee extension gr tband 2 x 10  Standing calf stretch 5 x 10 s/h     direct contact modalities after being cleared for contraindications: Ultrasound:  Olga received ultrasound to manage pain and inflammation at 100 % duty cycle applied to the hamstring insertion on lateral side of R knee at an intensity of  1.5 " W/cm2  for a duration of 8 minutes. Patient tolerated treatment well without adverse effects. Therapist was in attendance throughout intervention.        Patient Education and Home Exercises       Education provided:   - received verbal and tactile cues to facilitate proper execution of exercises and body mechanics.     Written Home Exercises Provided: Patient instructed to cont prior HEP. Exercises were reviewed and Olga was able to demonstrate them prior to the end of the session.  Olga demonstrated good  understanding of the education provided. See EMR under Patient Instructions for exercises provided during therapy sessions.    Assessment     Olga tolerates her PT treatment  well without great difficulty or pain. She did say that ultrasound helps decrease the pain, and would like to continue. She does have lateral knee pain around the insertion of IT band. DPT talked to patient regarding the treatment of Dry needling if pain does not cease. Patient was receptive and we will continue POC at this time.    Olga Is progressing well towards her goals.   Pt prognosis is Excellent.     Pt will continue to benefit from skilled outpatient physical therapy to address the deficits listed in the problem list box on initial evaluation, provide pt/family education and to maximize pt's level of independence in the home and community environment.     Pt's spiritual, cultural and educational needs considered and pt agreeable to plan of care and goals.     Anticipated barriers to physical therapy: none    Goals:  Short Term Goals: 3 weeks   Pt will increase right knee extension to -5 degrees, and quad lag to -5 degrees to allow patient normal gait pattern.  Pt will demonstrate independent performance of home exercise program.  Pt will report decrease in pain right knee to 3/10 to improve quality of life.     Long Term Goals: 6 weeks   1. Pt will increase right knee extension to 0 degrees, and quad lag to 0 degrees to allow  patient normal gait mechanics for community mobility.  2. Pt will report decrease in pain knee to 0/10 to improve quality of life.       Plan     Continue with POC.    Racheal [employer], PTA

## 2023-08-23 ENCOUNTER — PATIENT OUTREACH (OUTPATIENT)
Dept: ADMINISTRATIVE | Facility: HOSPITAL | Age: 68
End: 2023-08-23

## 2023-08-23 ENCOUNTER — OFFICE VISIT (OUTPATIENT)
Dept: FAMILY MEDICINE | Facility: CLINIC | Age: 68
End: 2023-08-23
Payer: MEDICARE

## 2023-08-23 VITALS
OXYGEN SATURATION: 98 % | BODY MASS INDEX: 30.9 KG/M2 | DIASTOLIC BLOOD PRESSURE: 88 MMHG | SYSTOLIC BLOOD PRESSURE: 140 MMHG | TEMPERATURE: 98 F | HEIGHT: 69 IN | HEART RATE: 75 BPM | RESPIRATION RATE: 18 BRPM | WEIGHT: 208.63 LBS

## 2023-08-23 DIAGNOSIS — E55.9 VITAMIN D DEFICIENCY: ICD-10-CM

## 2023-08-23 DIAGNOSIS — I10 PRIMARY HYPERTENSION: ICD-10-CM

## 2023-08-23 DIAGNOSIS — E53.8 VITAMIN B12 DEFICIENCY: ICD-10-CM

## 2023-08-23 DIAGNOSIS — E11.9 TYPE 2 DIABETES MELLITUS WITHOUT COMPLICATION, WITHOUT LONG-TERM CURRENT USE OF INSULIN: Primary | ICD-10-CM

## 2023-08-23 DIAGNOSIS — E78.5 HYPERLIPIDEMIA, UNSPECIFIED HYPERLIPIDEMIA TYPE: ICD-10-CM

## 2023-08-23 DIAGNOSIS — Z23 NEED FOR VACCINATION: ICD-10-CM

## 2023-08-23 LAB
ANION GAP SERPL CALCULATED.3IONS-SCNC: 9 MMOL/L (ref 7–16)
BUN SERPL-MCNC: 12 MG/DL (ref 7–18)
BUN/CREAT SERPL: 13 (ref 6–20)
CALCIUM SERPL-MCNC: 9.9 MG/DL (ref 8.5–10.1)
CHLORIDE SERPL-SCNC: 105 MMOL/L (ref 98–107)
CHOLEST SERPL-MCNC: 167 MG/DL (ref 0–200)
CHOLEST/HDLC SERPL: 3.8 {RATIO}
CO2 SERPL-SCNC: 29 MMOL/L (ref 21–32)
CREAT SERPL-MCNC: 0.91 MG/DL (ref 0.55–1.02)
EGFR (NO RACE VARIABLE) (RUSH/TITUS): 69 ML/MIN/1.73M2
GLUCOSE SERPL-MCNC: 144 MG/DL (ref 74–106)
HDLC SERPL-MCNC: 44 MG/DL (ref 40–60)
LDLC SERPL CALC-MCNC: 86 MG/DL
LDLC/HDLC SERPL: 2 {RATIO}
MAGNESIUM SERPL-MCNC: 2.4 MG/DL (ref 1.7–2.3)
NONHDLC SERPL-MCNC: 123 MG/DL
POTASSIUM SERPL-SCNC: 4.6 MMOL/L (ref 3.5–5.1)
SODIUM SERPL-SCNC: 138 MMOL/L (ref 136–145)
TRIGL SERPL-MCNC: 187 MG/DL (ref 35–150)
VLDLC SERPL-MCNC: 37 MG/DL

## 2023-08-23 PROCEDURE — 99214 OFFICE O/P EST MOD 30 MIN: CPT | Mod: ,,, | Performed by: NURSE PRACTITIONER

## 2023-08-23 PROCEDURE — 90677 PNEUMOCOCCAL CONJUGATE VACCINE 20-VALENT: ICD-10-PCS | Mod: ,,, | Performed by: NURSE PRACTITIONER

## 2023-08-23 PROCEDURE — 80048 BASIC METABOLIC PNL TOTAL CA: CPT | Mod: ,,, | Performed by: CLINICAL MEDICAL LABORATORY

## 2023-08-23 PROCEDURE — 80061 LIPID PANEL: ICD-10-PCS | Mod: ,,, | Performed by: CLINICAL MEDICAL LABORATORY

## 2023-08-23 PROCEDURE — 80061 LIPID PANEL: CPT | Mod: ,,, | Performed by: CLINICAL MEDICAL LABORATORY

## 2023-08-23 PROCEDURE — 96372 THER/PROPH/DIAG INJ SC/IM: CPT | Mod: ,,, | Performed by: NURSE PRACTITIONER

## 2023-08-23 PROCEDURE — 83735 MAGNESIUM: ICD-10-PCS | Mod: ,,, | Performed by: CLINICAL MEDICAL LABORATORY

## 2023-08-23 PROCEDURE — G0009 ADMIN PNEUMOCOCCAL VACCINE: HCPCS | Mod: ,,, | Performed by: NURSE PRACTITIONER

## 2023-08-23 PROCEDURE — 99214 PR OFFICE/OUTPT VISIT, EST, LEVL IV, 30-39 MIN: ICD-10-PCS | Mod: ,,, | Performed by: NURSE PRACTITIONER

## 2023-08-23 PROCEDURE — 83735 ASSAY OF MAGNESIUM: CPT | Mod: ,,, | Performed by: CLINICAL MEDICAL LABORATORY

## 2023-08-23 PROCEDURE — 96372 PR INJECTION,THERAP/PROPH/DIAG2ST, IM OR SUBCUT: ICD-10-PCS | Mod: ,,, | Performed by: NURSE PRACTITIONER

## 2023-08-23 PROCEDURE — 90677 PCV20 VACCINE IM: CPT | Mod: ,,, | Performed by: NURSE PRACTITIONER

## 2023-08-23 PROCEDURE — G0009 PNEUMOCOCCAL CONJUGATE VACCINE 20-VALENT: ICD-10-PCS | Mod: ,,, | Performed by: NURSE PRACTITIONER

## 2023-08-23 PROCEDURE — 80048 BASIC METABOLIC PANEL: ICD-10-PCS | Mod: ,,, | Performed by: CLINICAL MEDICAL LABORATORY

## 2023-08-23 RX ORDER — CYANOCOBALAMIN 1000 UG/ML
1000 INJECTION, SOLUTION INTRAMUSCULAR; SUBCUTANEOUS ONCE
Status: COMPLETED | OUTPATIENT
Start: 2023-08-23 | End: 2023-08-23

## 2023-08-23 RX ORDER — GLIPIZIDE 5 MG/1
5 TABLET ORAL DAILY
COMMUNITY
Start: 2023-08-13 | End: 2024-02-21

## 2023-08-23 RX ORDER — LOSARTAN POTASSIUM 50 MG/1
50 TABLET ORAL DAILY
Qty: 90 TABLET | Refills: 3 | Status: SHIPPED | OUTPATIENT
Start: 2023-08-23 | End: 2023-12-18

## 2023-08-23 RX ORDER — CYANOCOBALAMIN 1000 UG/ML
INJECTION, SOLUTION INTRAMUSCULAR; SUBCUTANEOUS
Qty: 7 ML | Refills: 0 | Status: SHIPPED | OUTPATIENT
Start: 2023-08-30 | End: 2023-11-10

## 2023-08-23 RX ORDER — CHOLECALCIFEROL (VITAMIN D3) 25 MCG
1000 TABLET ORAL DAILY
Qty: 90 TABLET | Refills: 3
Start: 2023-08-23 | End: 2023-11-16 | Stop reason: ALTCHOICE

## 2023-08-23 RX ADMIN — CYANOCOBALAMIN 1000 MCG: 1000 INJECTION, SOLUTION INTRAMUSCULAR; SUBCUTANEOUS at 11:08

## 2023-08-23 NOTE — PROGRESS NOTES
[facility]  Chief Complaint      Chief Complaint   Patient presents with    Establish Care     Patient reports to the clinic today to establish care with NP. She is fasting but did not bring her medication bottles today.    Health Maintenance     Care gaps addressed, patient had her eye exam with Dr Tolliver in [place] last month. She declines Tdap at this time but would like Pneumonia vaccine today. She agrees to foot exam today. She has had 3 of the Covid vaccines but declines any further vaccines at this time. Her mammogram is scheduled for 9/12 and DEXA scan is scheduled for 9/1.    Shaye Chavarria CMA       History of Present Illness      Olga Stephenson is a 68 y.o. female with chronic conditions of CAD with Hx of CABG, Anxiety, Vitamin B 12 Deficiency, Vitamin D Deficiency, Fatty Liver, DM Type 2, Cardiomegaly, Hysterectomy Status on HRT, Hypertension, Hyperlipidemia, GERD, Chronic Low Back Pain, Osteoarthritis Right Knee, Hx of Thyroid Nodule, Allergic Rhinitis, Insomnia,  Hx of GI Bleed, Colon Polyp, Bilateral Lower Extremity Edema, and Obesity who presents today to establish care. Ms. Stephenson is fasting and did not bring her medication bottles today but reports all medications are accurate. Ms. Stephenson has been a  for 5 years after 46 years of marriage. She has three children Nicki (49) Britt (45) and Graeme (36).  She is retired from [employer] ( and ).  She is due for foot exam today. She is due for pneumonia vaccine today. She declines tetanus vaccine today. She reports her eye exam is up-to-date. She declines any further Covid vaccines at this time.     Care Team:  Diabetes Mae Gautam NP  Neurology Dr. Malik Lomas  Pain Management Dr. Idris Morales / Eugenia Tolliver, SAGAR  Gynecology Shanelle Gusman, ANKIT  Orthopedic Dr. Erick Tolliver  Cardiology Dr. Amparo Brand   Optometry Dr. Navarro Tolliver  Gastroenterology Dr. ONEIL Gomez  / PHILIP Jackson   General Surgery Dr. Juan Damico III    Care Gaps Discussed:  Mammogram 2022 repeat scheduled 2023  Colorectal Cancer Screening  2022 Dr. ONEIL Gomez repeat in 3 years due 2025  Hemoglobin A1c 2023 6.6%  Diabetes Urine Screening 2022 due 2023  Lipid Panel 10/26/2022 Chol 178 Trig 245 LDL n/a HDL 35  High Dose Statin per cardiology notes Atorvastatin and Rosuvastatin caused myalgias   Eye Exam  up-to-date per patient Dr. Navarro Tolliver, requests requested   Foot Exam 2023 repeat 2024  Tetanus Vaccine overdue   Shingles Vaccine #1 2022 #2 2023  Influenza Vaccine 2022 repeat due on/after 2023  Pneumonia Vaccine Prevnar 20 2023  Covid 19 Vaccine 2021 overdue for 4th dose since 2023  Pap/GYN Exam Hysterectomy Status last HPV negative 10/24/2022 GYN MALINI Gusman CNM  DEXA Scan 2023 @ 1300  Hep C Screening 2021 non-reactive       Past Medical History:  Past Medical History:   Diagnosis Date    Acute superficial gastritis without hemorrhage 2022    Adenomatous polyp of cecum 06/15/2022    Adenomatous polyp of transverse colon 06/15/2022    Anticoagulant long-term use     Arthritis     Asthma     Coronary artery disease     Diabetes mellitus, type 2 2014    Diverticula, colon 06/15/2022    DVT (deep venous thrombosis)     Epigastric pain 2022    Heart attack     History of colon polyps 06/15/2022    Hyperlipidemia     Hypertension 2014    Polyp of hepatic flexure of colon 06/15/2022    Screening for colon cancer 06/15/2022    Thyroid disease        Past Surgical History:   has a past surgical history that includes Esophagogastroduodenoscopy (03/10/2021); Colonoscopy (2017); Hysterectomy;  section; Bilateral oophorectomy (Bilateral); Cholecystectomy; arthroplasty, knee, total, using computer-assisted navigation (Right, 2023); and Coronary  "artery bypass graft (2011).    Social History:  Social History     Tobacco Use    Smoking status: Former     Current packs/day: 0.00     Passive exposure: Past    Smokeless tobacco: Never   Substance Use Topics    Alcohol use: Not Currently     Comment: occasionally    Drug use: Never       I personally reviewed all past medical, surgical, and social.     Review of Systems   Constitutional:  Negative for chills and fever.   HENT:  Negative for congestion, ear pain, rhinorrhea, sore throat and trouble swallowing.    Eyes:  Negative for visual disturbance.   Respiratory:  Positive for shortness of breath (with exertion). Negative for cough.    Cardiovascular:  Positive for palpitations. Negative for chest pain and leg swelling.   Gastrointestinal:  Positive for abdominal pain ("very much in the lower part" ongoing x 2 months). Negative for constipation and diarrhea.   Genitourinary:  Positive for frequency. Negative for dysuria and urgency.   Musculoskeletal:  Positive for arthralgias (right knee, left shoulder), back pain ("always that's why I got to the pain clinic") and joint swelling (right knee). Negative for neck pain.   Skin:  Negative for rash.   Neurological:  Positive for headaches (last week). Negative for dizziness.   Psychiatric/Behavioral:  Positive for sleep disturbance ("I have trouble falling asleep"). Negative for dysphoric mood. The patient is not nervous/anxious.         Medications:  Outpatient Encounter Medications as of 8/23/2023   Medication Sig Dispense Refill    albuterol (PROVENTIL/VENTOLIN HFA) 90 mcg/actuation inhaler USE 2 INHALATIONS TWICE A DAY 17 g 3    blood sugar diagnostic (FREESTYLE LITE STRIPS) Strp USE 1 STRIP DAILY TO MONITOR GLUCOSE 100 strip 3    clopidogreL (PLAVIX) 75 mg tablet Take 1 tablet (75 mg total) by mouth once daily. 30 tablet 1    cyclobenzaprine (FLEXERIL) 10 MG tablet 1 tablet nightly as needed.      dapagliflozin (FARXIGA) 10 mg tablet Take 1 tablet (10 mg " "total) by mouth once daily. 90 tablet 3    estradioL (VIVELLE-DOT) 0.1 mg/24 hr PTSW Place 1 patch onto the skin twice a week. 24 patch 3    ezetimibe (ZETIA) 10 mg tablet Take 1 tablet by mouth every evening.      fluconazole (DIFLUCAN) 200 MG Tab TAKE ONE TABLET BY MOUTH EVERY DAY 3 tablet 1    fluticasone propionate (FLONASE) 50 mcg/actuation nasal spray 1 spray (50 mcg total) by Each Nostril route once daily. 16 g 3    glipiZIDE (GLUCOTROL) 5 MG tablet Take 5 mg by mouth once daily.      HYDROcodone-acetaminophen (NORCO)  mg per tablet Take 1 tablet by mouth every 6 (six) hours as needed for Pain. 28 tablet 0    HYDROcodone-acetaminophen (NORCO)  mg per tablet Take 1 tablet by mouth every 6 (six) hours as needed for Pain. 28 tablet 0    metFORMIN (GLUCOPHAGE) 1000 MG tablet Take 1,000 mg by mouth nightly.      metoprolol succinate (TOPROL-XL) 25 MG 24 hr tablet Take 1 tablet by mouth once daily.      multivitamin capsule Take 1 capsule by mouth once daily.      naproxen (NAPROSYN) 500 MG tablet Take 1 tablet (500 mg total) by mouth 2 (two) times daily as needed (pain). 20 tablet 0    pantoprazole (PROTONIX) 40 MG tablet Take 1 tablet (40 mg total) by mouth once daily. 90 tablet 3    pregabalin (LYRICA) 75 MG capsule Take 150 mg by mouth nightly.      tiZANidine (ZANAFLEX) 4 MG tablet Take by mouth.      valACYclovir (VALTREX) 500 MG tablet Take 1 tablet (500 mg total) by mouth once daily. 90 tablet 1    [DISCONTINUED] losartan (COZAAR) 50 MG tablet Take 1 tablet (50 mg total) by mouth once daily. 90 tablet 3    [START ON 8/30/2023] cyanocobalamin 1,000 mcg/mL injection Inject 1 mL (1,000 mcg total) into the muscle every Wednesday for 42 days, THEN 1 mL (1,000 mcg total) every 30 days. 7 mL 0    losartan (COZAAR) 50 MG tablet Take 1 tablet (50 mg total) by mouth once daily. 90 tablet 3    syringe with needle 1 mL 28 gauge x 1/2" Syrg 1 Syringe by Misc.(Non-Drug; Combo Route) route every Wednesday for " 42 days, THEN 1 Syringe every 30 days. For B12 injections.. 7 each 0    vitamin D (VITAMIN D3) 1000 units Tab Take 1 tablet (1,000 Units total) by mouth once daily. 90 tablet 3    [DISCONTINUED] glipiZIDE 5 MG TR24 Take 1 tablet (5 mg total) by mouth daily with breakfast. 90 tablet 1    [DISCONTINUED] hydrocortisone 2.5 % cream        Facility-Administered Encounter Medications as of 8/23/2023   Medication Dose Route Frequency Provider Last Rate Last Admin    cyanocobalamin injection 1,000 mcg  1,000 mcg Intramuscular Once Kenya De Paz NP           Allergies:  Review of patient's allergies indicates:   Allergen Reactions    Jardiance [empagliflozin] Anaphylaxis     Headaches     Trulicity [dulaglutide]      Abdominal pain    Ozempic [semaglutide] Nausea And Vomiting       Health Maintenance:  Immunization History   Administered Date(s) Administered    COVID-19, MRNA, LN-S, PF (MODERNA FULL 0.5 ML DOSE) 03/29/2021, 04/27/2021, 12/17/2021    Influenza (FLUAD) - Quadrivalent - Adjuvanted - PF *Preferred* (65+) 11/03/2022    Influenza - Quadrivalent - High Dose - PF (65 years and older) 11/08/2021    Zoster Recombinant 10/25/2018, 11/03/2022, 01/23/2023        Health Maintenance   Topic Date Due    TETANUS VACCINE  Never done    DEXA Scan  Never done    Eye Exam  02/01/2023    Mammogram  05/23/2023    Hemoglobin A1c  08/09/2023    Lipid Panel  10/26/2023    Foot Exam  08/23/2024    Colorectal Cancer Screening  06/25/2025    Hepatitis C Screening  Completed    Shingles Vaccine  Completed    High Dose Statin  Discontinued        Physical Exam     Physical Exam  Constitutional:       General: She is not in acute distress.     Appearance: She is obese.   HENT:      Head: Normocephalic.      Right Ear: Tympanic membrane, ear canal and external ear normal. There is no impacted cerumen.      Left Ear: Tympanic membrane, ear canal and external ear normal. There is no impacted cerumen.   Cardiovascular:      Rate and Rhythm:  Normal rate and regular rhythm.      Pulses: Normal pulses.           Dorsalis pedis pulses are 2+ on the right side and 2+ on the left side.      Heart sounds: Normal heart sounds.   Pulmonary:      Effort: Pulmonary effort is normal.      Breath sounds: Normal breath sounds.   Abdominal:      General: Bowel sounds are normal.      Palpations: Abdomen is soft.      Tenderness: There is abdominal tenderness in the suprapubic area and left lower quadrant.   Musculoskeletal:      Right foot: No deformity.      Left foot: No deformity.   Feet:      Right foot:      Protective Sensation: 8 sites tested.  8 sites sensed.      Skin integrity: Skin integrity normal.      Toenail Condition: Right toenails are normal.      Left foot:      Protective Sensation: 8 sites tested.  8 sites sensed.      Skin integrity: Skin integrity normal.      Toenail Condition: Left toenails are normal.   Skin:     General: Skin is warm and dry.   Neurological:      Mental Status: She is alert and oriented to person, place, and time.   Psychiatric:         Mood and Affect: Mood normal.         Behavior: Behavior normal.          Laboratory:    Lab Results   Component Value Date     (H) 02/24/2023     02/24/2023    K 3.4 (L) 02/24/2023     02/24/2023    CO2 28 02/24/2023    BUN 13 02/24/2023    CREATININE 0.78 02/24/2023    CALCIUM 9.2 02/24/2023    PROT 7.3 02/10/2023    ALBUMIN 3.4 (L) 02/10/2023    BILITOT 0.6 02/10/2023    ALKPHOS 63 02/10/2023    AST 25 02/10/2023    ALT 42 02/10/2023    ANIONGAP 10 02/24/2023    ESTGFRAFRICA 68 06/08/2021    EGFRNONAA 83 06/26/2022       Lab Results   Component Value Date    WBC 8.05 03/13/2023    RBC 4.67 03/13/2023    HGB 13.3 03/13/2023    HCT 43.0 03/13/2023    MCV 92.1 03/13/2023    RDW 13.2 03/13/2023     03/13/2023        Lab Results   Component Value Date    CHOL 178 10/26/2022    CHOL 137 01/30/2020    TRIG 245 (H) 10/26/2022    TRIG 165 01/30/2020    HDL 35 (L)  10/26/2022    HDL 37 01/30/2020    LDLCALC 92 01/05/2022       Lab Results   Component Value Date    TSH 0.441 03/28/2023       Lab Results   Component Value Date    HGBA1C 6.6 05/09/2023    ESTIMATEDAVG 134 05/09/2023        Lab Results   Component Value Date    KZYFARUH16 223 03/28/2023       Lab Results   Component Value Date    TZPWORCZ35PE 26.2 03/28/2023       Point Of Care Testing:  Nitrites, UA   Date Value Ref Range Status   08/09/2023 Negative Negative Final     Urobilinogen, UA   Date Value Ref Range Status   08/09/2023 Normal 0.2, 1.0, Normal mg/dL Final     pH, UA   Date Value Ref Range Status   08/09/2023 5.0 5.0 to 8.0 pH Units Final     Specific Gravity, UA   Date Value Ref Range Status   08/09/2023 1.017 <=1.030 Final     Ketones, UA   Date Value Ref Range Status   08/09/2023 Negative Negative mg/dL Final       Lab Results   Component Value Date    [id number] Negative 07/08/2021    RAPFLUA Negative 07/08/2021    RAPFLUB Negative 07/08/2021         Assessment/Plan     Type 2 diabetes mellitus without complication, without long-term current use of insulin  -     Foot Exam Performed  -     Basic Metabolic Panel; Future; Expected date: 08/23/2023    Need for vaccination  -     (In Office Administered) Pneumococcal Conjugate Vaccine (20 Valent) (IM)    Primary hypertension  -     losartan (COZAAR) 50 MG tablet; Take 1 tablet (50 mg total) by mouth once daily.  Dispense: 90 tablet; Refill: 3  -     Magnesium; Future; Expected date: 08/23/2023  -     Basic Metabolic Panel; Future; Expected date: 08/23/2023    Hyperlipidemia, unspecified hyperlipidemia type  -     Lipid Panel; Future; Expected date: 08/23/2023    Vitamin B12 deficiency  -     cyanocobalamin injection 1,000 mcg  -     cyanocobalamin 1,000 mcg/mL injection; Inject 1 mL (1,000 mcg total) into the muscle every Wednesday for 42 days, THEN 1 mL (1,000 mcg total) every 30 days.  Dispense: 7 mL; Refill: 0  -     syringe with needle 1 mL 28 gauge x  "1/2" Syrg; 1 Syringe by Misc.(Non-Drug; Combo Route) route every Wednesday for 42 days, THEN 1 Syringe every 30 days. For B12 injections..  Dispense: 7 each; Refill: 0    Vitamin D deficiency  -     vitamin D (VITAMIN D3) 1000 units Tab; Take 1 tablet (1,000 Units total) by mouth once daily.  Dispense: 90 tablet; Refill: 3        Return to clinic in 3 months will repeat Vitamin B 12 and Vitamin D levels at that time.    Questions answered to desired level of satisfaction    Verbalized understanding to all information and instructions provided      ERIN [facility]    "

## 2023-08-23 NOTE — LETTER
AUTHORIZATION FOR RELEASE OF   CONFIDENTIAL INFORMATION    Dear Dr. Tolliver,    We are seeing Olga Stephenson, date of birth 1955, in the clinic at [facility]. Kenya De Paz NP is the patient's PCP. Olga Stephenson has an outstanding lab/procedure at the time we reviewed her chart. In order to help keep her health information updated, she has authorized us to request the following medical record(s):        (  )  MAMMOGRAM                                      (  )  COLONOSCOPY      (  )  PAP SMEAR                                          (  )  OUTSIDE LAB RESULTS     (  )  DEXA SCAN                                          ( x )  EYE EXAM            (  )  FOOT EXAM                                          (  )  ENTIRE RECORD     (  )  OUTSIDE IMMUNIZATIONS                 (  )  _______________         Please fax records to Fran Boykin LPN Care Coordinator at [phone].      If you have any questions, please contact Fran at [phone].           Patient Name: Olga Stephenson  : 1955  Patient Phone #: [phone]

## 2023-08-23 NOTE — PATIENT INSTRUCTIONS
Please bring ALL medications bottles (from ALL providers) including over-the-counter medications to your next appointment !!!       Get over-the-counter Vitamin D 1000 I.U. take once daily     Start Vitamin B 12 injections 1000 mcg/mL Wednesday for 6 weeks, then once monthly thereafter     Will repeat B 12 level at next visit

## 2023-08-28 ENCOUNTER — PATIENT OUTREACH (OUTPATIENT)
Dept: ADMINISTRATIVE | Facility: HOSPITAL | Age: 68
End: 2023-08-28

## 2023-08-28 ENCOUNTER — OFFICE VISIT (OUTPATIENT)
Dept: GASTROENTEROLOGY | Facility: CLINIC | Age: 68
End: 2023-08-28
Payer: MEDICARE

## 2023-08-28 VITALS
SYSTOLIC BLOOD PRESSURE: 123 MMHG | RESPIRATION RATE: 18 BRPM | HEIGHT: 69 IN | WEIGHT: 207 LBS | DIASTOLIC BLOOD PRESSURE: 80 MMHG | OXYGEN SATURATION: 99 % | HEART RATE: 98 BPM | BODY MASS INDEX: 30.66 KG/M2

## 2023-08-28 DIAGNOSIS — R10.32 LEFT LOWER QUADRANT ABDOMINAL PAIN: Primary | ICD-10-CM

## 2023-08-28 DIAGNOSIS — R16.0 HEPATOMEGALY: ICD-10-CM

## 2023-08-28 DIAGNOSIS — Z87.19 HISTORY OF GI BLEED: ICD-10-CM

## 2023-08-28 DIAGNOSIS — K76.0 FATTY LIVER: ICD-10-CM

## 2023-08-28 DIAGNOSIS — K31.84 GASTROPARESIS: ICD-10-CM

## 2023-08-28 DIAGNOSIS — R19.7 DIARRHEA, UNSPECIFIED TYPE: ICD-10-CM

## 2023-08-28 DIAGNOSIS — K21.9 GASTROESOPHAGEAL REFLUX DISEASE, UNSPECIFIED WHETHER ESOPHAGITIS PRESENT: ICD-10-CM

## 2023-08-28 PROCEDURE — 99214 OFFICE O/P EST MOD 30 MIN: CPT | Mod: S$PBB,,, | Performed by: NURSE PRACTITIONER

## 2023-08-28 PROCEDURE — 99214 PR OFFICE/OUTPT VISIT, EST, LEVL IV, 30-39 MIN: ICD-10-PCS | Mod: S$PBB,,, | Performed by: NURSE PRACTITIONER

## 2023-08-28 PROCEDURE — 99215 OFFICE O/P EST HI 40 MIN: CPT | Mod: PBBFAC | Performed by: NURSE PRACTITIONER

## 2023-08-28 NOTE — PROGRESS NOTES
Olga Stephenson is a 68 y.o. female here for Abdominal Pain        PCP: Kenya De Paz  Referring Provider: No referring provider defined for this encounter.     HPI:  Presents with report of left lower quadrant abdominal pain.  Reports that she is had intermittent left quadrant abdominal pain over the last few months.  No identified trigger for the pain.  Urinalysis on 08/09/2023 is negative.  She is also associated diarrhea that is worse after eating.  She has had a previous cholecystectomy.  States that she has diarrhea after every meal.  No weight loss.  Denies any recent antibiotics.  No hematochezia or melena.  Last colonoscopy was 06/15/2022, tubular adenoma with recommendation to repeat in 5 years.  She was evaluated at the emergency room on 08/05 due to left lower quadrant rib pain.  She does have pinpoint tenderness in the left lower ribcage.  She has a history of gastroparesis.  States that she does try to follow a gastroparesis diet.  Last EGD was 06/25/2022, mild gastritis.  She also has a history of fatty liver.  On 11/14/2022, liver ultrasound does show moderate diffuse hepatic steatosis.  Creatinine normal on 08/23.    Abdominal Pain  Associated symptoms include diarrhea. Pertinent negatives include no constipation, fever, nausea or vomiting.         ROS:  Review of Systems   Constitutional:  Negative for appetite change, fatigue, fever and unexpected weight change.   HENT:  Negative for trouble swallowing.    Respiratory:  Negative for shortness of breath.    Cardiovascular:  Negative for chest pain.   Gastrointestinal:  Positive for abdominal pain and diarrhea. Negative for blood in stool, change in bowel habit, constipation, nausea, vomiting, reflux and change in bowel habit.   Musculoskeletal:  Negative for gait problem.   Integumentary:  Negative for pallor.   Hematological:  Does not bruise/bleed easily.   Psychiatric/Behavioral:  The patient is not nervous/anxious.           PMHX:  has a  past medical history of Acute superficial gastritis without hemorrhage (2022), Adenomatous polyp of cecum (06/15/2022), Adenomatous polyp of transverse colon (06/15/2022), Anticoagulant long-term use, Arthritis, Asthma, Coronary artery disease, Diabetes mellitus, type 2 (), Diverticula, colon (06/15/2022), DVT (deep venous thrombosis), Epigastric pain (2022), Heart attack (), History of colon polyps (06/15/2022), Hyperlipidemia, Hypertension (), Polyp of hepatic flexure of colon (06/15/2022), Screening for colon cancer (06/15/2022), and Thyroid disease.    PSHX:  has a past surgical history that includes Esophagogastroduodenoscopy (03/10/2021); Colonoscopy (2017); Hysterectomy;  section; Bilateral oophorectomy (Bilateral); Cholecystectomy; arthroplasty, knee, total, using computer-assisted navigation (Right, 2023); and Coronary artery bypass graft ().    PFHX: family history includes Dementia in her mother; Diabetes in her brother and mother; Heart disease in her mother; No Known Problems in her daughter and father; Prostate cancer in her brother; Thyroid disease in her mother.    PSlHX:  reports that she has quit smoking. She has been exposed to tobacco smoke. She has never used smokeless tobacco. She reports that she does not currently use alcohol. She reports that she does not use drugs.        Review of patient's allergies indicates:   Allergen Reactions    Jardiance [empagliflozin] Anaphylaxis     Headaches     Trulicity [dulaglutide]      Abdominal pain    Ozempic [semaglutide] Nausea And Vomiting       Medication List with Changes/Refills   Current Medications    ALBUTEROL (PROVENTIL/VENTOLIN HFA) 90 MCG/ACTUATION INHALER    USE 2 INHALATIONS TWICE A DAY    BLOOD SUGAR DIAGNOSTIC (FREESTYLE LITE STRIPS) STRP    USE 1 STRIP DAILY TO MONITOR GLUCOSE    CLOPIDOGREL (PLAVIX) 75 MG TABLET    Take 1 tablet (75 mg total) by mouth once daily.    CYANOCOBALAMIN 1,000  "MCG/ML INJECTION    Inject 1 mL (1,000 mcg total) into the muscle every Wednesday for 42 days, THEN 1 mL (1,000 mcg total) every 30 days.    CYCLOBENZAPRINE (FLEXERIL) 10 MG TABLET    1 tablet nightly as needed.    DAPAGLIFLOZIN (FARXIGA) 10 MG TABLET    Take 1 tablet (10 mg total) by mouth once daily.    ESTRADIOL (VIVELLE-DOT) 0.1 MG/24 HR PTSW    Place 1 patch onto the skin twice a week.    EZETIMIBE (ZETIA) 10 MG TABLET    Take 1 tablet by mouth every evening.    FLUCONAZOLE (DIFLUCAN) 200 MG TAB    TAKE ONE TABLET BY MOUTH EVERY DAY    FLUTICASONE PROPIONATE (FLONASE) 50 MCG/ACTUATION NASAL SPRAY    1 spray (50 mcg total) by Each Nostril route once daily.    GLIPIZIDE (GLUCOTROL) 5 MG TABLET    Take 5 mg by mouth once daily.    HYDROCODONE-ACETAMINOPHEN (NORCO)  MG PER TABLET    Take 1 tablet by mouth every 6 (six) hours as needed for Pain.    LOSARTAN (COZAAR) 50 MG TABLET    Take 1 tablet (50 mg total) by mouth once daily.    METFORMIN (GLUCOPHAGE) 1000 MG TABLET    Take 1,000 mg by mouth nightly.    METOPROLOL SUCCINATE (TOPROL-XL) 25 MG 24 HR TABLET    Take 1 tablet by mouth once daily.    MULTIVITAMIN CAPSULE    Take 1 capsule by mouth once daily.    NAPROXEN (NAPROSYN) 500 MG TABLET    Take 1 tablet (500 mg total) by mouth 2 (two) times daily as needed (pain).    PANTOPRAZOLE (PROTONIX) 40 MG TABLET    Take 1 tablet (40 mg total) by mouth once daily.    PREGABALIN (LYRICA) 75 MG CAPSULE    Take 150 mg by mouth nightly.    SYRINGE WITH NEEDLE 1 ML 28 GAUGE X 1/2" SYRG    1 Syringe by Misc.(Non-Drug; Combo Route) route every Wednesday for 42 days, THEN 1 Syringe every 30 days. For B12 injections..    TIZANIDINE (ZANAFLEX) 4 MG TABLET    Take by mouth.    VALACYCLOVIR (VALTREX) 500 MG TABLET    Take 1 tablet (500 mg total) by mouth once daily.    VITAMIN D (VITAMIN D3) 1000 UNITS TAB    Take 1 tablet (1,000 Units total) by mouth once daily.        Objective Findings:  Vital Signs:  /80   Pulse " "98   Resp 18   Ht 5' 9" (1.753 m)   Wt 93.9 kg (207 lb)   SpO2 99%   BMI 30.57 kg/m²  Body mass index is 30.57 kg/m².    Physical Exam:  Physical Exam  Vitals and nursing note reviewed.   Constitutional:       General: She is not in acute distress.     Appearance: Normal appearance.   HENT:      Mouth/Throat:      Mouth: Mucous membranes are moist.   Cardiovascular:      Rate and Rhythm: Normal rate.   Pulmonary:      Effort: Pulmonary effort is normal.      Breath sounds: No wheezing, rhonchi or rales.   Abdominal:      General: Bowel sounds are normal. There is no distension.      Palpations: Abdomen is soft. There is no mass.      Tenderness: There is generalized abdominal tenderness and tenderness in the left lower quadrant. There is no guarding.   Skin:     General: Skin is warm and dry.      Coloration: Skin is not jaundiced or pale.   Neurological:      Mental Status: She is alert and oriented to person, place, and time.   Psychiatric:         Mood and Affect: Mood normal.          Labs:  Lab Results   Component Value Date    WBC 8.05 03/13/2023    HGB 13.3 03/13/2023    HCT 43.0 03/13/2023    MCV 92.1 03/13/2023    RDW 13.2 03/13/2023     03/13/2023    LYMPH 39.3 03/13/2023    LYMPH 3.16 03/13/2023    MONO 8.0 (H) 03/13/2023    EOS 0.10 03/13/2023    BASO 0.07 03/13/2023     Lab Results   Component Value Date     08/23/2023    K 4.6 08/23/2023     08/23/2023    CO2 29 08/23/2023     (H) 08/23/2023    BUN 12 08/23/2023    CREATININE 0.91 08/23/2023    CALCIUM 9.9 08/23/2023    PROT 7.3 02/10/2023    ALBUMIN 3.4 (L) 02/10/2023    BILITOT 0.6 02/10/2023    ALKPHOS 63 02/10/2023    AST 25 02/10/2023    ALT 42 02/10/2023         Imaging: X-Ray KUB    Result Date: 8/9/2023  EXAMINATION: XR KUB CLINICAL HISTORY: Abdominal pain COMPARISON: None available TECHNIQUE: XR KUB FINDINGS: No free fluid or free air seen.  The bowel gas pattern appears within normal limits.  No abnormal " calcifications are present.  Multiple clips from previous surgeries.  No other abnormality is identified.     No evidence of acute process demonstrated Electronically signed by: Ervin Fonseca Date:    08/09/2023 Time:    14:54        Assessment:  Olga Stephenson is a 68 y.o. female here with:  1. Left lower quadrant abdominal pain    2. Gastroesophageal reflux disease, unspecified whether esophagitis present    3. Gastroparesis    4. Diarrhea, unspecified type    5. Fatty liver    6. Hepatomegaly          Recommendations:  1. Schedule CT abdomen and pelvis due to left lower quadrant abdominal pain  2. CBC and hepatic function today  3. Do not lay down within 3 hours of eating.  Avoid spicy, greasy foods  Eat 6-8 small meals per day.  Exercise 150 minutes per week  Avoid raw fruits and vegetables  Small amount of protein and fiber at one time  3.Stool studies  4.Weight loss of 7-10%. Weight loss should be gradual  Diet low in saturated fats and carbohydrates  Good glucose and cholesterol control      Follow up in about 4 weeks (around 9/25/2023).      Order summary:  Orders Placed This Encounter    Fecal leukocytes    Giardia antigen    Enteric Pathogen Panel    C Diff Toxin by PCR    CT Abdomen Pelvis With Contrast    CBC Auto Differential    Hepatic Function Panel    Fecal fat, qualitative    Calprotectin, Stool    Occult blood x 1, stool       Thank you for allowing me to participate in the care of Olga Stephenson.      KYLE Rios

## 2023-08-29 ENCOUNTER — DOCUMENTATION ONLY (OUTPATIENT)
Dept: REHABILITATION | Facility: HOSPITAL | Age: 68
End: 2023-08-29
Payer: MEDICARE

## 2023-08-29 DIAGNOSIS — Z96.651 STATUS POST TOTAL KNEE REPLACEMENT, RIGHT: Primary | ICD-10-CM

## 2023-08-29 NOTE — PROGRESS NOTES
[facility]  Physical Therapy Discharge Note    Name: Olga Stephenson  Clinic Number: [id number]     Therapy Diagnosis:        Encounter Diagnosis   Name Primary?    Chronic pain of right knee Yes        Physician: Erick Tolliver MD     Physician Orders:  -EVAL AND TREAT 2-3 TIMES A WK FOR 6-8 WEEKS, ROM & STRENGTHENING, MODALITIES AT THERAPIST DISCRETION, HOME EXERCISE PROGRAM  Medical Diagnosis from Referral: Z96.651 (ICD-10-CM) - Status post total knee replacement, right  2/9/23; Right total knee Biceps Tendonitis stretches  Evaluation Date: 7/27/2023  Date of Last visit: 8/22/23  Total Visits Received: 5    Olga reports chronic pain on lateral side of right knee and distal iliotibial band; she reports pain of 6/10 and is very tender to palpation. She reports pain is not improving and wishes to discontinue with therapy.    ASSESSMENT      Olga has decided to discontinue her therapy. Therapeutic interventions of exercise and ultrasound unable to resolve her pain.    Discharge reason: Patient requested discharge    Discharge FOTO Score: none    Goals: Short Term Goals: 3 weeks   Pt will increase right knee extension to -5 degrees, and quad lag to -5 degrees to allow patient normal gait pattern.  Pt will demonstrate independent performance of home exercise program.  Pt will report decrease in pain right knee to 3/10 to improve quality of life.     Long Term Goals: 6 weeks   1. Pt will increase right knee extension to 0 degrees, and quad lag to 0 degrees to allow patient normal gait mechanics for community mobility.  2. Pt will report decrease in pain knee to 0/10 to improve quality of life.    PLAN   This patient is discharged from Physical Therapy      Mert Archer, PT

## 2023-08-30 ENCOUNTER — HOSPITAL ENCOUNTER (OUTPATIENT)
Dept: RADIOLOGY | Facility: HOSPITAL | Age: 68
Discharge: HOME | End: 2023-08-30
Attending: ORTHOPAEDIC SURGERY
Payer: MEDICARE

## 2023-08-30 ENCOUNTER — OFFICE VISIT (OUTPATIENT)
Dept: ORTHOPEDICS | Facility: CLINIC | Age: 68
End: 2023-08-30
Payer: MEDICARE

## 2023-08-30 VITALS — WEIGHT: 207 LBS | HEIGHT: 69 IN | BODY MASS INDEX: 30.66 KG/M2

## 2023-08-30 DIAGNOSIS — Z96.651 STATUS POST TOTAL KNEE REPLACEMENT, RIGHT: Primary | ICD-10-CM

## 2023-08-30 DIAGNOSIS — Z96.651 STATUS POST TOTAL KNEE REPLACEMENT, RIGHT: ICD-10-CM

## 2023-08-30 PROCEDURE — 73560 XR KNEE 1 OR 2 VIEW RIGHT: ICD-10-PCS | Mod: 26,RT,, | Performed by: ORTHOPAEDIC SURGERY

## 2023-08-30 PROCEDURE — 99214 OFFICE O/P EST MOD 30 MIN: CPT | Mod: PBBFAC | Performed by: ORTHOPAEDIC SURGERY

## 2023-08-30 PROCEDURE — 73560 X-RAY EXAM OF KNEE 1 OR 2: CPT | Mod: TC,RT

## 2023-08-30 PROCEDURE — 73560 X-RAY EXAM OF KNEE 1 OR 2: CPT | Mod: 26,RT,, | Performed by: ORTHOPAEDIC SURGERY

## 2023-08-30 PROCEDURE — 99213 OFFICE O/P EST LOW 20 MIN: CPT | Mod: S$PBB,,, | Performed by: ORTHOPAEDIC SURGERY

## 2023-08-30 PROCEDURE — 99213 PR OFFICE/OUTPT VISIT, EST, LEVL III, 20-29 MIN: ICD-10-PCS | Mod: S$PBB,,, | Performed by: ORTHOPAEDIC SURGERY

## 2023-08-30 NOTE — PROGRESS NOTES
Patient is here for follow-up of right total knee arthroplasty.  She is having some pain over the posterolateral aspect of the knee I do not feel anything posterior to the fibular head but it is just posterior the fibular head where her ITB band as inserting.  At this time neurovascularly she is intact distally.  Concerned she may have a cyst posterior laterally I will get an MRI of the knee the medial show that there is not any bony prominence or cyst in this area I will check her back after the MRI let her weightbear as tolerates there is no instability in the knee noted.

## 2023-08-30 NOTE — PROGRESS NOTES
Radiology Interpretation        Patient Name: Olga Stephenson  Date: 8/30/2023  YOB: 1955  MRN# [id number]        ORDERING DIAGNOSIS:    Encounter Diagnosis   Name Primary?    Status post total knee replacement, right Yes        Two views AP lateral right knee skeletally mature individual total knee arthroplasty place no loosening no fractures no subluxations posteriorly in the central portion of the knee there is a radiodensity appears to be a possible fabella unchanged from previous views there is no loosening components no fractures impression total knee arthroplasty in place right knee             Erick Tolliver MD

## 2023-09-01 ENCOUNTER — TELEPHONE (OUTPATIENT)
Dept: FAMILY MEDICINE | Facility: CLINIC | Age: 68
End: 2023-09-01
Payer: MEDICARE

## 2023-09-01 ENCOUNTER — HOSPITAL ENCOUNTER (OUTPATIENT)
Dept: RADIOLOGY | Facility: HOSPITAL | Age: 68
Discharge: HOME | End: 2023-09-01
Attending: INTERNAL MEDICINE
Payer: MEDICARE

## 2023-09-01 DIAGNOSIS — Z78.0 POSTMENOPAUSAL: ICD-10-CM

## 2023-09-01 PROCEDURE — 77080 DXA BONE DENSITY AXIAL: CPT | Mod: 26,,, | Performed by: RADIOLOGY

## 2023-09-01 PROCEDURE — 77080 DXA BONE DENSITY AXIAL: CPT | Mod: TC

## 2023-09-01 PROCEDURE — 77080 DXA BONE DENSITY AXIAL SKELETON 1 OR MORE SITES: ICD-10-PCS | Mod: 26,,, | Performed by: RADIOLOGY

## 2023-09-01 NOTE — TELEPHONE ENCOUNTER
----- Message from Kenya De Paz NP sent at 9/1/2023  1:55 PM CDT -----  Bone Density falls within normal range.

## 2023-09-01 NOTE — TELEPHONE ENCOUNTER
Notified DEXA scan falls within normal range and no course of treatment is needed at the time. Acknowledged  understanding.    Reji Tolliver LPN

## 2023-09-05 ENCOUNTER — OFFICE VISIT (OUTPATIENT)
Dept: FAMILY MEDICINE | Facility: CLINIC | Age: 68
End: 2023-09-05
Payer: MEDICARE

## 2023-09-05 ENCOUNTER — HOSPITAL ENCOUNTER (OUTPATIENT)
Dept: RADIOLOGY | Facility: HOSPITAL | Age: 68
Discharge: HOME | End: 2023-09-05
Attending: INTERNAL MEDICINE
Payer: MEDICARE

## 2023-09-05 VITALS
SYSTOLIC BLOOD PRESSURE: 127 MMHG | OXYGEN SATURATION: 97 % | WEIGHT: 209 LBS | TEMPERATURE: 98 F | BODY MASS INDEX: 30.96 KG/M2 | DIASTOLIC BLOOD PRESSURE: 79 MMHG | RESPIRATION RATE: 18 BRPM | HEART RATE: 88 BPM | HEIGHT: 69 IN

## 2023-09-05 DIAGNOSIS — M17.11 OSTEOARTHRITIS OF RIGHT KNEE, UNSPECIFIED OSTEOARTHRITIS TYPE: ICD-10-CM

## 2023-09-05 DIAGNOSIS — E11.69 TYPE 2 DIABETES MELLITUS WITH OTHER SPECIFIED COMPLICATION, WITHOUT LONG-TERM CURRENT USE OF INSULIN: Primary | ICD-10-CM

## 2023-09-05 DIAGNOSIS — R60.0 LEG EDEMA: ICD-10-CM

## 2023-09-05 DIAGNOSIS — I82.411 ACUTE DEEP VEIN THROMBOSIS (DVT) OF FEMORAL VEIN OF RIGHT LOWER EXTREMITY: ICD-10-CM

## 2023-09-05 PROCEDURE — 99213 PR OFFICE/OUTPT VISIT, EST, LEVL III, 20-29 MIN: ICD-10-PCS | Mod: ,,, | Performed by: INTERNAL MEDICINE

## 2023-09-05 PROCEDURE — 93971 EXTREMITY STUDY: CPT | Mod: 26,RT,, | Performed by: RADIOLOGY

## 2023-09-05 PROCEDURE — 99213 OFFICE O/P EST LOW 20 MIN: CPT | Mod: ,,, | Performed by: INTERNAL MEDICINE

## 2023-09-05 PROCEDURE — 93971 EXTREMITY STUDY: CPT | Mod: TC,RT

## 2023-09-05 PROCEDURE — 93971 US LOWER EXTREMITY VEINS RIGHT: ICD-10-PCS | Mod: 26,RT,, | Performed by: RADIOLOGY

## 2023-09-05 RX ORDER — METFORMIN HYDROCHLORIDE 1000 MG/1
1000 TABLET ORAL NIGHTLY
Qty: 90 TABLET | Refills: 1 | Status: SHIPPED | OUTPATIENT
Start: 2023-09-05 | End: 2023-09-18 | Stop reason: SDUPTHER

## 2023-09-05 RX ORDER — PREGABALIN 75 MG/1
150 CAPSULE ORAL NIGHTLY
Qty: 90 CAPSULE | Refills: 1 | Status: SHIPPED | OUTPATIENT
Start: 2023-09-05

## 2023-09-05 RX ORDER — VALACYCLOVIR HYDROCHLORIDE 500 MG/1
500 TABLET, FILM COATED ORAL DAILY
Qty: 90 TABLET | Refills: 1 | Status: SHIPPED | OUTPATIENT
Start: 2023-09-05 | End: 2024-01-08 | Stop reason: SDUPTHER

## 2023-09-05 RX ORDER — CLOPIDOGREL BISULFATE 75 MG/1
75 TABLET ORAL DAILY
Qty: 30 TABLET | Refills: 1 | Status: SHIPPED | OUTPATIENT
Start: 2023-09-05 | End: 2024-09-04

## 2023-09-11 PROBLEM — I82.411 ACUTE DEEP VEIN THROMBOSIS (DVT) OF FEMORAL VEIN OF RIGHT LOWER EXTREMITY: Status: ACTIVE | Noted: 2023-09-11

## 2023-09-11 NOTE — PROGRESS NOTES
Subjective:       Patient ID: Olga Stephenson is a 68 y.o. female.    Chief Complaint: Results (Ultrasounds )    HPI  .  Patient found to have when swelling she does have a DVT in the right lower extremity the plan Eliquis 10 milligrams 1 p.o. b.i.d. x7 days 5 milligrams 1 p.o. after.  Current Medications:    Current Outpatient Medications:     albuterol (PROVENTIL/VENTOLIN HFA) 90 mcg/actuation inhaler, USE 2 INHALATIONS TWICE A DAY, Disp: 17 g, Rfl: 3    cyanocobalamin 1,000 mcg/mL injection, Inject 1 mL (1,000 mcg total) into the muscle every Wednesday for 42 days, THEN 1 mL (1,000 mcg total) every 30 days., Disp: 7 mL, Rfl: 0    cyclobenzaprine (FLEXERIL) 10 MG tablet, 1 tablet nightly as needed., Disp: , Rfl:     dapagliflozin (FARXIGA) 10 mg tablet, Take 1 tablet (10 mg total) by mouth once daily., Disp: 90 tablet, Rfl: 3    estradioL (VIVELLE-DOT) 0.1 mg/24 hr PTSW, Place 1 patch onto the skin twice a week., Disp: 24 patch, Rfl: 3    ezetimibe (ZETIA) 10 mg tablet, Take 1 tablet by mouth every evening., Disp: , Rfl:     fluconazole (DIFLUCAN) 200 MG Tab, TAKE ONE TABLET BY MOUTH EVERY DAY, Disp: 3 tablet, Rfl: 1    fluticasone propionate (FLONASE) 50 mcg/actuation nasal spray, 1 spray (50 mcg total) by Each Nostril route once daily., Disp: 16 g, Rfl: 3    glipiZIDE (GLUCOTROL) 5 MG tablet, Take 5 mg by mouth once daily., Disp: , Rfl:     HYDROcodone-acetaminophen (NORCO)  mg per tablet, Take 1 tablet by mouth every 6 (six) hours as needed for Pain., Disp: 28 tablet, Rfl: 0    losartan (COZAAR) 50 MG tablet, Take 1 tablet (50 mg total) by mouth once daily., Disp: 90 tablet, Rfl: 3    metoprolol succinate (TOPROL-XL) 25 MG 24 hr tablet, Take 1 tablet by mouth once daily., Disp: , Rfl:     multivitamin capsule, Take 1 capsule by mouth once daily., Disp: , Rfl:     naproxen (NAPROSYN) 500 MG tablet, Take 1 tablet (500 mg total) by mouth 2 (two) times daily as needed (pain)., Disp: 20 tablet, Rfl: 0     "pantoprazole (PROTONIX) 40 MG tablet, Take 1 tablet (40 mg total) by mouth once daily., Disp: 90 tablet, Rfl: 3    syringe with needle 1 mL 28 gauge x 1/2" Syrg, 1 Syringe by Misc.(Non-Drug; Combo Route) route every Wednesday for 42 days, THEN 1 Syringe every 30 days. For B12 injections.., Disp: 7 each, Rfl: 0    tiZANidine (ZANAFLEX) 4 MG tablet, Take by mouth., Disp: , Rfl:     vitamin D (VITAMIN D3) 1000 units Tab, Take 1 tablet (1,000 Units total) by mouth once daily., Disp: 90 tablet, Rfl: 3    apixaban (ELIQUIS) 5 mg Tab, Take 2 tablets (10 mg total) by mouth 2 (two) times daily for 7 days, THEN 1 tablet (5 mg total) 2 (two) times daily., Disp: 88 tablet, Rfl: 0    blood sugar diagnostic (FREESTYLE LITE STRIPS) Strp, USE 1 STRIP DAILY TO MONITOR GLUCOSE, Disp: 100 strip, Rfl: 3    clopidogreL (PLAVIX) 75 mg tablet, Take 1 tablet (75 mg total) by mouth once daily., Disp: 30 tablet, Rfl: 1    metFORMIN (GLUCOPHAGE) 1000 MG tablet, Take 1 tablet (1,000 mg total) by mouth nightly., Disp: 90 tablet, Rfl: 1    pregabalin (LYRICA) 75 MG capsule, Take 2 capsules (150 mg total) by mouth nightly., Disp: 90 capsule, Rfl: 1    valACYclovir (VALTREX) 500 MG tablet, Take 1 tablet (500 mg total) by mouth once daily., Disp: 90 tablet, Rfl: 1           Review of Systems   Constitutional:  Negative for appetite change, fatigue and fever.   Respiratory:  Negative for shortness of breath.    Cardiovascular:  Negative for chest pain.   Gastrointestinal:  Negative for abdominal pain and constipation.   Endocrine: Negative for polydipsia, polyphagia and polyuria.   Genitourinary:  Negative for difficulty urinating, frequency and hot flashes.   Allergic/Immunologic: Negative for environmental allergies.   Neurological:  Negative for dizziness and light-headedness.   Psychiatric/Behavioral:  Negative for agitation.                 Vitals:    09/05/23 1429   BP: 127/79   BP Location: Right arm   Patient Position: Sitting   BP Method: " "Large (Automatic)   Pulse: 88   Resp: 18   Temp: 97.7 °F (36.5 °C)   TempSrc: Temporal   SpO2: 97%   Weight: 94.8 kg (209 lb)   Height: 5' 9" (1.753 m)        Physical Exam  Vitals and nursing note reviewed.   Constitutional:       Appearance: Normal appearance.   Cardiovascular:      Rate and Rhythm: Normal rate and regular rhythm.      Pulses: Normal pulses.      Heart sounds: Normal heart sounds.   Pulmonary:      Effort: Pulmonary effort is normal.      Breath sounds: Normal breath sounds.   Abdominal:      General: Abdomen is flat. Bowel sounds are normal.      Palpations: Abdomen is soft.   Musculoskeletal:         General: Normal range of motion.   Skin:     General: Skin is warm and dry.   Neurological:      General: No focal deficit present.      Mental Status: She is alert and oriented to person, place, and time. Mental status is at baseline.         Last Labs:     Patient Outreach on 08/28/2023   Component Date Value    Left Eye DM Retinopathy 07/19/2023 Negative     Right Eye DM Retinopathy 07/19/2023 Negative    Lab Visit on 08/28/2023   Component Date Value    WBC 08/28/2023 9.22     RBC 08/28/2023 4.97     Hemoglobin 08/28/2023 14.4     Hematocrit 08/28/2023 45.1     MCV 08/28/2023 90.7     MCH 08/28/2023 29.0     MCHC 08/28/2023 31.9 (L)     RDW 08/28/2023 14.3     Platelet Count 08/28/2023 252     MPV 08/28/2023 12.5 (H)     Neutrophils % 08/28/2023 49.8 (L)     Lymphocytes % 08/28/2023 38.9     Monocytes % 08/28/2023 8.7 (H)     Eosinophils % 08/28/2023 1.2     Basophils % 08/28/2023 1.0     Immature Granulocytes % 08/28/2023 0.4     nRBC, Auto 08/28/2023 0.0     Neutrophils, Abs 08/28/2023 4.59     Lymphocytes, Absolute 08/28/2023 3.59     Monocytes, Absolute 08/28/2023 0.80     Eosinophils, Absolute 08/28/2023 0.11     Basophils, Absolute 08/28/2023 0.09     Immature Granulocytes, A* 08/28/2023 0.04     nRBC, Absolute 08/28/2023 0.00     Diff Type 08/28/2023 Auto    Office Visit on 08/23/2023 "   Component Date Value    Triglycerides 08/23/2023 187 (H)     Cholesterol 08/23/2023 167     HDL Cholesterol 08/23/2023 44     Cholesterol/HDL Ratio (R* 08/23/2023 3.8     Non-HDL 08/23/2023 123     LDL Calculated 08/23/2023 86     LDL/HDL 08/23/2023 2.0     VLDL 08/23/2023 37     Magnesium 08/23/2023 2.4 (H)     Sodium 08/23/2023 138     Potassium 08/23/2023 4.6     Chloride 08/23/2023 105     CO2 08/23/2023 29     Anion Gap 08/23/2023 9     Glucose 08/23/2023 144 (H)     BUN 08/23/2023 12     Creatinine 08/23/2023 0.91     BUN/Creatinine Ratio 08/23/2023 13     Calcium 08/23/2023 9.9     eGFR 08/23/2023 69    Office Visit on 08/09/2023   Component Date Value    Color, UA 08/09/2023 Light-Yellow     Clarity, UA 08/09/2023 Clear     pH, UA 08/09/2023 5.0     Leukocytes, UA 08/09/2023 Negative     Nitrites, UA 08/09/2023 Negative     Protein, UA 08/09/2023 Negative     Glucose, UA 08/09/2023 >1000     Ketones, UA 08/09/2023 Negative     Urobilinogen, UA 08/09/2023 Normal     Bilirubin, UA 08/09/2023 Negative     Blood, UA 08/09/2023 Negative     Specific Gravity, UA 08/09/2023 1.017        Last Imaging:  US Lower Extremity Veins Right  Narrative: EXAMINATION:  US LOWER EXTREMITY VEINS RIGHT    CLINICAL HISTORY:  Unilateral primary osteoarthritis, right knee    TECHNIQUE:  Duplex and color flow Doppler evaluation with compression of the right lower extremity veins was performed.  Ultrasound images captured and stored.    COMPARISON:  None    FINDINGS:  Right thigh veins: The common femoral, femoral, popliteal, upper greater saphenous, and deep femoral veins are patent and free of thrombus. The veins are normally compressible and have normal phasic flow and augmentation response.    Right calf veins: There is an occlusive thrombus of the right posterior tibial vein.  The popliteal vein is patent.  Impression: Occlusive thrombus of the right posterior tibial vein.  No other thrombosis identified.    Electronically  signed by: Benja Rebolledo  Date:    09/05/2023  Time:    11:11         **Labs and x-rays personally reviewed by me    ** reviewed      Objective:        Assessment:       1. Type 2 diabetes mellitus with other specified complication, without long-term current use of insulin  blood sugar diagnostic (FREESTYLE LITE STRIPS) Strp      2. Acute deep vein thrombosis (DVT) of femoral vein of right lower extremity             Plan:         [unfilled]

## 2023-09-12 ENCOUNTER — HOSPITAL ENCOUNTER (OUTPATIENT)
Dept: RADIOLOGY | Facility: HOSPITAL | Age: 68
Discharge: HOME | End: 2023-09-12
Attending: RADIOLOGY
Payer: MEDICARE

## 2023-09-12 DIAGNOSIS — Z12.31 VISIT FOR SCREENING MAMMOGRAM: ICD-10-CM

## 2023-09-12 PROCEDURE — 77067 MAMMO DIGITAL SCREENING BILAT: ICD-10-PCS | Mod: 26,,, | Performed by: RADIOLOGY

## 2023-09-12 PROCEDURE — 77067 SCR MAMMO BI INCL CAD: CPT | Mod: 26,,, | Performed by: RADIOLOGY

## 2023-09-12 PROCEDURE — 77067 SCR MAMMO BI INCL CAD: CPT | Mod: TC

## 2023-09-14 ENCOUNTER — HOSPITAL ENCOUNTER (OUTPATIENT)
Dept: RADIOLOGY | Facility: HOSPITAL | Age: 68
Discharge: HOME | End: 2023-09-14
Attending: NURSE PRACTITIONER
Payer: MEDICARE

## 2023-09-14 ENCOUNTER — TELEPHONE (OUTPATIENT)
Dept: GASTROENTEROLOGY | Facility: CLINIC | Age: 68
End: 2023-09-14
Payer: MEDICARE

## 2023-09-14 DIAGNOSIS — K57.92 DIVERTICULITIS: Primary | ICD-10-CM

## 2023-09-14 DIAGNOSIS — R10.32 LEFT LOWER QUADRANT ABDOMINAL PAIN: ICD-10-CM

## 2023-09-14 PROCEDURE — 74177 CT ABDOMEN PELVIS WITH CONTRAST: ICD-10-PCS | Mod: 26,,, | Performed by: RADIOLOGY

## 2023-09-14 PROCEDURE — 25500020 PHARM REV CODE 255: Performed by: NURSE PRACTITIONER

## 2023-09-14 PROCEDURE — 74177 CT ABD & PELVIS W/CONTRAST: CPT | Mod: 26,,, | Performed by: RADIOLOGY

## 2023-09-14 PROCEDURE — 74177 CT ABD & PELVIS W/CONTRAST: CPT | Mod: TC

## 2023-09-14 RX ORDER — AMOXICILLIN AND CLAVULANATE POTASSIUM 500; 125 MG/1; MG/1
1 TABLET, FILM COATED ORAL 2 TIMES DAILY
Qty: 14 TABLET | Refills: 0 | Status: SHIPPED | OUTPATIENT
Start: 2023-09-14 | End: 2023-09-21

## 2023-09-14 RX ADMIN — IOPAMIDOL 100 ML: 755 INJECTION, SOLUTION INTRAVENOUS at 09:09

## 2023-09-14 NOTE — TELEPHONE ENCOUNTER
Results and recommendations called to patient. Scheduled for colonoscopy for 11/2/23 at 12:00pm. Instructed patient that prep sheet would be mailed to her. Verbalized understanding.             ----- Message from PHILIP Barreto sent at 9/14/2023 11:03 AM CDT -----  Thickening in the colon, possible diverticulitis or colitis. I am sending in Augmentin. Schedule colonoscopy in 6-8 weeks

## 2023-09-18 RX ORDER — METFORMIN HYDROCHLORIDE 1000 MG/1
1000 TABLET ORAL NIGHTLY
Qty: 90 TABLET | Refills: 1 | Status: SHIPPED | OUTPATIENT
Start: 2023-09-18

## 2023-10-02 ENCOUNTER — OFFICE VISIT (OUTPATIENT)
Dept: GASTROENTEROLOGY | Facility: CLINIC | Age: 68
End: 2023-10-02
Payer: MEDICARE

## 2023-10-02 VITALS
WEIGHT: 212 LBS | SYSTOLIC BLOOD PRESSURE: 146 MMHG | DIASTOLIC BLOOD PRESSURE: 85 MMHG | HEIGHT: 69 IN | BODY MASS INDEX: 31.4 KG/M2 | HEART RATE: 82 BPM

## 2023-10-02 DIAGNOSIS — B37.31 VAGINAL YEAST INFECTION: ICD-10-CM

## 2023-10-02 DIAGNOSIS — K76.0 FATTY LIVER: ICD-10-CM

## 2023-10-02 DIAGNOSIS — K57.92 DIVERTICULITIS: ICD-10-CM

## 2023-10-02 DIAGNOSIS — K31.84 GASTROPARESIS: ICD-10-CM

## 2023-10-02 DIAGNOSIS — R93.3 ABNORMAL CT SCAN, COLON: Primary | ICD-10-CM

## 2023-10-02 PROCEDURE — 99214 OFFICE O/P EST MOD 30 MIN: CPT | Mod: S$PBB,,, | Performed by: NURSE PRACTITIONER

## 2023-10-02 PROCEDURE — 99215 OFFICE O/P EST HI 40 MIN: CPT | Mod: PBBFAC | Performed by: NURSE PRACTITIONER

## 2023-10-02 PROCEDURE — 99214 PR OFFICE/OUTPT VISIT, EST, LEVL IV, 30-39 MIN: ICD-10-PCS | Mod: S$PBB,,, | Performed by: NURSE PRACTITIONER

## 2023-10-02 RX ORDER — FLUCONAZOLE 150 MG/1
TABLET ORAL
Qty: 2 TABLET | Refills: 0 | Status: SHIPPED | OUTPATIENT
Start: 2023-10-02 | End: 2023-12-04

## 2023-10-02 NOTE — PROGRESS NOTES
Olga Stephenson is a 68 y.o. female here for Follow-up        PCP: Kenya De Paz  Referring Provider: No referring provider defined for this encounter.     HPI:  Presents for follow up after abnormal CT.  CT abdomen and pelvis on 09/14/2023, report reviewed, thickening in the ascending and descending colon possible colitis/diverticulitis.  Patient was prescribed antibiotics.  States that abdominal pain has now improved.  Reports that she has had hematochezia since starting Eliquis.  Reports bowels are moving without difficulty.  Originally abdominal pain was located on the left lower quadrant not the right lower quadrant.  She has been scheduled for colonoscopy.  She is currently taking Eliquis and will be due to start that on 10/18.  Eliquis was started for right lower leg DVT.  She is also taking Plavix. She is followed by Dr. Brand.  Reports today that she does have a vaginal yeast infection following antibiotics.  States that she always requires Diflucan after antibiotic.  She does have a history of fatty liver.    Follow-up  Pertinent negatives include no abdominal pain, change in bowel habit, chest pain, fatigue, fever, nausea or vomiting.         ROS:  Review of Systems   Constitutional:  Negative for appetite change, fatigue, fever and unexpected weight change.   HENT:  Negative for trouble swallowing.    Respiratory:  Negative for shortness of breath.    Cardiovascular:  Negative for chest pain.   Gastrointestinal:  Positive for blood in stool. Negative for abdominal pain, change in bowel habit, constipation, diarrhea, nausea, vomiting and reflux.   Musculoskeletal:  Negative for gait problem.   Integumentary:  Negative for pallor.   Hematological:  Does not bruise/bleed easily.   Psychiatric/Behavioral:  The patient is not nervous/anxious.           PMHX:  has a past medical history of Acute superficial gastritis without hemorrhage (06/08/2022), Adenomatous polyp of cecum (06/15/2022),  Adenomatous polyp of transverse colon (06/15/2022), Anticoagulant long-term use, Arthritis, Asthma, Coronary artery disease, Diabetes mellitus, type 2 (), Diabetic eye exam (2023), Diverticula, colon (06/15/2022), DVT (deep venous thrombosis), Epigastric pain (2022), Heart attack (), History of colon polyps (06/15/2022), Hyperlipidemia, Hypertension (), Polyp of hepatic flexure of colon (06/15/2022), Screening for colon cancer (06/15/2022), and Thyroid disease.    PSHX:  has a past surgical history that includes Esophagogastroduodenoscopy (03/10/2021); Colonoscopy (2017); Hysterectomy;  section; Bilateral oophorectomy (Bilateral); Cholecystectomy; arthroplasty, knee, total, using computer-assisted navigation (Right, 2023); and Coronary artery bypass graft ().    PFHX: family history includes Dementia in her mother; Diabetes in her brother and mother; Heart disease in her mother; No Known Problems in her daughter and father; Prostate cancer in her brother; Thyroid disease in her mother.    PSlHX:  reports that she has quit smoking. She has been exposed to tobacco smoke. She has never used smokeless tobacco. She reports that she does not currently use alcohol. She reports that she does not use drugs.        Review of patient's allergies indicates:   Allergen Reactions    Jardiance [empagliflozin] Anaphylaxis     Headaches     Trulicity [dulaglutide]      Abdominal pain    Ozempic [semaglutide] Nausea And Vomiting       Medication List with Changes/Refills   New Medications    FLUCONAZOLE (DIFLUCAN) 150 MG TAB    Take one today and repeat in 3 days   Current Medications    ALBUTEROL (PROVENTIL/VENTOLIN HFA) 90 MCG/ACTUATION INHALER    USE 2 INHALATIONS TWICE A DAY    APIXABAN (ELIQUIS) 5 MG TAB    Take 2 tablets (10 mg total) by mouth 2 (two) times daily for 7 days, THEN 1 tablet (5 mg total) 2 (two) times daily.    BLOOD SUGAR DIAGNOSTIC (FREESTYLE LITE STRIPS) STRP     "USE 1 STRIP DAILY TO MONITOR GLUCOSE    CLOPIDOGREL (PLAVIX) 75 MG TABLET    Take 1 tablet (75 mg total) by mouth once daily.    CYANOCOBALAMIN 1,000 MCG/ML INJECTION    Inject 1 mL (1,000 mcg total) into the muscle every Wednesday for 42 days, THEN 1 mL (1,000 mcg total) every 30 days.    CYCLOBENZAPRINE (FLEXERIL) 10 MG TABLET    1 tablet nightly as needed.    DAPAGLIFLOZIN (FARXIGA) 10 MG TABLET    Take 1 tablet (10 mg total) by mouth once daily.    ESTRADIOL (VIVELLE-DOT) 0.1 MG/24 HR PTSW    Place 1 patch onto the skin twice a week.    EZETIMIBE (ZETIA) 10 MG TABLET    Take 1 tablet by mouth every evening.    FLUCONAZOLE (DIFLUCAN) 200 MG TAB    TAKE ONE TABLET BY MOUTH EVERY DAY    FLUTICASONE PROPIONATE (FLONASE) 50 MCG/ACTUATION NASAL SPRAY    1 spray (50 mcg total) by Each Nostril route once daily.    GLIPIZIDE (GLUCOTROL) 5 MG TABLET    Take 5 mg by mouth once daily.    HYDROCODONE-ACETAMINOPHEN (NORCO)  MG PER TABLET    Take 1 tablet by mouth every 6 (six) hours as needed for Pain.    LOSARTAN (COZAAR) 50 MG TABLET    Take 1 tablet (50 mg total) by mouth once daily.    METFORMIN (GLUCOPHAGE) 1000 MG TABLET    Take 1 tablet (1,000 mg total) by mouth nightly.    METOPROLOL SUCCINATE (TOPROL-XL) 25 MG 24 HR TABLET    Take 1 tablet by mouth once daily.    MULTIVITAMIN CAPSULE    Take 1 capsule by mouth once daily.    NAPROXEN (NAPROSYN) 500 MG TABLET    Take 1 tablet (500 mg total) by mouth 2 (two) times daily as needed (pain).    PANTOPRAZOLE (PROTONIX) 40 MG TABLET    Take 1 tablet (40 mg total) by mouth once daily.    PREGABALIN (LYRICA) 75 MG CAPSULE    Take 2 capsules (150 mg total) by mouth nightly.    SYRINGE WITH NEEDLE 1 ML 28 GAUGE X 1/2" SYRG    1 Syringe by Misc.(Non-Drug; Combo Route) route every Wednesday for 42 days, THEN 1 Syringe every 30 days. For B12 injections..    TIZANIDINE (ZANAFLEX) 4 MG TABLET    Take by mouth.    VALACYCLOVIR (VALTREX) 500 MG TABLET    Take 1 tablet (500 mg " "total) by mouth once daily.    VITAMIN D (VITAMIN D3) 1000 UNITS TAB    Take 1 tablet (1,000 Units total) by mouth once daily.        Objective Findings:  Vital Signs:  BP (!) 146/85   Pulse 82   Ht 5' 9" (1.753 m)   Wt 96.2 kg (212 lb)   BMI 31.31 kg/m²  Body mass index is 31.31 kg/m².    Physical Exam:  Physical Exam  Vitals and nursing note reviewed.   Constitutional:       General: She is not in acute distress.     Appearance: Normal appearance.   HENT:      Mouth/Throat:      Mouth: Mucous membranes are moist.   Cardiovascular:      Rate and Rhythm: Normal rate.   Pulmonary:      Effort: Pulmonary effort is normal.      Breath sounds: No wheezing, rhonchi or rales.   Abdominal:      General: Bowel sounds are normal. There is no distension.      Palpations: Abdomen is soft. There is no mass.      Tenderness: There is no abdominal tenderness. There is no guarding.   Skin:     General: Skin is warm and dry.      Coloration: Skin is not jaundiced or pale.   Neurological:      Mental Status: She is alert and oriented to person, place, and time.   Psychiatric:         Mood and Affect: Mood normal.          Labs:  Lab Results   Component Value Date    WBC 9.22 08/28/2023    HGB 14.4 08/28/2023    HCT 45.1 08/28/2023    MCV 90.7 08/28/2023    RDW 14.3 08/28/2023     08/28/2023    LYMPH 38.9 08/28/2023    LYMPH 3.59 08/28/2023    MONO 8.7 (H) 08/28/2023    EOS 0.11 08/28/2023    BASO 0.09 08/28/2023     Lab Results   Component Value Date     08/23/2023    K 4.6 08/23/2023     08/23/2023    CO2 29 08/23/2023     (H) 08/23/2023    BUN 12 08/23/2023    CREATININE 0.91 08/23/2023    CALCIUM 9.9 08/23/2023    PROT 7.3 02/10/2023    ALBUMIN 3.4 (L) 02/10/2023    BILITOT 0.6 02/10/2023    ALKPHOS 63 02/10/2023    AST 25 02/10/2023    ALT 42 02/10/2023         Imaging: CT Abdomen Pelvis With Contrast    Result Date: 9/14/2023  EXAMINATION: CT ABDOMEN PELVIS WITH CONTRAST CLINICAL HISTORY: Abdominal " pain, acute, nonlocalized; Left lower quadrant pain TECHNIQUE: Axial CT imaging of the abdomen and pelvis is performed with intravenous and oral contrast. Contrast dose is 100 cc Isovue 370. CT dose reduction technique used - Dose Rite and tube current modulation. COMPARISON: 20 July 2022 FINDINGS: Cardiac and lung bases are within normal limits CT abdomen: The liver is diffusely decreased in density.  The gallbladder has been removed.  The spleen, pancreas and adrenal glands are normal in size and enhancement.  No evidence of focal lesion is demonstrated in these solid organs. Kidneys are normal in size and enhancement.  No evidence of hydronephrosis or nephrolithiasis is seen. There is thickening of the colon it in various areas more prominent in the ascending and descending colon.  Multiple diverticula present with small amount of stranding adjacent to the descending colon.  Otherwise the bowel caliber is normal and no wall thickening or adjacent inflammatory change is seen.  No evidence of free fluid or free air is present.  Appendix is not seen. CT pelvis: The pelvic bowel appears within normal limits.  Bladder shows no evidence of abnormality.  The uterus and ovaries are not seen.     Thickening of the colon most prominent in the ascending and descending colons could indicate colitis.  Diverticula present with slight adjacent stranding descending colon could indicate mild diverticulitis. Electronically signed by: Ervin Fonseca Date:    09/14/2023 Time:    09:48    Mammo Digital Screening Bilat    Result Date: 9/12/2023  Result: Mammo Digital Screening Bilat History: Patient is 68 y.o. and is seen for a screening mammogram. First child born at age 35 On hormone therapy Films Compared:10/05/2012 through 05/23/2022 Findings: The breasts have scattered areas of fibroglandular density. There is a 1 cm low-density circumscribed nodule in the right retroareolar region. No suspicious masses or microcalcifications  are seen. No interval changes have occurred. A breast examination was performed, and no clinically suspicious palpable masses were present.      No radiographic evidence of malignancy. Routine screening mammograms are recommended. BI-RADS Category 1: Negative Recommendation: Routine screening mammogram in 1 year is recommended. Your estimated lifetime risk of breast cancer (to age 85) based on Tyrer-Cuzick risk assessment model is 3.9 %.  According to the [employer], patients with a lifetime breast cancer risk of 20% or higher might benefit from supplemental screening tests.    US Lower Extremity Veins Right    Result Date: 9/5/2023  EXAMINATION: US LOWER EXTREMITY VEINS RIGHT CLINICAL HISTORY: Unilateral primary osteoarthritis, right knee TECHNIQUE: Duplex and color flow Doppler evaluation with compression of the right lower extremity veins was performed.  Ultrasound images captured and stored. COMPARISON: None FINDINGS: Right thigh veins: The common femoral, femoral, popliteal, upper greater saphenous, and deep femoral veins are patent and free of thrombus. The veins are normally compressible and have normal phasic flow and augmentation response. Right calf veins: There is an occlusive thrombus of the right posterior tibial vein.  The popliteal vein is patent.     Occlusive thrombus of the right posterior tibial vein.  No other thrombosis identified. Electronically signed by: Benja Rebolledo Date:    09/05/2023 Time:    11:11        Assessment:  Olga Stephenson is a 68 y.o. female here with:  1. Abnormal CT scan, colon    2. Diverticulitis    3. Vaginal yeast infection    4. Gastroparesis    5. Fatty liver          Recommendations:  1. Keep appointment for colonoscopy.  If unable to stop Eliquis due to ongoing DVT, may continue for colonoscopy.  Cardiac clearance for Plavix per Dr. Brand  2. Diflucan 150 mg today may repeat in 3 days  3. Do not lay down within 3 hours of eating.  Avoid spicy, greasy  foods  Eat 6-8 small meals per day.  Exercise 150 minutes per week  Avoid raw fruits and vegetables  Small amount of protein and fiber at one time  4. Exercise 150 minutes per week  Weight loss of 7-10%. Weight loss should be gradual  Diet low in saturated fats and carbohydrates  Good glucose and cholesterol control  5. Follow up after colonoscopy      No follow-ups on file.      Order summary:  Orders Placed This Encounter    fluconazole (DIFLUCAN) 150 MG Tab       Thank you for allowing me to participate in the care of Olga Rondon Sachin.      PHILIP Rios-C

## 2023-10-09 RX ORDER — APIXABAN 5 MG/1
TABLET, FILM COATED ORAL
Qty: 90 TABLET | Refills: 0 | Status: SHIPPED | OUTPATIENT
Start: 2023-10-09 | End: 2023-12-11

## 2023-10-12 DIAGNOSIS — Z96.651 STATUS POST TOTAL KNEE REPLACEMENT, RIGHT: Primary | ICD-10-CM

## 2023-10-16 ENCOUNTER — OFFICE VISIT (OUTPATIENT)
Dept: ORTHOPEDICS | Facility: CLINIC | Age: 68
End: 2023-10-16
Payer: MEDICARE

## 2023-10-16 ENCOUNTER — HOSPITAL ENCOUNTER (OUTPATIENT)
Dept: RADIOLOGY | Facility: HOSPITAL | Age: 68
Discharge: HOME | End: 2023-10-16
Attending: ORTHOPAEDIC SURGERY
Payer: MEDICARE

## 2023-10-16 VITALS — BODY MASS INDEX: 31.4 KG/M2 | HEIGHT: 69 IN | WEIGHT: 212 LBS

## 2023-10-16 DIAGNOSIS — Z96.651 STATUS POST TOTAL KNEE REPLACEMENT, RIGHT: ICD-10-CM

## 2023-10-16 DIAGNOSIS — Z96.651 STATUS POST TOTAL KNEE REPLACEMENT, RIGHT: Primary | ICD-10-CM

## 2023-10-16 PROCEDURE — 99999PBSHW PR PBB SHADOW TECHNICAL ONLY FILED TO HB: Mod: PBBFAC,,,

## 2023-10-16 PROCEDURE — 73560 X-RAY EXAM OF KNEE 1 OR 2: CPT | Mod: TC,RT

## 2023-10-16 PROCEDURE — 20610 DRAIN/INJ JOINT/BURSA W/O US: CPT | Mod: PBBFAC,RT | Performed by: ORTHOPAEDIC SURGERY

## 2023-10-16 PROCEDURE — 99213 PR OFFICE/OUTPT VISIT, EST, LEVL III, 20-29 MIN: ICD-10-PCS | Mod: S$PBB,25,, | Performed by: ORTHOPAEDIC SURGERY

## 2023-10-16 PROCEDURE — 20610 LARGE JOINT ASPIRATION/INJECTION: R ANSERINE BURSA: ICD-10-PCS | Mod: S$PBB,RT,, | Performed by: ORTHOPAEDIC SURGERY

## 2023-10-16 PROCEDURE — 73560 X-RAY EXAM OF KNEE 1 OR 2: CPT | Mod: 26,RT,, | Performed by: ORTHOPAEDIC SURGERY

## 2023-10-16 PROCEDURE — 99213 OFFICE O/P EST LOW 20 MIN: CPT | Mod: S$PBB,25,, | Performed by: ORTHOPAEDIC SURGERY

## 2023-10-16 PROCEDURE — 99214 OFFICE O/P EST MOD 30 MIN: CPT | Mod: PBBFAC,25 | Performed by: ORTHOPAEDIC SURGERY

## 2023-10-16 PROCEDURE — 73560 XR KNEE 1 OR 2 VIEW RIGHT: ICD-10-PCS | Mod: 26,RT,, | Performed by: ORTHOPAEDIC SURGERY

## 2023-10-16 PROCEDURE — 99999PBSHW PR PBB SHADOW TECHNICAL ONLY FILED TO HB: ICD-10-PCS | Mod: PBBFAC,,,

## 2023-10-16 RX ORDER — TIZANIDINE 4 MG/1
4 TABLET ORAL EVERY 6 HOURS PRN
Qty: 30 TABLET | Refills: 0 | Status: SHIPPED | OUTPATIENT
Start: 2023-10-16 | End: 2023-10-26

## 2023-10-16 RX ORDER — BUPIVACAINE HYDROCHLORIDE 2.5 MG/ML
1 INJECTION, SOLUTION EPIDURAL; INFILTRATION; INTRACAUDAL
Status: DISCONTINUED | OUTPATIENT
Start: 2023-10-16 | End: 2023-10-16 | Stop reason: HOSPADM

## 2023-10-16 RX ORDER — TRIAMCINOLONE ACETONIDE 40 MG/ML
40 INJECTION, SUSPENSION INTRA-ARTICULAR; INTRAMUSCULAR
Status: DISCONTINUED | OUTPATIENT
Start: 2023-10-16 | End: 2023-10-16 | Stop reason: HOSPADM

## 2023-10-16 RX ADMIN — TRIAMCINOLONE ACETONIDE 40 MG: 40 INJECTION, SUSPENSION INTRA-ARTICULAR; INTRAMUSCULAR at 10:10

## 2023-10-16 RX ADMIN — BUPIVACAINE HYDROCHLORIDE 1 ML: 2.5 INJECTION, SOLUTION EPIDURAL; INFILTRATION; INTRACAUDAL at 10:10

## 2023-10-16 NOTE — PROCEDURES
Large Joint Aspiration/Injection: R anserine bursa    Date/Time: 10/16/2023 10:10 AM    Performed by: Erick Tolliver MD  Authorized by: Erick Tolliver MD    Consent Done?:  Yes (Verbal)  Indications:  Arthritis    Details:  Needle Size:  22 G  Ultrasonic Guidance for needle placement?: No    Approach:  Lateral  Location:  Knee  Site:  R anserine bursa  Medications:  1 mL BUPivacaine (PF) 0.25% (2.5 mg/ml) 0.25 % (2.5 mg/mL); 40 mg triamcinolone acetonide 40 mg/mL  Patient tolerance:  Patient tolerated the procedure well with no immediate complications

## 2023-10-16 NOTE — PROGRESS NOTES
Radiology Interpretation        Patient Name: Olga Stephenson  Date: 10/16/2023  YOB: 1955  MRN# [id number]        ORDERING DIAGNOSIS:    Encounter Diagnosis   Name Primary?    Status post total knee replacement, right Yes           Two views AP lateral right knee skeletally mature individual total knee arthroplasty in place no loosening fractures or subluxations impression total knee arthroplasty in place right knee no loosening            Erick Tolliver MD

## 2023-10-16 NOTE — PROGRESS NOTES
Patient is here for follow-up of her right total knee arthroplasty.  She is having pain over ITB band.  Her MRI did not show any has treatment option into therapy show with Kenalog get her some relief of her symptoms.  Let weightbear as tolerates.  Did not inject the intra articular portion of the knee.  I will follow back up in 2 months.

## 2023-10-27 ENCOUNTER — TELEPHONE (OUTPATIENT)
Dept: GASTROENTEROLOGY | Facility: CLINIC | Age: 68
End: 2023-10-27
Payer: MEDICARE

## 2023-10-27 NOTE — TELEPHONE ENCOUNTER
Call to patient after review of note from Dr. Brand.  Patient has a recent DVT in the right leg.  She is currently on Eliquis as well as Plavix.  Cardiology does not want her to stop Eliquis for colonoscopy at this time.  Patient did have abnormal CT with thickening in the colon wall.  We can proceed with colonoscopy on anticoagulation.  Advised patient that we would not be able to remove polyps if found due to Eliquis and Plavix therapy.  Patient verbalized understanding.

## 2023-11-03 ENCOUNTER — TELEPHONE (OUTPATIENT)
Dept: FAMILY MEDICINE | Facility: CLINIC | Age: 68
End: 2023-11-03
Payer: MEDICARE

## 2023-11-03 NOTE — TELEPHONE ENCOUNTER
Confirmed pt's upcoming appointment on 11/6 and instructed her to bring her medication bottles.    Shaye Chavraria CMA

## 2023-11-06 ENCOUNTER — OFFICE VISIT (OUTPATIENT)
Dept: FAMILY MEDICINE | Facility: CLINIC | Age: 68
End: 2023-11-06
Payer: MEDICARE

## 2023-11-06 VITALS
TEMPERATURE: 99 F | RESPIRATION RATE: 18 BRPM | DIASTOLIC BLOOD PRESSURE: 78 MMHG | HEIGHT: 69 IN | SYSTOLIC BLOOD PRESSURE: 123 MMHG | OXYGEN SATURATION: 97 % | HEART RATE: 86 BPM | BODY MASS INDEX: 31.31 KG/M2

## 2023-11-06 DIAGNOSIS — K21.9 GASTROESOPHAGEAL REFLUX DISEASE, UNSPECIFIED WHETHER ESOPHAGITIS PRESENT: ICD-10-CM

## 2023-11-06 DIAGNOSIS — E55.9 VITAMIN D DEFICIENCY: ICD-10-CM

## 2023-11-06 DIAGNOSIS — R35.0 URINARY FREQUENCY: ICD-10-CM

## 2023-11-06 DIAGNOSIS — I82.441 DEEP VEIN THROMBOSIS (DVT) OF TIBIAL VEIN OF RIGHT LOWER EXTREMITY, UNSPECIFIED CHRONICITY: ICD-10-CM

## 2023-11-06 DIAGNOSIS — Z79.01 LONG TERM (CURRENT) USE OF ANTICOAGULANTS: ICD-10-CM

## 2023-11-06 DIAGNOSIS — J45.909 ASTHMA, UNSPECIFIED ASTHMA SEVERITY, UNSPECIFIED WHETHER COMPLICATED, UNSPECIFIED WHETHER PERSISTENT: ICD-10-CM

## 2023-11-06 DIAGNOSIS — Z23 NEED FOR VACCINATION: ICD-10-CM

## 2023-11-06 DIAGNOSIS — J30.9 CHRONIC ALLERGIC RHINITIS: ICD-10-CM

## 2023-11-06 DIAGNOSIS — E11.9 TYPE 2 DIABETES MELLITUS WITHOUT COMPLICATION, WITHOUT LONG-TERM CURRENT USE OF INSULIN: Primary | ICD-10-CM

## 2023-11-06 LAB
25(OH)D3 SERPL-MCNC: 19.3 NG/ML
BASOPHILS # BLD AUTO: 0.11 K/UL (ref 0–0.2)
BASOPHILS NFR BLD AUTO: 1.2 % (ref 0–1)
BILIRUB SERPL-MCNC: NEGATIVE MG/DL
BLOOD URINE, POC: ABNORMAL
COLOR, POC UA: YELLOW
CREAT UR-MCNC: 104 MG/DL (ref 28–219)
DIFFERENTIAL METHOD BLD: ABNORMAL
EOSINOPHIL # BLD AUTO: 0.14 K/UL (ref 0–0.5)
EOSINOPHIL NFR BLD AUTO: 1.6 % (ref 1–4)
ERYTHROCYTE [DISTWIDTH] IN BLOOD BY AUTOMATED COUNT: 13.3 % (ref 11.5–14.5)
EST. AVERAGE GLUCOSE BLD GHB EST-MCNC: 207 MG/DL
GLUCOSE UR QL STRIP: NEGATIVE
HBA1C MFR BLD HPLC: 8.8 % (ref 4.5–6.6)
HCT VFR BLD AUTO: 45.6 % (ref 38–47)
HGB BLD-MCNC: 14.6 G/DL (ref 12–16)
IMM GRANULOCYTES # BLD AUTO: 0.02 K/UL (ref 0–0.04)
IMM GRANULOCYTES NFR BLD: 0.2 % (ref 0–0.4)
KETONES UR QL STRIP: NEGATIVE
LEUKOCYTE ESTERASE URINE, POC: NEGATIVE
LYMPHOCYTES # BLD AUTO: 4.15 K/UL (ref 1–4.8)
LYMPHOCYTES NFR BLD AUTO: 46 % (ref 27–41)
MCH RBC QN AUTO: 29.5 PG (ref 27–31)
MCHC RBC AUTO-ENTMCNC: 32 G/DL (ref 32–36)
MCV RBC AUTO: 92.1 FL (ref 80–96)
MICROALBUMIN UR-MCNC: 5 MG/DL (ref 0–2.8)
MICROALBUMIN/CREAT RATIO PNL UR: 48.1 MG/G (ref 0–30)
MONOCYTES # BLD AUTO: 0.66 K/UL (ref 0–0.8)
MONOCYTES NFR BLD AUTO: 7.3 % (ref 2–6)
MPC BLD CALC-MCNC: 12.6 FL (ref 9.4–12.4)
NEUTROPHILS # BLD AUTO: 3.95 K/UL (ref 1.8–7.7)
NEUTROPHILS NFR BLD AUTO: 43.7 % (ref 53–65)
NITRITE, POC UA: NEGATIVE
NRBC # BLD AUTO: 0 X10E3/UL
NRBC, AUTO (.00): 0 %
PH, POC UA: 5.5
PLATELET # BLD AUTO: 246 K/UL (ref 150–400)
PROTEIN, POC: ABNORMAL
RBC # BLD AUTO: 4.95 M/UL (ref 4.2–5.4)
SPECIFIC GRAVITY, POC UA: 1.02
UROBILINOGEN, POC UA: 0.2
WBC # BLD AUTO: 9.03 K/UL (ref 4.5–11)

## 2023-11-06 PROCEDURE — 82570 ASSAY OF URINE CREATININE: CPT | Mod: ,,, | Performed by: CLINICAL MEDICAL LABORATORY

## 2023-11-06 PROCEDURE — 99214 OFFICE O/P EST MOD 30 MIN: CPT | Mod: ,,, | Performed by: NURSE PRACTITIONER

## 2023-11-06 PROCEDURE — 83036 HEMOGLOBIN A1C: ICD-10-PCS | Mod: ,,, | Performed by: CLINICAL MEDICAL LABORATORY

## 2023-11-06 PROCEDURE — 82043 UR ALBUMIN QUANTITATIVE: CPT | Mod: ,,, | Performed by: CLINICAL MEDICAL LABORATORY

## 2023-11-06 PROCEDURE — 99214 PR OFFICE/OUTPT VISIT, EST, LEVL IV, 30-39 MIN: ICD-10-PCS | Mod: ,,, | Performed by: NURSE PRACTITIONER

## 2023-11-06 PROCEDURE — 82306 VITAMIN D: ICD-10-PCS | Mod: ,,, | Performed by: CLINICAL MEDICAL LABORATORY

## 2023-11-06 PROCEDURE — 90694 VACC AIIV4 NO PRSRV 0.5ML IM: CPT | Mod: ,,, | Performed by: NURSE PRACTITIONER

## 2023-11-06 PROCEDURE — 81003 URINALYSIS AUTO W/O SCOPE: CPT | Mod: RHCUB | Performed by: NURSE PRACTITIONER

## 2023-11-06 PROCEDURE — 82570 MICROALBUMIN / CREATININE RATIO URINE: ICD-10-PCS | Mod: ,,, | Performed by: CLINICAL MEDICAL LABORATORY

## 2023-11-06 PROCEDURE — G0008 FLU VACCINE - QUADRIVALENT - ADJUVANTED: ICD-10-PCS | Mod: ,,, | Performed by: NURSE PRACTITIONER

## 2023-11-06 PROCEDURE — G0008 ADMIN INFLUENZA VIRUS VAC: HCPCS | Mod: ,,, | Performed by: NURSE PRACTITIONER

## 2023-11-06 PROCEDURE — 85025 COMPLETE CBC W/AUTO DIFF WBC: CPT | Mod: ,,, | Performed by: CLINICAL MEDICAL LABORATORY

## 2023-11-06 PROCEDURE — 82306 VITAMIN D 25 HYDROXY: CPT | Mod: ,,, | Performed by: CLINICAL MEDICAL LABORATORY

## 2023-11-06 PROCEDURE — 90694 FLU VACCINE - QUADRIVALENT - ADJUVANTED: ICD-10-PCS | Mod: ,,, | Performed by: NURSE PRACTITIONER

## 2023-11-06 PROCEDURE — 83036 HEMOGLOBIN GLYCOSYLATED A1C: CPT | Mod: ,,, | Performed by: CLINICAL MEDICAL LABORATORY

## 2023-11-06 PROCEDURE — 85025 CBC WITH DIFFERENTIAL: ICD-10-PCS | Mod: ,,, | Performed by: CLINICAL MEDICAL LABORATORY

## 2023-11-06 PROCEDURE — 82043 MICROALBUMIN / CREATININE RATIO URINE: ICD-10-PCS | Mod: ,,, | Performed by: CLINICAL MEDICAL LABORATORY

## 2023-11-06 RX ORDER — ALBUTEROL SULFATE 90 UG/1
2 AEROSOL, METERED RESPIRATORY (INHALATION) EVERY 4 HOURS PRN
Qty: 17 G | Refills: 3 | Status: SHIPPED | OUTPATIENT
Start: 2023-11-06

## 2023-11-06 RX ORDER — CHLORPHENIRAMINE MALEATE AND PHENYLEPHRINE HYDROCHLORIDE 4; 10 MG/1; MG/1
1 TABLET, COATED ORAL EVERY 4 HOURS PRN
Qty: 60 TABLET | Refills: 1 | Status: SHIPPED | OUTPATIENT
Start: 2023-11-06 | End: 2023-12-04 | Stop reason: ALTCHOICE

## 2023-11-06 RX ORDER — PANTOPRAZOLE SODIUM 40 MG/1
40 TABLET, DELAYED RELEASE ORAL DAILY
Qty: 90 TABLET | Refills: 3 | Status: SHIPPED | OUTPATIENT
Start: 2023-11-06 | End: 2024-02-12 | Stop reason: SDUPTHER

## 2023-11-06 NOTE — PROGRESS NOTES
[facility]  Chief Complaint      Chief Complaint   Patient presents with    Follow-up     Here for 3 month follow up    Diabetes     Due for repeat A1c & urine microalbumin creatinine     Health Maintenance     Not Fasting. Did not bring med bottles but has taken medication. Care gaps discussed she declines TDAY/COVID/RSV. She would like her A1C/Micro/flu shots.       History of Present Illness      Olga Stephenson is a 68 y.o. female with chronic conditions of CAD with Hx of CABG, Anxiety, Vitamin B 12 Deficiency, Vitamin D Deficiency, Fatty Liver, DM Type 2, Cardiomegaly, Hysterectomy Status on HRT, Hypertension, Hyperlipidemia, GERD, Chronic Low Back Pain, Osteoarthritis Right Knee, Hx of Thyroid Nodule, Allergic Rhinitis, Insomnia,  Hx of GI Bleed, Colon Polyp, Bilateral Lower Extremity Edema, and Obesity  who presents today for 3 month follow up. Reviewed records from Dr. Pacheco cardiologist and she did not recommend that patient hold her Eliqus for an elective colonoscopy given her recent diagnosis of DVT. She declines all recommended vaccines with the exception of her flu vaccine which she wishes to have administered today.     Care Team:  Diabetes Mae Gautam, SAGAR  Neurology Dr. Malik Lomas  Pain Management Dr. Idris Morales / Eugenia Tolliver, SAGAR  Gynecology Shanelle Gusman, [facility]  Orthopedic Dr. Erick Tolliver  Cardiology Dr. Amparo Brand   Optometry Dr. Navarro Tolliver  Gastroenterology Dr. ONEIL Gomez / Svitlana Milton, [facility]   General Surgery Dr. Juan Damico III     Care Gaps Discussed:  Mammogram 09/12/2023 repeat scheduled for 11/05/2024 @ 1000  Colorectal Cancer Screening  06/25/2022 Dr. ONEIL Gomez repeat in 3 years due 06/25/2025  Hemoglobin A1c 05/09/2023 6.6%, repeat today 11/06/2023 results pending  Diabetes Urine Screening 09/08/2022 repeat today 11/06/2023 results pending   Lipid Panel 10/26/2022 Chol 178 Trig 245 LDL n/a HDL 35, 08/23/2023  High Dose Statin per  cardiology notes Atorvastatin and Rosuvastatin caused myalgias   Eye Exam  up-to-date per patient Dr. Navarro Tolliver  Foot Exam 2023 repeat 2024  Tetanus Vaccine overdue-declined   Shingles Vaccine #1 2022 #2 2023  Influenza Vaccine 2022 repeat due on/after 2023  Pneumonia Vaccine Prevnar 20 2023  Covid 19 Vaccine 2021 overdue for 4th dose since 2023  Pap/GYN Exam Hysterectomy Status last HPV negative 10/24/2022 GYN MALINI Gusman CNM  DEXA Scan 2023 normal bone density, repeat in 3 years on/after 2026  Hep C Screening 2021 non-reactive         Past Medical History:  Past Medical History:   Diagnosis Date    Acute superficial gastritis without hemorrhage 2022    Adenomatous polyp of cecum 06/15/2022    Adenomatous polyp of transverse colon 06/15/2022    Anticoagulant long-term use     Arthritis     Asthma     Coronary artery disease     Diabetes mellitus, type 2 2014    Diabetic eye exam 2023    Dr. Navarro Tolliver    Diverticula, colon 06/15/2022    DVT (deep venous thrombosis)     Epigastric pain 2022    Heart attack     History of colon polyps 06/15/2022    Hyperlipidemia     Hypertension 2014    Polyp of hepatic flexure of colon 06/15/2022    Screening for colon cancer 06/15/2022    Thyroid disease        Past Surgical History:   has a past surgical history that includes Esophagogastroduodenoscopy (03/10/2021); Colonoscopy (2017); Hysterectomy;  section; Bilateral oophorectomy (Bilateral); Cholecystectomy; arthroplasty, knee, total, using computer-assisted navigation (Right, 2023); and Coronary artery bypass graft ().    Social History:  Social History     Tobacco Use    Smoking status: Former     Passive exposure: Past    Smokeless tobacco: Never   Substance Use Topics    Alcohol use: Not Currently     Comment: occasionally    Drug use: Never       I personally reviewed all past  "medical, surgical, and social.     Review of Systems   Constitutional:  Negative for chills and fever.   HENT:  Positive for congestion, sinus pressure and sinus pain. Negative for rhinorrhea and sore throat.    Respiratory:  Negative for cough and shortness of breath.    Cardiovascular:  Negative for chest pain.   Gastrointestinal:  Positive for diarrhea ("I keep diarrhea"). Negative for abdominal pain and constipation.   Genitourinary:  Positive for frequency and urgency. Negative for dysuria.   Musculoskeletal:  Positive for arthralgias (right knee) and back pain. Negative for myalgias.   Neurological:  Positive for dizziness ("with sinuses") and headaches.   Psychiatric/Behavioral:  Positive for sleep disturbance. Negative for dysphoric mood. The patient is not nervous/anxious.         Medications:  Outpatient Encounter Medications as of 11/6/2023   Medication Sig Dispense Refill    blood sugar diagnostic (FREESTYLE LITE STRIPS) Strp USE 1 STRIP DAILY TO MONITOR GLUCOSE 100 strip 3    clopidogreL (PLAVIX) 75 mg tablet Take 1 tablet (75 mg total) by mouth once daily. 30 tablet 1    cyclobenzaprine (FLEXERIL) 10 MG tablet 1 tablet nightly as needed.      dapagliflozin (FARXIGA) 10 mg tablet Take 1 tablet (10 mg total) by mouth once daily. 90 tablet 3    ELIQUIS 5 mg Tab TAKE 2 TABLETS BY MOUTH TWICE DAILY FOR 7 DAYS, THEN 1 TABLET TWICE DAILY 90 tablet 0    estradioL (VIVELLE-DOT) 0.1 mg/24 hr PTSW Place 1 patch onto the skin twice a week. 24 patch 3    ezetimibe (ZETIA) 10 mg tablet Take 1 tablet by mouth every evening.      fluticasone propionate (FLONASE) 50 mcg/actuation nasal spray 1 spray (50 mcg total) by Each Nostril route once daily. 16 g 3    glipiZIDE (GLUCOTROL) 5 MG tablet Take 5 mg by mouth once daily.      HYDROcodone-acetaminophen (NORCO)  mg per tablet Take 1 tablet by mouth every 6 (six) hours as needed for Pain. 28 tablet 0    losartan (COZAAR) 50 MG tablet Take 1 tablet (50 mg total) by " mouth once daily. 90 tablet 3    metFORMIN (GLUCOPHAGE) 1000 MG tablet Take 1 tablet (1,000 mg total) by mouth nightly. 90 tablet 1    metoprolol succinate (TOPROL-XL) 25 MG 24 hr tablet Take 1 tablet by mouth once daily.      multivitamin capsule Take 1 capsule by mouth once daily.      naproxen (NAPROSYN) 500 MG tablet Take 1 tablet (500 mg total) by mouth 2 (two) times daily as needed (pain). 20 tablet 0    pregabalin (LYRICA) 75 MG capsule Take 2 capsules (150 mg total) by mouth nightly. 90 capsule 1    tiZANidine (ZANAFLEX) 4 MG tablet Take by mouth.      valACYclovir (VALTREX) 500 MG tablet Take 1 tablet (500 mg total) by mouth once daily. 90 tablet 1    vitamin D (VITAMIN D3) 1000 units Tab Take 1 tablet (1,000 Units total) by mouth once daily. 90 tablet 3    [DISCONTINUED] albuterol (PROVENTIL/VENTOLIN HFA) 90 mcg/actuation inhaler USE 2 INHALATIONS TWICE A DAY 17 g 3    [DISCONTINUED] pantoprazole (PROTONIX) 40 MG tablet Take 1 tablet (40 mg total) by mouth once daily. 90 tablet 3    albuterol (PROVENTIL/VENTOLIN HFA) 90 mcg/actuation inhaler Inhale 2 puffs into the lungs every 4 (four) hours as needed for Wheezing or Shortness of Breath. Rescue 17 g 3    chlorpheniramine-phenylephrine (ED A-HIST) 4-10 mg per tablet Take 1 tablet by mouth every 4 (four) hours as needed for Congestion or Allergies. 60 tablet 1    cyanocobalamin 1,000 mcg/mL injection Inject 1 mL (1,000 mcg total) into the muscle every Wednesday for 42 days, THEN 1 mL (1,000 mcg total) every 30 days. (Patient not taking: Reported on 2023) 7 mL 0    fluconazole (DIFLUCAN) 150 MG Tab Take one today and repeat in 3 days (Patient not taking: Reported on 2023) 2 tablet 0    fluconazole (DIFLUCAN) 200 MG Tab TAKE ONE TABLET BY MOUTH EVERY DAY (Patient not taking: Reported on 2023) 3 tablet 1    pantoprazole (PROTONIX) 40 MG tablet Take 1 tablet (40 mg total) by mouth once daily. 90 tablet 3    [] syringe with needle 1 mL 28  "gauge x 1/2" Syrg 1 Syringe by Misc.(Non-Drug; Combo Route) route every Wednesday for 42 days, THEN 1 Syringe every 30 days. For B12 injections.. 7 each 0    [] tiZANidine (ZANAFLEX) 4 MG tablet Take 1 tablet (4 mg total) by mouth every 6 (six) hours as needed. 30 tablet 0    [DISCONTINUED] hydrocortisone 2.5 % cream        No facility-administered encounter medications on file as of 2023.       Allergies:  Review of patient's allergies indicates:   Allergen Reactions    Jardiance [empagliflozin] Anaphylaxis     Headaches     Trulicity [dulaglutide]      Abdominal pain    Ozempic [semaglutide] Nausea And Vomiting       Health Maintenance:  Immunization History   Administered Date(s) Administered    COVID-19, MRNA, LN-S, PF (MODERNA FULL 0.5 ML DOSE) 2021, 2021, 2021    Influenza (FLUAD) - Quadrivalent - Adjuvanted - PF *Preferred* (65+) 2022, 2023    Influenza - Quadrivalent - High Dose - PF (65 years and older) 2021    Pneumococcal Conjugate - 20 Valent 2023    Zoster Recombinant 10/25/2018, 2022, 2023        Health Maintenance   Topic Date Due    TETANUS VACCINE  Never done-declined     Hemoglobin A1c  2023-repeat today results pending    Eye Exam  2024    Foot Exam  2024    Lipid Panel  2024    Mammogram  2024    Colorectal Cancer Screening  2025    DEXA Scan  2026    Hepatitis C Screening  Completed    Shingles Vaccine  Completed    High Dose Statin  Discontinued        Physical Exam     Physical Exam  Constitutional:       General: She is not in acute distress.     Appearance: Normal appearance.   HENT:      Head: Normocephalic.      Right Ear: External ear normal.      Left Ear: External ear normal.   Cardiovascular:      Rate and Rhythm: Normal rate and regular rhythm.      Pulses: Normal pulses.      Heart sounds: Normal heart sounds.   Pulmonary:      Effort: Pulmonary effort is normal.      Breath " sounds: Normal breath sounds. No wheezing.   Abdominal:      Palpations: Abdomen is soft.      Tenderness: There is no abdominal tenderness.   Skin:     General: Skin is warm and dry.   Neurological:      Mental Status: She is alert.   Psychiatric:         Mood and Affect: Mood normal.         Behavior: Behavior normal.          Laboratory:    Lab Results   Component Value Date     (H) 08/23/2023     08/23/2023    K 4.6 08/23/2023     08/23/2023    CO2 29 08/23/2023    BUN 12 08/23/2023    CREATININE 0.91 08/23/2023    CALCIUM 9.9 08/23/2023    PROT 7.3 02/10/2023    ALBUMIN 3.4 (L) 02/10/2023    BILITOT 0.6 02/10/2023    ALKPHOS 63 02/10/2023    AST 25 02/10/2023    ALT 42 02/10/2023    ANIONGAP 9 08/23/2023    ESTGFRAFRICA 68 06/08/2021    EGFRNONAA 83 06/26/2022       Lab Results   Component Value Date    WBC 9.22 08/28/2023    RBC 4.97 08/28/2023    HGB 14.4 08/28/2023    HCT 45.1 08/28/2023    MCV 90.7 08/28/2023    RDW 14.3 08/28/2023     08/28/2023        Lab Results   Component Value Date    CHOL 167 08/23/2023    CHOL 137 01/30/2020    TRIG 187 (H) 08/23/2023    TRIG 165 01/30/2020    HDL 44 08/23/2023    HDL 37 01/30/2020    LDLCALC 86 08/23/2023       Lab Results   Component Value Date    TSH 0.441 03/28/2023       Lab Results   Component Value Date    HGBA1C 6.6 05/09/2023    ESTIMATEDAVG 134 05/09/2023        Lab Results   Component Value Date    CJOSMMYA50 3,228 (H) 10/12/2023       Lab Results   Component Value Date    JLNFVMKI67RT 26.2 03/28/2023       Point Of Care Testing:  WBC, UA   Date Value Ref Range Status   11/06/2023 Negative  Final     Nitrite, UA   Date Value Ref Range Status   11/06/2023 Negative  Final     Urobilinogen, UA   Date Value Ref Range Status   11/06/2023 0.2  Final     Protein, POC   Date Value Ref Range Status   11/06/2023 Trace  Final     pH, UA   Date Value Ref Range Status   11/06/2023 5.5  Final     Spec Grav UA   Date Value Ref Range Status    11/06/2023 1.025  Final     Ketones, UA   Date Value Ref Range Status   11/06/2023 Negative  Final     Bilirubin, POC   Date Value Ref Range Status   11/06/2023 Negative  Final     Glucose, UA   Date Value Ref Range Status   11/06/2023 Negative  Final       Lab Results   Component Value Date    BIGQTVI7AO Negative 07/08/2021    RAPFLUA Negative 07/08/2021    RAPFLUB Negative 07/08/2021         Assessment/Plan     Type 2 diabetes mellitus without complication, without long-term current use of insulin  -     Hemoglobin A1C; Future; Expected date: 11/06/2023  -     Microalbumin/Creatinine Ratio, Urine; Future; Expected date: 11/06/2023    Vitamin D deficiency  -     Vitamin D; Future; Expected date: 11/06/2023    Gastroesophageal reflux disease, unspecified whether esophagitis present  -     pantoprazole (PROTONIX) 40 MG tablet; Take 1 tablet (40 mg total) by mouth once daily.  Dispense: 90 tablet; Refill: 3    Asthma, unspecified asthma severity, unspecified whether complicated, unspecified whether persistent  -     albuterol (PROVENTIL/VENTOLIN HFA) 90 mcg/actuation inhaler; Inhale 2 puffs into the lungs every 4 (four) hours as needed for Wheezing or Shortness of Breath. Rescue  Dispense: 17 g; Refill: 3    Need for vaccination  -     Influenza (FLUAD) - Quadrivalent (Adjuvanted) *Preferred* (65+) (PF)    Urinary frequency  -     POCT URINALYSIS W/O SCOPE    Chronic allergic rhinitis  -     chlorpheniramine-phenylephrine (ED A-HIST) 4-10 mg per tablet; Take 1 tablet by mouth every 4 (four) hours as needed for Congestion or Allergies.  Dispense: 60 tablet; Refill: 1    Long term (current) use of anticoagulants  -     CBC Auto Differential; Future; Expected date: 11/06/2023    Deep vein thrombosis (DVT) of tibial vein of right lower extremity, unspecified chronicity  -     CBC Auto Differential; Future; Expected date: 11/06/2023        Return to clinic in 3 months.    Questions answered to desired level of  satisfaction    Verbalized understanding to all information and instructions provided      [facility]IN [facility]

## 2023-11-16 DIAGNOSIS — E55.9 VITAMIN D DEFICIENCY: Primary | ICD-10-CM

## 2023-11-16 RX ORDER — ERGOCALCIFEROL 1.25 MG/1
50000 CAPSULE ORAL
Qty: 12 CAPSULE | Refills: 1 | Status: SHIPPED | OUTPATIENT
Start: 2023-11-17

## 2023-11-20 ENCOUNTER — OFFICE VISIT (OUTPATIENT)
Dept: GASTROENTEROLOGY | Facility: CLINIC | Age: 68
End: 2023-11-20
Payer: MEDICARE

## 2023-11-20 VITALS
BODY MASS INDEX: 30.81 KG/M2 | WEIGHT: 208 LBS | DIASTOLIC BLOOD PRESSURE: 81 MMHG | HEART RATE: 79 BPM | SYSTOLIC BLOOD PRESSURE: 122 MMHG | HEIGHT: 69 IN

## 2023-11-20 DIAGNOSIS — K57.30 DIVERTICULA, COLON: ICD-10-CM

## 2023-11-20 DIAGNOSIS — R93.3 ABNORMAL CT SCAN, COLON: ICD-10-CM

## 2023-11-20 DIAGNOSIS — K31.84 GASTROPARESIS: ICD-10-CM

## 2023-11-20 DIAGNOSIS — K76.0 FATTY LIVER: ICD-10-CM

## 2023-11-20 DIAGNOSIS — K57.92 DIVERTICULITIS: Primary | ICD-10-CM

## 2023-11-20 PROCEDURE — 99214 PR OFFICE/OUTPT VISIT, EST, LEVL IV, 30-39 MIN: ICD-10-PCS | Mod: S$PBB,,, | Performed by: NURSE PRACTITIONER

## 2023-11-20 PROCEDURE — 99214 OFFICE O/P EST MOD 30 MIN: CPT | Mod: S$PBB,,, | Performed by: NURSE PRACTITIONER

## 2023-11-20 PROCEDURE — 99215 OFFICE O/P EST HI 40 MIN: CPT | Mod: PBBFAC | Performed by: NURSE PRACTITIONER

## 2023-11-20 NOTE — PROGRESS NOTES
Olga Stephenson is a 68 y.o. female here for No chief complaint on file.        PCP: Kenya De Paz  Referring Provider: Jocelin Love, Johnny  [address] [place] [address]  [place] [address]     HPI:  Presents for follow-up.  Patient had CT abdomen and pelvis on 09/14/2023 that did show thickening in the ascending and descending colon, possible colitis/diverticulitis.  Patient was prescribed antibiotics.  States that abdominal pain has now improved.  Has not seen any blood recently.  Had previously had hematochezia.  She is on Eliquis for DVT.  This was discussed with Dr. Brand.  Patient will have to remain on Eliquis for a minimum of 6 months.  Colonoscopy will need to be performed on Eliquis.  Today she is reporting mucus in her stool. Stool is loose. This is a change in bowel patterns for the patient.  Also reports foul odor of stool.  Patient had stool studies ordered in August but has not yet turned these in.  We will give the patient a cup today.  She has been advised to turn in the stool studies for testing.  She has a history of gastroparesis.  Denies any nausea or vomiting.  No fever.  She also has fatty liver.          ROS:  Review of Systems   Constitutional:  Negative for appetite change, fatigue, fever and unexpected weight change.   HENT:  Negative for trouble swallowing.    Gastrointestinal:  Positive for diarrhea. Negative for abdominal pain, blood in stool, change in bowel habit, constipation, nausea, vomiting and reflux.   Musculoskeletal:  Positive for leg pain. Negative for gait problem.   Integumentary:  Negative for pallor.   Psychiatric/Behavioral:  The patient is not nervous/anxious.           PMHX:  has a past medical history of Acute superficial gastritis without hemorrhage (06/08/2022), Adenomatous polyp of cecum (06/15/2022), Adenomatous polyp of transverse colon (06/15/2022), Anticoagulant long-term use, Arthritis, Asthma, Coronary artery disease, Diabetes  mellitus, type 2 (), Diabetic eye exam (2023), Diverticula, colon (06/15/2022), DVT (deep venous thrombosis), Epigastric pain (2022), Heart attack (), History of colon polyps (06/15/2022), Hyperlipidemia, Hypertension (), Polyp of hepatic flexure of colon (06/15/2022), Screening for colon cancer (06/15/2022), and Thyroid disease.    PSHX:  has a past surgical history that includes Esophagogastroduodenoscopy (03/10/2021); Colonoscopy (2017); Hysterectomy;  section; Bilateral oophorectomy (Bilateral); Cholecystectomy; arthroplasty, knee, total, using computer-assisted navigation (Right, 2023); and Coronary artery bypass graft ().    PFHX: family history includes Dementia in her mother; Diabetes in her brother and mother; Heart disease in her mother; No Known Problems in her daughter and father; Prostate cancer in her brother; Thyroid disease in her mother.    PSlHX:  reports that she has quit smoking. She has been exposed to tobacco smoke. She has never used smokeless tobacco. She reports that she does not currently use alcohol. She reports that she does not use drugs.        Review of patient's allergies indicates:   Allergen Reactions    Jardiance [empagliflozin] Anaphylaxis     Headaches     Trulicity [dulaglutide]      Abdominal pain    Ozempic [semaglutide] Nausea And Vomiting       Medication List with Changes/Refills   Current Medications    ALBUTEROL (PROVENTIL/VENTOLIN HFA) 90 MCG/ACTUATION INHALER    Inhale 2 puffs into the lungs every 4 (four) hours as needed for Wheezing or Shortness of Breath. Rescue    BLOOD SUGAR DIAGNOSTIC (FREESTYLE LITE STRIPS) STRP    USE 1 STRIP DAILY TO MONITOR GLUCOSE    CHLORPHENIRAMINE-PHENYLEPHRINE (ED A-HIST) 4-10 MG PER TABLET    Take 1 tablet by mouth every 4 (four) hours as needed for Congestion or Allergies.    CLOPIDOGREL (PLAVIX) 75 MG TABLET    Take 1 tablet (75 mg total) by mouth once daily.    CYCLOBENZAPRINE (FLEXERIL)  "10 MG TABLET    1 tablet nightly as needed.    DAPAGLIFLOZIN (FARXIGA) 10 MG TABLET    Take 1 tablet (10 mg total) by mouth once daily.    ELIQUIS 5 MG TAB    TAKE 2 TABLETS BY MOUTH TWICE DAILY FOR 7 DAYS, THEN 1 TABLET TWICE DAILY    ERGOCALCIFEROL (ERGOCALCIFEROL) 50,000 UNIT CAP    Take 1 capsule (50,000 Units total) by mouth Every Friday.    ESTRADIOL (VIVELLE-DOT) 0.1 MG/24 HR PTSW    Place 1 patch onto the skin twice a week.    EZETIMIBE (ZETIA) 10 MG TABLET    Take 1 tablet by mouth every evening.    FLUCONAZOLE (DIFLUCAN) 150 MG TAB    Take one today and repeat in 3 days    FLUCONAZOLE (DIFLUCAN) 200 MG TAB    TAKE ONE TABLET BY MOUTH EVERY DAY    FLUTICASONE PROPIONATE (FLONASE) 50 MCG/ACTUATION NASAL SPRAY    1 spray (50 mcg total) by Each Nostril route once daily.    GLIPIZIDE (GLUCOTROL) 5 MG TABLET    Take 5 mg by mouth once daily.    HYDROCODONE-ACETAMINOPHEN (NORCO)  MG PER TABLET    Take 1 tablet by mouth every 6 (six) hours as needed for Pain.    LOSARTAN (COZAAR) 50 MG TABLET    Take 1 tablet (50 mg total) by mouth once daily.    METFORMIN (GLUCOPHAGE) 1000 MG TABLET    Take 1 tablet (1,000 mg total) by mouth nightly.    METOPROLOL SUCCINATE (TOPROL-XL) 25 MG 24 HR TABLET    Take 1 tablet by mouth once daily.    MULTIVITAMIN CAPSULE    Take 1 capsule by mouth once daily.    NAPROXEN (NAPROSYN) 500 MG TABLET    Take 1 tablet (500 mg total) by mouth 2 (two) times daily as needed (pain).    PANTOPRAZOLE (PROTONIX) 40 MG TABLET    Take 1 tablet (40 mg total) by mouth once daily.    PREGABALIN (LYRICA) 75 MG CAPSULE    Take 2 capsules (150 mg total) by mouth nightly.    TIZANIDINE (ZANAFLEX) 4 MG TABLET    Take by mouth.    VALACYCLOVIR (VALTREX) 500 MG TABLET    Take 1 tablet (500 mg total) by mouth once daily.        Objective Findings:  Vital Signs:  /81   Pulse 79   Ht 5' 9" (1.753 m)   Wt 94.3 kg (208 lb)   BMI 30.72 kg/m²  Body mass index is 30.72 kg/m².    Physical " Exam:  Physical Exam  Vitals and nursing note reviewed.   Constitutional:       General: She is not in acute distress.     Appearance: Normal appearance.   HENT:      Mouth/Throat:      Mouth: Mucous membranes are moist.   Cardiovascular:      Rate and Rhythm: Normal rate.   Pulmonary:      Effort: Pulmonary effort is normal.      Breath sounds: No wheezing, rhonchi or rales.   Abdominal:      General: Bowel sounds are normal. There is no distension.      Palpations: Abdomen is soft. There is no mass.      Tenderness: There is no abdominal tenderness. There is no guarding.   Skin:     General: Skin is warm and dry.      Coloration: Skin is not jaundiced or pale.   Neurological:      Mental Status: She is alert and oriented to person, place, and time.   Psychiatric:         Mood and Affect: Mood normal.          Labs:  Lab Results   Component Value Date    WBC 9.03 11/06/2023    HGB 14.6 11/06/2023    HCT 45.6 11/06/2023    MCV 92.1 11/06/2023    RDW 13.3 11/06/2023     11/06/2023    LYMPH 46.0 (H) 11/06/2023    LYMPH 4.15 11/06/2023    MONO 7.3 (H) 11/06/2023    EOS 0.14 11/06/2023    BASO 0.11 11/06/2023     Lab Results   Component Value Date     08/23/2023    K 4.6 08/23/2023     08/23/2023    CO2 29 08/23/2023     (H) 08/23/2023    BUN 12 08/23/2023    CREATININE 0.91 08/23/2023    CALCIUM 9.9 08/23/2023    PROT 7.3 02/10/2023    ALBUMIN 3.4 (L) 02/10/2023    BILITOT 0.6 02/10/2023    ALKPHOS 63 02/10/2023    AST 25 02/10/2023    ALT 42 02/10/2023         Imaging: No results found.      Assessment:  Olga Stephenson is a 68 y.o. female here with:  1. Diverticulitis    2. Abnormal CT scan, colon    3. Diverticula, colon    4. Gastroparesis    5. Fatty liver          Recommendations:  1. Turn in stool studies previously ordered  2. Keep appointment for colonoscopy.  We will need to be done on Eliquis due to DVT  3. Do not lay down within 3 hours of eating.  Avoid spicy, greasy foods  Eat  6-8 small meals per day  Avoid raw fruits and vegetables  Small amount of protein and fiber at one time  4.Weight loss of 7-10%. Weight loss should be gradual  Diet low in saturated fats and carbohydrates  Good glucose and cholesterol control        Follow up in about 3 months (around 2/20/2024).      Order summary:       Thank you for allowing me to participate in the care of Olga Stephenson.      CHARLOTTE RiosC

## 2023-12-04 ENCOUNTER — OFFICE VISIT (OUTPATIENT)
Dept: FAMILY MEDICINE | Facility: CLINIC | Age: 68
End: 2023-12-04
Payer: MEDICARE

## 2023-12-04 VITALS
WEIGHT: 206.19 LBS | RESPIRATION RATE: 18 BRPM | OXYGEN SATURATION: 96 % | TEMPERATURE: 98 F | HEART RATE: 83 BPM | DIASTOLIC BLOOD PRESSURE: 80 MMHG | HEIGHT: 69 IN | SYSTOLIC BLOOD PRESSURE: 122 MMHG | BODY MASS INDEX: 30.54 KG/M2

## 2023-12-04 DIAGNOSIS — I25.118 CORONARY ARTERY DISEASE OF NATIVE ARTERY OF NATIVE HEART WITH STABLE ANGINA PECTORIS: ICD-10-CM

## 2023-12-04 DIAGNOSIS — J30.2 SEASONAL ALLERGIES: ICD-10-CM

## 2023-12-04 DIAGNOSIS — J01.00 ACUTE NON-RECURRENT MAXILLARY SINUSITIS: Primary | ICD-10-CM

## 2023-12-04 PROCEDURE — 96372 THER/PROPH/DIAG INJ SC/IM: CPT | Mod: ,,, | Performed by: FAMILY MEDICINE

## 2023-12-04 PROCEDURE — 99213 OFFICE O/P EST LOW 20 MIN: CPT | Mod: ,,, | Performed by: FAMILY MEDICINE

## 2023-12-04 PROCEDURE — 96372 PR INJECTION,THERAP/PROPH/DIAG2ST, IM OR SUBCUT: ICD-10-PCS | Mod: ,,, | Performed by: FAMILY MEDICINE

## 2023-12-04 PROCEDURE — 99213 PR OFFICE/OUTPT VISIT, EST, LEVL III, 20-29 MIN: ICD-10-PCS | Mod: ,,, | Performed by: FAMILY MEDICINE

## 2023-12-04 RX ORDER — DEXAMETHASONE SODIUM PHOSPHATE 4 MG/ML
4 INJECTION, SOLUTION INTRA-ARTICULAR; INTRALESIONAL; INTRAMUSCULAR; INTRAVENOUS; SOFT TISSUE
Status: COMPLETED | OUTPATIENT
Start: 2023-12-04 | End: 2023-12-04

## 2023-12-04 RX ORDER — BROMPHENIRAMINE MALEATE, PSEUDOEPHEDRINE HYDROCHLORIDE, AND DEXTROMETHORPHAN HYDROBROMIDE 2; 30; 10 MG/5ML; MG/5ML; MG/5ML
5 SYRUP ORAL EVERY 6 HOURS PRN
Qty: 150 ML | Refills: 0 | Status: SHIPPED | OUTPATIENT
Start: 2023-12-04 | End: 2023-12-14

## 2023-12-04 RX ORDER — METHYLPREDNISOLONE ACETATE 40 MG/ML
40 INJECTION, SUSPENSION INTRA-ARTICULAR; INTRALESIONAL; INTRAMUSCULAR; SOFT TISSUE
Status: COMPLETED | OUTPATIENT
Start: 2023-12-04 | End: 2023-12-04

## 2023-12-04 RX ORDER — AMOXICILLIN AND CLAVULANATE POTASSIUM 875; 125 MG/1; MG/1
1 TABLET, FILM COATED ORAL 2 TIMES DAILY
Qty: 20 TABLET | Refills: 0 | Status: SHIPPED | OUTPATIENT
Start: 2023-12-04 | End: 2023-12-14

## 2023-12-04 RX ORDER — FLUCONAZOLE 150 MG/1
TABLET ORAL
Qty: 3 TABLET | Refills: 0 | Status: SHIPPED | OUTPATIENT
Start: 2023-12-04 | End: 2024-02-12 | Stop reason: ALTCHOICE

## 2023-12-04 RX ORDER — CETIRIZINE HYDROCHLORIDE 10 MG/1
10 TABLET ORAL DAILY
Qty: 90 TABLET | Refills: 1 | Status: SHIPPED | OUTPATIENT
Start: 2023-12-04 | End: 2024-02-01

## 2023-12-04 RX ADMIN — METHYLPREDNISOLONE ACETATE 40 MG: 40 INJECTION, SUSPENSION INTRA-ARTICULAR; INTRALESIONAL; INTRAMUSCULAR; SOFT TISSUE at 08:12

## 2023-12-04 RX ADMIN — DEXAMETHASONE SODIUM PHOSPHATE 4 MG: 4 INJECTION, SOLUTION INTRA-ARTICULAR; INTRALESIONAL; INTRAMUSCULAR; INTRAVENOUS; SOFT TISSUE at 08:12

## 2023-12-04 NOTE — PROGRESS NOTES
Clinic Note    Patient Name: Olga Stephenson  : 1955  MRN: [id number]    Chief Complaint   Patient presents with    Sinus Problem     Sneezing, coughing x 1-2 weeks        HPI:    Ms. Olga Stephenson is a 68 y.o. female who presents to clinic today with CC of sinus congestion, sneezing, and coughing X 2 weeks.  Patient reports she took a home COVID-19 test yesterday with negative results. Reports thick green mucus.  Patient denies fever.  Patient is, otherwise, without complaints.     Medications:  Medication List with Changes/Refills   New Medications    AMOXICILLIN-CLAVULANATE 875-125MG (AUGMENTIN) 875-125 MG PER TABLET    Take 1 tablet by mouth 2 (two) times daily. for 10 days    BROMPHENIRAMINE-PSEUDOEPH-DM (BROMFED DM) 2-30-10 MG/5 ML SYRP    Take 5 mLs by mouth every 6 (six) hours as needed (congestion or cough).    CETIRIZINE (ZYRTEC) 10 MG TABLET    Take 1 tablet (10 mg total) by mouth once daily. For allergies    FLUCONAZOLE (DIFLUCAN) 150 MG TAB    Take one tablet by mouth every 72 hours X 3 doses if needed for yeast infection after completion of antibiotics.   Current Medications    ALBUTEROL (PROVENTIL/VENTOLIN HFA) 90 MCG/ACTUATION INHALER    Inhale 2 puffs into the lungs every 4 (four) hours as needed for Wheezing or Shortness of Breath. Rescue    BLOOD SUGAR DIAGNOSTIC (FREESTYLE LITE STRIPS) STRP    USE 1 STRIP DAILY TO MONITOR GLUCOSE    CLOPIDOGREL (PLAVIX) 75 MG TABLET    Take 1 tablet (75 mg total) by mouth once daily.    CYCLOBENZAPRINE (FLEXERIL) 10 MG TABLET    1 tablet nightly as needed.    DAPAGLIFLOZIN (FARXIGA) 10 MG TABLET    Take 1 tablet (10 mg total) by mouth once daily.    ELIQUIS 5 MG TAB    TAKE 2 TABLETS BY MOUTH TWICE DAILY FOR 7 DAYS, THEN 1 TABLET TWICE DAILY    ERGOCALCIFEROL (ERGOCALCIFEROL) 50,000 UNIT CAP    Take 1 capsule (50,000 Units total) by mouth Every Friday.    ESTRADIOL (VIVELLE-DOT) 0.1 MG/24 HR PTSW    Place 1 patch onto the skin twice a week.     EZETIMIBE (ZETIA) 10 MG TABLET    Take 1 tablet by mouth every evening.    FLUTICASONE PROPIONATE (FLONASE) 50 MCG/ACTUATION NASAL SPRAY    1 spray (50 mcg total) by Each Nostril route once daily.    GLIPIZIDE (GLUCOTROL) 5 MG TABLET    Take 5 mg by mouth once daily.    HYDROCODONE-ACETAMINOPHEN (NORCO)  MG PER TABLET    Take 1 tablet by mouth every 6 (six) hours as needed for Pain.    LOSARTAN (COZAAR) 50 MG TABLET    Take 1 tablet (50 mg total) by mouth once daily.    METFORMIN (GLUCOPHAGE) 1000 MG TABLET    Take 1 tablet (1,000 mg total) by mouth nightly.    METOPROLOL SUCCINATE (TOPROL-XL) 25 MG 24 HR TABLET    Take 1 tablet by mouth once daily.    MULTIVITAMIN CAPSULE    Take 1 capsule by mouth once daily.    NAPROXEN (NAPROSYN) 500 MG TABLET    Take 1 tablet (500 mg total) by mouth 2 (two) times daily as needed (pain).    PANTOPRAZOLE (PROTONIX) 40 MG TABLET    Take 1 tablet (40 mg total) by mouth once daily.    PREGABALIN (LYRICA) 75 MG CAPSULE    Take 2 capsules (150 mg total) by mouth nightly.    TIZANIDINE (ZANAFLEX) 4 MG TABLET    Take by mouth.    VALACYCLOVIR (VALTREX) 500 MG TABLET    Take 1 tablet (500 mg total) by mouth once daily.   Discontinued Medications    CHLORPHENIRAMINE-PHENYLEPHRINE (ED A-HIST) 4-10 MG PER TABLET    Take 1 tablet by mouth every 4 (four) hours as needed for Congestion or Allergies.    FLUCONAZOLE (DIFLUCAN) 150 MG TAB    Take one today and repeat in 3 days    FLUCONAZOLE (DIFLUCAN) 200 MG TAB    TAKE ONE TABLET BY MOUTH EVERY DAY        Allergies: Jardiance [empagliflozin], Trulicity [dulaglutide], and Ozempic [semaglutide]      Past Medical History:    Past Medical History:   Diagnosis Date    Acute superficial gastritis without hemorrhage 06/08/2022    Adenomatous polyp of cecum 06/15/2022    Adenomatous polyp of transverse colon 06/15/2022    Anticoagulant long-term use     Arthritis     Asthma     Coronary artery disease     Diabetes mellitus, type 2 2014     Diabetic eye exam 2023    Dr. Navarro Tolliver    Diverticula, colon 06/15/2022    DVT (deep venous thrombosis)     Epigastric pain 2022    Heart attack     History of colon polyps 06/15/2022    Hyperlipidemia     Hypertension 2014    Polyp of hepatic flexure of colon 06/15/2022    Screening for colon cancer 06/15/2022    Thyroid disease        Past Surgical History:    Past Surgical History:   Procedure Laterality Date    ARTHROPLASTY, KNEE, TOTAL, USING COMPUTER-ASSISTED NAVIGATION Right 2023    Procedure: ARTHROPLASTY, KNEE, TOTAL, USING COMPUTER-ASSISTED NAVIGATION;  Surgeon: Erick Tolliver MD;  Location: [facility] [place];  Service: Orthopedics;  Laterality: Right;    BILATERAL OOPHORECTOMY Bilateral     35 years ago,  1-2 years after hyst     SECTION      CHOLECYSTECTOMY      COLONOSCOPY  2017    repeat in 3 years    CORONARY ARTERY BYPASS GRAFT  2011    ESOPHAGOGASTRODUODENOSCOPY  03/10/2021    HYSTERECTOMY           Social History:    Social History     Tobacco Use   Smoking Status Former    Passive exposure: Past   Smokeless Tobacco Never     Social History     Substance and Sexual Activity   Alcohol Use Not Currently    Comment: occasionally     Social History     Substance and Sexual Activity   Drug Use Never         Family History:    Family History   Problem Relation Age of Onset    Diabetes Mother     Heart disease Mother     Dementia Mother     Thyroid disease Mother     No Known Problems Father     Diabetes Brother     Prostate cancer Brother     No Known Problems Daughter     Breast cancer Neg Hx     Colon cancer Neg Hx     Ovarian cancer Neg Hx        Review of Systems:    Review of Systems   Constitutional:  Negative for appetite change, chills, fatigue, fever and unexpected weight change.   HENT:  Positive for nasal congestion, postnasal drip, sinus pressure/congestion, sneezing and sore throat.    Eyes:  Negative for visual disturbance.   Respiratory:  Positive  "for cough. Negative for shortness of breath.    Cardiovascular:  Negative for chest pain and leg swelling.   Gastrointestinal:  Positive for diarrhea. Negative for abdominal pain, change in bowel habit, constipation, nausea and vomiting.   Musculoskeletal:  Negative for arthralgias.   Integumentary:  Negative for rash.   Neurological:  Positive for headaches. Negative for dizziness.   Psychiatric/Behavioral:  The patient is not nervous/anxious.         Vitals:    Vitals:    12/04/23 0823   BP: 122/80   BP Location: Left arm   Patient Position: Sitting   BP Method: Large (Automatic)   Pulse: 83   Resp: 18   Temp: 97.8 °F (36.6 °C)   TempSrc: Oral   SpO2: 96%   Weight: 93.5 kg (206 lb 3.2 oz)   Height: 5' 9" (1.753 m)       Body mass index is 30.45 kg/m².    Wt Readings from Last 3 Encounters:   12/04/23 0823 93.5 kg (206 lb 3.2 oz)   11/20/23 1431 94.3 kg (208 lb)   10/16/23 1003 96.2 kg (212 lb)        Physical Exam:    Physical Exam  Constitutional:       General: She is not in acute distress.     Appearance: Normal appearance. She is obese.   HENT:      Nose: Congestion present.      Mouth/Throat:      Mouth: Mucous membranes are moist.      Pharynx: Oropharynx is clear.   Eyes:      Conjunctiva/sclera: Conjunctivae normal.   Cardiovascular:      Rate and Rhythm: Normal rate and regular rhythm.      Heart sounds: Normal heart sounds. No murmur heard.  Pulmonary:      Effort: Pulmonary effort is normal. No respiratory distress.      Breath sounds: Normal breath sounds. No wheezing, rhonchi or rales.   Abdominal:      General: Bowel sounds are normal.      Palpations: Abdomen is soft.      Tenderness: There is no abdominal tenderness.   Musculoskeletal:      Cervical back: Neck supple.      Right lower leg: No edema.      Left lower leg: No edema.   Skin:     Findings: No rash.   Neurological:      General: No focal deficit present.      Mental Status: She is alert. Mental status is at baseline.   Psychiatric:       "   Mood and Affect: Mood normal.       Assessment/Plan:   1. Acute non-recurrent maxillary sinusitis  -     dexAMETHasone injection 4 mg  -     methylPREDNISolone acetate injection 40 mg  -     amoxicillin-clavulanate 875-125mg (AUGMENTIN) 875-125 mg per tablet; Take 1 tablet by mouth 2 (two) times daily. for 10 days  Dispense: 20 tablet; Refill: 0  -     fluconazole (DIFLUCAN) 150 MG Tab; Take one tablet by mouth every 72 hours X 3 doses if needed for yeast infection after completion of antibiotics.  Dispense: 3 tablet; Refill: 0  -     brompheniramine-pseudoeph-DM (BROMFED DM) 2-30-10 mg/5 mL Syrp; Take 5 mLs by mouth every 6 (six) hours as needed (congestion or cough).  Dispense: 150 mL; Refill: 0    2. Coronary artery disease of native artery of native heart with stable angina pectoris  - Stable.  - Continue current medications and plan  - Follow up with Cardiology as scheduled    3. Seasonal allergies  -     cetirizine (ZYRTEC) 10 MG tablet; Take 1 tablet (10 mg total) by mouth once daily. For allergies  Dispense: 90 tablet; Refill: 1         Active Problem List with Overview Notes    Diagnosis Date Noted    Abnormal CT scan, colon 10/02/2023    Diverticulitis 10/02/2023    Vaginal yeast infection 10/02/2023    Acute deep vein thrombosis (DVT) of femoral vein of right lower extremity 09/11/2023    Left lower quadrant abdominal pain 08/28/2023    Leg edema 08/11/2023    Muscle strain 08/11/2023    Right knee pain 07/27/2023    Acute vaginitis 05/23/2023    Localized swelling of right lower leg 05/09/2023    Postmenopausal 05/09/2023    Vitamin B 12 deficiency 03/29/2023    Memory loss 03/29/2023    TSH deficiency 03/29/2023    Cellulitis 03/29/2023    Edema 03/29/2023    Decreased range of motion (ROM) of right knee 02/28/2023    Hypokalemia 02/19/2023    Abnormal TSH 02/19/2023    Muscle weakness 02/12/2023    Status post total right knee replacement 02/12/2023    Vitamin D deficiency 02/12/2023    Lumbosacral  spondylosis without myelopathy 09/08/2022    Osteoarthritis 09/08/2022    Hyperlipidemia     DVT prophylaxis 06/15/2022     IMO Regualtory Update 4/1/23      History of colon polyps 06/15/2022    Diverticula, colon 06/15/2022    Adenomatous polyp of cecum 06/15/2022    Polyp of hepatic flexure of colon 06/15/2022    Adenomatous polyp of transverse colon 06/15/2022    Epigastric pain 06/08/2022    Acute superficial gastritis without hemorrhage 06/08/2022    Hepatomegaly 05/02/2022    Fatty liver 05/02/2022    Gastroesophageal reflux disease 05/02/2022    Dysphagia 05/02/2022    Arthritis of carpometacarpal (CMC) joint of right thumb 07/21/2021    Arthritis of right knee 07/21/2021    Diarrhea 07/01/2021    Coronary artery disease of native artery of native heart with stable angina pectoris     Hypertension     Elevated liver enzymes 04/16/2021    Gastroparesis 04/16/2021    Type 2 diabetes mellitus, without long-term current use of insulin 04/16/2021          RTC prn if symptoms worsen or fail to resolve.  Patient voiced understanding and is agreeable to plan.      Florence Kendrick MD    Family Medicine

## 2023-12-09 DIAGNOSIS — Z71.89 COMPLEX CARE COORDINATION: ICD-10-CM

## 2023-12-11 DIAGNOSIS — Z96.651 STATUS POST TOTAL KNEE REPLACEMENT, RIGHT: Primary | ICD-10-CM

## 2023-12-11 RX ORDER — APIXABAN 5 MG/1
TABLET, FILM COATED ORAL
Qty: 90 TABLET | Refills: 0 | Status: SHIPPED | OUTPATIENT
Start: 2023-12-11 | End: 2024-01-05 | Stop reason: SDUPTHER

## 2023-12-17 DIAGNOSIS — I10 PRIMARY HYPERTENSION: ICD-10-CM

## 2023-12-17 DIAGNOSIS — E11.9 TYPE 2 DIABETES MELLITUS WITHOUT COMPLICATION, WITHOUT LONG-TERM CURRENT USE OF INSULIN: ICD-10-CM

## 2023-12-18 RX ORDER — LOSARTAN POTASSIUM 50 MG/1
50 TABLET ORAL
Qty: 90 TABLET | Refills: 3 | Status: SHIPPED | OUTPATIENT
Start: 2023-12-18 | End: 2024-01-08 | Stop reason: SDUPTHER

## 2023-12-18 RX ORDER — DAPAGLIFLOZIN 10 MG/1
10 TABLET, FILM COATED ORAL
Qty: 90 TABLET | Refills: 3 | Status: SHIPPED | OUTPATIENT
Start: 2023-12-18 | End: 2024-02-21 | Stop reason: SDUPTHER

## 2023-12-29 DIAGNOSIS — Z96.651 STATUS POST TOTAL KNEE REPLACEMENT, RIGHT: Primary | ICD-10-CM

## 2024-01-02 NOTE — PROGRESS NOTES
"  Olga Stephenson presented for a  Medicare AWV and comprehensive Health Risk Assessment today.     The following components were reviewed and updated:    Medical history  Family History  Social history  Allergies and Current Medications  Health Risk Assessment  Health Maintenance  Care Team         ** See Completed Assessments for Annual Wellness Visit within the encounter summary.**         The following assessments were completed:  Living Situation  CAGE  Depression Screening  Timed Get Up and Go  Whisper Test  Cognitive Function Screening  Nutrition Screening  ADL Screening  PAQ Screening        Vitals:    01/08/24 1107   BP: 120/70   BP Location: Left arm   Patient Position: Sitting   Pulse: 81   Resp: 16   Temp: 97.7 °F (36.5 °C)   TempSrc: Oral   SpO2: 96%   Weight: 93.9 kg (207 lb)   Height: 5' 9" (1.753 m)     Body mass index is 30.57 kg/m².      Physical Exam  Constitutional:       General: She is not in acute distress.     Appearance: She is obese.   HENT:      Head: Normocephalic.      Right Ear: External ear normal.      Left Ear: External ear normal.   Cardiovascular:      Rate and Rhythm: Normal rate and regular rhythm.      Pulses: Normal pulses.      Heart sounds: Normal heart sounds.   Pulmonary:      Effort: Pulmonary effort is normal.      Breath sounds: Normal breath sounds.   Abdominal:      Palpations: Abdomen is soft.      Tenderness: There is no abdominal tenderness.   Skin:     General: Skin is warm and dry.   Neurological:      Mental Status: She is alert and oriented to person, place, and time.   Psychiatric:         Mood and Affect: Mood normal.         Behavior: Behavior normal.             Diagnoses and health risks identified today and associated recommendations/orders:    1. Encounter for initial annual wellness visit (AWV) in Medicare patient  - follow up in one year for annual wellness visit   - follow up with PCP as previously scheduled     2. Primary hypertension  - stable 120/70 HR " 81  - losartan (COZAAR) 50 MG tablet; Take 1 tablet (50 mg total) by mouth once daily.  Dispense: 30 tablet; Refill: 0  - metoprolol succ 25 mg one tablet daily     3. Hyperlipidemia, unspecified hyperlipidemia type  - ezetimibe 10 mg one tablet daily   - 08/23/2023 Chol 167 Trig 187 LDL 86 HDL 44    4. Type 2 diabetes mellitus with other specified complication, without long-term current use of insulin  - followed by PHILIP Lyons   - eye exam up-to-date  - foot exam up-to-date  - A1c up-to-date & uncontrolled 11/06/2023 8.8%  - urine microalbumin creatinine ratio up-to-date    5. Coronary artery disease of native artery of native heart with stable angina pectoris  - followed cardiologist at [facility]  - Plavix 75 mg one tablet daily    6. Obesity (BMI 30.0-34.9)  - encouraged heart healthy diabetic diet  - encouraged some form of physical activity 5-7 days per week as tolerated     7. Gastroesophageal reflux disease, unspecified whether esophagitis present  - followed by GI  - pantoprazole 40 mg one tablet daily     8. HSV infection  - valACYclovir (VALTREX) 500 MG tablet; Take 1 tablet (500 mg total) by mouth once daily.  Dispense: 90 tablet; Refill: 1    9. Allergic rhinitis, unspecified seasonality, unspecified trigger  - cetirizine 10 mg one tablet daily as needed for allergies  - Flonase 50 mcg one spray each nostril daily as needed for allergies    10. Deep vein thrombosis (DVT) of tibial vein of right lower extremity, unspecified chronicity  - Eliquis 5 mg one tablet every 12 hours     11. Vitamin D deficiency  - 11/0/2023 Vitamin D 25 OH 19.3 () one capsule every 7 days   - followed by PCP   - will repeat in 3-6 months       Covid vaccine - will take at pharmacy, RSV vaccine -VIS (10/19/2023) given with instructions to take at pharmacy, Tetanus vaccine - declined.    I offered to discuss advanced care planning, including how to pick a person who would make decisions for you if you were unable to make them  for yourself, called a health care power of , and what kind of decisions you might make such as use of life sustaining treatments such as ventilators and tube feeding when faced with a life limiting illness recorded on a living will that they will need to know. (How you want to be cared for as you near the end of your natural life)     X Patient is interested in learning more about how to make advanced directives.  I provided them paperwork and offered to discuss this with them.    Provided Olga with a 5-10 year written screening schedule and personal prevention plan. Recommendations were developed using the USPSTF age appropriate recommendations. Education, counseling, and referrals were provided as needed. After Visit Summary printed and given to patient which includes a list of additional screenings\tests needed.    Follow up in about 1 year (around 1/8/2025).    Kenya De Paz NP  Adult Gerontology Primary Care Provider

## 2024-01-08 ENCOUNTER — OFFICE VISIT (OUTPATIENT)
Dept: FAMILY MEDICINE | Facility: CLINIC | Age: 69
End: 2024-01-08
Payer: MEDICARE

## 2024-01-08 ENCOUNTER — TELEPHONE (OUTPATIENT)
Dept: GASTROENTEROLOGY | Facility: CLINIC | Age: 69
End: 2024-01-08
Payer: MEDICARE

## 2024-01-08 VITALS
BODY MASS INDEX: 30.66 KG/M2 | DIASTOLIC BLOOD PRESSURE: 70 MMHG | TEMPERATURE: 98 F | OXYGEN SATURATION: 96 % | HEIGHT: 69 IN | RESPIRATION RATE: 16 BRPM | SYSTOLIC BLOOD PRESSURE: 120 MMHG | WEIGHT: 207 LBS | HEART RATE: 81 BPM

## 2024-01-08 DIAGNOSIS — K21.9 GASTROESOPHAGEAL REFLUX DISEASE, UNSPECIFIED WHETHER ESOPHAGITIS PRESENT: ICD-10-CM

## 2024-01-08 DIAGNOSIS — E55.9 VITAMIN D DEFICIENCY: ICD-10-CM

## 2024-01-08 DIAGNOSIS — I82.441 DEEP VEIN THROMBOSIS (DVT) OF TIBIAL VEIN OF RIGHT LOWER EXTREMITY, UNSPECIFIED CHRONICITY: ICD-10-CM

## 2024-01-08 DIAGNOSIS — I10 PRIMARY HYPERTENSION: ICD-10-CM

## 2024-01-08 DIAGNOSIS — B00.9 HSV INFECTION: ICD-10-CM

## 2024-01-08 DIAGNOSIS — Z00.00 ENCOUNTER FOR INITIAL ANNUAL WELLNESS VISIT (AWV) IN MEDICARE PATIENT: Primary | ICD-10-CM

## 2024-01-08 DIAGNOSIS — E78.5 HYPERLIPIDEMIA, UNSPECIFIED HYPERLIPIDEMIA TYPE: ICD-10-CM

## 2024-01-08 DIAGNOSIS — E66.9 OBESITY (BMI 30.0-34.9): ICD-10-CM

## 2024-01-08 DIAGNOSIS — J30.9 ALLERGIC RHINITIS, UNSPECIFIED SEASONALITY, UNSPECIFIED TRIGGER: ICD-10-CM

## 2024-01-08 DIAGNOSIS — E11.69 TYPE 2 DIABETES MELLITUS WITH OTHER SPECIFIED COMPLICATION, WITHOUT LONG-TERM CURRENT USE OF INSULIN: ICD-10-CM

## 2024-01-08 DIAGNOSIS — I25.118 CORONARY ARTERY DISEASE OF NATIVE ARTERY OF NATIVE HEART WITH STABLE ANGINA PECTORIS: ICD-10-CM

## 2024-01-08 PROCEDURE — G0439 PPPS, SUBSEQ VISIT: HCPCS | Mod: ,,, | Performed by: NURSE PRACTITIONER

## 2024-01-08 RX ORDER — LOSARTAN POTASSIUM 50 MG/1
50 TABLET ORAL DAILY
Qty: 30 TABLET | Refills: 0 | Status: SHIPPED | OUTPATIENT
Start: 2024-01-08 | End: 2024-01-31

## 2024-01-08 RX ORDER — VALACYCLOVIR HYDROCHLORIDE 500 MG/1
500 TABLET, FILM COATED ORAL DAILY
Qty: 90 TABLET | Refills: 1 | Status: SHIPPED | OUTPATIENT
Start: 2024-01-08

## 2024-01-08 NOTE — PATIENT INSTRUCTIONS
Counseling and Referral of Other Preventative  (Italic type indicates deductible and co-insurance are waived)    Patient Name: Olga Stephenson  Today's Date: 1/8/2024    Health Maintenance       Date Due Completion Date    TETANUS VACCINE Never done ---    RSV Vaccine (Age 60+ and Pregnant patients) (1 - 1-dose 60+ series) Never done ---    COVID-19 Vaccine (4 - 2023-24 season) 09/01/2023 12/17/2021    Hemoglobin A1c 02/06/2024 11/6/2023    Eye Exam 07/19/2024 7/19/2023    Override on 2/1/2022: Done (dr marks)    Foot Exam 08/23/2024 8/23/2023    Override on 7/25/2022: Done    Lipid Panel 08/23/2024 8/23/2023    Mammogram 09/12/2024 9/12/2023    Diabetes Urine Screening 11/06/2024 11/6/2023    Colorectal Cancer Screening 06/25/2025 6/25/2022    DEXA Scan 09/01/2026 9/1/2023        No orders of the defined types were placed in this encounter.    The following information is provided to all patients.  This information is to help you find resources for any of the problems found today that may be affecting your health:                  Living healthy guide: ms.gov    Understanding Diabetes: www.diabetes.org      Eating healthy: www.cdc.gov/healthyweight      CDC home safety checklist: www.cdc.gov/steadi/patient.html      Agency on Aging: ms.gov    Alcoholics anonymous (AA): www.aa.org      Physical Activity: www.marybeth.nih.gov/wz3uppi      Tobacco use: ms.gov

## 2024-01-30 DIAGNOSIS — I10 PRIMARY HYPERTENSION: ICD-10-CM

## 2024-01-31 RX ORDER — LOSARTAN POTASSIUM 50 MG/1
50 TABLET ORAL
Qty: 90 TABLET | Refills: 1 | Status: SHIPPED | OUTPATIENT
Start: 2024-01-31

## 2024-02-01 ENCOUNTER — ANESTHESIA EVENT (OUTPATIENT)
Dept: GASTROENTEROLOGY | Facility: HOSPITAL | Age: 69
End: 2024-02-01
Payer: MEDICARE

## 2024-02-01 ENCOUNTER — HOSPITAL ENCOUNTER (OUTPATIENT)
Dept: GASTROENTEROLOGY | Facility: HOSPITAL | Age: 69
Discharge: HOME | End: 2024-02-01
Attending: NURSE PRACTITIONER
Payer: MEDICARE

## 2024-02-01 ENCOUNTER — ANESTHESIA (OUTPATIENT)
Dept: GASTROENTEROLOGY | Facility: HOSPITAL | Age: 69
End: 2024-02-01
Payer: MEDICARE

## 2024-02-01 VITALS
DIASTOLIC BLOOD PRESSURE: 75 MMHG | SYSTOLIC BLOOD PRESSURE: 120 MMHG | TEMPERATURE: 97 F | HEART RATE: 67 BPM | RESPIRATION RATE: 18 BRPM | OXYGEN SATURATION: 96 %

## 2024-02-01 DIAGNOSIS — Z86.010 HISTORY OF COLON POLYPS: ICD-10-CM

## 2024-02-01 DIAGNOSIS — R19.7 DIARRHEA, UNSPECIFIED TYPE: Primary | ICD-10-CM

## 2024-02-01 DIAGNOSIS — K57.92 DIVERTICULITIS: ICD-10-CM

## 2024-02-01 DIAGNOSIS — K57.30 COLON, DIVERTICULOSIS: ICD-10-CM

## 2024-02-01 DIAGNOSIS — K63.5 POLYP OF TRANSVERSE COLON, UNSPECIFIED TYPE: ICD-10-CM

## 2024-02-01 DIAGNOSIS — K92.1 BLOOD IN STOOL: ICD-10-CM

## 2024-02-01 DIAGNOSIS — K52.9 COLITIS: ICD-10-CM

## 2024-02-01 DIAGNOSIS — K63.5 POLYP OF COLON, UNSPECIFIED PART OF COLON, UNSPECIFIED TYPE: ICD-10-CM

## 2024-02-01 DIAGNOSIS — K63.5 POLYP OF CECUM: ICD-10-CM

## 2024-02-01 LAB — GLUCOSE SERPL-MCNC: 191 MG/DL (ref 70–105)

## 2024-02-01 PROCEDURE — 63600175 PHARM REV CODE 636 W HCPCS: Performed by: NURSE ANESTHETIST, CERTIFIED REGISTERED

## 2024-02-01 PROCEDURE — 37000009 HC ANESTHESIA EA ADD 15 MINS

## 2024-02-01 PROCEDURE — 27000284 HC CANNULA NASAL: Performed by: NURSE ANESTHETIST, CERTIFIED REGISTERED

## 2024-02-01 PROCEDURE — D9220A PRA ANESTHESIA: Mod: ,,, | Performed by: NURSE ANESTHETIST, CERTIFIED REGISTERED

## 2024-02-01 PROCEDURE — 82962 GLUCOSE BLOOD TEST: CPT

## 2024-02-01 PROCEDURE — 45378 DIAGNOSTIC COLONOSCOPY: CPT | Mod: ,,, | Performed by: INTERNAL MEDICINE

## 2024-02-01 PROCEDURE — 37000008 HC ANESTHESIA 1ST 15 MINUTES

## 2024-02-01 PROCEDURE — 25000003 PHARM REV CODE 250: Performed by: NURSE ANESTHETIST, CERTIFIED REGISTERED

## 2024-02-01 PROCEDURE — 45378 DIAGNOSTIC COLONOSCOPY: CPT | Performed by: INTERNAL MEDICINE

## 2024-02-01 RX ORDER — MESALAMINE 1.2 G/1
2.4 TABLET, DELAYED RELEASE ORAL
Qty: 60 TABLET | Refills: 11 | Status: SHIPPED | OUTPATIENT
Start: 2024-02-01 | End: 2024-04-09

## 2024-02-01 RX ORDER — SODIUM CHLORIDE 9 MG/ML
INJECTION, SOLUTION INTRAVENOUS CONTINUOUS PRN
Status: DISCONTINUED | OUTPATIENT
Start: 2024-02-01 | End: 2024-02-01

## 2024-02-01 RX ORDER — LIDOCAINE HYDROCHLORIDE 20 MG/ML
INJECTION, SOLUTION EPIDURAL; INFILTRATION; INTRACAUDAL; PERINEURAL
Status: DISCONTINUED | OUTPATIENT
Start: 2024-02-01 | End: 2024-02-01

## 2024-02-01 RX ORDER — SODIUM CHLORIDE 0.9 % (FLUSH) 0.9 %
10 SYRINGE (ML) INJECTION
Status: CANCELLED | OUTPATIENT
Start: 2024-02-01

## 2024-02-01 RX ORDER — PROPOFOL 10 MG/ML
VIAL (ML) INTRAVENOUS
Status: DISCONTINUED | OUTPATIENT
Start: 2024-02-01 | End: 2024-02-01

## 2024-02-01 RX ADMIN — PROPOFOL 50 MG: 10 INJECTION, EMULSION INTRAVENOUS at 10:02

## 2024-02-01 RX ADMIN — LIDOCAINE HYDROCHLORIDE 100 MG: 20 INJECTION, SOLUTION INTRAVENOUS at 10:02

## 2024-02-01 RX ADMIN — SODIUM CHLORIDE: 9 INJECTION, SOLUTION INTRAVENOUS at 10:02

## 2024-02-01 NOTE — DISCHARGE INSTRUCTIONS
Procedure Date  2/1/24     Impression  Overall Impression:   Diverticulosis in the ascending colon, descending colon and sigmoid colon  Pan-diverticulosis was noted. Mild erythema of the mucosa was present in the ascending colon without ulceration. Two polyps were noted, in the cecum and distal transverse colon at the splenic flexure. Polypectomy and biopsies were not done due to anticoagulation for VTE.     Recommendation    Repeat colonoscopy in 1 year      Avoid nsaids; add Lialda for probable colitis. Repeat colonoscopy in 1 year, off of anticoagulation.  Discharge: disp: DC to home. High fiber diet. No driving x 24 hours. Follow-up with PCP as scheduled.  Dx: diarrhea, history of colon polyps, non-specific colitis, colon diverticulosis, two polyps were seen during this exam (see photos).   NO DRIVING, OPERATING EQUIPMENT, OR SIGNING LEGAL DOCUMENTS FOR 24 HOURS.    Our office will call you with repeat colon appointment and prep instructions. If you don't hear from us in one week please give us a call [phone].

## 2024-02-01 NOTE — TRANSFER OF CARE
Anesthesia Transfer of Care Note    Patient: Olga Stephenson    Procedure(s) Performed: * No procedures listed *    Patient location: GI    Anesthesia Type: general    Transport from OR: Transported from OR on room air with adequate spontaneous ventilation    Post pain: adequate analgesia    Post assessment: no apparent anesthetic complications    Post vital signs: stable    Level of consciousness: responds to stimulation    Nausea/Vomiting: no nausea/vomiting    Complications: none    Transfer of care protocol was followed      Last vitals: Visit Vitals  /61 (BP Location: Left arm, Patient Position: Lying)   Pulse 75   Temp 36.1 °C (97 °F) (Oral)   Resp 16   SpO2 100%   Breastfeeding No

## 2024-02-01 NOTE — H&P
[facility] - End[facility]  Gastroenterology  H&P    Patient Name: Olga Stephenson  MRN: [id number]  Admission Date: 2024  Code Status: Prior    Attending Provider: Jocelin Love FNP   Primary Care Physician: Kenya De Paz NP  Principal Problem:<principal problem not specified>    Subjective:     History of Present Illness: Pt has history of colon polyps and colitis with ulcers. Recently, she had an abnormal CT abdomen.    Past Medical History:   Diagnosis Date    Acute superficial gastritis without hemorrhage 2022    Adenomatous polyp of cecum 06/15/2022    Adenomatous polyp of transverse colon 06/15/2022    Anticoagulant long-term use     Arthritis     Asthma     Coronary artery disease     Diabetes mellitus, type 2     Diabetic eye exam 2023    Dr. Navarro Tolliver    Diverticula, colon 06/15/2022    DVT (deep venous thrombosis)     Epigastric pain 2022    Heart attack     History of colon polyps 06/15/2022    Hyperlipidemia     Hypertension 2014    Polyp of hepatic flexure of colon 06/15/2022    Screening for colon cancer 06/15/2022    Thyroid disease        Past Surgical History:   Procedure Laterality Date    ARTHROPLASTY, KNEE, TOTAL, USING COMPUTER-ASSISTED NAVIGATION Right 2023    Procedure: ARTHROPLASTY, KNEE, TOTAL, USING COMPUTER-ASSISTED NAVIGATION;  Surgeon: Erick Tolliver MD;  Location: [facility];  Service: Orthopedics;  Laterality: Right;    BILATERAL OOPHORECTOMY Bilateral     35 years ago,  1-2 years after hyst     SECTION      CHOLECYSTECTOMY      COLONOSCOPY  2017    repeat in 3 years    CORONARY ARTERY BYPASS GRAFT  2011    ESOPHAGOGASTRODUODENOSCOPY  03/10/2021    HYSTERECTOMY         Review of patient's allergies indicates:   Allergen Reactions    Jardiance [empagliflozin] Anaphylaxis     Headaches     Trulicity [dulaglutide]      Abdominal pain    Ozempic [semaglutide] Nausea And Vomiting     Family History       Problem Relation (Age of  Onset)    Dementia Mother    Diabetes Mother, Brother    Heart disease Mother    Hypertension Daughter, Daughter    No Known Problems Father    Prostate cancer Brother    Thyroid disease Mother          Tobacco Use    Smoking status: Former     Passive exposure: Past    Smokeless tobacco: Never   Substance and Sexual Activity    Alcohol use: Yes     Alcohol/week: 1.0 standard drink of alcohol     Types: 1 Glasses of wine per week     Comment: occasionally    Drug use: Never    Sexual activity: Not Currently     Partners: Male     Birth control/protection: None     Review of Systems   Respiratory: Negative.     Cardiovascular: Negative.    Gastrointestinal:  Positive for abdominal pain and diarrhea.     Objective:     Vital Signs (Most Recent):    Vital Signs (24h Range):           There is no height or weight on file to calculate BMI.    No intake or output data in the 24 hours ending 02/01/24 1029    Lines/Drains/Airways       Peripheral Intravenous Line  Duration                  Peripheral IV - Single Lumen 02/01/24 1020 22 G Posterior;Right Hand <1 day                    Physical Exam  Vitals reviewed.   Constitutional:       General: She is not in acute distress.     Appearance: Normal appearance. She is well-developed. She is not ill-appearing.   HENT:      Head: Normocephalic and atraumatic.      Nose: Nose normal.   Eyes:      Pupils: Pupils are equal, round, and reactive to light.   Cardiovascular:      Rate and Rhythm: Normal rate and regular rhythm.   Pulmonary:      Effort: Pulmonary effort is normal.      Breath sounds: Normal breath sounds. No wheezing.   Abdominal:      General: Abdomen is flat. Bowel sounds are normal. There is no distension.      Palpations: Abdomen is soft.      Tenderness: There is no abdominal tenderness. There is no guarding.   Skin:     General: Skin is warm and dry.      Coloration: Skin is not jaundiced.   Neurological:      Mental Status: She is alert.   Psychiatric:          "Attention and Perception: Attention normal.         Mood and Affect: Affect normal.         Speech: Speech normal.         Behavior: Behavior is cooperative.      Comments: Pt was calm while speaking.         Significant Labs:  CBC: No results for input(s): "WBC", "HGB", "HCT", "PLT" in the last 48 hours.  CMP: No results for input(s): "GLU", "CALCIUM", "ALBUMIN", "PROT", "NA", "K", "CO2", "CL", "BUN", "CREATININE", "ALKPHOS", "ALT", "AST", "BILITOT" in the last 48 hours.    Significant Imaging:  Imaging results within the past 24 hours have been reviewed.    Assessment/Plan:     There are no hospital problems to display for this patient.        Imp: History of colon polyps; colitis, abnormal CT abdomen  Plan: colonoscopy    Maurice Gomez MD  Gastroenterology  [facility] - Endoscopy  "

## 2024-02-01 NOTE — ANESTHESIA PREPROCEDURE EVALUATION
02/01/2024  Olga Stephenson is a 68 y.o., female.    Social History     Socioeconomic History    Marital status:    Tobacco Use    Smoking status: Former     Passive exposure: Past    Smokeless tobacco: Never   Substance and Sexual Activity    Alcohol use: Yes     Alcohol/week: 1.0 standard drink of alcohol     Types: 1 Glasses of wine per week     Comment: occasionally    Drug use: Never    Sexual activity: Not Currently     Partners: Male     Birth control/protection: None     Social Determinants of Health     Financial Resource Strain: Medium Risk (1/8/2024)    Overall Financial Resource Strain (CARDIA)     Difficulty of Paying Living Expenses: Somewhat hard   Food Insecurity: No Food Insecurity (1/8/2024)    Hunger Vital Sign     Worried About Running Out of Food in the Last Year: Never true     Ran Out of Food in the Last Year: Never true   Transportation Needs: No Transportation Needs (1/8/2024)    PRAPARE - Transportation     Lack of Transportation (Medical): No     Lack of Transportation (Non-Medical): No   Physical Activity: Insufficiently Active (1/8/2024)    Exercise Vital Sign     Days of Exercise per Week: 2 days     Minutes of Exercise per Session: 30 min   Stress: No Stress Concern Present (1/8/2024)    [employer] - Occupational Stress Questionnaire     Feeling of Stress : Only a little   Social Connections: Moderately Integrated (1/8/2024)    Social Connection and Isolation Panel [NHANES]     Frequency of Communication with Friends and Family: More than three times a week     Frequency of Social Gatherings with Friends and Family: Three times a week     Attends [employer] Services: More than 4 times per year     Active Member of Clubs or Organizations: Yes     Attends Club or Organization Meetings: 1 to 4 times per year     Marital Status:     Housing Stability: High Risk (2024)    Housing Stability Vital Sign     Unable to Pay for Housing in the Last Year: No     Number of Places Lived in the Last Year: 1     Unstable Housing in the Last Year: Yes      Pre-op Assessment    I have reviewed the Patient Summary Reports.     I have reviewed the Nursing Notes. I have reviewed the NPO Status.   I have reviewed the Medications.     Review of Systems  Anesthesia Hx:  No problems with previous Anesthesia                Social:  Former Smoker       Cardiovascular:     Hypertension  Past MI CAD      Angina     hyperlipidemia                             Pulmonary:    Asthma                    Hepatic/GI:     GERD Liver Disease,            Musculoskeletal:  Arthritis               Neurological:    Neuromuscular Disease,                                   Endocrine:  Diabetes, type 2 Hypothyroidism            Past Medical History:   Diagnosis Date    Acute superficial gastritis without hemorrhage 2022    Adenomatous polyp of cecum 06/15/2022    Adenomatous polyp of transverse colon 06/15/2022    Anticoagulant long-term use     Arthritis     Asthma     Coronary artery disease     Diabetes mellitus, type 2     Diabetic eye exam 2023    Dr. Navarro Tolliver    Diverticula, colon 06/15/2022    DVT (deep venous thrombosis)     Epigastric pain 2022    Heart attack     History of colon polyps 06/15/2022    Hyperlipidemia     Hypertension 2014    Polyp of hepatic flexure of colon 06/15/2022    Screening for colon cancer 06/15/2022    Thyroid disease       Past Surgical History:   Procedure Laterality Date    ARTHROPLASTY, KNEE, TOTAL, USING COMPUTER-ASSISTED NAVIGATION Right 2023    Procedure: ARTHROPLASTY, KNEE, TOTAL, USING COMPUTER-ASSISTED NAVIGATION;  Surgeon: Erick Tolliver MD;  Location: [facility];  Service: Orthopedics;  Laterality: Right;    BILATERAL OOPHORECTOMY Bilateral     35 years ago,  1-2 years after hyst      SECTION      CHOLECYSTECTOMY      COLONOSCOPY  11/13/2017    repeat in 3 years    CORONARY ARTERY BYPASS GRAFT  2011    ESOPHAGOGASTRODUODENOSCOPY  03/10/2021    HYSTERECTOMY          Physical Exam  General: Well nourished, Cooperative, Alert and Oriented    Airway:  Mallampati: II     Chest/Lungs:  Clear to auscultation    Heart:  Rate: Normal        Anesthesia Plan  Type of Anesthesia, risks & benefits discussed:    Anesthesia Type: Gen Natural Airway, MAC  Intra-op Monitoring Plan: Standard ASA Monitors  Post Op Pain Control Plan: multimodal analgesia and IV/PO Opioids PRN  Induction:  IV  Informed Consent: Informed consent signed with the Patient and all parties understand the risks and agree with anesthesia plan.  All questions answered. Patient consented to blood products? Yes  ASA Score: 4  Day of Surgery Review of History & Physical: I have interviewed and examined the patient. I have reviewed the patient's H&P dated: There are no significant changes.     Ready For Surgery From Anesthesia Perspective.     .

## 2024-02-01 NOTE — ANESTHESIA POSTPROCEDURE EVALUATION
Anesthesia Post Evaluation    Patient: Olga Stephenson    Procedure(s) Performed: * No procedures listed *    Final Anesthesia Type: general      Patient location during evaluation: PACU  Patient participation: Yes- Able to Participate  Level of consciousness: responds to stimulation  Post-procedure vital signs: reviewed and stable  Pain management: adequate  Airway patency: patent  AKANKSHA mitigation strategies: Multimodal analgesia  PONV status at discharge: No PONV  Anesthetic complications: no      Cardiovascular status: hemodynamically stable  Respiratory status: unassisted, spontaneous ventilation and room air  Hydration status: euvolemic  Follow-up not needed.  Comments: Refer to nursing note for pain/zach score upon discharge from recovery.               Vitals Value Taken Time   /63 02/01/24 1102   Temp 36.1 °C (97 °F) 02/01/24 1102   Pulse 73 02/01/24 1102   Resp 17 02/01/24 1102   SpO2 100 % 02/01/24 1102   Vitals shown include unvalidated device data.      No case tracking events are documented in the log.      Pain/Zach Score: Zach Score: 8 (2/1/2024 10:55 AM)

## 2024-02-09 ENCOUNTER — TELEPHONE (OUTPATIENT)
Dept: FAMILY MEDICINE | Facility: CLINIC | Age: 69
End: 2024-02-09
Payer: MEDICARE

## 2024-02-12 ENCOUNTER — OFFICE VISIT (OUTPATIENT)
Dept: FAMILY MEDICINE | Facility: CLINIC | Age: 69
End: 2024-02-12
Payer: MEDICARE

## 2024-02-12 VITALS
DIASTOLIC BLOOD PRESSURE: 84 MMHG | HEIGHT: 69 IN | TEMPERATURE: 98 F | BODY MASS INDEX: 31.16 KG/M2 | HEART RATE: 76 BPM | WEIGHT: 210.38 LBS | RESPIRATION RATE: 18 BRPM | OXYGEN SATURATION: 95 % | SYSTOLIC BLOOD PRESSURE: 130 MMHG

## 2024-02-12 DIAGNOSIS — M46.1 SACROILIITIS, NOT ELSEWHERE CLASSIFIED: ICD-10-CM

## 2024-02-12 DIAGNOSIS — E55.9 VITAMIN D DEFICIENCY: ICD-10-CM

## 2024-02-12 DIAGNOSIS — F32.A MILD DEPRESSION: ICD-10-CM

## 2024-02-12 DIAGNOSIS — R53.82 CHRONIC FATIGUE: ICD-10-CM

## 2024-02-12 DIAGNOSIS — Z13.31 POSITIVE DEPRESSION SCREENING: ICD-10-CM

## 2024-02-12 DIAGNOSIS — K21.9 GASTROESOPHAGEAL REFLUX DISEASE, UNSPECIFIED WHETHER ESOPHAGITIS PRESENT: ICD-10-CM

## 2024-02-12 DIAGNOSIS — E11.9 TYPE 2 DIABETES MELLITUS WITHOUT COMPLICATION, WITHOUT LONG-TERM CURRENT USE OF INSULIN: Primary | ICD-10-CM

## 2024-02-12 LAB
25(OH)D3 SERPL-MCNC: 33.8 NG/ML
ALBUMIN SERPL BCP-MCNC: 3.8 G/DL (ref 3.5–5)
ALBUMIN/GLOB SERPL: 0.9 {RATIO}
ALP SERPL-CCNC: 88 U/L (ref 55–142)
ALT SERPL W P-5'-P-CCNC: 43 U/L (ref 13–56)
ANION GAP SERPL CALCULATED.3IONS-SCNC: 11 MMOL/L (ref 7–16)
AST SERPL W P-5'-P-CCNC: 28 U/L (ref 15–37)
BILIRUB SERPL-MCNC: 0.4 MG/DL (ref ?–1.2)
BUN SERPL-MCNC: 12 MG/DL (ref 7–18)
BUN/CREAT SERPL: 13 (ref 6–20)
CALCIUM SERPL-MCNC: 9.6 MG/DL (ref 8.5–10.1)
CHLORIDE SERPL-SCNC: 104 MMOL/L (ref 98–107)
CO2 SERPL-SCNC: 26 MMOL/L (ref 21–32)
CREAT SERPL-MCNC: 0.96 MG/DL (ref 0.55–1.02)
EGFR (NO RACE VARIABLE) (RUSH/TITUS): 65 ML/MIN/1.73M2
EST. AVERAGE GLUCOSE BLD GHB EST-MCNC: 180 MG/DL
GLOBULIN SER-MCNC: 4.3 G/DL (ref 2–4)
GLUCOSE SERPL-MCNC: 158 MG/DL (ref 74–106)
HBA1C MFR BLD HPLC: 7.9 % (ref 4.5–6.6)
POTASSIUM SERPL-SCNC: 4 MMOL/L (ref 3.5–5.1)
PROT SERPL-MCNC: 8.1 G/DL (ref 6.4–8.2)
SODIUM SERPL-SCNC: 137 MMOL/L (ref 136–145)
VIT B12 SERPL-MCNC: 417 PG/ML (ref 193–986)

## 2024-02-12 PROCEDURE — 82306 VITAMIN D 25 HYDROXY: CPT | Mod: ,,, | Performed by: CLINICAL MEDICAL LABORATORY

## 2024-02-12 PROCEDURE — 83036 HEMOGLOBIN GLYCOSYLATED A1C: CPT | Mod: ,,, | Performed by: CLINICAL MEDICAL LABORATORY

## 2024-02-12 PROCEDURE — 82607 VITAMIN B-12: CPT | Mod: GZ,,, | Performed by: CLINICAL MEDICAL LABORATORY

## 2024-02-12 PROCEDURE — 99214 OFFICE O/P EST MOD 30 MIN: CPT | Mod: ,,, | Performed by: NURSE PRACTITIONER

## 2024-02-12 PROCEDURE — 80053 COMPREHEN METABOLIC PANEL: CPT | Mod: ,,, | Performed by: CLINICAL MEDICAL LABORATORY

## 2024-02-12 RX ORDER — PANTOPRAZOLE SODIUM 40 MG/1
40 TABLET, DELAYED RELEASE ORAL DAILY
Qty: 90 TABLET | Refills: 3 | Status: SHIPPED | OUTPATIENT
Start: 2024-02-12

## 2024-02-12 RX ORDER — DAPAGLIFLOZIN 10 MG/1
10 TABLET, FILM COATED ORAL
Qty: 90 TABLET | Refills: 3 | Status: CANCELLED | OUTPATIENT
Start: 2024-02-12

## 2024-02-12 NOTE — PROGRESS NOTES
"[facility]  Chief Complaint      Chief Complaint   Patient presents with    Follow-up     3mon F/u    Depression     Score 7 mild    Health Maintenance     Fasting. Didn't bring meds. Care gaps discussed accepts A1c and declines all other vaccinations.     Medication Refill     Please send Farxiga refill to [facility]        History of Present Illness      Olga Stephenson is a 68 y.o. female with chronic conditions of CAD with Hx of CABG, Anxiety, Vitamin B 12 Deficiency, Vitamin D Deficiency, Fatty Liver, DM Type 2, Cardiomegaly, Hysterectomy Status on HRT, Hypertension, Hyperlipidemia, GERD, Chronic Low Back Pain, Osteoarthritis Right Knee, Hx of Thyroid Nodule, Allergic Rhinitis, Insomnia, Hx of GI Bleed, Colon Polyp, Bilateral Lower Extremity Edema, Depression, and Obesity  who presents today for 3 month follow up. Positive Depression screening today with PHQ-9 score of 9, which indicates mild depression. She reports that it is not depression. She reports having no energy to do anything and has pain all the time. She is followed by pain management. Asked if she thought the decreased energy could be secondary to depression and she reports "no". She denies the need for mental health evaluation nor does she want any medication. She is due for repeat labs today. She has GI and Diabetes follow up next week. Otherwise, Olga is doing ok. She did inquire about medical marijuana. Information for [facility] provided today.     Care Team:  Diabetes Mae Gautam, SAGAR  Neurology Dr. Malik Lomas  Pain Management Dr. Idris Morales / Eugenia Tolliver, SAGAR  Gynecology Shanelle Gusman, ANKIT  Orthopedic Dr. Erick Tolliver  Cardiology Dr. Amparo Brand   Optometry Dr. Navarro Tolliver  Gastroenterology Dr. ONEIL Gomez / PHILIP Jackson   General Surgery Dr. Juan Damico III    Past Medical History:  Past Medical History:   Diagnosis Date    Acute superficial " gastritis without hemorrhage 2022    Adenomatous polyp of cecum 06/15/2022    Adenomatous polyp of transverse colon 06/15/2022    Anticoagulant long-term use     Arthritis     Asthma     Coronary artery disease     Diabetes mellitus, type 2     Diabetic eye exam 2023    Dr. Navarro Tolliver    Diverticula, colon 06/15/2022    DVT (deep venous thrombosis)     Epigastric pain 2022    Heart attack     History of colon polyps 06/15/2022    Hyperlipidemia     Hypertension 2014    Polyp of hepatic flexure of colon 06/15/2022    Screening for colon cancer 06/15/2022    Thyroid disease        Past Surgical History:   has a past surgical history that includes Esophagogastroduodenoscopy (03/10/2021); Colonoscopy (2017); Hysterectomy;  section; Bilateral oophorectomy (Bilateral); Cholecystectomy; arthroplasty, knee, total, using computer-assisted navigation (Right, 2023); and Coronary artery bypass graft ().    Social History:  Social History     Tobacco Use    Smoking status: Former     Passive exposure: Past    Smokeless tobacco: Never   Substance Use Topics    Alcohol use: Yes     Alcohol/week: 1.0 standard drink of alcohol     Types: 1 Glasses of wine per week     Comment: occasionally    Drug use: Never       I personally reviewed all past medical, surgical, and social.     Review of Systems     Medications:  Outpatient Encounter Medications as of 2024   Medication Sig Dispense Refill    albuterol (PROVENTIL/VENTOLIN HFA) 90 mcg/actuation inhaler Inhale 2 puffs into the lungs every 4 (four) hours as needed for Wheezing or Shortness of Breath. Rescue 17 g 3    apixaban (ELIQUIS) 5 mg Tab TAKE 2 TABLETS BY MOUTH TWICE DAILY FOR 7 DAYS, THEN 1 TABLET TWICE DAILY 90 tablet 0    blood sugar diagnostic (FREESTYLE LITE STRIPS) Strp USE 1 STRIP DAILY TO MONITOR GLUCOSE 100 strip 3    clopidogreL (PLAVIX) 75 mg tablet Take 1 tablet (75 mg total) by mouth once daily. 30 tablet 1     cyclobenzaprine (FLEXERIL) 10 MG tablet 1 tablet nightly as needed.      ergocalciferol (ERGOCALCIFEROL) 50,000 unit Cap Take 1 capsule (50,000 Units total) by mouth Every Friday. 12 capsule 1    estradioL (VIVELLE-DOT) 0.1 mg/24 hr PTSW Place 1 patch onto the skin twice a week. 24 patch 3    FARXIGA 10 mg tablet TAKE 1 TABLET DAILY 90 tablet 3    fluticasone propionate (FLONASE) 50 mcg/actuation nasal spray 1 spray (50 mcg total) by Each Nostril route once daily. 16 g 3    glipiZIDE (GLUCOTROL) 5 MG tablet Take 5 mg by mouth once daily.      HYDROcodone-acetaminophen (NORCO)  mg per tablet Take 1 tablet by mouth every 6 (six) hours as needed for Pain. 28 tablet 0    losartan (COZAAR) 50 MG tablet TAKE 1 TABLET BY MOUTH EVERY DAY 90 tablet 1    mesalamine (LIALDA) 1.2 gram TbEC Take 2 tablets (2.4 g total) by mouth daily with breakfast. 60 tablet 11    metFORMIN (GLUCOPHAGE) 1000 MG tablet Take 1 tablet (1,000 mg total) by mouth nightly. 90 tablet 1    metoprolol succinate (TOPROL-XL) 25 MG 24 hr tablet Take 1 tablet by mouth once daily.      multivitamin capsule Take 1 capsule by mouth once daily.      pregabalin (LYRICA) 75 MG capsule Take 2 capsules (150 mg total) by mouth nightly. 90 capsule 1    tiZANidine (ZANAFLEX) 4 MG tablet Take by mouth.      valACYclovir (VALTREX) 500 MG tablet Take 1 tablet (500 mg total) by mouth once daily. 90 tablet 1    [DISCONTINUED] pantoprazole (PROTONIX) 40 MG tablet Take 1 tablet (40 mg total) by mouth once daily. 90 tablet 3    ezetimibe (ZETIA) 10 mg tablet Take 1 tablet by mouth every evening.      pantoprazole (PROTONIX) 40 MG tablet Take 1 tablet (40 mg total) by mouth once daily. 90 tablet 3    [DISCONTINUED] fluconazole (DIFLUCAN) 150 MG Tab Take one tablet by mouth every 72 hours X 3 doses if needed for yeast infection after completion of antibiotics. (Patient not taking: Reported on 2/12/2024) 3 tablet 0    [DISCONTINUED] hydrocortisone 2.5 % cream        No  "facility-administered encounter medications on file as of 2/12/2024.       Allergies:  Review of patient's allergies indicates:   Allergen Reactions    Jardiance [empagliflozin] Anaphylaxis     Headaches     Trulicity [dulaglutide]      Abdominal pain    Ozempic [semaglutide] Nausea And Vomiting       Health Maintenance:  Immunization History   Administered Date(s) Administered    COVID-19 MRNA, LN-S PF (MODERNA HALF 0.25 ML DOSE) 12/17/2021    COVID-19, MRNA, LN-S, PF (MODERNA FULL 0.5 ML DOSE) 03/29/2021, 04/27/2021    Influenza (FLUAD) - Quadrivalent - Adjuvanted - PF *Preferred* (65+) 11/03/2022, 11/06/2023    Influenza - Quadrivalent - High Dose - PF (65 years and older) 11/08/2021    Pneumococcal Conjugate - 20 Valent 08/23/2023    Zoster Recombinant 10/25/2018, 11/03/2022, 01/23/2023        Health Maintenance   Topic Date Due    TETANUS VACCINE  Never done-declined     Hemoglobin A1c  02/06/2024-drawn today results pending     Eye Exam  07/19/2024    Foot Exam  08/23/2024    Lipid Panel  08/23/2024    Mammogram  09/12/2024    Colorectal Cancer Screening  02/01/2025    DEXA Scan  09/01/2026    Hepatitis C Screening  Completed    Shingles Vaccine  Completed    High Dose Statin  Discontinued        Physical Exam      Vital Signs  Temp: 98.1 °F (36.7 °C)  Pulse: 76  Resp: 18  SpO2: 95 %  BP: 130/84  BP Location: Right arm  Patient Position: Sitting  Pain Score:   7  Pain Loc: Back (lower)  Height and Weight  Height: 5' 9" (175.3 cm)  Weight: 95.4 kg (210 lb 6.4 oz)  BSA (Calculated - sq m): 2.16 sq meters  BMI (Calculated): 31.1  Weight in (lb) to have BMI = 25: 168.9]    Physical Exam  Constitutional:       General: She is not in acute distress.     Appearance: She is obese.   HENT:      Head: Normocephalic.      Right Ear: External ear normal.      Left Ear: External ear normal.   Cardiovascular:      Rate and Rhythm: Normal rate and regular rhythm.      Pulses: Normal pulses.      Heart sounds: Normal heart " sounds.   Pulmonary:      Effort: Pulmonary effort is normal.      Breath sounds: Normal breath sounds.   Abdominal:      Palpations: Abdomen is soft.      Tenderness: There is no abdominal tenderness.   Skin:     General: Skin is warm and dry.   Neurological:      Mental Status: She is alert and oriented to person, place, and time.   Psychiatric:         Mood and Affect: Mood normal.         Behavior: Behavior normal.          Laboratory:    Lab Results   Component Value Date     (H) 08/23/2023     08/23/2023    K 4.6 08/23/2023     08/23/2023    CO2 29 08/23/2023    BUN 12 08/23/2023    CREATININE 0.91 08/23/2023    CALCIUM 9.9 08/23/2023    PROT 7.3 02/10/2023    ALBUMIN 3.4 (L) 02/10/2023    BILITOT 0.6 02/10/2023    ALKPHOS 63 02/10/2023    AST 25 02/10/2023    ALT 42 02/10/2023    ANIONGAP 9 08/23/2023    ESTGFRAFRICA 68 06/08/2021    EGFRNONAA 83 06/26/2022       Lab Results   Component Value Date    WBC 9.03 11/06/2023    RBC 4.95 11/06/2023    HGB 14.6 11/06/2023    HCT 45.6 11/06/2023    MCV 92.1 11/06/2023    RDW 13.3 11/06/2023     11/06/2023        Lab Results   Component Value Date    CHOL 167 08/23/2023    CHOL 137 01/30/2020    TRIG 187 (H) 08/23/2023    TRIG 165 01/30/2020    HDL 44 08/23/2023    HDL 37 01/30/2020    LDLCALC 86 08/23/2023       Lab Results   Component Value Date    TSH 0.441 03/28/2023       Lab Results   Component Value Date    HGBA1C 8.8 (H) 11/06/2023    ESTIMATEDAVG 207 11/06/2023        Lab Results   Component Value Date    IRQPCTSM08 3,228 (H) 10/12/2023       Lab Results   Component Value Date    KHCSMDYD53PP 19.3 11/06/2023         Point Of Care Testing:  WBC, UA   Date Value Ref Range Status   11/06/2023 Negative  Final     Nitrite, UA   Date Value Ref Range Status   11/06/2023 Negative  Final     Urobilinogen, UA   Date Value Ref Range Status   11/06/2023 0.2  Final     Protein, POC   Date Value Ref Range Status   11/06/2023 Trace  Final     pH,  UA   Date Value Ref Range Status   11/06/2023 5.5  Final     Spec Grav UA   Date Value Ref Range Status   11/06/2023 1.025  Final     Ketones, UA   Date Value Ref Range Status   11/06/2023 Negative  Final     Bilirubin, POC   Date Value Ref Range Status   11/06/2023 Negative  Final     Glucose, UA   Date Value Ref Range Status   11/06/2023 Negative  Final       Lab Results   Component Value Date    RLIQYPR8ES Negative 07/08/2021    RAPFLUA Negative 07/08/2021    RAPFLUB Negative 07/08/2021         Assessment/Plan     Type 2 diabetes mellitus without complication, without long-term current use of insulin  -     Hemoglobin A1C; Future; Expected date: 02/12/2024  -     Comprehensive Metabolic Panel; Future; Expected date: 02/12/2024    Gastroesophageal reflux disease, unspecified whether esophagitis present  -     pantoprazole (PROTONIX) 40 MG tablet; Take 1 tablet (40 mg total) by mouth once daily.  Dispense: 90 tablet; Refill: 3    Mild depression       -     declines medication and/or mental health evaluation today and I agree that no additional intervention is needed at this time        -     no suicidal or homicidal ideations     Positive depression screening  Comments:  I have reviewed the positive depression score with the patient and found that no additional intervention is needed at this time.    Chronic fatigue  -     Vitamin D; Future; Expected date: 02/12/2024  -     Vitamin B12; Future; Expected date: 02/12/2024  -     Comprehensive Metabolic Panel; Future; Expected date: 02/12/2024    Vitamin D deficiency  -     Vitamin D; Future; Expected date: 02/12/2024    Sacroiliitis, not elsewhere classified        -     continue follow up with pain management as directed         -     information on [facility] provided today       Return to clinic in 6 months.    Questions answered to desired level of satisfaction    Verbalized understanding to all information and instructions  provided      YENNI VerdinPCNP-[facility]

## 2024-02-12 NOTE — PATIENT INSTRUCTIONS
Please bring ALL medications bottles (from ALL providers) including over-the-counter medications to your next appointment !!!         [facility]   [address]  [place] [address]  [phone]      Call Dr. Morales office after lunch [phone], they need to get you scheduled (you did not have an appointment already in the system)

## 2024-02-20 ENCOUNTER — OFFICE VISIT (OUTPATIENT)
Dept: GASTROENTEROLOGY | Facility: CLINIC | Age: 69
End: 2024-02-20
Payer: MEDICARE

## 2024-02-20 VITALS
HEART RATE: 88 BPM | SYSTOLIC BLOOD PRESSURE: 133 MMHG | WEIGHT: 211.38 LBS | DIASTOLIC BLOOD PRESSURE: 84 MMHG | BODY MASS INDEX: 31.22 KG/M2

## 2024-02-20 DIAGNOSIS — K31.84 GASTROPARESIS: ICD-10-CM

## 2024-02-20 DIAGNOSIS — K63.5 POLYP OF TRANSVERSE COLON, UNSPECIFIED TYPE: Primary | ICD-10-CM

## 2024-02-20 DIAGNOSIS — K63.5 POLYP OF CECUM: ICD-10-CM

## 2024-02-20 DIAGNOSIS — K76.0 FATTY LIVER: ICD-10-CM

## 2024-02-20 PROCEDURE — 99215 OFFICE O/P EST HI 40 MIN: CPT | Mod: PBBFAC | Performed by: NURSE PRACTITIONER

## 2024-02-20 PROCEDURE — 99214 OFFICE O/P EST MOD 30 MIN: CPT | Mod: S$PBB,,, | Performed by: NURSE PRACTITIONER

## 2024-02-20 NOTE — PROGRESS NOTES
Olga Stephenson is a 68 y.o. female here for No chief complaint on file.        PCP: Kenya De Paz  Referring Provider: No referring provider defined for this encounter.     HPI:  Presents for follow-up after colonoscopy.  Colonoscopy on 02/01/2024, diverticulosis.  Two colon polyps were noted, in the cecum and in the distal transverse colon.  Colon polyps could not be removed due to anticoagulation. She is on Eliquis for DVT.  This was discussed with Dr. Brand.  Patient will have to remain on Eliquis for a minimum of 6 months. CT abdomen and pelvis on 09/14/2023 that did show thickening in the ascending and descending colon, possible colitis/diverticulitis. Was treated with antibiotics. Abdominal pain is improved.  Reports increased abdominal bloating and cramping prior to bowel movement. Reports frequent bowel movements due to metformin. Previous cholecystectomy. Loose stool after eating.  History of gastroparesis.  Denies any nausea or vomiting.          ROS:  Review of Systems   Constitutional:  Negative for appetite change, fatigue, fever and unexpected weight change.   HENT:  Negative for trouble swallowing.    Cardiovascular:  Negative for chest pain.   Gastrointestinal:  Negative for abdominal pain, blood in stool, change in bowel habit, constipation, diarrhea, nausea, vomiting and reflux.   Musculoskeletal:  Negative for gait problem.   Integumentary:  Negative for pallor.   Psychiatric/Behavioral:  The patient is not nervous/anxious.           PMHX:  has a past medical history of Acute superficial gastritis without hemorrhage (06/08/2022), Adenomatous polyp of cecum (06/15/2022), Adenomatous polyp of transverse colon (06/15/2022), Anticoagulant long-term use, Arthritis, Asthma, Coronary artery disease, Diabetes mellitus, type 2 (2014), Diabetic eye exam (07/19/2023), Diverticula, colon (06/15/2022), DVT (deep venous thrombosis), Epigastric pain (06/08/2022), Heart attack (2011), History of colon  polyps (06/15/2022), Hyperlipidemia, Hypertension (), Polyp of hepatic flexure of colon (06/15/2022), Screening for colon cancer (06/15/2022), and Thyroid disease.    PSHX:  has a past surgical history that includes Esophagogastroduodenoscopy (03/10/2021); Colonoscopy (2017); Hysterectomy;  section; Bilateral oophorectomy (Bilateral); Cholecystectomy; arthroplasty, knee, total, using computer-assisted navigation (Right, 2023); and Coronary artery bypass graft ().    PFHX: family history includes Dementia in her mother; Diabetes in her brother and mother; Heart disease in her mother; Hypertension in her daughter and daughter; No Known Problems in her father; Prostate cancer in her brother; Thyroid disease in her mother.    PSlHX:  reports that she has quit smoking. She has been exposed to tobacco smoke. She has never used smokeless tobacco. She reports current alcohol use of about 1.0 standard drink of alcohol per week. She reports that she does not use drugs.        Review of patient's allergies indicates:   Allergen Reactions    Jardiance [empagliflozin] Anaphylaxis     Headaches     Trulicity [dulaglutide]      Abdominal pain    Ozempic [semaglutide] Nausea And Vomiting       Medication List with Changes/Refills   Current Medications    ALBUTEROL (PROVENTIL/VENTOLIN HFA) 90 MCG/ACTUATION INHALER    Inhale 2 puffs into the lungs every 4 (four) hours as needed for Wheezing or Shortness of Breath. Rescue    APIXABAN (ELIQUIS) 5 MG TAB    Take 1 tablet (5 mg total) by mouth 2 (two) times daily.    BLOOD SUGAR DIAGNOSTIC (FREESTYLE LITE STRIPS) STRP    USE 1 STRIP DAILY TO MONITOR GLUCOSE    CLOPIDOGREL (PLAVIX) 75 MG TABLET    Take 1 tablet (75 mg total) by mouth once daily.    CYCLOBENZAPRINE (FLEXERIL) 10 MG TABLET    1 tablet nightly as needed.    ERGOCALCIFEROL (ERGOCALCIFEROL) 50,000 UNIT CAP    Take 1 capsule (50,000 Units total) by mouth Every Friday.    ESTRADIOL (VIVELLE-DOT) 0.1  MG/24 HR PTSW    Place 1 patch onto the skin twice a week.    EZETIMIBE (ZETIA) 10 MG TABLET    Take 1 tablet by mouth every evening.    FARXIGA 10 MG TABLET    TAKE 1 TABLET DAILY    FLUTICASONE PROPIONATE (FLONASE) 50 MCG/ACTUATION NASAL SPRAY    1 spray (50 mcg total) by Each Nostril route once daily.    GLIPIZIDE (GLUCOTROL) 5 MG TABLET    Take 5 mg by mouth once daily.    HYDROCODONE-ACETAMINOPHEN (NORCO)  MG PER TABLET    Take 1 tablet by mouth every 6 (six) hours as needed for Pain.    LOSARTAN (COZAAR) 50 MG TABLET    TAKE 1 TABLET BY MOUTH EVERY DAY    MESALAMINE (LIALDA) 1.2 GRAM TBEC    Take 2 tablets (2.4 g total) by mouth daily with breakfast.    METFORMIN (GLUCOPHAGE) 1000 MG TABLET    Take 1 tablet (1,000 mg total) by mouth nightly.    METOPROLOL SUCCINATE (TOPROL-XL) 25 MG 24 HR TABLET    Take 1 tablet by mouth once daily.    MULTIVITAMIN CAPSULE    Take 1 capsule by mouth once daily.    PANTOPRAZOLE (PROTONIX) 40 MG TABLET    Take 1 tablet (40 mg total) by mouth once daily.    PREGABALIN (LYRICA) 75 MG CAPSULE    Take 2 capsules (150 mg total) by mouth nightly.    TIZANIDINE (ZANAFLEX) 4 MG TABLET    Take by mouth.    VALACYCLOVIR (VALTREX) 500 MG TABLET    Take 1 tablet (500 mg total) by mouth once daily.        Objective Findings:  Vital Signs:  /84   Pulse 88   Wt 95.9 kg (211 lb 6.4 oz)   BMI 31.22 kg/m²  Body mass index is 31.22 kg/m².    Physical Exam:  Physical Exam  Vitals and nursing note reviewed.   Constitutional:       General: She is not in acute distress.     Appearance: Normal appearance.   HENT:      Mouth/Throat:      Mouth: Mucous membranes are moist.   Cardiovascular:      Rate and Rhythm: Normal rate.   Pulmonary:      Effort: Pulmonary effort is normal.      Breath sounds: No wheezing, rhonchi or rales.   Abdominal:      General: Bowel sounds are normal. There is no distension.      Palpations: Abdomen is soft. There is no mass.      Tenderness: There is no  abdominal tenderness. There is no guarding.      Hernia: No hernia is present.   Skin:     General: Skin is warm and dry.      Coloration: Skin is not jaundiced or pale.   Neurological:      Mental Status: She is alert and oriented to person, place, and time.   Psychiatric:         Mood and Affect: Mood normal.          Labs:  Lab Results   Component Value Date    WBC 9.03 11/06/2023    HGB 14.6 11/06/2023    HCT 45.6 11/06/2023    MCV 92.1 11/06/2023    RDW 13.3 11/06/2023     11/06/2023    LYMPH 46.0 (H) 11/06/2023    LYMPH 4.15 11/06/2023    MONO 7.3 (H) 11/06/2023    EOS 0.14 11/06/2023    BASO 0.11 11/06/2023     Lab Results   Component Value Date     02/12/2024    K 4.0 02/12/2024     02/12/2024    CO2 26 02/12/2024     (H) 02/12/2024    BUN 12 02/12/2024    CREATININE 0.96 02/12/2024    CALCIUM 9.6 02/12/2024    PROT 8.1 02/12/2024    ALBUMIN 3.8 02/12/2024    BILITOT 0.4 02/12/2024    ALKPHOS 88 02/12/2024    AST 28 02/12/2024    ALT 43 02/12/2024         Imaging: Colonoscopy    Addendum Date: 2/1/2024    Procedure Date 2/1/24 Impression Overall Impression: Diverticulosis in the ascending colon, descending colon and sigmoid colon Pan-diverticulosis was noted. Mild erythema of the mucosa was present in the ascending colon without ulceration. Two polyps were noted, in the cecum and distal transverse colon at the splenic flexure. Polypectomy and biopsies were not done due to anticoagulation for VTE. Recommendation  Repeat colonoscopy in 1 year Avoid nsaids; add Lialda for probable colitis. Repeat colonoscopy in 1 year, off of anticoagulation. Discharge: disp: DC to home. High fiber diet. No driving x 24 hours. Follow-up with PCP as scheduled. Return to GI clinic in 1 month. Dx: diarrhea, history of colon polyps, non-specific colitis, colon diverticulosis, two polyps were seen during this exam (see photos). Indication Diverticulitis, diarrhea, blood in stool, history of colon polyps  Providers Camelia South Technician Maurice Gomez MD Proceduralist Zhane Rico, CRNA Kimberly Aleman RN Registered Nurse Medications Moderate sedation administered by anesthesia staff - See anesthesia record. Preprocedure A history and physical has been performed, and patient medication allergies have been reviewed. The patient's tolerance of previous anesthesia has been reviewed. The risks and benefits of the procedure and the sedation options and risks were discussed with the patient. All questions were answered and informed consent obtained. ASA Score: ASA 3 - Patient with moderate systemic disease with functional limitations Mallampati Airway Score: II (hard and soft palate, upper portion of tonsils anduvula visible) Details of the Procedure The patient underwent monitored anesthesia care, which was administered by an anesthesia professional. The patient's heart rate, blood pressure, level of consciousness, respirations, oxygen, ECG and ETCO2 were monitored throughout the procedure. A digital rectal exam was performed. A perianal exam was performed. The scope was introduced through the anus and advanced to the cecum. Retroflexion was performed in the rectum. The quality of bowel preparation was evaluated using the [place] Bowel Preparation Scale with scores of: right colon = 2, transverse colon = 2, left colon = 2. The total BBPS score was 6. Bowel prep was adequate. The patient's estimated blood loss was minimal (<5 mL). The procedure was not difficult. The patient tolerated the procedure well. There were no apparent adverse events. Scope: Pediatric Colonoscope Scope Serial: [id number] Events Procedure Events Event Event Time Procedure Events Event Event Time ENDO SCOPE IN TIME 2/1/2024 10:36 AM ENDO CECUM REACHED 2/1/2024 10:46 AM ENDO SCOPE OUT TIME 2/1/2024 10:51 AM CECAL WITHDRAWAL TIME: 4m 51s Findings Diverticula in the ascending colon, descending colon and sigmoid colon; no bleeding was  identified     Result Date: 2/1/2024  Table formatting from the original result was not included. Procedure Date 2/1/24 Impression Overall      Diverticulosis in the ascending colon, descending colon and sigmoid colon Pan-diverticulosis was noted. Mild erythema of the mucosa was present in the ascending colon without ulceration. Two polyps were noted, in the cecum and distal transverse colon at the splenic flexure. Polypectomy and biopsies were not done due to anticoagulation for VTE. Recommendation  Repeat colonoscopy in 1 year Avoid nsaids; add Lialda for probable colitis. Repeat colonoscopy in 1 year, off of anticoagulation. Discharge: disp: DC to home. High fiber diet. No driving x 24 hours. Follow-up with PCP as scheduled. Dx: diarrhea, history of colon polyps, non-specific colitis, colon diverticulosis, two polyps were seen during this exam (see photos). Indication Diverticulitis, diarrhea, blood in stool, history of colon polyps Providers Camelia South Technician Maurice Gomez MD Proceduralist Zhane Rico, Kimberly Aleman CRNA, RN Registered Nurse Medications Moderate sedation administered by anesthesia staff - See anesthesia record. Preprocedure A history and physical has been performed, and patient medication allergies have been reviewed. The patient's tolerance of previous anesthesia has been reviewed. The risks and benefits of the procedure and the sedation options and risks were discussed with the patient. All questions were answered and informed consent obtained. ASA Score: ASA 3 - Patient with moderate systemic disease with functional limitations Mallampati Airway Score: II (hard and soft palate, upper portion of tonsils anduvula visible) Details of the Procedure The patient underwent monitored anesthesia care, which was administered by an anesthesia professional. The patient's heart rate, blood pressure, level of consciousness, respirations, oxygen, ECG and ETCO2 were monitored  throughout the procedure. A digital rectal exam was performed. A perianal exam was performed. The scope was introduced through the anus and advanced to the cecum. Retroflexion was performed in the rectum. The quality of bowel preparation was evaluated using the [place] Bowel Preparation Scale with scores of: right colon = 2, transverse colon = 2, left colon = 2. The total BBPS score was 6. Bowel prep was adequate. The patient's estimated blood loss was minimal (<5 mL). The procedure was not difficult. The patient tolerated the procedure well. There were no apparent adverse events. Scope: Pediatric Colonoscope Scope Serial: [id number] Events Procedure Events Event Event Time Procedure Events Event Event Time ENDO SCOPE IN TIME 2/1/2024 10:36 AM ENDO CECUM REACHED 2/1/2024 10:46 AM ENDO SCOPE OUT TIME 2/1/2024 10:51 AM CECAL WITHDRAWAL TIME: 4m 51s Findings Diverticula in the ascending colon, descending colon and sigmoid colon; no bleeding was identified         Assessment:  Olga Stephenson is a 68 y.o. female here with:  1. Polyp of transverse colon, unspecified type    2. Polyp of cecum    3. Gastroparesis    4. Fatty liver          Recommendations:  1. We will need colonoscopy in 1 year due to colon polyps not removed during recent colonoscopy due to anticoagulation  2. Do not lay down within 3 hours of eating.  Avoid spicy, greasy foods  Eat 6-8 small meals per day.  Exercise 150 minutes per week  Avoid raw fruits and vegetables  Small amount of protein and fiber at one time  3. Exercise 150 minutes per week  Weight loss of 7-10%. Weight loss should be gradual  Diet low in saturated fats and carbohydrates  Good glucose and cholesterol control  4. Patient will call for follow up  when she chooses a provider      No follow-ups on file.      Order summary:  Orders Placed This Encounter    US Elastography Liver w/imaging       Thank you for allowing me to participate in the care of Olga Stephenson.      Jocelin EVERETT  CHARLOTTE LoveC

## 2024-02-20 NOTE — PATIENT INSTRUCTIONS
Do not lay down within 3 hours of eating.  Avoid spicy, greasy foods  Eat 6-8 small meals per day  Avoid raw fruits and vegetables  Small amount of protein and fiber at one time    Weight loss of 7-10%. Weight loss should be gradual  Diet low in saturated fats and carbohydrates  Good glucose and cholesterol control    You will need a colonoscopy in one year    Recommend office visit in 6 months

## 2024-02-21 ENCOUNTER — OFFICE VISIT (OUTPATIENT)
Dept: DIABETES SERVICES | Facility: CLINIC | Age: 69
End: 2024-02-21
Payer: MEDICARE

## 2024-02-21 VITALS
BODY MASS INDEX: 31.16 KG/M2 | HEIGHT: 69 IN | HEART RATE: 94 BPM | OXYGEN SATURATION: 95 % | SYSTOLIC BLOOD PRESSURE: 142 MMHG | RESPIRATION RATE: 18 BRPM | WEIGHT: 210.38 LBS | DIASTOLIC BLOOD PRESSURE: 88 MMHG

## 2024-02-21 DIAGNOSIS — I10 PRIMARY HYPERTENSION: ICD-10-CM

## 2024-02-21 DIAGNOSIS — E11.9 TYPE 2 DIABETES MELLITUS WITHOUT COMPLICATION, WITHOUT LONG-TERM CURRENT USE OF INSULIN: Primary | ICD-10-CM

## 2024-02-21 DIAGNOSIS — E78.2 MIXED HYPERLIPIDEMIA: ICD-10-CM

## 2024-02-21 DIAGNOSIS — K31.84 GASTROPARESIS: ICD-10-CM

## 2024-02-21 DIAGNOSIS — I25.118 CORONARY ARTERY DISEASE OF NATIVE ARTERY OF NATIVE HEART WITH STABLE ANGINA PECTORIS: ICD-10-CM

## 2024-02-21 LAB — GLUCOSE SERPL-MCNC: 201 MG/DL (ref 70–110)

## 2024-02-21 PROCEDURE — 99215 OFFICE O/P EST HI 40 MIN: CPT | Mod: PBBFAC | Performed by: NURSE PRACTITIONER

## 2024-02-21 PROCEDURE — 99999PBSHW POCT GLUCOSE, HAND-HELD DEVICE: Mod: PBBFAC,,,

## 2024-02-21 PROCEDURE — 99214 OFFICE O/P EST MOD 30 MIN: CPT | Mod: S$PBB,,, | Performed by: NURSE PRACTITIONER

## 2024-02-21 PROCEDURE — 82962 GLUCOSE BLOOD TEST: CPT | Mod: PBBFAC | Performed by: NURSE PRACTITIONER

## 2024-02-21 RX ORDER — DAPAGLIFLOZIN 10 MG/1
10 TABLET, FILM COATED ORAL DAILY
Qty: 90 TABLET | Refills: 3 | Status: SHIPPED | OUTPATIENT
Start: 2024-02-21 | End: 2024-04-04 | Stop reason: SDUPTHER

## 2024-02-21 RX ORDER — GLIPIZIDE 10 MG/1
10 TABLET, FILM COATED, EXTENDED RELEASE ORAL
Qty: 90 TABLET | Refills: 1 | Status: SHIPPED | OUTPATIENT
Start: 2024-02-21

## 2024-02-21 NOTE — PATIENT INSTRUCTIONS
Take glipizide that you have at home once with breakfast and once with supper until gone, then start new dose of XR 10mg once daily in am.     Continue farxiga daily    Can stop metformin due to GI upset.       Pt is advised to monitor and document glucose fasting when you wake up before you eat and 2 hours after meal and bring in meter to next visit.      Ensure to take medications as directed.      Follow diabetic diet as directed.      Work to achieve normal body weight.     Ensure to exercise 4-5 times per week for 20 minutes. Snacks with 0-5 grams carbs   Hard Boiled Egg   Crystal Light, Vitamin Water, Powerade Zero   Herbal tea, unsweetened   8 oz unsweetened almond milk   2 tsp peanut butter on celery   ½ cup sugar-free Jell-O   1 sugar-free popsicle   Non starchy vegetables such as carrots or celery sticks with lowfat dressing   ½ oz lowfat cheese or string cheese   1 closed handful of nuts or tbsp of seeds, unsalted    Snacks with 15 gram carbs  . 1 small piece of fruit or . banana or . cup light canned fruit  . 3 li cracker squares  . 3 cups popcorn  . 5 Vanilla Wafers  . 1/2 cup low fat, no added sugar ice cream or frozen yogurt  . 1/2 turkey, ham, or chicken sandwich  . 1.2 cup fruit with 1/2 cup of cottage cheese  . 4-6 unsalted wheat crackers with 1 oz low fat cheese or 1 tbsp peanut butter  . 30 goldfish crackers  . 7-8 mini rice cakes  . 1/3 cup hummus dip with raw vegetables  . 1/2 whole wheat ravi, 1 tbsp hummus  . Mini pizza (. whole wheat English muffin, low-fat cheese, tomato sauce)  . 100 calorie snack pack  . 4-6 oz light yogurt  . 1/2 cup sugar-free pudding

## 2024-02-21 NOTE — PROGRESS NOTES
Subjective:         Patient ID: Olga Stephenson is a 68 y.o. female.  Patient's current PCP is Kenya De Paz NP.     Chief Complaint: General Diabetes Follow-up (Patient is here for routine follow up. Patient is not checking glucose daily. )    HPI  Olga Stephenson is a 68 y.o. Black or  female presenting for an established visit for type 2 diabetes. A1c is improved from last check.  Pt's last visit was in October of 22. Discussed most recent labs.  A1c is improved but elevated at 7.9.  cholesterol is improved but ldl and triglyerides are still greater than goal value. Statin intolerant, on zetia.   Received diabetes education:yes    At last visit :  Will do cbc today due to POC a1c unable to process due to low hemoglobin and hx of recent transfusion due to acute anemia 2 to blood loss.  Other labs as well, complaining of hair loss, will do TSH.   Lifestyel modifications encouraged.   Reviewed meter at the bedside. Daily readings 160-180s  Will call with any changes once a1c from lab is resulted.        Screening or Prevention Patient's value Goal    HgA1C Testing and Control   Hemoglobin A1C   Date Value Ref Range Status   02/12/2024 7.9 (H) 4.5 - 6.6 % Final     Comment:       Normal:               <5.7%  Pre-Diabetic:       5.7% to 6.4%  Diabetic:             >6.4%  Diabetic Goal:     <7%   11/06/2023 8.8 (H) 4.5 - 6.6 % Final     Comment:       Normal:               <5.7%  Pre-Diabetic:       5.7% to 6.4%  Diabetic:             >6.4%  Diabetic Goal:     <7%   05/09/2023 6.6 4.5 - 6.6 % Final     Comment:       Normal:               <5.7%  Pre-Diabetic:       5.7% to 6.4%  Diabetic:             >6.4%  Diabetic Goal:     <7%       Annually/Less than 8%    Lipid profile Lab Results   Component Value Date    CHOL 167 08/23/2023    CHOL 178 10/26/2022    CHOL 153 01/05/2022     Lab Results   Component Value Date    HDL 44 08/23/2023    HDL 35 (L) 10/26/2022    HDL 35 (L) 01/05/2022     Lab  Results   Component Value Date    LDLCALC 86 08/23/2023    LDLCALC 92 01/05/2022    LDLCALC 67 01/30/2020     Lab Results   Component Value Date    TRIG 187 (H) 08/23/2023    TRIG 245 (H) 10/26/2022    TRIG 132 01/05/2022       Lab Results   Component Value Date    CHOLHDL 3.8 08/23/2023    CHOLHDL 5.1 10/26/2022    CHOLHDL 4.4 01/05/2022      Annually    CMP CMP  Sodium   Date Value Ref Range Status   02/12/2024 137 136 - 145 mmol/L Final     Potassium   Date Value Ref Range Status   02/12/2024 4.0 3.5 - 5.1 mmol/L Final     Chloride   Date Value Ref Range Status   02/12/2024 104 98 - 107 mmol/L Final     CO2   Date Value Ref Range Status   02/12/2024 26 21 - 32 mmol/L Final     Glucose   Date Value Ref Range Status   02/12/2024 158 (H) 74 - 106 mg/dL Final     BUN   Date Value Ref Range Status   02/12/2024 12 7 - 18 mg/dL Final     Creatinine   Date Value Ref Range Status   02/12/2024 0.96 0.55 - 1.02 mg/dL Final     Calcium   Date Value Ref Range Status   02/12/2024 9.6 8.5 - 10.1 mg/dL Final     Total Protein   Date Value Ref Range Status   02/12/2024 8.1 6.4 - 8.2 g/dL Final     Albumin   Date Value Ref Range Status   02/12/2024 3.8 3.5 - 5.0 g/dL Final     Bilirubin, Total   Date Value Ref Range Status   02/12/2024 0.4 >0.0 - 1.2 mg/dL Final     Alk Phos   Date Value Ref Range Status   02/12/2024 88 55 - 142 U/L Final     AST   Date Value Ref Range Status   02/12/2024 28 15 - 37 U/L Final     ALT   Date Value Ref Range Status   02/12/2024 43 13 - 56 U/L Final     Anion Gap   Date Value Ref Range Status   02/12/2024 11 7 - 16 mmol/L Final     eGFR   Date Value Ref Range Status   02/12/2024 65 >=60 mL/min/1.73m2 Final     Annually    Microalbumin  Lab Results   Component Value Date    MICROALBUR 5.0 (H) 11/06/2023     Annually     LDL control Lab Results   Component Value Date    LDLCALC 86 08/23/2023    Annually/Less than 100 mg/dl     Nephropathy screening Lab Results   Component Value Date    MICALBCREAT 31.0  12/03/2020     Lab Results   Component Value Date    PROTEINUA Negative 08/09/2023    Annually    Blood pressure BP Readings from Last 1 Encounters:   02/21/24 (!) 142/88    Less than 140/90    Dilated retinal exam : 07/19/2023 Annually    Foot exam   : 08/23/2023 Annually              Past failed treatment include: metformin causing GI upset.  Will stop.     Blood glucose testing is performed regularly. Patient is testing 1 times per day.  Meter:       Any episodes of hypoglycemia? no    Complications related to diabetes: cardiovascular disease and peripheral vascular disease    Her blood sugar in the clinic today was:   Lab Results   Component Value Date    POCGLU 201 (A) 02/21/2024           Review of Systems   Constitutional:  Negative for activity change, appetite change, diaphoresis and fatigue.   HENT:  Negative for nasal congestion, facial swelling and sinus pressure/congestion.    Eyes:  Negative for visual disturbance.   Respiratory:  Negative for shortness of breath and wheezing.    Cardiovascular:  Negative for chest pain and leg swelling.   Gastrointestinal:  Negative for constipation, diarrhea, nausea and vomiting.   Endocrine: Negative for polydipsia, polyphagia and polyuria.   Genitourinary:  Negative for dysuria, frequency and urgency.   Musculoskeletal:  Negative for gait problem and myalgias.   Integumentary:  Negative for color change, rash and wound.   Neurological:  Negative for dizziness, syncope, weakness, headaches and coordination difficulties.   Hematological:  Does not bruise/bleed easily.   Psychiatric/Behavioral:  Negative for self-injury, sleep disturbance and suicidal ideas. The patient is not nervous/anxious.       Objective:      Vitals:    02/21/24 1029   BP: (!) 142/88   Pulse: 94   Resp: 18     Physical Exam  Vitals and nursing note reviewed.   Constitutional:       Appearance: Normal appearance.   HENT:      Head: Normocephalic.   Cardiovascular:      Rate and Rhythm: Normal  rate and regular rhythm.      Pulses: Normal pulses.      Heart sounds: Normal heart sounds.   Pulmonary:      Effort: Pulmonary effort is normal.      Breath sounds: Normal breath sounds.   Musculoskeletal:         General: Normal range of motion.      Right lower leg: No edema.      Left lower leg: No edema.   Skin:     General: Skin is warm and dry.   Neurological:      General: No focal deficit present.      Mental Status: She is alert and oriented to person, place, and time.   Psychiatric:         Mood and Affect: Mood normal.         Behavior: Behavior normal.         Thought Content: Thought content normal.         Judgment: Judgment normal.      Assessment/Plan   1. Type 2 diabetes mellitus without complication, without long-term current use of insulin  Take glipizide that you have at home once with breakfast and once with supper until gone, then start new dose of XR 10mg once daily in am.     Continue farxiga daily    Can stop metformin due to GI upset.       Pt is advised to monitor and document glucose fasting when you wake up before you eat and 2 hours after meal and bring in meter to next visit.      Ensure to take medications as directed.      Follow diabetic diet as directed.      Work to achieve normal body weight.     Ensure to exercise 4-5 times per week for 20 minutes.   -     POCT Glucose, Hand-Held Device  -     dapagliflozin propanediol (FARXIGA) 10 mg tablet; Take 1 tablet (10 mg total) by mouth Daily.  Dispense: 90 tablet; Refill: 3    2. Coronary artery disease of native artery of native heart with stable angina pectoris  See cardiology routinely as scheduled     3. Primary hypertension  ARB coverage, DASH.    4. Mixed hyperlipidemia  Statin intolerant, on zetia.  Decrease red meats, eggs and cheese in diet.  Increase fiber and exercise.  Decrease fried fatty foods and foods high in saturated fats.     5. Gastroparesis  Gastroparesis diet.           - Follow up: 3 months      I spent a total  of 18 minutes on the day of the visit.This includes face to face time and non-face to face time preparing to see the patient (eg, review of tests), documenting clinical information in the electronic record, independently interpreting results and communicating results to the patient.    Mae Gautam FNP-BC ADM-BC  [facility]

## 2024-02-26 ENCOUNTER — HOSPITAL ENCOUNTER (OUTPATIENT)
Dept: RADIOLOGY | Facility: HOSPITAL | Age: 69
Discharge: HOME | End: 2024-02-26
Attending: NURSE PRACTITIONER
Payer: MEDICARE

## 2024-02-26 ENCOUNTER — OFFICE VISIT (OUTPATIENT)
Dept: ORTHOPEDICS | Facility: CLINIC | Age: 69
End: 2024-02-26
Payer: MEDICARE

## 2024-02-26 ENCOUNTER — HOSPITAL ENCOUNTER (OUTPATIENT)
Dept: RADIOLOGY | Facility: HOSPITAL | Age: 69
Discharge: HOME | End: 2024-02-26
Attending: ORTHOPAEDIC SURGERY
Payer: MEDICARE

## 2024-02-26 VITALS — WEIGHT: 210 LBS | HEIGHT: 69 IN | BODY MASS INDEX: 31.1 KG/M2

## 2024-02-26 DIAGNOSIS — M18.0 PRIMARY OSTEOARTHRITIS OF BOTH FIRST CARPOMETACARPAL JOINTS: ICD-10-CM

## 2024-02-26 DIAGNOSIS — K76.0 FATTY LIVER: ICD-10-CM

## 2024-02-26 DIAGNOSIS — Z96.651 STATUS POST TOTAL KNEE REPLACEMENT, RIGHT: Primary | ICD-10-CM

## 2024-02-26 DIAGNOSIS — Z96.651 STATUS POST TOTAL KNEE REPLACEMENT, RIGHT: ICD-10-CM

## 2024-02-26 PROCEDURE — 99214 OFFICE O/P EST MOD 30 MIN: CPT | Mod: PBBFAC,25 | Performed by: ORTHOPAEDIC SURGERY

## 2024-02-26 PROCEDURE — 73560 X-RAY EXAM OF KNEE 1 OR 2: CPT | Mod: TC,RT

## 2024-02-26 PROCEDURE — 99213 OFFICE O/P EST LOW 20 MIN: CPT | Mod: S$PBB,25,, | Performed by: ORTHOPAEDIC SURGERY

## 2024-02-26 PROCEDURE — 76981 USE PARENCHYMA: CPT | Mod: 26,,, | Performed by: RADIOLOGY

## 2024-02-26 PROCEDURE — 73560 X-RAY EXAM OF KNEE 1 OR 2: CPT | Mod: 26,RT,, | Performed by: ORTHOPAEDIC SURGERY

## 2024-02-26 PROCEDURE — 99999PBSHW PR PBB SHADOW TECHNICAL ONLY FILED TO HB: Mod: PBBFAC,,,

## 2024-02-26 PROCEDURE — 76981 USE PARENCHYMA: CPT | Mod: TC

## 2024-02-26 PROCEDURE — 20600 DRAIN/INJ JOINT/BURSA W/O US: CPT | Mod: PBBFAC,LT | Performed by: ORTHOPAEDIC SURGERY

## 2024-02-26 PROCEDURE — 20600 DRAIN/INJ JOINT/BURSA W/O US: CPT | Mod: PBBFAC,50 | Performed by: ORTHOPAEDIC SURGERY

## 2024-02-26 RX ORDER — BUPIVACAINE HYDROCHLORIDE 2.5 MG/ML
1 INJECTION, SOLUTION INFILTRATION; PERINEURAL
Status: DISCONTINUED | OUTPATIENT
Start: 2024-02-26 | End: 2024-02-26 | Stop reason: HOSPADM

## 2024-02-26 RX ORDER — TRIAMCINOLONE ACETONIDE 40 MG/ML
20 INJECTION, SUSPENSION INTRA-ARTICULAR; INTRAMUSCULAR
Status: DISCONTINUED | OUTPATIENT
Start: 2024-02-26 | End: 2024-02-26 | Stop reason: HOSPADM

## 2024-02-26 RX ADMIN — TRIAMCINOLONE ACETONIDE 20 MG: 40 INJECTION, SUSPENSION INTRA-ARTICULAR; INTRAMUSCULAR at 10:02

## 2024-02-26 RX ADMIN — BUPIVACAINE HYDROCHLORIDE 1 ML: 2.5 INJECTION, SOLUTION INFILTRATION; PERINEURAL at 10:02

## 2024-02-26 NOTE — PROGRESS NOTES
Patient is here for follow-up of right knee I injected her ITB band she is having tightness in back of her leg she is actually having both legs she has a history of back pain I think some of this is coming from her back she is going to go see her pain treatment doctor about a possible back injection.  Her right thumb she has a CMC arthritis positive grind test we discussed treatment options she wished an injection I injected her kathleen  thumb 1 cc of Marcaine 1 cc Kenalog.  Let her use her hand as tolerates I will follow her up in 3 months.

## 2024-02-26 NOTE — PROCEDURES
Has appointment w allergy in Feb  Discussed allergy results Small Joint Aspiration/Injection: R thumb CMC    Date/Time: 2/26/2024 10:00 AM    Performed by: Erick Tolliver MD  Authorized by: Erick Tolliver MD    Consent Done?:  Yes (Verbal)  Indications:  Arthritis  Location:  Thumb  Site:  R thumb CMC  Ultrasonic guidance for needle placement?: No    Needle size:  25 G  Approach:  Dorsal  Medications:  20 mg triamcinolone acetonide 40 mg/mL; 1 mL BUPivacaine 0.25% (2.5 mg/ml) 0.25 % (2.5 mg/mL)  Patient tolerance:  Patient tolerated the procedure well with no immediate complications

## 2024-02-26 NOTE — PROCEDURES
Small Joint Aspiration/Injection: L thumb CMC    Date/Time: 2/26/2024 10:00 AM    Performed by: Erick Tolliver MD  Authorized by: Erick Tolliver MD    Consent Done?:  Yes (Verbal)  Indications:  Arthritis  Location:  Thumb  Site:  L thumb CMC  Ultrasonic guidance for needle placement?: No    Needle size:  25 G  Approach:  Dorsal  Medications:  20 mg triamcinolone acetonide 40 mg/mL; 1 mL BUPivacaine 0.25% (2.5 mg/ml) 0.25 % (2.5 mg/mL)  Patient tolerance:  Patient tolerated the procedure well with no immediate complications

## 2024-02-26 NOTE — PROGRESS NOTES
Radiology Interpretation        Patient Name: Olga Stephenson  Date: 2/26/2024  YOB: 1955  MRN# [id number]        ORDERING DIAGNOSIS:    Encounter Diagnosis   Name Primary?    Status post total knee replacement, right Yes           Two views AP lateral right knee skeletally mature individual total knee arthroplasty in place no loosening fractures subluxations impression total knee arthroplasty in place right knee no loosening            Erick Tolliver MD

## 2024-04-04 DIAGNOSIS — E11.9 TYPE 2 DIABETES MELLITUS WITHOUT COMPLICATION, WITHOUT LONG-TERM CURRENT USE OF INSULIN: ICD-10-CM

## 2024-04-04 RX ORDER — DAPAGLIFLOZIN 10 MG/1
10 TABLET, FILM COATED ORAL DAILY
Qty: 90 TABLET | Refills: 3 | Status: SHIPPED | OUTPATIENT
Start: 2024-04-04

## 2024-04-04 RX ORDER — LIRAGLUTIDE 6 MG/ML
INJECTION SUBCUTANEOUS
Qty: 27 ML | Refills: 1 | Status: SHIPPED | OUTPATIENT
Start: 2024-04-04

## 2024-04-06 ENCOUNTER — HOSPITAL ENCOUNTER (EMERGENCY)
Facility: HOSPITAL | Age: 69
Discharge: NURSING, REHAB OR LONG-TERM CARE | End: 2024-04-06
Attending: EMERGENCY MEDICINE
Payer: MEDICARE

## 2024-04-06 VITALS
OXYGEN SATURATION: 97 % | RESPIRATION RATE: 18 BRPM | HEART RATE: 68 BPM | WEIGHT: 208 LBS | BODY MASS INDEX: 31.52 KG/M2 | SYSTOLIC BLOOD PRESSURE: 158 MMHG | TEMPERATURE: 98 F | HEIGHT: 68 IN | DIASTOLIC BLOOD PRESSURE: 91 MMHG

## 2024-04-06 DIAGNOSIS — E11.65 HYPERGLYCEMIA DUE TO DIABETES MELLITUS: ICD-10-CM

## 2024-04-06 DIAGNOSIS — I63.9 CEREBROVASCULAR ACCIDENT (CVA), UNSPECIFIED MECHANISM: Primary | ICD-10-CM

## 2024-04-06 DIAGNOSIS — R29.90 EPISODE OF TRANSIENT NEUROLOGIC SYMPTOMS: ICD-10-CM

## 2024-04-06 DIAGNOSIS — R55 NEAR SYNCOPE: ICD-10-CM

## 2024-04-06 DIAGNOSIS — R07.9 CHEST PAIN: ICD-10-CM

## 2024-04-06 LAB
ALBUMIN SERPL BCP-MCNC: 4.3 G/DL (ref 3.5–5)
ALBUMIN/GLOB SERPL: 1 {RATIO}
ALP SERPL-CCNC: 84 U/L (ref 55–142)
ALT SERPL W P-5'-P-CCNC: 49 U/L (ref 13–56)
ANION GAP SERPL CALCULATED.3IONS-SCNC: 15 MMOL/L (ref 7–16)
AST SERPL W P-5'-P-CCNC: 29 U/L (ref 15–37)
BASOPHILS # BLD AUTO: 0.07 K/UL (ref 0–0.2)
BASOPHILS NFR BLD AUTO: 0.7 % (ref 0–1)
BILIRUB SERPL-MCNC: 0.6 MG/DL (ref ?–1.2)
BILIRUB UR QL STRIP: NEGATIVE
BUN SERPL-MCNC: 11 MG/DL (ref 7–18)
BUN/CREAT SERPL: 11 (ref 6–20)
CALCIUM SERPL-MCNC: 9 MG/DL (ref 8.5–10.1)
CHLORIDE SERPL-SCNC: 99 MMOL/L (ref 98–107)
CK SERPL-CCNC: 254 U/L (ref 26–192)
CLARITY UR: CLEAR
CO2 SERPL-SCNC: 27 MMOL/L (ref 21–32)
COLOR UR: YELLOW
CREAT SERPL-MCNC: 1.02 MG/DL (ref 0.55–1.02)
D DIMER PPP FEU-MCNC: 0.44 ΜG/ML (ref 0–0.47)
DIFFERENTIAL METHOD BLD: ABNORMAL
EGFR (NO RACE VARIABLE) (RUSH/TITUS): 60 ML/MIN/1.73M2
EOSINOPHIL # BLD AUTO: 0.09 K/UL (ref 0–0.5)
EOSINOPHIL NFR BLD AUTO: 1 % (ref 1–4)
ERYTHROCYTE [DISTWIDTH] IN BLOOD BY AUTOMATED COUNT: 13.7 % (ref 11.5–14.5)
GLOBULIN SER-MCNC: 4.3 G/DL (ref 2–4)
GLUCOSE SERPL-MCNC: 216 MG/DL (ref 74–106)
GLUCOSE SERPL-MCNC: 235 MG/DL (ref 70–105)
GLUCOSE SERPL-MCNC: 236 MG/DL (ref 70–105)
GLUCOSE UR STRIP-MCNC: 500 MG/DL
HCT VFR BLD AUTO: 45.8 % (ref 38–47)
HGB BLD-MCNC: 14.8 G/DL (ref 12–16)
KETONES UR STRIP-SCNC: NEGATIVE MG/DL
LEUKOCYTE ESTERASE UR QL STRIP: NEGATIVE
LYMPHOCYTES # BLD AUTO: 3.72 K/UL (ref 1–4.8)
LYMPHOCYTES NFR BLD AUTO: 39.5 % (ref 27–41)
MCH RBC QN AUTO: 29.5 PG (ref 27–31)
MCHC RBC AUTO-ENTMCNC: 32.3 G/DL (ref 32–36)
MCV RBC AUTO: 91.2 FL (ref 80–96)
MONOCYTES # BLD AUTO: 0.89 K/UL (ref 0–0.8)
MONOCYTES NFR BLD AUTO: 9.5 % (ref 2–6)
MPC BLD CALC-MCNC: 12.1 FL (ref 9.4–12.4)
NEUTROPHILS # BLD AUTO: 4.64 K/UL (ref 1.8–7.7)
NEUTROPHILS NFR BLD AUTO: 49.3 % (ref 53–65)
NITRITE UR QL STRIP: NEGATIVE
PH UR STRIP: 6.5 PH UNITS
PLATELET # BLD AUTO: 237 K/UL (ref 150–400)
POTASSIUM SERPL-SCNC: 3.8 MMOL/L (ref 3.5–5.1)
PROT SERPL-MCNC: 8.6 G/DL (ref 6.4–8.2)
PROT UR QL STRIP: NEGATIVE
RBC # BLD AUTO: 5.02 M/UL (ref 4.2–5.4)
RBC # UR STRIP: NEGATIVE /UL
SODIUM SERPL-SCNC: 137 MMOL/L (ref 136–145)
SP GR UR STRIP: 1.01
TROPONIN I SERPL DL<=0.01 NG/ML-MCNC: <4 PG/ML
TROPONIN I SERPL DL<=0.01 NG/ML-MCNC: <4 PG/ML
UROBILINOGEN UR STRIP-ACNC: 0.2 MG/DL
WBC # BLD AUTO: 9.41 K/UL (ref 4.5–11)

## 2024-04-06 PROCEDURE — 99291 CRITICAL CARE FIRST HOUR: CPT | Performed by: EMERGENCY MEDICINE

## 2024-04-06 PROCEDURE — 96374 THER/PROPH/DIAG INJ IV PUSH: CPT

## 2024-04-06 PROCEDURE — C9113 INJ PANTOPRAZOLE SODIUM, VIA: HCPCS | Performed by: EMERGENCY MEDICINE

## 2024-04-06 PROCEDURE — 25000003 PHARM REV CODE 250: Performed by: EMERGENCY MEDICINE

## 2024-04-06 PROCEDURE — 96361 HYDRATE IV INFUSION ADD-ON: CPT

## 2024-04-06 PROCEDURE — 81003 URINALYSIS AUTO W/O SCOPE: CPT | Performed by: EMERGENCY MEDICINE

## 2024-04-06 PROCEDURE — G0426 INPT/ED TELECONSULT50: HCPCS | Mod: GT,,, | Performed by: STUDENT IN AN ORGANIZED HEALTH CARE EDUCATION/TRAINING PROGRAM

## 2024-04-06 PROCEDURE — 99285 EMERGENCY DEPT VISIT HI MDM: CPT | Mod: 25

## 2024-04-06 PROCEDURE — 82962 GLUCOSE BLOOD TEST: CPT | Mod: 91

## 2024-04-06 PROCEDURE — 93010 ELECTROCARDIOGRAM REPORT: CPT | Performed by: FAMILY MEDICINE

## 2024-04-06 PROCEDURE — 80053 COMPREHEN METABOLIC PANEL: CPT | Performed by: EMERGENCY MEDICINE

## 2024-04-06 PROCEDURE — 84484 ASSAY OF TROPONIN QUANT: CPT | Mod: 91 | Performed by: EMERGENCY MEDICINE

## 2024-04-06 PROCEDURE — 63600175 PHARM REV CODE 636 W HCPCS: Performed by: EMERGENCY MEDICINE

## 2024-04-06 PROCEDURE — 82550 ASSAY OF CK (CPK): CPT | Performed by: EMERGENCY MEDICINE

## 2024-04-06 PROCEDURE — 85025 COMPLETE CBC W/AUTO DIFF WBC: CPT | Performed by: EMERGENCY MEDICINE

## 2024-04-06 PROCEDURE — 96360 HYDRATION IV INFUSION INIT: CPT | Mod: 59

## 2024-04-06 PROCEDURE — 85379 FIBRIN DEGRADATION QUANT: CPT | Performed by: EMERGENCY MEDICINE

## 2024-04-06 PROCEDURE — 96375 TX/PRO/DX INJ NEW DRUG ADDON: CPT

## 2024-04-06 PROCEDURE — 93005 ELECTROCARDIOGRAM TRACING: CPT

## 2024-04-06 RX ORDER — ASPIRIN 325 MG
325 TABLET ORAL DAILY
Status: DISCONTINUED | OUTPATIENT
Start: 2024-04-06 | End: 2024-04-06

## 2024-04-06 RX ORDER — ONDANSETRON HYDROCHLORIDE 2 MG/ML
4 INJECTION, SOLUTION INTRAVENOUS
Status: COMPLETED | OUTPATIENT
Start: 2024-04-06 | End: 2024-04-06

## 2024-04-06 RX ORDER — PANTOPRAZOLE SODIUM 40 MG/10ML
40 INJECTION, POWDER, LYOPHILIZED, FOR SOLUTION INTRAVENOUS
Status: COMPLETED | OUTPATIENT
Start: 2024-04-06 | End: 2024-04-06

## 2024-04-06 RX ORDER — TRAMADOL HYDROCHLORIDE 50 MG/1
50 TABLET ORAL EVERY 6 HOURS PRN
COMMUNITY

## 2024-04-06 RX ADMIN — SODIUM CHLORIDE 500 ML: 9 INJECTION, SOLUTION INTRAVENOUS at 11:04

## 2024-04-06 RX ADMIN — PANTOPRAZOLE SODIUM 40 MG: 40 INJECTION, POWDER, LYOPHILIZED, FOR SOLUTION INTRAVENOUS at 11:04

## 2024-04-06 RX ADMIN — ONDANSETRON 4 MG: 2 INJECTION INTRAMUSCULAR; INTRAVENOUS at 11:04

## 2024-04-06 NOTE — ED NOTES
236 POCT  
Assisted pt on bedpan, pt voiced no complaints, no weakness noted  
Called [facility] for transport to [facility]  
Phi here for transport  
Pt accepted @ [facility] DR Fagan  
Pt alert and oriented, no deficit noted, family at bedside  
Pt awake and alert talking to family  
Pt back from ct, pt eyes open, pt follow commands  
Pt lethargic and weak but still follow commands  
Pt transport to [facility] by [facility], no distress noted, no change in pt condition, family present  
Pt transported to ct with monitor and rn  
Waiting on Dr Cox to do tele stroke eval, Dr on another telestroke eval  
195.1

## 2024-04-06 NOTE — SUBJECTIVE & OBJECTIVE
HPI:  68 y.o. female w/ PMHx of DM II, CAD, HT presenting s/p near syncopal event.   Started to feel hot,sweaty and clammy while outside working and was worried about hypoglycemia. Called daughter  LKW 10:00  Initial NIHSS 0, then patien had an argument w/ family member and had generalized weakness episode w/ reported aphasia and RSW.   NIHSS 0 w/ stuttering, non physiologic speech upon my arrival to the ED.      Images personally reviewed and interpreted:  Study: Head CT  Study Interpretation: No acute intracranial abnormalities.      Assessment and plan:  Reported episode of aphasia and RSW while in the ED.   Patient w/ multiple stroke RF.   NIHSS 0 at this time.   Would defer TNK as patient is back to baseline.   Speech deficits ( stuttering) appears to be non-physiologic.     Recommend TIA/AIS work up as follows due to transient RSW observed during the ED stay.     Recommendations:  CTA Head & Neck ASAP to evaluate cervico-cephalic vasculature for high risk culprit vessel disease or large/medium vessel occlusion  MRI brain to evaluate for presence and/or extent of ischemic injury  Allow for permissive HTNsion up to 220/110 until AIS is r/o w/ imaging   Lipid profile, A1c, TSH  ECHO w/o bubble study  PT/OT/SLP eval and Rx  IVF to allow for maximum cerebral perfusion  HOB flat   Load aspirin 325 mg & clopidogrel 300 mg x 1 now, followed by daily aspirin 81 mg /  clopidogrel 75 mg x 30 days followed by monotherapy thereafter  High intensity statin for LDL goal <70 long term     Recommendations discussed w/ ED Physician       Lytics recommendation: Thrombolytic therapy not recommended due to Patient back to neurological baseline  Thrombectomy recommendation: Awaiting CTA results from [facility] for determination   Placement recommendation: pending further studies

## 2024-04-06 NOTE — ED PROVIDER NOTES
"Encounter Date: 4/6/2024       History     Chief Complaint   Patient presents with    Fatigue     Pt was working in [employer] and started feeling weak, pt states she has been having chest pain x 2 days     PT IS A 68 YR OLD BF WITH HX WORKING IN [employer] AND " GOT TOO HOT"  WITH NEAR SYNCOPAL EPISODE, PT CALLED FAMILY THINKING HER GLUCOSE WAS LOW AS SHE HAS DM AND ONLY HAD A HOT DOG THIS AM BEFORE WORKING IN THE YARD PT CO CHEST PAIN L ANTERIOR CHEST ONSET 3 DAYS AGO AND MILD NOW WITH TENDERNESS NOTED.   PT HAS CAD AND IS S/P CORONARY STENTS. POS FAMILY HX CVA   PT IS ON ELIQUIS AND PLAVIX TAKEN THIS AM  PT IS ON TRAMADOL TAKEN LAST PM     PT IS FULL CODE STATUS  Past Medical History    Diagnosis Date Comments  Hypertension [I10] 2014   Asthma [J45.909]    Coronary artery disease [I25.10]    Heart attack [I21.9] 2011   Thyroid disease [E07.9]    Epigastric pain [R10.13] 06/08/2022   Acute superficial gastritis without hemorrhage [K29.00] 06/08/2022   Screening for colon cancer [Z12.11] 06/15/2022   History of colon polyps [Z86.010] 06/15/2022   Diverticula, colon [K57.30] 06/15/2022   Adenomatous polyp of cecum [D12.0] 06/15/2022   Polyp of hepatic flexure of colon [K63.5] 06/15/2022   Adenomatous polyp of transverse colon [D12.3] 06/15/2022   Diabetes mellitus, type 2 [E11.9] 2014   Hyperlipidemia [E78.5]    Arthritis [M19.90]    Anticoagulant long-term use [Z79.01]    DVT (deep venous thrombosis) [I82.409]    Diabetic eye exam [Z01.00, E11.9] 07/19/2023 Dr. Navarro Tolliver              I        Review of patient's allergies indicates:   Allergen Reactions    Jardiance [empagliflozin] Anaphylaxis     Headaches     Trulicity [dulaglutide]      Abdominal pain    Ozempic [semaglutide] Nausea And Vomiting     Past Medical History:   Diagnosis Date    Acute superficial gastritis without hemorrhage 06/08/2022    Adenomatous polyp of cecum 06/15/2022    Adenomatous polyp of transverse colon 06/15/2022    Anticoagulant long-term " use     Arthritis     Asthma     Coronary artery disease     Diabetes mellitus, type 2     Diabetic eye exam 2023    Dr. Navarro Tolliver    Diverticula, colon 06/15/2022    DVT (deep venous thrombosis)     Epigastric pain 2022    Heart attack     History of colon polyps 06/15/2022    Hyperlipidemia     Hypertension     Polyp of hepatic flexure of colon 06/15/2022    Screening for colon cancer 06/15/2022    Thyroid disease      Past Surgical History:   Procedure Laterality Date    ARTHROPLASTY, KNEE, TOTAL, USING COMPUTER-ASSISTED NAVIGATION Right 2023    Procedure: ARTHROPLASTY, KNEE, TOTAL, USING COMPUTER-ASSISTED NAVIGATION;  Surgeon: Erick Tolliver MD;  Location: [facility];  Service: Orthopedics;  Laterality: Right;    BILATERAL OOPHORECTOMY Bilateral     35 years ago,  1-2 years after hyst     SECTION      CHOLECYSTECTOMY      COLONOSCOPY  2017    repeat in 3 years    CORONARY ARTERY BYPASS GRAFT  2011    ESOPHAGOGASTRODUODENOSCOPY  03/10/2021    HYSTERECTOMY       Family History   Problem Relation Age of Onset    Diabetes Mother     Heart disease Mother     Dementia Mother     Thyroid disease Mother     No Known Problems Father     Diabetes Brother     Prostate cancer Brother     Hypertension Daughter     Hypertension Daughter     Breast cancer Neg Hx     Colon cancer Neg Hx     Ovarian cancer Neg Hx      Social History     Tobacco Use    Smoking status: Former     Passive exposure: Past    Smokeless tobacco: Never   Substance Use Topics    Alcohol use: Yes     Alcohol/week: 1.0 standard drink of alcohol     Types: 1 Glasses of wine per week     Comment: occasionally    Drug use: Never     Review of Systems   Unable to perform ROS: Acuity of condition       Physical Exam     Initial Vitals   BP Pulse Resp Temp SpO2   24 1101 24 1101 24 1101 24 1147 24 1149   (!) 168/87 65 20 97.5 °F (36.4 °C) 99 %      MAP       --                 Physical Exam    Nursing note and vitals reviewed.  Constitutional: She appears well-developed and well-nourished. She is cooperative. She appears distressed.   HENT:   Head: Normocephalic and atraumatic.   Right Ear: External ear normal.   Left Ear: External ear normal.   Nose: Nose normal.   Mouth/Throat: Oropharynx is clear and moist. No oropharyngeal exudate.   Eyes: Conjunctivae and EOM are normal. Pupils are equal, round, and reactive to light.   Neck: Trachea normal. Neck supple.   Normal range of motion.  Cardiovascular:  Normal rate, regular rhythm, normal heart sounds and intact distal pulses.     Exam reveals no gallop and no friction rub.       No murmur heard.  Pulses:       Radial pulses are 3+ on the right side and 3+ on the left side.        Dorsalis pedis pulses are 3+ on the right side and 3+ on the left side.   Pulmonary/Chest: Breath sounds normal. No respiratory distress. She has no wheezes. She has no rales. She exhibits tenderness (MARKED L ANTERIOR).   Abdominal: Abdomen is soft. Bowel sounds are normal. She exhibits no distension.   Musculoskeletal:         General: Normal range of motion.      Right shoulder: Normal.      Left shoulder: Normal.      Right upper arm: Normal.      Left upper arm: Normal.      Right elbow: Normal.      Left elbow: Normal.      Right forearm: Normal.      Left forearm: Normal.      Right wrist: Normal.      Left wrist: Normal.      Right hand: Normal.      Left hand: Normal.      Cervical back: Normal range of motion and neck supple.     Lymphadenopathy:     She has no cervical adenopathy.     She has no axillary adenopathy.   Neurological: She is alert and oriented to person, place, and time. She has normal strength and normal reflexes. She displays normal reflexes. No cranial nerve deficit or sensory deficit. She displays a negative Romberg sign. GCS score is 15. GCS eye subscore is 4. GCS verbal subscore is 5. GCS motor subscore is 6.   Reflex Scores:        Bicep reflexes are 2+ on the right side and 2+ on the left side.       Patellar reflexes are 2+ on the right side and 2+ on the left side.  Skin: Skin is warm and dry. Capillary refill takes less than 2 seconds.   DIAPHORESIS   Psychiatric: Her speech is normal. Cognition and memory are normal.   CALM  PT ANSWERS QUESTIONS  AND FOLLOWS COMMANDS  SPEECH IS CLEAR         Medical Screening Exam   See Full Note    ED Course   Procedures  Labs Reviewed   COMPREHENSIVE METABOLIC PANEL - Abnormal; Notable for the following components:       Result Value    Glucose 216 (*)     Total Protein 8.6 (*)     Globulin 4.3 (*)     All other components within normal limits   URINALYSIS - Abnormal; Notable for the following components:    Glucose,  (*)     All other components within normal limits   CK - Abnormal; Notable for the following components:     (*)     All other components within normal limits   CBC WITH DIFFERENTIAL - Abnormal; Notable for the following components:    Neutrophils % 49.3 (*)     Monocytes % 9.5 (*)     Monocytes, Absolute 0.89 (*)     All other components within normal limits   POCT GLUCOSE MONITORING CONTINUOUS - Abnormal; Notable for the following components:    POC Glucose 235 (*)     All other components within normal limits   POCT GLUCOSE MONITORING CONTINUOUS - Abnormal; Notable for the following components:    POC Glucose 236 (*)     All other components within normal limits   TROPONIN I - Normal   D DIMER, QUANTITATIVE - Normal   TROPONIN I - Normal   CBC W/ AUTO DIFFERENTIAL    Narrative:     The following orders were created for panel order CBC Auto Differential.  Procedure                               Abnormality         Status                     ---------                               -----------         ------                     CBC with Differential[[id number]]       Abnormal            Final result                 Please view results for these tests on the individual  orders.   POCT GLUCOSE MONITORING CONTINUOUS   POCT GLUCOSE MONITORING CONTINUOUS     EKG Readings: (Independently Interpreted)   Initial Reading: No STEMI. Rhythm: Normal Sinus Rhythm. Heart Rate: 65. Ectopy: No Ectopy. T Waves Flipped: V1, AVR and V2. Q Waves: AVR, V1 and V2. Clinical Impression: Left Ventricular Hypertrophy (LDH)   REPEAT EKG NSR UNCHANGED       Imaging Results              X-Ray Chest 1 View (Final result)  Result time 04/06/24 12:26:15      Final result by Ervin Fonseca II, MD (04/06/24 12:26:15)                   Impression:      No evidence of acute cardiopulmonary disease.      Electronically signed by: Ervin Fonseca  Date:    04/06/2024  Time:    12:26               Narrative:    EXAMINATION:  XR CHEST 1 VIEW    CLINICAL HISTORY:  WEAKNESS;    COMPARISON:  11 February 2023    TECHNIQUE:  XR CHEST 1 VIEW    FINDINGS:  The heart and mediastinum are normal in size and configuration.  The pulmonary vascularity is normal in caliber.  No lung infiltrates, effusions, pneumothorax or other abnormality is demonstrated.                                       CT Head Without Contrast (Final result)  Result time 04/06/24 12:17:32      Final result by Ervin Fonseca II, MD (04/06/24 12:17:32)                   Impression:      No evidence of abnormality demonstrated.      Electronically signed by: Ervin Fonseca  Date:    04/06/2024  Time:    12:17               Narrative:    EXAMINATION:  CT HEAD WITHOUT CONTRAST    CLINICAL HISTORY:  Mental status change, unknown cause;    TECHNIQUE:  Axial CT imaging of the brain is performed without contrast with 3 mm increments.    CT dose reduction technique used - Dose Rite and tube current modulation.    COMPARISON:  4 May 2023    FINDINGS:  No evidence of hemorrhage, mass, mass effect, midline shift or acute infarct seen.  The brain parenchyma attenuation and differentiation appears within normal limits for age. The ventricles and cisterns are normal  "in caliber.  No cranial or skull base abnormality is identified.                                    X-Rays:   Independently Interpreted Readings:   Chest X-Ray: Viewed and Other Results - Imaging    Updated   Order   04/06/24 1228  X-Ray Chest 1 View  Performed: 04/06/24 1218  Final         Impression: No evidence of acute cardiopulmonary disease. Electronically signed by: Ervin Fonseca Date: 04/06/2024 Time: 12:26    04/06/24 1219  CT Head Without Contrast  Performed: 04/06/24 1208  Final         Impression: No evidence of abnormality demonstrated. Electronically signed by: Ervin Fonseca Date: 04/06/2024 Time: 12:17         Head CT: Viewed and Other Results - Imaging    Updated   Order   04/06/24 1228  X-Ray Chest 1 View  Performed: 04/06/24 1218  Final         Impression: No evidence of acute cardiopulmonary disease. Electronically signed by: Ervin Fonseca Date: 04/06/2024 Time: 12:26    04/06/24 1219  CT Head Without Contrast  Performed: 04/06/24 1208  Final         Impression: No evidence of abnormality demonstrated. Electronically signed by: Ervin Fonseca Date: 04/06/2024 Time: 12:17         Other Readings:  Viewed and Other Results - Imaging    Updated   Order   04/06/24 1228  X-Ray Chest 1 View  Performed: 04/06/24 1218  Final         Impression: No evidence of acute cardiopulmonary disease. Electronically signed by: Ervin Fonseca Date: 04/06/2024 Time: 12:26    04/06/24 1219  CT Head Without Contrast  Performed: 04/06/24 1208  Final         Impression: No evidence of abnormality demonstrated. Electronically signed by: Ervin Fonseca Date: 04/06/2024 Time: 12:17          Medications   sodium chloride 0.9% bolus 500 mL 500 mL (0 mLs Intravenous Stopped 4/6/24 1300)   ondansetron injection 4 mg (4 mg Intravenous Given 4/6/24 1156)   pantoprazole injection 40 mg (40 mg Intravenous Given 4/6/24 1155)     Medical Decision Making  PT IS A 68 YR OLD BF WITH HX WORKING IN [employer] AND " GOT TOO HOT"  " WITH NEAR SYNCOPAL EPISODE, PT CALLED FAMILY THINKING HER GLUCOSE WAS LOW AS SHE HAS DM AND ONLY HAD A HOT DOG THIS AM BEFORE WORKING IN THE [employer] PT CO CHEST PAIN L ANTERIOR CHEST ONSET 3 DAYS AGO AND MILD NOW WITH TENDERNESS NOTED.   PT HAS CAD AND IS S/P CORONARY STENTS. POS FAMILY HX CVA   PT IS ON ELIQUIS AND PLAVIX TAKEN THIS AM  PT IS ON TRAMADOL TAKEN LAST PM     Past Medical History    Diagnosis Date Comments  Hypertension [I10] 2014   Asthma [J45.909]    Coronary artery disease [I25.10]    Heart attack [I21.9] 2011   Thyroid disease [E07.9]    Epigastric pain [R10.13] 06/08/2022   Acute superficial gastritis without hemorrhage [K29.00] 06/08/2022   Screening for colon cancer [Z12.11] 06/15/2022   History of colon polyps [Z86.010] 06/15/2022   Diverticula, colon [K57.30] 06/15/2022   Adenomatous polyp of cecum [D12.0] 06/15/2022   Polyp of hepatic flexure of colon [K63.5] 06/15/2022   Adenomatous polyp of transverse colon [D12.3] 06/15/2022   Diabetes mellitus, type 2 [E11.9] 2014   Hyperlipidemia [E78.5]    Arthritis [M19.90]    Anticoagulant long-term use [Z79.01]    DVT (deep venous thrombosis) [I82.409]    Diabetic eye exam [Z01.00, E11.9] 07/19/2023 Dr. Navarro Tolliver    IN ED PT BECAME EMOTIONAL SPEAKING WITH FAMILY PER NURSING STAFF AND ON REASSESSMENT HAS MARKED CHANGE WITH    DYSARTHRIA AND GENERALIZED WEAKNESS WITH MARKED R SIDED WEAKNESS  NIH ASSESSMENT 8  STROKE ALERT  CALLED , CT HEAD NEG  PT IMPROVED BACK TO [facility] FOR RUE/RLE DECREASED STRENGTH   TELESTROKE CONSULTATION  DR RIZZO WITH RECOMMENDATION FOR CTA HEAD AND NECK AND  MG- HOWEVER PT COMPLIANT WITH ELIQUIS AND PLAVIX TODAY AND ASA NOT RECOMMENDED  [facility] ARRANGED CONSULTATION WITH DR LIN  AT  AND PT ACCEPTED FOR STROKE ALERT WITH DR MARMOLEJO ACCEPTING  [facility]  WILL TRANSPORT  PT IS IN STBLE CONDITION      Amount and/or Complexity of Data Reviewed  Independent Historian: caregiver     Details: FAMILY  Labs:  ordered.     Details: Viewed and Other Results - Labs      Updated   Order   04/06/24 1323  Urinalysis  Collected: 04/06/24 1318  Final result  Specimen: Urine, Clean Catch      Color, UA Yellow  Clarity, UA Clear  pH, UA 6.5 pH Units  Leukocytes, UA Negative  Nitrites, UA Negative  Protein, UA Negative  Glucose,  Abnormal  mg/dL  Ketones, UA Negative mg/dL  Urobilinogen, UA 0.2 mg/dL  Bilirubin, UA Negative  Blood, UA Negative  Specific Gravity, UA 1.010       04/06/24 1217  POCT glucose  Collected: 04/06/24 1156  Final result      POC Glucose 236 High  mg/dL       04/06/24 1142  D-Dimer, Quantitative  Collected: 04/06/24 1132  Final result  Specimen: Blood      D-Dimer 0.44 µg/mL       04/06/24 1149  Troponin I  Collected: 04/06/24 1131  Final result  Specimen: Blood      Troponin I High Sensitivity <4.0 pg/mL       04/06/24 1147  CK  Collected: 04/06/24 1131  Final result  Specimen: Blood       High  U/L       04/06/24 1147  Comprehensive metabolic panel  Collected: 04/06/24 1131  Final result  Specimen: Blood      Sodium 137 mmol/L  Potassium 3.8 mmol/L  Chloride 99 mmol/L  CO2 27 mmol/L  Anion Gap 15 mmol/L  Glucose 216 High  mg/dL  BUN 11 mg/dL  Creatinine 1.02 mg/dL  BUN/Creatinine Ratio 11  Calcium 9.0 mg/dL  Total Protein 8.6 High  g/dL  Albumin 4.3 g/dL  Globulin 4.3 High  g/dL  A/G Ratio 1.0  Bilirubin, Total 0.6 mg/dL  Alk Phos 84 U/L  ALT 49 U/L  AST 29 U/L  eGFR 60 mL/min/1.73m2       04/06/24 1136  CBC Auto Differential  Collected: 04/06/24 1131  Final result  Specimen: Blood         04/06/24 1136  CBC with Differential  Collected: 04/06/24 1131  Final result  Specimen: Blood      WBC 9.41 K/uL  RBC 5.02 M/uL  Hemoglobin 14.8 g/dL  Hematocrit 45.8 %  MCV 91.2 fL  MCH 29.5 pg  MCHC 32.3 g/dL  RDW 13.7 %  Platelet Count 237 K/uL  MPV 12.1 fL  Neutrophils % 49.3 Low  %  Lymphocytes % 39.5 %  Neutrophils, Abs 4.64 K/uL  Lymphocytes, Absolute 3.72 K/uL  Diff Type Auto  Monocytes  % 9.5 High  %  Eosinophils % 1.0 %  Basophils % 0.7 %  Monocytes, Absolute 0.89 High  K/uL  Eosinophils, Absolute 0.09 K/uL  Basophils, Absolute 0.07 K/uL            Radiology: ordered.     Details: Viewed and Other Results - Imaging    Updated   Order   04/06/24 1228  X-Ray Chest 1 View  Performed: 04/06/24 1218  Final         Impression: No evidence of acute cardiopulmonary disease. Electronically signed by: Ervin Fonseca Date: 04/06/2024 Time: 12:26    04/06/24 1219  CT Head Without Contrast  Performed: 04/06/24 1208  Final         Impression: No evidence of abnormality demonstrated. Electronically signed by: Ervin Fonseca Date: 04/06/2024 Time: 12:17        ECG/medicine tests: ordered.     Details: NSR LVH   NO STEMI  REPEAT UNCHANGED  Discussion of management or test interpretation with external provider(s): EXAM  CT HEAD NEG  STROKE ALERT 1200  LABS REVIEWED , TROP NEG X 2  CXR NEG  EKG NEG X 2  PT TELE STROKE WITH DR BARAKAT  CONSULT DR LIN  TRANSPORT TO [facility]  PT STABLE FOR TRANSPORT      Risk  Prescription drug management.    Critical Care  Total time providing critical care: 60 minutes      Additional MDM:     NIH Stroke Scale:   Interval = baseline (upon arrival/admit)  Level of consciousness = 0 - alert  LOC questions = 0 - answers both correctly  LOC commands = 0 - performs both correctly  Best gaze = 0 - normal  Visual = 0 - no visual loss  Facial palsy = 0 - normal  Motor left arm =  2 - can't resist gravity  Motor right arm =  0 - no drift  Motor left leg = 0 - no drift  Motor right leg =  0 - no drift  Limb ataxia = 0 - absent  Sensory = 0 - normal  Best language = 0 - no aphasia  Dysarthria = 0 - normal articulation  Extinction and inattention = 0 - no neglect  NIH Stroke Scale Total = 2                                     Clinical Impression:   Final diagnoses:  [R55] Near syncope  [I63.9] Cerebrovascular accident (CVA), unspecified mechanism (Primary)  [E11.65] Hyperglycemia due to  diabetes mellitus  [R07.9] Chest pain        ED Disposition Condition    Transfer to Another Facility Stable                Sandy Bucio MD  04/06/24 [id number]       Sandy Bucio MD  04/06/24 1737

## 2024-04-06 NOTE — TELEMEDICINE CONSULT
[facility]  Vascular Neurology  Comprehensive Stroke Center  TeleVascular Neurology Acute Consultation Note        Consult Information  Consults    Consulting Provider: JOSH MURRAY   Current Providers  No providers found    Patient Location:  [facility] EMERGENCY DEPART* Emergency Department    Spoke hospital nurse at bedside with patient assisting consultant.  Patient information was obtained from patient and primary team.       Stroke Documentation  Acute Stroke Times   Last Known Normal Date: 04/06/24  Last Known Normal Time: 1000  Symptom Onset Date: 04/06/24  Unknown Symptom Onset Time: Unknown Time  Stroke Team Called Date: 04/06/24  Stroke Team Called Time: 1217  Stroke Team Arrival Date: 04/06/24  Stroke Team Arrival Time: 1230 (Technical difficulties w/ the ipad at the referring facility)  CT Interpretation Time: 1235  Thrombolytic Therapy Recommended: No  CTA Interpretation Time:  (Recommended)    NIH Scale:  1a. Level of Consciousness: 0-->Alert, keenly responsive  1b. LOC Questions: 0-->Answers both questions correctly  1c. LOC Commands: 0-->Performs both tasks correctly  2. Best Gaze: 0-->Normal  3. Visual: 0-->No visual loss  4. Facial Palsy: 0-->Normal symmetrical movements  5a. Motor Arm, Left: 0-->No drift, limb holds 90 (or 45) degrees for full 10 secs  5b. Motor Arm, Right: 0-->No drift, limb holds 90 (or 45) degrees for full 10 secs  6a. Motor Leg, Left: 0-->No drift, leg holds 30 degree position for full 5 secs  6b. Motor Leg, Right: 0-->No drift, leg holds 30 degree position for full 5 secs  7. Limb Ataxia: 0-->Absent  8. Sensory: 0-->Normal, no sensory loss  9. Best Language: 0-->No aphasia, normal  10. Dysarthria: 0-->Normal  11. Extinction and Inattention (formerly Neglect): 0-->No abnormality  Total (NIH Stroke Scale): 0      Modified [place]: Score: 0  [place] Coma Scale:     ABCD2 Score:    [id number] Score:    HAS -BLED Score:    ICH Score:    Hunt [employer]  "Classification:      Blood pressure (!) 171/87, pulse 69, temperature 97.5 °F (36.4 °C), resp. rate 18, height 5' 8" (1.727 m), weight 94.3 kg (208 lb), SpO2 97 %, not currently breastfeeding.    Van Negative    Medical Decision Making  HPI:  68 y.o. female w/ PMHx of DM II, CAD, HT presenting s/p near syncopal event.   Started to feel hot,sweaty and clammy while outside working and was worried about hypoglycemia. Called daughter  LKW 10:00  Initial NIHSS 0, then patien had an argument w/ family member and had generalized weakness episode w/ reported aphasia and RSW.   NIHSS 0 w/ stuttering, non physiologic speech upon my arrival to the ED.      Images personally reviewed and interpreted:  Study: Head CT  Study Interpretation: No acute intracranial abnormalities.      Assessment and plan:  Reported episode of aphasia and RSW while in the ED.   Patient w/ multiple stroke RF.   NIHSS 0 at this time.   Would defer TNK as patient is back to baseline.   Speech deficits ( stuttering) appears to be non-physiologic.     Recommend TIA/AIS work up as follows due to transient RSW observed during the ED stay.     Recommendations:  CTA Head & Neck ASAP to evaluate cervico-cephalic vasculature for high risk culprit vessel disease or large/medium vessel occlusion  MRI brain to evaluate for presence and/or extent of ischemic injury  Allow for permissive HTNsion up to 220/110 until AIS is r/o w/ imaging   Lipid profile, A1c, TSH  ECHO w/o bubble study  PT/OT/SLP eval and Rx  IVF to allow for maximum cerebral perfusion  HOB flat   Patient in Eliquis and Plavix as per ED physician and took the meds in the AM   Continue home AC and antiplatelets at this time  High intensity statin for LDL goal <70 long term     Recommendations discussed w/ ED Physician       Lytics recommendation: Thrombolytic therapy not recommended due to Patient back to neurological baseline  Thrombectomy recommendation: Awaiting CTA results from Spoke for " determination   Placement recommendation: pending further studies                 Past Medical History:   Diagnosis Date    Acute superficial gastritis without hemorrhage 2022    Adenomatous polyp of cecum 06/15/2022    Adenomatous polyp of transverse colon 06/15/2022    Anticoagulant long-term use     Arthritis     Asthma     Coronary artery disease     Diabetes mellitus, type 2     Diabetic eye exam 2023    Dr. Navarro Tolliver    Diverticula, colon 06/15/2022    DVT (deep venous thrombosis)     Epigastric pain 2022    Heart attack     History of colon polyps 06/15/2022    Hyperlipidemia     Hypertension 2014    Polyp of hepatic flexure of colon 06/15/2022    Screening for colon cancer 06/15/2022    Thyroid disease      Past Surgical History:   Procedure Laterality Date    ARTHROPLASTY, KNEE, TOTAL, USING COMPUTER-ASSISTED NAVIGATION Right 2023    Procedure: ARTHROPLASTY, KNEE, TOTAL, USING COMPUTER-ASSISTED NAVIGATION;  Surgeon: Erick Tolliver MD;  Location: [facility];  Service: Orthopedics;  Laterality: Right;    BILATERAL OOPHORECTOMY Bilateral     35 years ago,  1-2 years after hyst     SECTION      CHOLECYSTECTOMY      COLONOSCOPY  2017    repeat in 3 years    CORONARY ARTERY BYPASS GRAFT  2011    ESOPHAGOGASTRODUODENOSCOPY  03/10/2021    HYSTERECTOMY       Family History   Problem Relation Age of Onset    Diabetes Mother     Heart disease Mother     Dementia Mother     Thyroid disease Mother     No Known Problems Father     Diabetes Brother     Prostate cancer Brother     Hypertension Daughter     Hypertension Daughter     Breast cancer Neg Hx     Colon cancer Neg Hx     Ovarian cancer Neg Hx        Diagnoses  No problems updated.    Christine Bustos MD      Emergent/Acute neurological consultation requested by spoke provider due to critical concerns for possible cerebrovascular event that could result in permanent loss of neurologic/bodily function, severe  disability or death of this patient.  Immediate/timely evaluation by a highly prepared expert is paramount for optimal outcomes  High risk for neurological deterioration if not properly diagnosed  High risk for neurological deterioration if not treated promplty/as soon as possible  Complex diagnostic evaluation may be required (advanced imaging)  High risk treatment options (thrombolytics and/or thrombectomy)    Patient care was coordinated with spoke provider, including but not limted to    Discussing likely diagnosis/etiology of symptoms  Making recommendations for further diagnostic studies  Making recommendations for intravenous thrombolytics or other advanced therapies  Making recommendations for disposition (admission/transfer for higher level of care)

## 2024-04-08 ENCOUNTER — PATIENT MESSAGE (OUTPATIENT)
Dept: DIABETES SERVICES | Facility: CLINIC | Age: 69
End: 2024-04-08
Payer: MEDICARE

## 2024-04-08 ENCOUNTER — TELEPHONE (OUTPATIENT)
Dept: DIABETES SERVICES | Facility: CLINIC | Age: 69
End: 2024-04-08
Payer: MEDICARE

## 2024-04-08 DIAGNOSIS — E55.9 VITAMIN D DEFICIENCY: ICD-10-CM

## 2024-04-08 DIAGNOSIS — J30.2 SEASONAL ALLERGIES: ICD-10-CM

## 2024-04-08 RX ORDER — ERGOCALCIFEROL 1.25 MG/1
50000 CAPSULE ORAL
Qty: 12 CAPSULE | Refills: 1 | OUTPATIENT
Start: 2024-04-08

## 2024-04-08 NOTE — TELEPHONE ENCOUNTER
Sent patient portal  message to notify them of cancellation of their Diabetes Management appointment with Mae Gautam due to provider no longer practicing at [facility] effective April 19th 2024. Patient was instructed to follow up with their preferred primary care provider for continued care.

## 2024-04-09 ENCOUNTER — OFFICE VISIT (OUTPATIENT)
Dept: FAMILY MEDICINE | Facility: CLINIC | Age: 69
End: 2024-04-09
Payer: MEDICARE

## 2024-04-09 VITALS
WEIGHT: 208 LBS | TEMPERATURE: 97 F | HEART RATE: 79 BPM | BODY MASS INDEX: 31.52 KG/M2 | DIASTOLIC BLOOD PRESSURE: 81 MMHG | HEIGHT: 68 IN | OXYGEN SATURATION: 96 % | SYSTOLIC BLOOD PRESSURE: 115 MMHG

## 2024-04-09 DIAGNOSIS — B00.9 HSV INFECTION: ICD-10-CM

## 2024-04-09 DIAGNOSIS — G45.9 TIA (TRANSIENT ISCHEMIC ATTACK): Primary | ICD-10-CM

## 2024-04-09 PROCEDURE — 99213 OFFICE O/P EST LOW 20 MIN: CPT | Mod: ,,, | Performed by: NURSE PRACTITIONER

## 2024-04-09 RX ORDER — ATORVASTATIN CALCIUM 40 MG/1
40 TABLET, FILM COATED ORAL NIGHTLY
COMMUNITY

## 2024-04-09 RX ORDER — VALACYCLOVIR HYDROCHLORIDE 500 MG/1
500 TABLET, FILM COATED ORAL DAILY
Qty: 90 TABLET | Refills: 1 | Status: SHIPPED | OUTPATIENT
Start: 2024-04-09

## 2024-04-09 RX ORDER — CETIRIZINE HYDROCHLORIDE 10 MG/1
10 TABLET ORAL DAILY
Qty: 90 TABLET | Refills: 1 | Status: SHIPPED | OUTPATIENT
Start: 2024-04-09 | End: 2024-04-09

## 2024-04-09 NOTE — PROGRESS NOTES
[facility]  Chief Complaint      Chief Complaint   Patient presents with    Follow-up     Patient presents to clinic for hospital follow up . She presented to [facility] on 4/07/24 and was diagnosed with a TIA once Neurology was consulted.     Patient Education     Patient would like her provider medical opinion on taking Sea English/Black Seed Oil/Ashwagangdha/Burdock Root herbal supplement.        History of Present Illness      Olga Stephenson is a 68 y.o. female with chronic conditions of Gastritis, Colon/rectal polyp, CAD, Diabetes Mellitus, Asthma, DVT, MI, Hyperlipidemia, HTN, and Thyroid disease.  She is a former pt of NOHELIA De Paz and is new to me. The pt presents to the clinic for a hospital follow-up, she was transferred from [facility] to [facility]. The pt states she experienced weakness and near syncope after working outside. While in ED the pt had an episode with slurred speech, R sided weakness and unable to answer questions, these symptoms have resolved. The pt reports she has a 2 week follow-up appoint with Neurology Dr. Blaze Fagan in [place], [place].     Discharge dx Transient ischemic attack. Discharged medication reconciled with out pt medication list.   Discharge summary reviewed.    Follow-up  Pertinent negatives include no abdominal pain, arthralgias, chest pain, coughing, fatigue, fever, headaches, myalgias, nausea, rash or weakness.      Past Medical History:  Past Medical History:   Diagnosis Date    Acute superficial gastritis without hemorrhage 06/08/2022    Adenomatous polyp of cecum 06/15/2022    Adenomatous polyp of transverse colon 06/15/2022    Anticoagulant long-term use     Arthritis     Asthma     Coronary artery disease     Diabetes mellitus, type 2 2014    Diabetic eye exam 07/19/2023    Dr. Navarro Tolliver    Diverticula, colon 06/15/2022    DVT (deep venous thrombosis)     Epigastric pain 06/08/2022    Heart attack 2011     History of colon polyps 06/15/2022    Hyperlipidemia     Hypertension 2014    Polyp of hepatic flexure of colon 06/15/2022    Screening for colon cancer 06/15/2022    Thyroid disease        Past Surgical History:   has a past surgical history that includes Esophagogastroduodenoscopy (03/10/2021); Colonoscopy (2017); Hysterectomy;  section; Bilateral oophorectomy (Bilateral); Cholecystectomy; arthroplasty, knee, total, using computer-assisted navigation (Right, 2023); and Coronary artery bypass graft ().    Social History:  Social History     Tobacco Use    Smoking status: Former     Passive exposure: Past    Smokeless tobacco: Never   Substance Use Topics    Alcohol use: Yes     Alcohol/week: 1.0 standard drink of alcohol     Types: 1 Glasses of wine per week     Comment: occasionally    Drug use: Never       I personally reviewed all past medical, surgical, and social.     Review of Systems   Constitutional:  Negative for appetite change, fatigue, fever and unexpected weight change.   Respiratory:  Negative for cough and shortness of breath.    Cardiovascular:  Negative for chest pain and leg swelling.   Gastrointestinal:  Negative for abdominal pain, constipation, diarrhea and nausea.   Genitourinary:  Negative for difficulty urinating and dysuria.   Musculoskeletal:  Negative for arthralgias and myalgias.   Skin:  Negative for color change and rash.   Neurological:  Negative for dizziness, syncope, weakness and headaches.   Psychiatric/Behavioral:  Negative for dysphoric mood. The patient is not nervous/anxious.       Medications:  Outpatient Encounter Medications as of 2024   Medication Sig Note Dispense Refill    albuterol (PROVENTIL/VENTOLIN HFA) 90 mcg/actuation inhaler Inhale 2 puffs into the lungs every 4 (four) hours as needed for Wheezing or Shortness of Breath. Rescue  17 g 3    atorvastatin (LIPITOR) 40 MG tablet Take 40 mg by mouth every evening.       blood sugar diagnostic  (FREESTYLE LITE STRIPS) Strp USE 1 STRIP DAILY TO MONITOR GLUCOSE  100 strip 3    clopidogreL (PLAVIX) 75 mg tablet Take 1 tablet (75 mg total) by mouth once daily.  30 tablet 1    cyclobenzaprine (FLEXERIL) 10 MG tablet 1 tablet nightly as needed.       dapagliflozin propanediol (FARXIGA) 10 mg tablet Take 1 tablet (10 mg total) by mouth Daily.  90 tablet 3    ergocalciferol (ERGOCALCIFEROL) 50,000 unit Cap Take 1 capsule (50,000 Units total) by mouth Every Friday.  12 capsule 1    estradioL (VIVELLE-DOT) 0.1 mg/24 hr PTSW Place 1 patch onto the skin twice a week.  24 patch 3    ezetimibe (ZETIA) 10 mg tablet Take 1 tablet by mouth every evening.       fluticasone propionate (FLONASE) 50 mcg/actuation nasal spray 1 spray (50 mcg total) by Each Nostril route once daily.  16 g 3    glipiZIDE (GLUCOTROL) 10 MG TR24 Take 1 tablet (10 mg total) by mouth daily with breakfast.  90 tablet 1    HYDROcodone-acetaminophen (NORCO)  mg per tablet Take 1 tablet by mouth every 6 (six) hours as needed for Pain.  28 tablet 0    liraglutide 0.6 mg/0.1 mL, 18 mg/3 mL, subq PNIJ (VICTOZA 3-LATONYA) 0.6 mg/0.1 mL (18 mg/3 mL) PnIj pen Inject 0.6mg sq once daily x 7 days, then increase to 1.2 mg daily x 7 days then increase to 1.8mg daily.  27 mL 1    losartan (COZAAR) 50 MG tablet TAKE 1 TABLET BY MOUTH EVERY DAY  90 tablet 1    metoprolol succinate (TOPROL-XL) 25 MG 24 hr tablet Take 1 tablet by mouth once daily.       multivitamin capsule Take 1 capsule by mouth once daily.       pantoprazole (PROTONIX) 40 MG tablet Take 1 tablet (40 mg total) by mouth once daily.  90 tablet 3    pregabalin (LYRICA) 75 MG capsule Take 2 capsules (150 mg total) by mouth nightly.  90 capsule 1    rivaroxaban (XARELTO) 20 mg Tab Take 20 mg by mouth daily with dinner or evening meal.       tiZANidine (ZANAFLEX) 4 MG tablet Take by mouth.       traMADoL (ULTRAM) 50 mg tablet Take 50 mg by mouth every 6 (six) hours as needed for Pain. 1 - 2 tabs        [DISCONTINUED] valACYclovir (VALTREX) 500 MG tablet Take 1 tablet (500 mg total) by mouth once daily.  90 tablet 1    valACYclovir (VALTREX) 500 MG tablet Take 1 tablet (500 mg total) by mouth once daily.  90 tablet 1    [DISCONTINUED] apixaban (ELIQUIS) 5 mg Tab Take 1 tablet (5 mg total) by mouth 2 (two) times daily. (Patient not taking: Reported on 4/9/2024) 4/9/2024: Provider NOHELIA De Paz NP discontinued 180 tablet 0    [DISCONTINUED] cetirizine (ZYRTEC) 10 MG tablet TAKE 1 TABLET (10 MG TOTAL) BY MOUTH ONCE DAILY. FOR ALLERGIES (Patient not taking: Reported on 4/9/2024)  90 tablet 1    [DISCONTINUED] hydrocortisone 2.5 % cream        [DISCONTINUED] mesalamine (LIALDA) 1.2 gram TbEC Take 2 tablets (2.4 g total) by mouth daily with breakfast. (Patient not taking: Reported on 4/9/2024) 4/9/2024: Patient stated she is not taking 60 tablet 11    [DISCONTINUED] metFORMIN (GLUCOPHAGE) 1000 MG tablet Take 1 tablet (1,000 mg total) by mouth nightly. (Patient not taking: Reported on 4/9/2024)  90 tablet 1     Facility-Administered Encounter Medications as of 4/9/2024   Medication Dose Route Frequency Provider Last Rate Last Admin    [DISCONTINUED] 0.9%  NaCl infusion  75 mL/hr Intravenous  Provider, Generic External Data        [DISCONTINUED] acetaminophen tablet  650 mg Oral Q6H PRN Provider, Generic External Data        [DISCONTINUED] apixaban tablet  5 mg Oral  Provider, Generic External Data        [DISCONTINUED] atorvastatin tablet  40 mg Oral  Provider, Generic External Data        [DISCONTINUED] clopidogreL tablet  75 mg Oral  Provider, Generic External Data        [DISCONTINUED] dextrose 40 % gel   Oral  Provider, Generic External Data        [DISCONTINUED] dextrose 50 % in water (D50W) injection  20 mL Intravenous  Provider, Generic External Data        [DISCONTINUED] dextrose 50 % in water (D50W) injection  50 mL Intravenous  Provider, Generic External Data        [DISCONTINUED] GENERIC EXTERNAL MEDICATION      "Provider, Generic External Data        [DISCONTINUED] glucagon SolR  1 mg Intramuscular  Provider, Generic External Data        [DISCONTINUED] hydrALAZINE injection  10 mg Intravenous Q8H PRN Provider, Generic External Data        [DISCONTINUED] insulin lispro injection  0-10 Units Subcutaneous  Provider, Generic External Data        [DISCONTINUED] labetalol 20 mg/4 mL (5 mg/mL) IV syring  10 mg Intravenous Q6H PRN Provider, Generic External Data        [DISCONTINUED] pantoprazole EC tablet  40 mg Oral  Provider, Generic External Data        [DISCONTINUED] rivaroxaban tablet  20 mg Oral  Provider, Generic External Data           Allergies:  Review of patient's allergies indicates:   Allergen Reactions    Jardiance [empagliflozin] Anaphylaxis     Headaches     Trulicity [dulaglutide]      Abdominal pain    Ozempic [semaglutide] Nausea And Vomiting       Health Maintenance:  Immunization History   Administered Date(s) Administered    COVID-19 MRNA, LN-S PF (MODERNA HALF 0.25 ML DOSE) 12/17/2021    COVID-19, MRNA, LN-S, PF (MODERNA FULL 0.5 ML DOSE) 03/29/2021, 04/27/2021    Influenza (FLUAD) - Quadrivalent - Adjuvanted - PF *Preferred* (65+) 11/03/2022, 11/06/2023    Influenza - Quadrivalent - High Dose - PF (65 years and older) 11/08/2021    Pneumococcal Conjugate - 20 Valent 08/23/2023    Zoster Recombinant 10/25/2018, 11/03/2022, 01/23/2023        Health Maintenance   Topic Date Due    TETANUS VACCINE  Never done    Hemoglobin A1c  07/06/2024    Eye Exam  07/19/2024    Foot Exam  08/23/2024    Mammogram  09/12/2024    Colorectal Cancer Screening  02/01/2025    Lipid Panel  04/06/2025    DEXA Scan  09/01/2026    Hepatitis C Screening  Completed    Shingles Vaccine  Completed    High Dose Statin  Discontinued        Physical Exam      Vital Signs  Temp: 97.4 °F (36.3 °C)  Pulse: 79  SpO2: 96 %  BP: 115/81  BP Location: Left arm  Patient Position: Sitting  Pain Score: 0-No pain  Height and Weight  Height: 5' 8" (172.7 " cm)  Weight: 94.3 kg (208 lb)  BSA (Calculated - sq m): 2.13 sq meters  BMI (Calculated): 31.6  Weight in (lb) to have BMI = 25: 164.1]    Physical Exam     Laboratory:  CBC:  Recent Labs   Lab 08/28/23  1358 11/06/23  1610 04/06/24  1131   WBC 9.22 9.03 9.41   RBC 4.97 4.95 5.02   Hemoglobin 14.4 14.6 14.8   Hematocrit 45.1 45.6 45.8   Platelet Count 252 246 237   MCV 90.7 92.1 91.2   MCH 29.0 29.5 29.5   MCHC 31.9 L 32.0 32.3       LIPIDS:  Recent Labs   Lab 01/05/22  1126 01/05/22  1126 10/26/22  1143 02/10/23  2026 02/17/23  0613 03/28/23  1516 08/23/23  1136 04/06/24  1711   TSH  --    < > 0.812 0.196 L 0.575 0.441  --   --    HDL  --   --   --   --   --   --   --  39 L   HDL Cholesterol 35 L  --  35 L  --   --   --  44  --    Cholesterol 153  --  178  --   --   --  167 171   Triglycerides 132  --  245 H  --   --   --  187 H 188 H   LDL Calculated 92  --   --   --   --   --  86 94   Cholesterol/HDL Ratio (Risk Factor) 4.4  --  5.1  --   --   --  3.8  --    Non-  --  143  --   --   --  123 132    < > = values in this interval not displayed.       TSH:  Recent Labs   Lab 02/10/23  2026 02/17/23  0613 03/28/23  1516   TSH 0.196 L 0.575 0.441       A1C:  Recent Labs   Lab 05/27/21  1353 06/08/21  1513 12/07/21  1511 06/24/22  1548 10/26/22  1143 02/10/23  1849 05/09/23  1054 11/06/23  1216 02/12/24  1024 04/06/24  1711   Hemoglobin A1C 7.0 A 6.8 H 7.8 A 7.0 H 8.1 H 6.9 H 6.6 8.8 H 7.9 H 9.5 H       Lab Results   Component Value Date     (H) 04/06/2024     04/06/2024    K 3.8 04/06/2024    CL 99 04/06/2024    CO2 27 04/06/2024    BUN 11 04/06/2024    CREATININE 1.02 04/06/2024    CALCIUM 9.0 04/06/2024    PROT 8.6 (H) 04/06/2024    ALBUMIN 4.3 04/06/2024    BILITOT 0.6 04/06/2024    ALKPHOS 84 04/06/2024    AST 29 04/06/2024    ALT 49 04/06/2024    ANIONGAP 15 04/06/2024    ESTGFRAFRICA 68 06/08/2021    EGFRNONAA 83 06/26/2022       Lab Results   Component Value Date    WBC 9.41 04/06/2024    RBC  "5.02 04/06/2024    HGB 14.8 04/06/2024    HCT 45.8 04/06/2024    MCV 91.2 04/06/2024    RDW 13.7 04/06/2024     04/06/2024        Lab Results   Component Value Date    CHOL 171 04/06/2024    CHOL 137 01/30/2020    TRIG 188 (H) 04/06/2024    TRIG 165 01/30/2020    HDL 39 (L) 04/06/2024    HDL 37 01/30/2020    LDLCALC 94 04/06/2024       Lab Results   Component Value Date    TSH 0.441 03/28/2023       Lab Results   Component Value Date    HGBA1C 9.5 (H) 04/06/2024    ESTIMATEDAVG 180 02/12/2024        No components found for: "VITAMIND"    No results found for: "PSA"    Point Of Care Testing:  WBC, UA   Date Value Ref Range Status   11/06/2023 Negative  Final     Nitrites, UA   Date Value Ref Range Status   04/06/2024 Negative Negative Final     Urobilinogen, UA   Date Value Ref Range Status   04/06/2024 0.2 0.2, 1.0, Normal mg/dL Final     Protein, POC   Date Value Ref Range Status   11/06/2023 Trace  Final     pH, UA   Date Value Ref Range Status   04/06/2024 6.5 5.0 to 8.0 pH Units Final     Specific Gravity, UA   Date Value Ref Range Status   04/06/2024 1.010 <=1.005, 1.010, 1.015, 1.020, 1.025, 1.030 Final     Ketones, UA   Date Value Ref Range Status   04/06/2024 Negative Negative mg/dL Final     Bilirubin, POC   Date Value Ref Range Status   11/06/2023 Negative  Final     Glucose, UA   Date Value Ref Range Status   11/06/2023 Negative  Final       Lab Results   Component Value Date    [id number] Negative 07/08/2021    RAPFLUA Negative 07/08/2021    RAPFLUB Negative 07/08/2021         Assessment/Plan     1. HSV infection  -     valACYclovir (VALTREX) 500 MG tablet; Take 1 tablet (500 mg total) by mouth once daily.  Dispense: 90 tablet; Refill: 1           Return to clinic in 3 months and as needed.    Questions answered to desired level of satisfaction    Verbalized understanding to all information and instructions provided      YENNI EllisonPCNP-[facility]    "

## 2024-04-09 NOTE — PATIENT INSTRUCTIONS
Please bring ALL medications bottles (from ALL providers) including over-the-counter medications to your next appointment !!!

## 2024-05-08 DIAGNOSIS — Z79.890 POSTMENOPAUSAL HORMONE THERAPY: ICD-10-CM

## 2024-05-08 RX ORDER — ESTRADIOL 0.1 MG/D
1 FILM, EXTENDED RELEASE TRANSDERMAL
Qty: 24 PATCH | Refills: 3 | Status: SHIPPED | OUTPATIENT
Start: 2024-05-09 | End: 2025-04-10

## 2024-05-13 NOTE — TELEPHONE ENCOUNTER
Confirmed appointment and notified to bring all medications. She verbalized understanding.....Reji Tolliver LPN  
Condition:: FBSE
Please Describe Your Condition:: Last evaluated by Dr. Carcamo. Pt has a couple spots on her forehead she’s concerned about. Currently using Opzelura.

## 2024-05-23 DIAGNOSIS — Z96.651 STATUS POST TOTAL KNEE REPLACEMENT, RIGHT: Primary | ICD-10-CM

## 2024-05-29 LAB
OHS QRS DURATION: 72 MS
OHS QRS DURATION: 88 MS
OHS QTC CALCULATION: 447 MS
OHS QTC CALCULATION: 457 MS

## 2024-07-09 DIAGNOSIS — Z71.89 COMPLEX CARE COORDINATION: ICD-10-CM

## 2024-07-12 DIAGNOSIS — Z96.651 STATUS POST TOTAL KNEE REPLACEMENT, RIGHT: Primary | ICD-10-CM

## 2024-07-15 ENCOUNTER — OFFICE VISIT (OUTPATIENT)
Dept: ORTHOPEDICS | Facility: CLINIC | Age: 69
End: 2024-07-15
Payer: MEDICARE

## 2024-07-15 ENCOUNTER — HOSPITAL ENCOUNTER (OUTPATIENT)
Dept: RADIOLOGY | Facility: HOSPITAL | Age: 69
Discharge: HOME | End: 2024-07-15
Attending: ORTHOPAEDIC SURGERY
Payer: MEDICARE

## 2024-07-15 DIAGNOSIS — Z96.651 STATUS POST TOTAL KNEE REPLACEMENT, RIGHT: ICD-10-CM

## 2024-07-15 DIAGNOSIS — M19.049 CMC ARTHRITIS: ICD-10-CM

## 2024-07-15 DIAGNOSIS — Z47.89 ENCOUNTER FOR ORTHOPEDIC FOLLOW-UP CARE: Primary | ICD-10-CM

## 2024-07-15 PROCEDURE — 99213 OFFICE O/P EST LOW 20 MIN: CPT | Mod: S$PBB,25,, | Performed by: ORTHOPAEDIC SURGERY

## 2024-07-15 PROCEDURE — 20605 DRAIN/INJ JOINT/BURSA W/O US: CPT | Mod: PBBFAC | Performed by: ORTHOPAEDIC SURGERY

## 2024-07-15 PROCEDURE — 20600 DRAIN/INJ JOINT/BURSA W/O US: CPT | Mod: PBBFAC,RT | Performed by: ORTHOPAEDIC SURGERY

## 2024-07-15 PROCEDURE — 73560 X-RAY EXAM OF KNEE 1 OR 2: CPT | Mod: TC,RT

## 2024-07-15 PROCEDURE — 99999PBSHW PR PBB SHADOW TECHNICAL ONLY FILED TO HB: Mod: PBBFAC,,,

## 2024-07-15 PROCEDURE — 99213 OFFICE O/P EST LOW 20 MIN: CPT | Mod: PBBFAC,25 | Performed by: ORTHOPAEDIC SURGERY

## 2024-07-15 PROCEDURE — 73560 X-RAY EXAM OF KNEE 1 OR 2: CPT | Mod: 26,RT,, | Performed by: ORTHOPAEDIC SURGERY

## 2024-07-15 PROCEDURE — 20600 DRAIN/INJ JOINT/BURSA W/O US: CPT | Mod: PBBFAC,LT | Performed by: ORTHOPAEDIC SURGERY

## 2024-07-15 PROCEDURE — 20610 DRAIN/INJ JOINT/BURSA W/O US: CPT | Mod: PBBFAC | Performed by: ORTHOPAEDIC SURGERY

## 2024-07-15 PROCEDURE — 99999 PR PBB SHADOW E&M-EST. PATIENT-LVL III: CPT | Mod: PBBFAC,,, | Performed by: ORTHOPAEDIC SURGERY

## 2024-07-15 RX ORDER — TRIAMCINOLONE ACETONIDE 40 MG/ML
20 INJECTION, SUSPENSION INTRA-ARTICULAR; INTRAMUSCULAR
Status: DISCONTINUED | OUTPATIENT
Start: 2024-07-15 | End: 2024-07-15 | Stop reason: HOSPADM

## 2024-07-15 RX ORDER — BUPIVACAINE HYDROCHLORIDE 2.5 MG/ML
1 INJECTION, SOLUTION EPIDURAL; INFILTRATION; INTRACAUDAL
Status: DISCONTINUED | OUTPATIENT
Start: 2024-07-15 | End: 2024-07-15 | Stop reason: HOSPADM

## 2024-07-15 RX ORDER — BUPIVACAINE HYDROCHLORIDE 2.5 MG/ML
1 INJECTION, SOLUTION INFILTRATION; PERINEURAL
Status: DISCONTINUED | OUTPATIENT
Start: 2024-07-15 | End: 2024-07-15 | Stop reason: HOSPADM

## 2024-07-15 RX ORDER — TRIAMCINOLONE ACETONIDE 40 MG/ML
40 INJECTION, SUSPENSION INTRA-ARTICULAR; INTRAMUSCULAR
Status: DISCONTINUED | OUTPATIENT
Start: 2024-07-15 | End: 2024-07-15 | Stop reason: HOSPADM

## 2024-07-15 RX ADMIN — BUPIVACAINE HYDROCHLORIDE 1 ML: 2.5 INJECTION, SOLUTION EPIDURAL; INFILTRATION; INTRACAUDAL at 10:07

## 2024-07-15 RX ADMIN — TRIAMCINOLONE ACETONIDE 20 MG: 40 INJECTION, SUSPENSION INTRA-ARTICULAR; INTRAMUSCULAR at 10:07

## 2024-07-15 RX ADMIN — BUPIVACAINE HYDROCHLORIDE 1 ML: 2.5 INJECTION, SOLUTION INFILTRATION; PERINEURAL at 10:07

## 2024-07-15 RX ADMIN — TRIAMCINOLONE ACETONIDE 40 MG: 40 INJECTION, SUSPENSION INTRA-ARTICULAR; INTRAMUSCULAR at 10:07

## 2024-07-15 NOTE — PROGRESS NOTES
Radiology Interpretation        Patient Name: Olga Stephenson  Date: 7/15/2024  YOB: 1955  MRN# [id number]        ORDERING DIAGNOSIS:    Encounter Diagnosis   Name Primary?    Encounter for orthopedic follow-up care Yes      Two views AP lateral right knee skeletally mature individual total knee arthroplasty in place no loosening fractures or subluxations impression total knee arthroplasty in place right knee no loosening                 Erick Tolliver MD

## 2024-07-15 NOTE — PROCEDURES
Large Joint Aspiration/Injection: R knee    Date/Time: 7/15/2024 10:30 AM    Performed by: Erick Tolliver MD  Authorized by: Erick Tolliver MD    Consent Done?:  Yes (Verbal)  Indications:  Arthritis    Details:  Needle Size:  22 G  Ultrasonic Guidance for needle placement?: No    Approach:  Anterolateral  Location:  Knee  Site:  R knee  Medications:  1 mL BUPivacaine (PF) 0.25% (2.5 mg/ml) 0.25 % (2.5 mg/mL); 40 mg triamcinolone acetonide 40 mg/mL  Patient tolerance:  Patient tolerated the procedure well with no immediate complications

## 2024-07-15 NOTE — PROCEDURES
Small Joint Aspiration/Injection: R thumb CMC    Date/Time: 7/15/2024 10:30 AM    Performed by: Erick Tolliver MD  Authorized by: Erick Tolliver MD    Consent Done?:  Yes (Verbal)  Indications:  Arthritis  Location:  Thumb  Site:  R thumb CMC  Ultrasonic guidance for needle placement?: No    Needle size:  25 G  Approach:  Dorsal  Medications:  20 mg triamcinolone acetonide 40 mg/mL; 1 mL BUPivacaine 0.25% (2.5 mg/ml) 0.25 % (2.5 mg/mL)  Patient tolerance:  Patient tolerated the procedure well with no immediate complications

## 2024-07-15 NOTE — PROGRESS NOTES
Patient is here follow-up of her right knee she has total knee arthroplasty.  She is having some soreness over ITB band.  I injected her area over her lateral femoral condyle with 1 cc of Marcaine 1 cc Kenalog.  This gutter some relief the symptoms previously.  Bilateral thumb she is still has positive grind test.  She has arthritis of the thumb CMC joints.  Injected each thumb with 0.5 cc Marcaine 0.5 cc Kenalog each side.  Let her use her hand as tolerates.  I will follow up 4 months.

## 2024-07-15 NOTE — PROCEDURES
Small Joint Aspiration/Injection: L thumb CMC    Date/Time: 7/15/2024 10:30 AM    Performed by: Erick Tolliver MD  Authorized by: Erick Tolliver MD    Consent Done?:  Yes (Verbal)  Indications:  Arthritis  Location:  Thumb  Site:  L thumb CMC  Ultrasonic guidance for needle placement?: No    Needle size:  25 G  Approach:  Dorsal  Medications:  20 mg triamcinolone acetonide 40 mg/mL; 1 mL BUPivacaine 0.25% (2.5 mg/ml) 0.25 % (2.5 mg/mL)  Patient tolerance:  Patient tolerated the procedure well with no immediate complications

## 2024-07-20 NOTE — PLAN OF CARE
Problem: Adult Inpatient Plan of Care  Goal: Plan of Care Review  2/24/2023 1402 by Debora Constantino RN  Outcome: Adequate for Care Transition  2/24/2023 0812 by Debora Constantino RN  Outcome: Ongoing, Progressing  Goal: Patient-Specific Goal (Individualized)  2/24/2023 1402 by Debora Constantino RN  Outcome: Adequate for Care Transition  2/24/2023 0812 by Debora Constantino RN  Outcome: Ongoing, Progressing  Goal: Absence of Hospital-Acquired Illness or Injury  2/24/2023 1402 by Debora Constantino RN  Outcome: Adequate for Care Transition  2/24/2023 0812 by Debora Constantino RN  Outcome: Ongoing, Progressing  Goal: Optimal Comfort and Wellbeing  2/24/2023 1402 by Debora Constantino RN  Outcome: Adequate for Care Transition  2/24/2023 0812 by Debora Constantino RN  Outcome: Ongoing, Progressing  Goal: Readiness for Transition of Care  2/24/2023 1402 by Debora Constantino RN  Outcome: Adequate for Care Transition  2/24/2023 0812 by Debora Constantino RN  Outcome: Ongoing, Progressing     Problem: Diabetes Comorbidity  Goal: Blood Glucose Level Within Targeted Range  2/24/2023 1402 by Debora Constantino RN  Outcome: Adequate for Care Transition  2/24/2023 0812 by Debora Constantino RN  Outcome: Ongoing, Progressing     Problem: Fall Injury Risk  Goal: Absence of Fall and Fall-Related Injury  2/24/2023 1402 by Debora Constantino RN  Outcome: Adequate for Care Transition  2/24/2023 0812 by Debora Constantino RN  Outcome: Ongoing, Progressing     Problem: Skin Injury Risk Increased  Goal: Skin Health and Integrity  2/24/2023 1402 by Debora Constantino RN  Outcome: Adequate for Care Transition  2/24/2023 0812 by Debora Constantino RN  Outcome: Ongoing, Progressing     Problem: Adjustment to Surgery (Knee Arthroplasty)  Goal: Optimal Coping  2/24/2023 1402 by Debora Constantino RN  Outcome: Adequate for Care Transition  2/24/2023 0812 by Debora Constantino RN  Outcome: Ongoing, Progressing     Problem: Bleeding (Knee Arthroplasty)  Goal: Absence of Bleeding  2/24/2023  Goal Outcome Evaluation:    sc  Patient Name: Gonsalo Blakely   MRN: [id number]   Date of Admission: 7/15/2024    Procedure: Procedure(s):  CORONARY ARTERY BYPASS GRAFT TIMES FOUR, INTERNAL MAMMARY ARTERY HARVEST,  LEFT LEG ENDOSCOPIC VESSEL PROCUREMENT , EPIAORTIC ULTRASOUND,  LIGATION, LEFT ATRIAL APPENDAGE, OPEN  ANESTHEIA TRANSESOPHAGEAL ECHOCARDIOGRAM.    Post Op day #:5    Subjective (Patient focus/Primary Problem for shift):           Pain Goal0 Pain Rating2           Pain Medication/ Regime effective to reduce patient pain tyelnol    Objective (Physical assessment):           Rhythm: atrial fibrillation with BBB            Bowel Activity: yes if Yes indicate when: 7/19/2024          Bowel Medications: yes, BUT  pt refused the evening dose on 7/19/2024            Incision: healing well          Incentive Spirometry Q 1-2 hour when awake:  yes Volume: 1250          Epicardial Pacing Wires:  not applicable            Patient Activity:           Up to chair for meals: yes          Ambulation with RN x2 (Not including CR): not applicable            Is patient in home clothes:no             Chest Tubes   Pleural: no Draining: no               Suction: no              Mediastinal: no Draining: not applicable               Suction: not applicable   Dressing Change Daily:yes If No, why? NA                     Urinary Catheter: no           Preventative WOC consult (need MD order): no       Assessment (Nursing primary shift focus):     Plan (Patient Care Plan/focus):     Patient has minimal incision pain- 2 out 10. Scheduled tylenol po given.  Patient has dry and productive cough- Informed patient that RN will notify MD to ask for cough med. Patient refused cough med. Had BM 7/19/2024- refused scheduled stool softener.      Pallavi Michael RN   7/20/2024   4:47 AM                                  1402 by Debora Constantino RN  Outcome: Adequate for Care Transition  2/24/2023 0812 by Debora Constantino RN  Outcome: Ongoing, Progressing     Problem: Bowel Motility Impaired (Knee Arthroplasty)  Goal: Effective Bowel Elimination  2/24/2023 1402 by Debora Constantino RN  Outcome: Adequate for Care Transition  2/24/2023 0812 by Debora Constantino RN  Outcome: Ongoing, Progressing     Problem: Fluid and Electrolyte Imbalance (Knee Arthroplasty)  Goal: Fluid and Electrolyte Balance  2/24/2023 1402 by Debora Constantino RN  Outcome: Adequate for Care Transition  2/24/2023 0812 by Debora Constantino RN  Outcome: Ongoing, Progressing     Problem: Functional Ability Impaired (Knee Arthroplasty)  Goal: Optimal Functional Ability  2/24/2023 1402 by Debora Constantino RN  Outcome: Adequate for Care Transition  2/24/2023 0812 by Debora Constantino RN  Outcome: Ongoing, Progressing     Problem: Infection (Knee Arthroplasty)  Goal: Absence of Infection Signs and Symptoms  2/24/2023 1402 by Debora Constantino RN  Outcome: Adequate for Care Transition  2/24/2023 0812 by Debora Constantino RN  Outcome: Ongoing, Progressing     Problem: Neurovascular Compromise (Knee Arthroplasty)  Goal: Intact Neurovascular Status  2/24/2023 1402 by Debora Constantino RN  Outcome: Adequate for Care Transition  2/24/2023 0812 by Debora Constantino RN  Outcome: Ongoing, Progressing     Problem: Ongoing Anesthesia Effects (Knee Arthroplasty)  Goal: Anesthesia/Sedation Recovery  2/24/2023 1402 by Debora Constantino RN  Outcome: Adequate for Care Transition  2/24/2023 0812 by Debora Constantino RN  Outcome: Ongoing, Progressing     Problem: Pain (Knee Arthroplasty)  Goal: Acceptable Pain Control  2/24/2023 1402 by Debora Constantino RN  Outcome: Adequate for Care Transition  2/24/2023 0812 by Debora Constantino RN  Outcome: Ongoing, Progressing     Problem: Postoperative Nausea and Vomiting (Knee Arthroplasty)  Goal: Nausea and Vomiting Relief  2/24/2023 1402 by Debora Constantino RN  Outcome:  Adequate for Care Transition  2/24/2023 0812 by Debora Constantino RN  Outcome: Ongoing, Progressing     Problem: Postoperative Urinary Retention (Knee Arthroplasty)  Goal: Effective Urinary Elimination  2/24/2023 1402 by Debora Constantino RN  Outcome: Adequate for Care Transition  2/24/2023 0812 by Debora Constantino RN  Outcome: Ongoing, Progressing     Problem: Respiratory Compromise (Knee Arthroplasty)  Goal: Effective Oxygenation and Ventilation  2/24/2023 1402 by Debora Constantino RN  Outcome: Adequate for Care Transition  2/24/2023 0812 by Debora Constantino RN  Outcome: Ongoing, Progressing

## 2024-08-01 ENCOUNTER — PATIENT MESSAGE (OUTPATIENT)
Dept: DIABETES SERVICES | Facility: CLINIC | Age: 69
End: 2024-08-01
Payer: MEDICARE

## 2024-08-26 ENCOUNTER — OFFICE VISIT (OUTPATIENT)
Dept: FAMILY MEDICINE | Facility: CLINIC | Age: 69
End: 2024-08-26
Payer: MEDICARE

## 2024-08-26 VITALS
TEMPERATURE: 98 F | SYSTOLIC BLOOD PRESSURE: 123 MMHG | OXYGEN SATURATION: 96 % | HEIGHT: 68 IN | HEART RATE: 81 BPM | BODY MASS INDEX: 30.71 KG/M2 | DIASTOLIC BLOOD PRESSURE: 79 MMHG | WEIGHT: 202.63 LBS

## 2024-08-26 DIAGNOSIS — I10 PRIMARY HYPERTENSION: ICD-10-CM

## 2024-08-26 DIAGNOSIS — R25.2 LEG CRAMPS: ICD-10-CM

## 2024-08-26 DIAGNOSIS — E11.9 TYPE 2 DIABETES MELLITUS WITHOUT COMPLICATION, WITHOUT LONG-TERM CURRENT USE OF INSULIN: Primary | ICD-10-CM

## 2024-08-26 DIAGNOSIS — J45.909 ASTHMA, UNSPECIFIED ASTHMA SEVERITY, UNSPECIFIED WHETHER COMPLICATED, UNSPECIFIED WHETHER PERSISTENT: ICD-10-CM

## 2024-08-26 DIAGNOSIS — B00.9 HSV INFECTION: ICD-10-CM

## 2024-08-26 PROBLEM — Z79.4 TYPE 2 DIABETES MELLITUS WITHOUT COMPLICATION, WITH LONG-TERM CURRENT USE OF INSULIN: Status: ACTIVE | Noted: 2021-04-16

## 2024-08-26 LAB
ANION GAP SERPL CALCULATED.3IONS-SCNC: 11 MMOL/L (ref 7–16)
BUN SERPL-MCNC: 7 MG/DL (ref 7–18)
BUN/CREAT SERPL: 8 (ref 6–20)
CALCIUM SERPL-MCNC: 9.6 MG/DL (ref 8.5–10.1)
CHLORIDE SERPL-SCNC: 107 MMOL/L (ref 98–107)
CO2 SERPL-SCNC: 25 MMOL/L (ref 21–32)
CREAT SERPL-MCNC: 0.93 MG/DL (ref 0.55–1.02)
EGFR (NO RACE VARIABLE) (RUSH/TITUS): 67 ML/MIN/1.73M2
EST. AVERAGE GLUCOSE BLD GHB EST-MCNC: 212 MG/DL
GLUCOSE SERPL-MCNC: 121 MG/DL (ref 74–106)
HBA1C MFR BLD HPLC: 9 % (ref 4.5–6.6)
POTASSIUM SERPL-SCNC: 4.4 MMOL/L (ref 3.5–5.1)
SODIUM SERPL-SCNC: 139 MMOL/L (ref 136–145)

## 2024-08-26 PROCEDURE — 80048 BASIC METABOLIC PNL TOTAL CA: CPT | Mod: ,,, | Performed by: CLINICAL MEDICAL LABORATORY

## 2024-08-26 PROCEDURE — 83036 HEMOGLOBIN GLYCOSYLATED A1C: CPT | Mod: ,,, | Performed by: CLINICAL MEDICAL LABORATORY

## 2024-08-26 PROCEDURE — 99213 OFFICE O/P EST LOW 20 MIN: CPT | Mod: ,,, | Performed by: NURSE PRACTITIONER

## 2024-08-26 RX ORDER — METOPROLOL SUCCINATE 25 MG/1
25 TABLET, EXTENDED RELEASE ORAL DAILY
Qty: 90 TABLET | Refills: 1 | Status: SHIPPED | OUTPATIENT
Start: 2024-08-26

## 2024-08-26 RX ORDER — CLOPIDOGREL BISULFATE 75 MG/1
75 TABLET ORAL DAILY
Qty: 90 TABLET | Refills: 1 | Status: SHIPPED | OUTPATIENT
Start: 2024-08-26 | End: 2025-02-22

## 2024-08-26 RX ORDER — ATORVASTATIN CALCIUM 40 MG/1
40 TABLET, FILM COATED ORAL NIGHTLY
Qty: 90 TABLET | Refills: 1 | Status: SHIPPED | OUTPATIENT
Start: 2024-08-26

## 2024-08-26 RX ORDER — CYCLOBENZAPRINE HCL 10 MG
10 TABLET ORAL NIGHTLY PRN
Qty: 30 TABLET | Refills: 1 | Status: SHIPPED | OUTPATIENT
Start: 2024-08-26

## 2024-08-26 RX ORDER — ORAL SEMAGLUTIDE 7 MG/1
7 TABLET ORAL DAILY
COMMUNITY
Start: 2024-07-26 | End: 2024-08-26 | Stop reason: SDUPTHER

## 2024-08-26 RX ORDER — TRAMADOL HYDROCHLORIDE 50 MG/1
50 TABLET ORAL EVERY 6 HOURS PRN
Status: CANCELLED | OUTPATIENT
Start: 2024-08-26

## 2024-08-26 RX ORDER — PREGABALIN 75 MG/1
150 CAPSULE ORAL NIGHTLY
Qty: 90 CAPSULE | Refills: 1 | Status: SHIPPED | OUTPATIENT
Start: 2024-08-26

## 2024-08-26 RX ORDER — LOSARTAN POTASSIUM 50 MG/1
50 TABLET ORAL DAILY
Qty: 90 TABLET | Refills: 1 | Status: SHIPPED | OUTPATIENT
Start: 2024-08-26

## 2024-08-26 RX ORDER — ORAL SEMAGLUTIDE 7 MG/1
7 TABLET ORAL DAILY
Qty: 90 TABLET | Refills: 1 | Status: SHIPPED | OUTPATIENT
Start: 2024-08-26

## 2024-08-26 RX ORDER — EZETIMIBE 10 MG/1
10 TABLET ORAL NIGHTLY
Qty: 90 TABLET | Refills: 1 | Status: SHIPPED | OUTPATIENT
Start: 2024-08-26

## 2024-08-26 RX ORDER — VALACYCLOVIR HYDROCHLORIDE 500 MG/1
500 TABLET, FILM COATED ORAL DAILY
Qty: 90 TABLET | Refills: 1 | Status: SHIPPED | OUTPATIENT
Start: 2024-08-26

## 2024-08-26 RX ORDER — GLIPIZIDE 10 MG/1
10 TABLET, FILM COATED, EXTENDED RELEASE ORAL
Qty: 90 TABLET | Refills: 1 | Status: SHIPPED | OUTPATIENT
Start: 2024-08-26

## 2024-08-26 RX ORDER — ALBUTEROL SULFATE 90 UG/1
2 INHALANT RESPIRATORY (INHALATION) EVERY 4 HOURS PRN
Qty: 17 G | Refills: 3 | Status: SHIPPED | OUTPATIENT
Start: 2024-08-26

## 2024-08-26 NOTE — PROGRESS NOTES
[facility]  Chief Complaint      Chief Complaint   Patient presents with    Follow-up     Patient presents to clinic for 6 month follow up.     Health Maintenance     She is        History of Present Illness      Olga Stephenson is a 69 y.o. female with chronic conditions of Gastritis, Colon/rectal polyp, CAD, Type 2 Diabetes Mellitus, Asthma, DVT, MI, Hyperlipidemia, HTN, TIA and Thyroid disease.  She presents today for a 6 month follow-up on DM. And intermittent leg cramps. Last HA1C level 9.4 on 2024. The pt's DM is managed with farxiga 10 mg po daily, glipizide 10 mg po daily, and Rybelsus 7 mg po daily.    HPI     Past Medical History:  Past Medical History:   Diagnosis Date    Acute superficial gastritis without hemorrhage 2022    Adenomatous polyp of cecum 06/15/2022    Adenomatous polyp of transverse colon 06/15/2022    Anticoagulant long-term use     Arthritis     Asthma     Coronary artery disease     Diabetes mellitus, type 2     Diabetic eye exam 2023    Dr. Navarro Tolliver    Diverticula, colon 06/15/2022    DVT (deep venous thrombosis)     Epigastric pain 2022    Heart attack     History of colon polyps 06/15/2022    Hyperlipidemia     Hypertension     Polyp of hepatic flexure of colon 06/15/2022    Screening for colon cancer 06/15/2022    Thyroid disease        Past Surgical History:   has a past surgical history that includes Esophagogastroduodenoscopy (03/10/2021); Colonoscopy (2017); Hysterectomy;  section; Bilateral oophorectomy (Bilateral); Cholecystectomy; arthroplasty, knee, total, using computer-assisted navigation (Right, 2023); and Coronary artery bypass graft ().    Social History:  Social History     Tobacco Use    Smoking status: Former     Passive exposure: Past    Smokeless tobacco: Never   Substance Use Topics    Alcohol use: Yes     Alcohol/week: 1.0 standard drink of alcohol     Types: 1 Glasses of wine  per week     Comment: occasionally    Drug use: Never       I personally reviewed all past medical, surgical, and social.     Review of Systems   Constitutional:  Negative for appetite change, fatigue, fever and unexpected weight change.   Respiratory:  Negative for cough and shortness of breath.    Cardiovascular:  Negative for chest pain and leg swelling.   Gastrointestinal:  Negative for abdominal pain, constipation, diarrhea and nausea.   Genitourinary:  Negative for difficulty urinating and dysuria.   Musculoskeletal:  Positive for myalgias. Negative for arthralgias.   Neurological:  Negative for dizziness, syncope, weakness and headaches.   Psychiatric/Behavioral:  Negative for dysphoric mood.       Medications:  Outpatient Encounter Medications as of 8/26/2024   Medication Sig Dispense Refill    albuterol (PROVENTIL/VENTOLIN HFA) 90 mcg/actuation inhaler Inhale 2 puffs into the lungs every 4 (four) hours as needed for Wheezing or Shortness of Breath. Rescue 17 g 3    atorvastatin (LIPITOR) 40 MG tablet Take 40 mg by mouth every evening.      blood sugar diagnostic (FREESTYLE LITE STRIPS) Strp USE 1 STRIP DAILY TO MONITOR GLUCOSE 100 strip 3    clopidogreL (PLAVIX) 75 mg tablet Take 1 tablet (75 mg total) by mouth once daily. 30 tablet 1    cyclobenzaprine (FLEXERIL) 10 MG tablet 1 tablet nightly as needed.      dapagliflozin propanediol (FARXIGA) 10 mg tablet Take 1 tablet (10 mg total) by mouth Daily. 90 tablet 3    estradioL (VIVELLE-DOT) 0.1 mg/24 hr PTSW Place 1 patch onto the skin twice a week. 24 patch 3    ezetimibe (ZETIA) 10 mg tablet Take 1 tablet by mouth every evening.      fluticasone propionate (FLONASE) 50 mcg/actuation nasal spray 1 spray (50 mcg total) by Each Nostril route once daily. 16 g 3    glipiZIDE (GLUCOTROL) 10 MG TR24 Take 1 tablet (10 mg total) by mouth daily with breakfast. 90 tablet 1    HYDROcodone-acetaminophen (NORCO)  mg per tablet Take 1 tablet by mouth every 6 (six)  hours as needed for Pain. 28 tablet 0    losartan (COZAAR) 50 MG tablet TAKE 1 TABLET BY MOUTH EVERY DAY 90 tablet 1    metoprolol succinate (TOPROL-XL) 25 MG 24 hr tablet Take 1 tablet by mouth once daily.      multivitamin capsule Take 1 capsule by mouth once daily.      pantoprazole (PROTONIX) 40 MG tablet Take 1 tablet (40 mg total) by mouth once daily. 90 tablet 3    pregabalin (LYRICA) 75 MG capsule Take 2 capsules (150 mg total) by mouth nightly. 90 capsule 1    rivaroxaban (XARELTO) 20 mg Tab Take 20 mg by mouth daily with dinner or evening meal.      RYBELSUS 7 mg tablet Take 7 mg by mouth once daily.      traMADoL (ULTRAM) 50 mg tablet Take 50 mg by mouth every 6 (six) hours as needed for Pain. 1 - 2 tabs      valACYclovir (VALTREX) 500 MG tablet Take 1 tablet (500 mg total) by mouth once daily. 90 tablet 1    [DISCONTINUED] liraglutide 0.6 mg/0.1 mL, 18 mg/3 mL, subq PNIJ (VICTOZA 3-LATONYA) 0.6 mg/0.1 mL (18 mg/3 mL) PnIj pen Inject 0.6mg sq once daily x 7 days, then increase to 1.2 mg daily x 7 days then increase to 1.8mg daily. 27 mL 1    [DISCONTINUED] ergocalciferol (ERGOCALCIFEROL) 50,000 unit Cap Take 1 capsule (50,000 Units total) by mouth Every Friday. (Patient not taking: Reported on 8/26/2024) 12 capsule 1    [DISCONTINUED] hydrocortisone 2.5 % cream       [DISCONTINUED] tiZANidine (ZANAFLEX) 4 MG tablet Take by mouth. (Patient not taking: Reported on 8/26/2024)       No facility-administered encounter medications on file as of 8/26/2024.       Allergies:  Review of patient's allergies indicates:   Allergen Reactions    Jardiance [empagliflozin] Anaphylaxis     Headaches     Trulicity [dulaglutide]      Abdominal pain    Ozempic [semaglutide] Nausea And Vomiting       Health Maintenance:  Immunization History   Administered Date(s) Administered    COVID-19 MRNA, LN-S PF (MODERNA HALF 0.25 ML DOSE) 12/17/2021    COVID-19, MRNA, LN-S, PF (MODERNA FULL 0.5 ML DOSE) 03/29/2021, 04/27/2021    Influenza  "(FLUAD) - Quadrivalent - Adjuvanted - PF *Preferred* (65+) 11/03/2022, 11/06/2023    Influenza - Quadrivalent - High Dose - PF (65 years and older) 11/08/2021    Pneumococcal Conjugate - 20 Valent 08/23/2023    Zoster Recombinant 10/25/2018, 11/03/2022, 01/23/2023        Health Maintenance   Topic Date Due    TETANUS VACCINE  Never done    Hemoglobin A1c  07/06/2024    Eye Exam  07/19/2024    Foot Exam  08/23/2024    Mammogram  09/12/2024    Colorectal Cancer Screening  02/01/2025    Lipid Panel  04/06/2025    DEXA Scan  09/01/2026    Hepatitis C Screening  Completed    Shingles Vaccine  Completed    High Dose Statin  Discontinued        Physical Exam      Vital Signs  Temp: 97.9 °F (36.6 °C)  Pulse: 81  SpO2: 96 %  BP: 123/79  BP Location: Right arm  Patient Position: Sitting  Pain Score:   8  Pain Loc: Leg (bilateral calves)  Height and Weight  Height: 5' 8" (172.7 cm)  Weight: 91.9 kg (202 lb 9.6 oz)  BSA (Calculated - sq m): 2.1 sq meters  BMI (Calculated): 30.8  Weight in (lb) to have BMI = 25: 164.1]    Physical Exam  Constitutional:       General: She is not in acute distress.     Appearance: Normal appearance. She is obese.   HENT:      Head: Normocephalic.      Mouth/Throat:      Mouth: Mucous membranes are moist.   Cardiovascular:      Rate and Rhythm: Normal rate and regular rhythm.      Pulses: Normal pulses.           Radial pulses are 2+ on the right side and 2+ on the left side.      Heart sounds: Normal heart sounds.   Pulmonary:      Effort: Pulmonary effort is normal. No respiratory distress.      Breath sounds: Normal breath sounds.   Abdominal:      Palpations: Abdomen is soft.      Tenderness: There is no abdominal tenderness.   Musculoskeletal:         General: Normal range of motion.      Right lower leg: No edema.      Left lower leg: No edema.   Skin:     General: Skin is warm and dry.   Neurological:      Mental Status: She is alert and oriented to person, place, and time.   Psychiatric:    "      Mood and Affect: Mood normal.         Behavior: Behavior normal.        Laboratory:  CBC:  Recent Labs   Lab 08/28/23  1358 11/06/23  1610 04/06/24  1131   WBC 9.22 9.03 9.41   RBC 4.97 4.95 5.02   Hemoglobin 14.4 14.6 14.8   Hematocrit 45.1 45.6 45.8   Platelet Count 252 246 237   MCV 90.7 92.1 91.2   MCH 29.0 29.5 29.5   MCHC 31.9 L 32.0 32.3       LIPIDS:  Recent Labs   Lab 01/05/22  1126 01/05/22  1126 10/26/22  1143 02/10/23  2026 02/17/23  0613 03/28/23  1516 08/23/23  1136 04/06/24  1711   TSH  --    < > 0.812 0.196 L 0.575 0.441  --   --    HDL  --   --   --   --   --   --   --  39 L   HDL Cholesterol 35 L  --  35 L  --   --   --  44  --    Cholesterol 153  --  178  --   --   --  167 171   Triglycerides 132  --  245 H  --   --   --  187 H 188 H   LDL Calculated 92  --   --   --   --   --  86 94   Cholesterol/HDL Ratio (Risk Factor) 4.4  --  5.1  --   --   --  3.8  --    Non-  --  143  --   --   --  123 132    < > = values in this interval not displayed.       TSH:  Recent Labs   Lab 02/10/23  2026 02/17/23  0613 03/28/23  1516   TSH 0.196 L 0.575 0.441       A1C:  Recent Labs   Lab 12/07/21  1511 06/24/22  1548 10/26/22  1143 02/10/23  1849 05/09/23  1054 11/06/23  1216 02/12/24  1024 04/06/24  1711   Hemoglobin A1C 7.8 A 7.0 H 8.1 H 6.9 H 6.6 8.8 H 7.9 H 9.5 H       Lab Results   Component Value Date     (H) 04/06/2024     04/06/2024    K 3.8 04/06/2024    CL 99 04/06/2024    CO2 27 04/06/2024    BUN 11 04/06/2024    CREATININE 1.02 04/06/2024    CALCIUM 9.0 04/06/2024    PROT 8.6 (H) 04/06/2024    ALBUMIN 4.3 04/06/2024    BILITOT 0.6 04/06/2024    ALKPHOS 84 04/06/2024    AST 29 04/06/2024    ALT 49 04/06/2024    ANIONGAP 15 04/06/2024    ESTGFRAFRICA 68 06/08/2021    EGFRNONAA 83 06/26/2022       Lab Results   Component Value Date    WBC 9.41 04/06/2024    RBC 5.02 04/06/2024    HGB 14.8 04/06/2024    HCT 45.8 04/06/2024    MCV 91.2 04/06/2024    RDW 13.7 04/06/2024      "04/06/2024        Lab Results   Component Value Date    CHOL 171 04/06/2024    CHOL 137 01/30/2020    TRIG 188 (H) 04/06/2024    TRIG 165 01/30/2020    HDL 39 (L) 04/06/2024    HDL 37 01/30/2020    LDLCALC 94 04/06/2024       Lab Results   Component Value Date    TSH 0.441 03/28/2023       Lab Results   Component Value Date    HGBA1C 9.5 (H) 04/06/2024    ESTIMATEDAVG 180 02/12/2024        No components found for: "VITAMIND"    No results found for: "PSA"    Point Of Care Testing:  WBC, UA   Date Value Ref Range Status   11/06/2023 Negative  Final     Nitrites, UA   Date Value Ref Range Status   04/06/2024 Negative Negative Final     Urobilinogen, UA   Date Value Ref Range Status   04/06/2024 0.2 0.2, 1.0, Normal mg/dL Final     Protein, POC   Date Value Ref Range Status   11/06/2023 Trace  Final     pH, UA   Date Value Ref Range Status   04/06/2024 6.5 5.0 to 8.0 pH Units Final     Specific Gravity, UA   Date Value Ref Range Status   04/06/2024 1.010 <=1.005, 1.010, 1.015, 1.020, 1.025, 1.030 Final     Ketones, UA   Date Value Ref Range Status   04/06/2024 Negative Negative mg/dL Final     Bilirubin, POC   Date Value Ref Range Status   11/06/2023 Negative  Final     Glucose, UA   Date Value Ref Range Status   11/06/2023 Negative  Final       Lab Results   Component Value Date    FKTZMAW4GD Negative 07/08/2021    RAPFLUA Negative 07/08/2021    RAPFLUB Negative 07/08/2021         Assessment/Plan     1. Leg cramps  -     Basic Metabolic Panel; Future; Expected date: 08/26/2024    2. Type 2 diabetes mellitus without complication, with long-term current use of insulin  -     Hemoglobin A1C; Future; Expected date: 08/26/2024           Return to clinic in 3 months for follow-up on DM.    Questions answered to desired level of satisfaction    Verbalized understanding to all information and instructions provided      ERIN [facility]    "

## 2024-09-10 ENCOUNTER — OFFICE VISIT (OUTPATIENT)
Dept: FAMILY MEDICINE | Facility: CLINIC | Age: 69
End: 2024-09-10
Payer: MEDICARE

## 2024-09-10 VITALS
SYSTOLIC BLOOD PRESSURE: 124 MMHG | WEIGHT: 204 LBS | DIASTOLIC BLOOD PRESSURE: 80 MMHG | BODY MASS INDEX: 30.21 KG/M2 | HEIGHT: 69 IN | RESPIRATION RATE: 20 BRPM | TEMPERATURE: 98 F | HEART RATE: 88 BPM | OXYGEN SATURATION: 96 %

## 2024-09-10 DIAGNOSIS — G89.29 CHRONIC BILATERAL LOW BACK PAIN WITH BILATERAL SCIATICA: Primary | ICD-10-CM

## 2024-09-10 DIAGNOSIS — M54.42 CHRONIC BILATERAL LOW BACK PAIN WITH BILATERAL SCIATICA: Primary | ICD-10-CM

## 2024-09-10 DIAGNOSIS — M54.41 CHRONIC BILATERAL LOW BACK PAIN WITH BILATERAL SCIATICA: Primary | ICD-10-CM

## 2024-09-10 PROCEDURE — 96372 THER/PROPH/DIAG INJ SC/IM: CPT | Mod: ,,, | Performed by: FAMILY MEDICINE

## 2024-09-10 PROCEDURE — 99213 OFFICE O/P EST LOW 20 MIN: CPT | Mod: ,,, | Performed by: FAMILY MEDICINE

## 2024-09-10 RX ORDER — KETOROLAC TROMETHAMINE 30 MG/ML
30 INJECTION, SOLUTION INTRAMUSCULAR; INTRAVENOUS
Status: COMPLETED | OUTPATIENT
Start: 2024-09-10 | End: 2024-09-10

## 2024-09-10 RX ADMIN — KETOROLAC TROMETHAMINE 30 MG: 30 INJECTION, SOLUTION INTRAMUSCULAR; INTRAVENOUS at 10:09

## 2024-09-10 NOTE — PROGRESS NOTES
Clinic Note    Patient Name: Olga Stephenson  : 1955  MRN: [id number]    Chief Complaint   Patient presents with    Back Pain    Leg Pain    Health Maintenance     .hm      TETANUS VACCINE Never done  RSV Vaccine (Age 60+ and Pregnant patients)(1 - 1-dose 60+ series) Never done  Eye Exam due on 2024  Foot Exam due on 2024  Influenza Vaccine(1) due on 2024  COVID-19 Vaccine( season) due on 2024  Mammogram due on 2024          HPI:    Ms. Olga Stephenson is a 69 y.o. female who presents to clinic today with CC of chronic back and leg pain. Reports flare with chronic pain. She follows with pain management. Reports she would like go get on a marijuana card but Dr. Wesley requires $200 mari pay for this and she cannot afford it. Denies any known injury. Reports she is just having a flare with her chronic pain.  Patient is, otherwise, without complaints.     Medications:  Medication List with Changes/Refills   Current Medications    ALBUTEROL (PROVENTIL/VENTOLIN HFA) 90 MCG/ACTUATION INHALER    Inhale 2 puffs into the lungs every 4 (four) hours as needed for Wheezing or Shortness of Breath. Rescue    ATORVASTATIN (LIPITOR) 40 MG TABLET    Take 1 tablet (40 mg total) by mouth every evening.    BLOOD SUGAR DIAGNOSTIC (FREESTYLE LITE STRIPS) STRP    USE 1 STRIP DAILY TO MONITOR GLUCOSE    CLOPIDOGREL (PLAVIX) 75 MG TABLET    Take 1 tablet (75 mg total) by mouth once daily.    CYCLOBENZAPRINE (FLEXERIL) 10 MG TABLET    Take 1 tablet (10 mg total) by mouth nightly as needed for Muscle spasms.    DAPAGLIFLOZIN PROPANEDIOL (FARXIGA) 10 MG TABLET    Take 1 tablet (10 mg total) by mouth Daily.    ESTRADIOL (VIVELLE-DOT) 0.1 MG/24 HR PTSW    Place 1 patch onto the skin twice a week.    EZETIMIBE (ZETIA) 10 MG TABLET    Take 1 tablet (10 mg total) by mouth every evening.    FLUTICASONE PROPIONATE (FLONASE) 50 MCG/ACTUATION NASAL SPRAY    1 spray (50 mcg total) by Each Nostril route  once daily.    GLIPIZIDE (GLUCOTROL) 10 MG TR24    Take 1 tablet (10 mg total) by mouth daily with breakfast.    HYDROCODONE-ACETAMINOPHEN (NORCO)  MG PER TABLET    Take 1 tablet by mouth every 6 (six) hours as needed for Pain.    LOSARTAN (COZAAR) 50 MG TABLET    Take 1 tablet (50 mg total) by mouth once daily.    METOPROLOL SUCCINATE (TOPROL-XL) 25 MG 24 HR TABLET    Take 1 tablet (25 mg total) by mouth once daily.    MULTIVITAMIN CAPSULE    Take 1 capsule by mouth once daily.    PANTOPRAZOLE (PROTONIX) 40 MG TABLET    Take 1 tablet (40 mg total) by mouth once daily.    PREGABALIN (LYRICA) 75 MG CAPSULE    Take 2 capsules (150 mg total) by mouth nightly.    RIVAROXABAN (XARELTO) 20 MG TAB    Take 1 tablet (20 mg total) by mouth daily with dinner or evening meal.    RYBELSUS 7 MG TABLET    Take 1 tablet (7 mg total) by mouth once daily.    VALACYCLOVIR (VALTREX) 500 MG TABLET    Take 1 tablet (500 mg total) by mouth once daily.        Allergies: Jardiance [empagliflozin], Trulicity [dulaglutide], and Ozempic [semaglutide]      Past Medical History:    Past Medical History:   Diagnosis Date    Acute superficial gastritis without hemorrhage 06/08/2022    Adenomatous polyp of cecum 06/15/2022    Adenomatous polyp of transverse colon 06/15/2022    Anticoagulant long-term use     Arthritis     Asthma     Coronary artery disease     Diabetes mellitus, type 2 2014    Diabetic eye exam 07/19/2023    Dr. Navarro Tolliver    Diverticula, colon 06/15/2022    DVT (deep venous thrombosis)     Epigastric pain 06/08/2022    Heart attack 2011    History of colon polyps 06/15/2022    Hyperlipidemia     Hypertension 2014    Polyp of hepatic flexure of colon 06/15/2022    Screening for colon cancer 06/15/2022    Thyroid disease        Past Surgical History:    Past Surgical History:   Procedure Laterality Date    ARTHROPLASTY, KNEE, TOTAL, USING COMPUTER-ASSISTED NAVIGATION Right 02/09/2023    Procedure: ARTHROPLASTY, KNEE, TOTAL,  USING COMPUTER-ASSISTED NAVIGATION;  Surgeon: Erick Tolliver MD;  Location: [facility];  Service: Orthopedics;  Laterality: Right;    BILATERAL OOPHORECTOMY Bilateral     35 years ago,  1-2 years after hyst     SECTION      CHOLECYSTECTOMY      COLONOSCOPY  2017    repeat in 3 years    CORONARY ARTERY BYPASS GRAFT  2011    ESOPHAGOGASTRODUODENOSCOPY  03/10/2021    HYSTERECTOMY           Social History:    Social History     Tobacco Use   Smoking Status Former    Passive exposure: Past   Smokeless Tobacco Never     Social History     Substance and Sexual Activity   Alcohol Use Yes    Alcohol/week: 1.0 standard drink of alcohol    Types: 1 Glasses of wine per week    Comment: occasionally     Social History     Substance and Sexual Activity   Drug Use Never         Family History:    Family History   Problem Relation Name Age of Onset    Diabetes Mother      Heart disease Mother      Dementia Mother      Thyroid disease Mother      No Known Problems Father      Diabetes Brother      Prostate cancer Brother      Hypertension Daughter Mary     Hypertension Daughter      Breast cancer Neg Hx      Colon cancer Neg Hx      Ovarian cancer Neg Hx         Review of Systems:    Review of Systems   Constitutional:  Negative for appetite change, chills, fatigue, fever and unexpected weight change.   Eyes:  Negative for visual disturbance.   Respiratory:  Negative for cough and shortness of breath.    Cardiovascular:  Negative for chest pain and leg swelling.   Gastrointestinal:  Negative for abdominal pain, change in bowel habit, constipation, diarrhea, nausea and vomiting.   Musculoskeletal:  Positive for arthralgias and back pain.   Integumentary:  Negative for rash.   Neurological:  Negative for dizziness and headaches.   Psychiatric/Behavioral:  The patient is not nervous/anxious.         Vitals:    Vitals:    09/10/24 0934   BP: 124/80   BP Location: Left arm   Patient Position: Sitting   BP  "Method: Large (Automatic)   Pulse: 88   Resp: 20   Temp: 97.9 °F (36.6 °C)   TempSrc: Rectal   SpO2: 96%   Weight: 92.5 kg (204 lb)   Height: 5' 9" (1.753 m)       Body mass index is 30.13 kg/m².    Wt Readings from Last 3 Encounters:   09/10/24 0934 92.5 kg (204 lb)   08/26/24 1046 91.9 kg (202 lb 9.6 oz)   04/09/24 1046 94.3 kg (208 lb)        Physical Exam:    Physical Exam  Constitutional:       General: She is not in acute distress.     Appearance: Normal appearance. She is obese.   HENT:      Nose: Nose normal.      Mouth/Throat:      Mouth: Mucous membranes are moist.      Pharynx: Oropharynx is clear.   Eyes:      Conjunctiva/sclera: Conjunctivae normal.   Cardiovascular:      Rate and Rhythm: Normal rate and regular rhythm.      Heart sounds: Normal heart sounds. No murmur heard.  Pulmonary:      Effort: Pulmonary effort is normal. No respiratory distress.      Breath sounds: Normal breath sounds. No wheezing, rhonchi or rales.   Abdominal:      General: Bowel sounds are normal.      Palpations: Abdomen is soft.      Tenderness: There is no abdominal tenderness.   Musculoskeletal:         General: Tenderness present. No swelling. Normal range of motion.      Cervical back: Neck supple.      Right lower leg: No edema.      Left lower leg: No edema.   Skin:     Findings: No rash.   Neurological:      General: No focal deficit present.      Mental Status: She is alert. Mental status is at baseline.   Psychiatric:         Mood and Affect: Mood normal.       Assessment/Plan:   1. Chronic bilateral low back pain with bilateral sciatica  -     ketorolac injection 30 mg         Active Problem List with Overview Notes    Diagnosis Date Noted    Leg cramps 08/26/2024    Asthma 08/26/2024    HSV infection 04/09/2024    Episode of transient neurologic symptoms 04/06/2024    Blood in stool 02/01/2024    Colitis 02/01/2024    Abnormal CT scan, colon 10/02/2023    Diverticulitis 10/02/2023    Vaginal yeast infection " 10/02/2023    Acute deep vein thrombosis (DVT) of femoral vein of right lower extremity 09/11/2023    Left lower quadrant abdominal pain 08/28/2023    Leg edema 08/11/2023    Muscle strain 08/11/2023    Right knee pain 07/27/2023    Acute vaginitis 05/23/2023    Localized swelling of right lower leg 05/09/2023    Postmenopausal 05/09/2023    Vitamin B 12 deficiency 03/29/2023    Memory loss 03/29/2023    TSH deficiency 03/29/2023    Cellulitis 03/29/2023    Edema 03/29/2023    Decreased range of motion (ROM) of right knee 02/28/2023    Hypokalemia 02/19/2023    Abnormal TSH 02/19/2023    Muscle weakness 02/12/2023    Status post total right knee replacement 02/12/2023    Vitamin D deficiency 02/12/2023    Lumbosacral spondylosis without myelopathy 09/08/2022    Osteoarthritis 09/08/2022    Hyperlipidemia     DVT prophylaxis 06/15/2022     IMO Regualtory Update 4/1/23      History of colon polyps 06/15/2022    Colon, diverticulosis 06/15/2022    Polyp of cecum 06/15/2022    Polyp of transverse colon 06/15/2022    Adenomatous polyp of transverse colon 06/15/2022    Epigastric pain 06/08/2022    Acute superficial gastritis without hemorrhage 06/08/2022    Hepatomegaly 05/02/2022    Fatty liver 05/02/2022    Gastroesophageal reflux disease 05/02/2022    Dysphagia 05/02/2022    Arthritis of carpometacarpal (CMC) joint of right thumb 07/21/2021    Arthritis of right knee 07/21/2021    Diarrhea 07/01/2021    Coronary artery disease of native artery of native heart with stable angina pectoris     Hypertension     Elevated liver enzymes 04/16/2021    Gastroparesis 04/16/2021    Type 2 diabetes mellitus without complication, with long-term current use of insulin 04/16/2021          RTC prn if symptoms worsen or fail to resolve.  Patient voiced understanding and is agreeable to plan.      Florence Kendrick MD    Family Medicine

## 2024-09-16 RX ORDER — INSULIN GLARGINE 100 [IU]/ML
10 INJECTION, SOLUTION SUBCUTANEOUS DAILY
Qty: 3 ML | Refills: 5 | Status: SHIPPED | OUTPATIENT
Start: 2024-09-16 | End: 2025-03-15

## 2024-09-16 RX ORDER — EZETIMIBE 10 MG/1
10 TABLET ORAL DAILY
Qty: 90 TABLET | Refills: 1 | Status: SHIPPED | OUTPATIENT
Start: 2024-09-16 | End: 2025-09-16

## 2024-11-05 ENCOUNTER — HOSPITAL ENCOUNTER (OUTPATIENT)
Dept: RADIOLOGY | Facility: HOSPITAL | Age: 69
Discharge: HOME | End: 2024-11-05
Attending: RADIOLOGY
Payer: MEDICARE

## 2024-11-05 DIAGNOSIS — Z12.31 VISIT FOR SCREENING MAMMOGRAM: ICD-10-CM

## 2024-11-05 PROCEDURE — 77067 SCR MAMMO BI INCL CAD: CPT | Mod: TC

## 2024-11-05 PROCEDURE — 77063 BREAST TOMOSYNTHESIS BI: CPT | Mod: 26,,, | Performed by: RADIOLOGY

## 2024-11-05 PROCEDURE — 77067 SCR MAMMO BI INCL CAD: CPT | Mod: 26,,, | Performed by: RADIOLOGY

## 2025-01-02 ENCOUNTER — HOSPITAL ENCOUNTER (EMERGENCY)
Facility: HOSPITAL | Age: 70
Discharge: HOME | End: 2025-01-02
Payer: MEDICARE

## 2025-01-02 VITALS
OXYGEN SATURATION: 98 % | TEMPERATURE: 98 F | DIASTOLIC BLOOD PRESSURE: 79 MMHG | HEIGHT: 69 IN | BODY MASS INDEX: 31.1 KG/M2 | RESPIRATION RATE: 16 BRPM | SYSTOLIC BLOOD PRESSURE: 135 MMHG | WEIGHT: 210 LBS | HEART RATE: 70 BPM

## 2025-01-02 DIAGNOSIS — M54.50 CHRONIC BILATERAL LOW BACK PAIN WITHOUT SCIATICA: Primary | ICD-10-CM

## 2025-01-02 DIAGNOSIS — G89.29 CHRONIC BILATERAL LOW BACK PAIN WITHOUT SCIATICA: Primary | ICD-10-CM

## 2025-01-02 DIAGNOSIS — M51.379 DEGENERATIVE DISC DISEASE AT L5-S1 LEVEL: ICD-10-CM

## 2025-01-02 PROCEDURE — 63600175 PHARM REV CODE 636 W HCPCS

## 2025-01-02 PROCEDURE — 96372 THER/PROPH/DIAG INJ SC/IM: CPT

## 2025-01-02 PROCEDURE — 99284 EMERGENCY DEPT VISIT MOD MDM: CPT | Mod: 25

## 2025-01-02 RX ORDER — MORPHINE SULFATE 4 MG/ML
4 INJECTION, SOLUTION INTRAMUSCULAR; INTRAVENOUS
Status: COMPLETED | OUTPATIENT
Start: 2025-01-02 | End: 2025-01-02

## 2025-01-02 RX ORDER — ONDANSETRON HYDROCHLORIDE 2 MG/ML
4 INJECTION, SOLUTION INTRAVENOUS
Status: COMPLETED | OUTPATIENT
Start: 2025-01-02 | End: 2025-01-02

## 2025-01-02 RX ADMIN — ONDANSETRON 4 MG: 2 INJECTION INTRAMUSCULAR; INTRAVENOUS at 05:01

## 2025-01-02 RX ADMIN — MORPHINE SULFATE 4 MG: 4 INJECTION, SOLUTION INTRAMUSCULAR; INTRAVENOUS at 05:01

## 2025-01-02 NOTE — DISCHARGE INSTRUCTIONS
Continue taking medication as prescribed by pain management. Follow up with pain management as scheduled. Return to the emergency department for new or worsening symptoms.

## 2025-01-02 NOTE — ED PROVIDER NOTES
Encounter Date: 1/2/2025       History     Chief Complaint   Patient presents with    Back Pain     Lower back pain that started Saturday. States taking tramadol and tylenol this morning with no relief. States pain radiates down bilateral legs     69-year old female presents to the emergency department for evaluation of lower back pain that has worsened over the past several days. Reports a history of chronic lower back pain due to degenerative disc disease and sees pain management. Reports that pain is radiating into bilateral lower extremities. Denies numbness/tingling/swelling/inability to bear weight to lower extremities.    The history is provided by the patient. No  was used.   Back Pain   This is a new problem. The current episode started several days ago. The problem occurs 2 - 4 times per day. The problem has been unchanged. The pain is associated with no known injury. The pain is present in the lumbar spine. The quality of the pain is described as aching. The pain does not radiate. The pain is at a severity of 7/10. The symptoms are aggravated by certain positions. Associated symptoms include leg pain. Pertinent negatives include no fever, no numbness, no dysuria, no paresthesias, no paresis, no tingling and no weakness. She has tried nothing for the symptoms.     Review of patient's allergies indicates:   Allergen Reactions    Jardiance [empagliflozin] Anaphylaxis     Headaches     Trulicity [dulaglutide]      Abdominal pain    Ozempic [semaglutide] Nausea And Vomiting     Past Medical History:   Diagnosis Date    Acute superficial gastritis without hemorrhage 06/08/2022    Adenomatous polyp of cecum 06/15/2022    Adenomatous polyp of transverse colon 06/15/2022    Anticoagulant long-term use     Arthritis     Asthma     Coronary artery disease     Diabetes mellitus, type 2 2014    Diabetic eye exam 07/19/2023    Dr. Navarro Tolliver    Diverticula, colon 06/15/2022    DVT (deep venous  thrombosis)     Epigastric pain 2022    Heart attack     History of colon polyps 06/15/2022    Hyperlipidemia     Hypertension 2014    Polyp of hepatic flexure of colon 06/15/2022    Screening for colon cancer 06/15/2022    Thyroid disease      Past Surgical History:   Procedure Laterality Date    ARTHROPLASTY, KNEE, TOTAL, USING COMPUTER-ASSISTED NAVIGATION Right 2023    Procedure: ARTHROPLASTY, KNEE, TOTAL, USING COMPUTER-ASSISTED NAVIGATION;  Surgeon: Erick Tolliver MD;  Location: [facility];  Service: Orthopedics;  Laterality: Right;    BILATERAL OOPHORECTOMY Bilateral     35 years ago,  1-2 years after hyst     SECTION      CHOLECYSTECTOMY      COLONOSCOPY  2017    repeat in 3 years    CORONARY ARTERY BYPASS GRAFT  2011    ESOPHAGOGASTRODUODENOSCOPY  03/10/2021    HYSTERECTOMY       Family History   Problem Relation Name Age of Onset    Diabetes Mother      Heart disease Mother      Dementia Mother      Thyroid disease Mother      No Known Problems Father      Diabetes Brother      Prostate cancer Brother      Hypertension Daughter Mary     Hypertension Daughter      Breast cancer Neg Hx      Colon cancer Neg Hx      Ovarian cancer Neg Hx       Social History     Tobacco Use    Smoking status: Former     Passive exposure: Past    Smokeless tobacco: Never   Substance Use Topics    Alcohol use: Yes     Alcohol/week: 1.0 standard drink of alcohol     Types: 1 Glasses of wine per week     Comment: occasionally    Drug use: Never     Review of Systems   Constitutional:  Negative for chills and fever.   Gastrointestinal:  Negative for nausea and vomiting.   Genitourinary:  Negative for decreased urine volume, difficulty urinating and dysuria.   Musculoskeletal:  Positive for back pain. Negative for neck pain and neck stiffness.   Neurological:  Negative for tingling, weakness, numbness and paresthesias.   Psychiatric/Behavioral:  Negative for confusion.    All other systems  reviewed and are negative.      Physical Exam     Initial Vitals [01/02/25 1551]   BP Pulse Resp Temp SpO2   135/79 70 17 97.7 °F (36.5 °C) 98 %      MAP       --         Physical Exam    Vitals reviewed.  Constitutional: She appears well-nourished. No distress.   HENT:   Head: Normocephalic.   Eyes: Conjunctivae are normal. Right eye exhibits no discharge. Left eye exhibits no discharge.   Neck: Neck supple.   Normal range of motion.  Cardiovascular:  Normal rate, regular rhythm and normal heart sounds.           Pulmonary/Chest: Breath sounds normal. No respiratory distress. She has no wheezes. She has no rhonchi. She exhibits no tenderness.   Abdominal: Abdomen is soft. Bowel sounds are normal. She exhibits no distension. There is no abdominal tenderness. There is no guarding.   Musculoskeletal:         General: No tenderness. Normal range of motion.      Cervical back: Normal range of motion and neck supple.      Lumbar back: Normal. No swelling, deformity, signs of trauma, spasms or tenderness. Normal range of motion.     Lymphadenopathy:     She has no cervical adenopathy.   Neurological: She is alert and oriented to person, place, and time. She has normal strength. No sensory deficit. GCS score is 15. GCS eye subscore is 4. GCS verbal subscore is 5. GCS motor subscore is 6.   Skin: Skin is warm and dry. Capillary refill takes less than 2 seconds.   Psychiatric: She has a normal mood and affect. Her behavior is normal.         Medical Screening Exam   See Full Note    ED Course   Procedures  Labs Reviewed - No data to display       Imaging Results    None          Medications   morphine injection 4 mg (4 mg Intramuscular Given 1/2/25 1710)   ondansetron injection 4 mg (4 mg Intramuscular Given 1/2/25 1710)     Medical Decision Making  69-year old female presents to the emergency department for evaluation of lower back pain that has worsened over the past several days. Reports a history of chronic lower back  pain due to degenerative disc disease and sees pain management. Reports that pain is radiating into bilateral lower extremities. Denies numbness/tingling/swelling/inability to bear weight to lower extremities.  Morphine, zofran IM ordered in ED  Follow-up and return precautions discussed with patient, who verbalized understanding  Diagnosis: Low back pain    Risk  Prescription drug management.                                      Clinical Impression:   Final diagnoses:  [M54.50, G89.29] Chronic bilateral low back pain without sciatica (Primary)  [M51.379] Degenerative disc disease at L5-S1 level        ED Disposition Condition    Discharge Stable          ED Prescriptions    None       Follow-up Information    None          Hero Meredith NP  01/02/25 [id number]

## 2025-01-06 ENCOUNTER — HOSPITAL ENCOUNTER (EMERGENCY)
Facility: HOSPITAL | Age: 70
Discharge: HOME | End: 2025-01-06
Payer: MEDICARE

## 2025-01-06 VITALS
WEIGHT: 208 LBS | SYSTOLIC BLOOD PRESSURE: 115 MMHG | HEIGHT: 69 IN | TEMPERATURE: 98 F | HEART RATE: 83 BPM | RESPIRATION RATE: 14 BRPM | DIASTOLIC BLOOD PRESSURE: 75 MMHG | BODY MASS INDEX: 30.81 KG/M2 | OXYGEN SATURATION: 96 %

## 2025-01-06 DIAGNOSIS — M54.50 ACUTE BILATERAL LOW BACK PAIN WITHOUT SCIATICA: Primary | ICD-10-CM

## 2025-01-06 LAB
BILIRUB UR QL STRIP: NEGATIVE
CLARITY UR: CLEAR
COLOR UR: ABNORMAL
GLUCOSE UR STRIP-MCNC: >1000 MG/DL
KETONES UR STRIP-SCNC: NEGATIVE MG/DL
LEUKOCYTE ESTERASE UR QL STRIP: NEGATIVE
NITRITE UR QL STRIP: NEGATIVE
PH UR STRIP: 5.5 PH UNITS
PROT UR QL STRIP: NEGATIVE
RBC # UR STRIP: NEGATIVE /UL
SP GR UR STRIP: 1.03
UROBILINOGEN UR STRIP-ACNC: NORMAL MG/DL

## 2025-01-06 PROCEDURE — 81003 URINALYSIS AUTO W/O SCOPE: CPT

## 2025-01-06 PROCEDURE — 63600175 PHARM REV CODE 636 W HCPCS: Mod: JZ

## 2025-01-06 PROCEDURE — 96372 THER/PROPH/DIAG INJ SC/IM: CPT

## 2025-01-06 PROCEDURE — 99284 EMERGENCY DEPT VISIT MOD MDM: CPT | Mod: 25

## 2025-01-06 RX ORDER — METHYLPREDNISOLONE SOD SUCC 125 MG
125 VIAL (EA) INJECTION
Status: COMPLETED | OUTPATIENT
Start: 2025-01-06 | End: 2025-01-06

## 2025-01-06 RX ORDER — KETOROLAC TROMETHAMINE 30 MG/ML
30 INJECTION, SOLUTION INTRAMUSCULAR; INTRAVENOUS
Status: COMPLETED | OUTPATIENT
Start: 2025-01-06 | End: 2025-01-06

## 2025-01-06 RX ADMIN — KETOROLAC TROMETHAMINE 30 MG: 30 INJECTION, SOLUTION INTRAMUSCULAR at 04:01

## 2025-01-06 RX ADMIN — METHYLPREDNISOLONE SODIUM SUCCINATE 125 MG: 125 INJECTION, POWDER, FOR SOLUTION INTRAMUSCULAR; INTRAVENOUS at 04:01

## 2025-01-07 ENCOUNTER — PATIENT OUTREACH (OUTPATIENT)
Dept: EMERGENCY MEDICINE | Facility: HOSPITAL | Age: 70
End: 2025-01-07
Payer: MEDICARE

## 2025-01-07 NOTE — PROGRESS NOTES
Leeanne Clifford LPN  ED Navigator  Emergency Department    Project: [facility] ED Navigator  Role: Community Health Worker    Date: 01/07/2025  Patient Name: Olga Stephenson  MRN: [id number]  PCP: Rocío Alexander AGNP    Assessment:     Olga Stephenson is a 69 y.o. female who has presented to ED for back pain. Patient has visited the ED 2 times in the past 3 months. Patient did contact PCP.     ED Navigator Initial Assessment    ED Navigator Enrollment Documentation  Consent to Services  Does patient consent to completing the assessment?: Yes  Contact  Method of Initial Contact: Phone  Transportation  Does the patient have issues with Transportation?: No  Does the patient have transportation to and from healthcare appointments?: Yes  Insurance Coverage  Do you have coverage/adequate coverage?: Yes  Type/kind of coverage: Medicare/  Is patient able to afford co-pays/deductibles?: Yes  Is patient able to afford HME or supplies?: Yes  Does patient have an established [facility] PCP?: Yes  Able to access?: Yes  Does patient need assistance finding a PCP?: No  Does the patient have a lack of adequate coverage?: No  Specialist Appointment  Did the patient come to the ED to see a specialist?: No  Does the patient have a pending specialist referral?: No  Does the patient have a specialist appointment made?: No  PCP Follow Up Appointment  Has the patient had an appointment with a primary care provider in the past year?: Yes  Approximate date: 9/10/24  Provider: Yusra Kendrick MD  Does the patient have a follow up appontment with a PCP?: Yes  Upcoming appointment date: 2/3/25  Provider: Rocío Alexander AGNP  When was the last time you saw your PCP?: 8/26/24  Medications  Is patient able to afford medication?: Yes  Is patient unable to get medication due to lack of transportation?: No  Psychological  Does the patient have psycho-social concerns?: No  Food  Does the patient have concerns about food?:  No  Communication/Education  Does the patient have limited English proficiency/English not primary language?: No  Does patient have low literacy and/or low health literacy?: No  Does patient have concerns with care?: No  Does patient have dissatisfaction with care?: No  Other Financial Concerns  Does the patient have immediate financial distress?: No  Does the patient have general financial concerns?: Yes  Other Social Barriers/Concerns  Does the patient have any additional barriers or concerns?: None  Primary Barrier  Barriers identified: Patient identified no barriers to care  Root Cause of ED Utilization: Patient Knowledge/Low Health Literacy  Plan to address Patient Knowledge/Low Health Literacy: Provided information for [facility] 24/7 Nurse triage line [phone] or 1-866-[facility] ([phone])  Next steps: Provided Education  Was education/educational materials provided surrounding PCP services/creating a medical home?: Yes Was education verbal or written?: Verbal     Was education/educational materials provided surrounding low cost, healthy foods?: No      Was education/educational materials provided surrounding other items? If so, use comment to explain.: No    Plan: Provided information for [facility] 24/7 Nurse triage line, [phone] or 1-866-[facility] ([phone])  Expected Date of Follow Up 1: 1/28/25         Social History     Socioeconomic History    Marital status:    Tobacco Use    Smoking status: Former     Passive exposure: Past    Smokeless tobacco: Never   Substance and Sexual Activity    Alcohol use: Yes     Alcohol/week: 1.0 standard drink of alcohol     Types: 1 Glasses of wine per week     Comment: occasionally    Drug use: Never    Sexual activity: Not Currently     Partners: Male     Birth control/protection: None     Social Drivers of Health     Financial Resource Strain: Low Risk  (1/7/2025)    Overall Financial Resource Strain (CARDIA)     Difficulty of Paying  Living Expenses: Not hard at all   Food Insecurity: No Food Insecurity (1/7/2025)    Hunger Vital Sign     Worried About Running Out of Food in the Last Year: Never true     Ran Out of Food in the Last Year: Never true   Transportation Needs: No Transportation Needs (1/7/2025)    PRAPARE - Transportation     Lack of Transportation (Medical): No     Lack of Transportation (Non-Medical): No   Physical Activity: Insufficiently Active (1/7/2025)    Exercise Vital Sign     Days of Exercise per Week: 3 days     Minutes of Exercise per Session: 20 min   Stress: No Stress Concern Present (1/7/2025)    [employer] - Occupational Stress Questionnaire     Feeling of Stress : Not at all   Housing Stability: Low Risk  (1/7/2025)    Housing Stability Vital Sign     Unable to Pay for Housing in the Last Year: No     Homeless in the Last Year: No       Plan:   ED navigator spoke with patient about ED visit. Patient states that her back is feeling better today because she got a shot in the ED and it has helped with the pain. Patient had an appointment today with Rocío Alexander AGNP but states that they called and canceled the appointment and rescheduled it for 2-3-25. Patient denies any new needs or needing any additional assistance at this time. ED Navigator gave [facility] 24/7 Nurse triage line ([phone] or [facility]-866-[facility] ([phone]) contact information, in addition to office contact information if further assistance is needed in the future. ED navigator will follow-up with patient on/around 1-28-25.     Leeanne Clifford ED Navigator   [phone]

## 2025-01-07 NOTE — ED PROVIDER NOTES
Encounter Date: 1/6/2025       History     Chief Complaint   Patient presents with    Back Pain     Pt reports lower back pain x2wks. She reports this AM it was worse when she bent down, she reports pain down both legs     69-year old female presents to the emergency department for evaluation of low back pain. Reports a history of chronic low back pain and is followed by pain treatment. States that she called them this morning and the earliest that they can get her in is in a few weeks, and she cannot wait that long. Denies recent fall/injury to lower back, heavy lifting straining, or dysuria.    The history is provided by the patient. No  was used.   Back Pain   This is a new problem. The current episode started several weeks ago. The problem occurs constantly. The problem has been unchanged. The pain is associated with no known injury. The pain is present in the lumbar spine. The quality of the pain is described as aching. The pain radiates to the right leg and left leg. The pain is at a severity of 10/10. The symptoms are aggravated by certain positions. Pertinent negatives include no chest pain, no fever, no numbness, no weight loss, no bowel incontinence, no perianal numbness, no bladder incontinence, no dysuria, no paresthesias and no paresis. She has tried analgesics for the symptoms. The treatment provided mild relief.     Review of patient's allergies indicates:   Allergen Reactions    Jardiance [empagliflozin] Anaphylaxis     Headaches     Trulicity [dulaglutide]      Abdominal pain    Ozempic [semaglutide] Nausea And Vomiting     Past Medical History:   Diagnosis Date    Acute superficial gastritis without hemorrhage 06/08/2022    Adenomatous polyp of cecum 06/15/2022    Adenomatous polyp of transverse colon 06/15/2022    Anticoagulant long-term use     Arthritis     Asthma     Coronary artery disease     Diabetes mellitus, type 2 2014    Diabetic eye exam 07/19/2023    Dr. Navarro Tolliver     Diverticula, colon 06/15/2022    DVT (deep venous thrombosis)     Epigastric pain 2022    Heart attack     History of colon polyps 06/15/2022    Hyperlipidemia     Hypertension 2014    Polyp of hepatic flexure of colon 06/15/2022    Screening for colon cancer 06/15/2022    Thyroid disease      Past Surgical History:   Procedure Laterality Date    ARTHROPLASTY, KNEE, TOTAL, USING COMPUTER-ASSISTED NAVIGATION Right 2023    Procedure: ARTHROPLASTY, KNEE, TOTAL, USING COMPUTER-ASSISTED NAVIGATION;  Surgeon: Erick Tolliver MD;  Location: [facility];  Service: Orthopedics;  Laterality: Right;    BILATERAL OOPHORECTOMY Bilateral     35 years ago,  1-2 years after hyst     SECTION      CHOLECYSTECTOMY      COLONOSCOPY  2017    repeat in 3 years    CORONARY ARTERY BYPASS GRAFT  2011    ESOPHAGOGASTRODUODENOSCOPY  03/10/2021    HYSTERECTOMY       Family History   Problem Relation Name Age of Onset    Diabetes Mother      Heart disease Mother      Dementia Mother      Thyroid disease Mother      No Known Problems Father      Diabetes Brother      Prostate cancer Brother      Hypertension Daughter Mary     Hypertension Daughter      Breast cancer Neg Hx      Colon cancer Neg Hx      Ovarian cancer Neg Hx       Social History     Tobacco Use    Smoking status: Former     Passive exposure: Past    Smokeless tobacco: Never   Substance Use Topics    Alcohol use: Yes     Alcohol/week: 1.0 standard drink of alcohol     Types: 1 Glasses of wine per week     Comment: occasionally    Drug use: Never     Review of Systems   Constitutional:  Negative for chills, fever and weight loss.   Eyes:  Negative for photophobia and visual disturbance.   Respiratory:  Negative for chest tightness, shortness of breath, wheezing and stridor.    Cardiovascular:  Negative for chest pain and palpitations.   Gastrointestinal:  Negative for bowel incontinence, diarrhea, nausea and vomiting.   Genitourinary:   Negative for bladder incontinence, decreased urine volume, difficulty urinating, dysuria and hematuria.   Musculoskeletal:  Positive for back pain. Negative for neck pain and neck stiffness.   Neurological:  Negative for dizziness, numbness and paresthesias.   Psychiatric/Behavioral:  Negative for confusion.    All other systems reviewed and are negative.      Physical Exam     Initial Vitals [01/06/25 1551]   BP Pulse Resp Temp SpO2   105/71 98 16 97.8 °F (36.6 °C) 96 %      MAP       --         Physical Exam    Vitals reviewed.  Constitutional: She appears well-nourished. No distress.   HENT:   Head: Normocephalic.   Eyes: Conjunctivae are normal. Right eye exhibits no discharge. Left eye exhibits no discharge.   Neck: Neck supple.   Normal range of motion.  Cardiovascular:  Normal rate, regular rhythm and normal heart sounds.           Pulmonary/Chest: Breath sounds normal. No respiratory distress. She has no wheezes. She has no rhonchi. She exhibits no tenderness.   Abdominal: Abdomen is soft. Bowel sounds are normal. She exhibits no distension. There is no abdominal tenderness. There is no guarding.   Musculoskeletal:         General: No tenderness. Normal range of motion.      Cervical back: Normal range of motion and neck supple.      Lumbar back: Normal. No swelling, deformity, signs of trauma, spasms, tenderness or bony tenderness. Normal range of motion.     Lymphadenopathy:     She has no cervical adenopathy.   Neurological: She is alert and oriented to person, place, and time. She has normal strength. No sensory deficit. GCS score is 15. GCS eye subscore is 4. GCS verbal subscore is 5. GCS motor subscore is 6.   Skin: Skin is warm and dry. Capillary refill takes less than 2 seconds.   Psychiatric: She has a normal mood and affect. Her behavior is normal.         Medical Screening Exam   See Full Note    ED Course   Procedures  Labs Reviewed   URINALYSIS, REFLEX TO URINE CULTURE - Abnormal       Result  Value    Color, UA Light-Yellow      Clarity, UA Clear      pH, UA 5.5      Leukocytes, UA Negative      Nitrites, UA Negative      Protein, UA Negative      Glucose, UA >1000 (*)     Ketones, UA Negative      Urobilinogen, UA Normal      Bilirubin, UA Negative      Blood, UA Negative      Specific Gravity, UA 1.031 (*)           Imaging Results              X-Ray Lumbar Spine Ap And Lateral (Final result)  Result time 01/06/25 17:31:16      Final result by Jose Angel Shah DO (01/06/25 17:31:16)                   Impression:      No acute fracture or dislocation.    Degenerative changes as above.      Electronically signed by: Jose Angel Shah  Date:    01/06/2025  Time:    17:31               Narrative:    EXAMINATION:  XR LUMBAR SPINE AP AND LATERAL    CLINICAL HISTORY:  low back pain;    TECHNIQUE:  AP, lateral and spot images were performed of the lumbar spine.    COMPARISON:  10/03/2019.    FINDINGS:  There are 5 non-rib-bearing lumbar spine vertebrae.  There is no acute fracture or subluxation of the lumbar spine.  The vertebral body heights are preserved.  There is mild disc height loss at L5-S1, and there is mild-to-moderate disc height loss in the partially visualized thoracic spine.  There are multilevel degenerative changes, similar to prior.  There are vascular calcifications.  Remaining osseous structures are intact.  There are right upper quadrant surgical clips.                                       Medications   ketorolac injection 30 mg (30 mg Intramuscular Given 1/6/25 1647)   methylPREDNISolone sodium succinate injection 125 mg (125 mg Intramuscular Given 1/6/25 1647)     Medical Decision Making  69-year old female presents to the emergency department for evaluation of low back pain. Reports a history of chronic low back pain and is followed by pain treatment. States that she called them this morning and the earliest that they can get her in is in a few weeks, and she cannot wait that long. Denies  recent fall/injury to lower back, heavy lifting straining, or dysuria.  Ordered and reviewed lumbar spine xray as well as radiologist's interpretation with results significant for there are 5 non-rib-bearing lumbar spine vertebrae.  There is no acute fracture or subluxation of the lumbar spine.  The vertebral body heights are preserved.  There is mild disc height loss at L5-S1, and there is mild-to-moderate disc height loss in the partially visualized thoracic spine.  There are multilevel degenerative changes, similar to prior.  There are vascular calcifications.  Remaining osseous structures are intact.  There are right upper quadrant surgical clips.  Ordered and reviewed urinalysis  Toradol and solu-medrol IM ordered in ED  Follow-up and return precautions discussed with patient, who verbalized understanding  Diagnosis: Low back pain    Amount and/or Complexity of Data Reviewed  Radiology: ordered.    Risk  Prescription drug management.                                      Clinical Impression:   Final diagnoses:  [M54.50] Acute bilateral low back pain without sciatica (Primary)        ED Disposition Condition    Discharge Stable          ED Prescriptions    None       Follow-up Information    None          Hero Meredith NP  01/06/25 [id number]

## 2025-01-07 NOTE — DISCHARGE INSTRUCTIONS
Continue to take medication as prescribed by pain management. Follow up with pain treatment as scheduled. Return to the emergency department for new or worsening symptoms.

## 2025-01-17 DIAGNOSIS — K63.5 POLYP OF COLON, UNSPECIFIED PART OF COLON, UNSPECIFIED TYPE: Primary | ICD-10-CM

## 2025-01-17 RX ORDER — POLYETHYLENE GLYCOL 3350, SODIUM SULFATE ANHYDROUS, SODIUM BICARBONATE, SODIUM CHLORIDE, POTASSIUM CHLORIDE 236; 22.74; 6.74; 5.86; 2.97 G/4L; G/4L; G/4L; G/4L; G/4L
4 POWDER, FOR SOLUTION ORAL ONCE
Qty: 4000 ML | Refills: 0 | Status: SHIPPED | OUTPATIENT
Start: 2025-01-17 | End: 2025-01-17

## 2025-02-03 ENCOUNTER — OFFICE VISIT (OUTPATIENT)
Dept: FAMILY MEDICINE | Facility: CLINIC | Age: 70
End: 2025-02-03
Payer: MEDICARE

## 2025-02-03 VITALS
DIASTOLIC BLOOD PRESSURE: 77 MMHG | HEART RATE: 82 BPM | WEIGHT: 208.38 LBS | HEIGHT: 69 IN | BODY MASS INDEX: 30.86 KG/M2 | OXYGEN SATURATION: 95 % | TEMPERATURE: 98 F | SYSTOLIC BLOOD PRESSURE: 116 MMHG

## 2025-02-03 DIAGNOSIS — E78.5 HYPERLIPIDEMIA, UNSPECIFIED HYPERLIPIDEMIA TYPE: ICD-10-CM

## 2025-02-03 DIAGNOSIS — I10 PRIMARY HYPERTENSION: Primary | ICD-10-CM

## 2025-02-03 DIAGNOSIS — G62.9 NEUROPATHY: ICD-10-CM

## 2025-02-03 DIAGNOSIS — K21.9 GASTROESOPHAGEAL REFLUX DISEASE, UNSPECIFIED WHETHER ESOPHAGITIS PRESENT: ICD-10-CM

## 2025-02-03 DIAGNOSIS — Z79.890 POSTMENOPAUSAL HORMONE THERAPY: ICD-10-CM

## 2025-02-03 DIAGNOSIS — B00.9 HSV INFECTION: ICD-10-CM

## 2025-02-03 DIAGNOSIS — E11.9 TYPE 2 DIABETES MELLITUS WITHOUT COMPLICATION, WITHOUT LONG-TERM CURRENT USE OF INSULIN: ICD-10-CM

## 2025-02-03 LAB
ALBUMIN SERPL BCP-MCNC: 4 G/DL (ref 3.4–4.8)
ALBUMIN/GLOB SERPL: 1.1 {RATIO}
ALP SERPL-CCNC: 95 U/L (ref 40–150)
ALT SERPL W P-5'-P-CCNC: 19 U/L
ANION GAP SERPL CALCULATED.3IONS-SCNC: 15 MMOL/L (ref 7–16)
AST SERPL W P-5'-P-CCNC: 21 U/L (ref 5–34)
BASOPHILS # BLD AUTO: 0.13 K/UL (ref 0–0.2)
BASOPHILS NFR BLD AUTO: 1.5 % (ref 0–1)
BILIRUB SERPL-MCNC: 0.4 MG/DL
BUN SERPL-MCNC: 19 MG/DL (ref 10–20)
BUN/CREAT SERPL: 20 (ref 6–20)
CALCIUM SERPL-MCNC: 9.6 MG/DL (ref 8.4–10.2)
CHLORIDE SERPL-SCNC: 103 MMOL/L (ref 98–107)
CO2 SERPL-SCNC: 24 MMOL/L (ref 23–31)
CREAT SERPL-MCNC: 0.94 MG/DL (ref 0.55–1.02)
CREAT UR-MCNC: 40 MG/DL (ref 15–325)
DIFFERENTIAL METHOD BLD: ABNORMAL
EGFR (NO RACE VARIABLE) (RUSH/TITUS): 66 ML/MIN/1.73M2
EOSINOPHIL # BLD AUTO: 0.18 K/UL (ref 0–0.5)
EOSINOPHIL NFR BLD AUTO: 2.1 % (ref 1–4)
ERYTHROCYTE [DISTWIDTH] IN BLOOD BY AUTOMATED COUNT: 14.2 % (ref 11.5–14.5)
EST. AVERAGE GLUCOSE BLD GHB EST-MCNC: 197 MG/DL
GLOBULIN SER-MCNC: 3.7 G/DL (ref 2–4)
GLUCOSE SERPL-MCNC: 218 MG/DL (ref 82–115)
HBA1C MFR BLD HPLC: 8.5 %
HCT VFR BLD AUTO: 39.9 % (ref 38–47)
HGB BLD-MCNC: 11.5 G/DL (ref 12–16)
IMM GRANULOCYTES # BLD AUTO: 0.03 K/UL (ref 0–0.04)
IMM GRANULOCYTES NFR BLD: 0.3 % (ref 0–0.4)
LYMPHOCYTES # BLD AUTO: 3.23 K/UL (ref 1–4.8)
LYMPHOCYTES NFR BLD AUTO: 37.1 % (ref 27–41)
MAGNESIUM SERPL-MCNC: 2.4 MG/DL (ref 1.6–2.6)
MCH RBC QN AUTO: 24.9 PG (ref 27–31)
MCHC RBC AUTO-ENTMCNC: 28.8 G/DL (ref 32–36)
MCV RBC AUTO: 86.4 FL (ref 80–96)
MICROALBUMIN UR-MCNC: 0.8 MG/DL
MICROALBUMIN/CREAT RATIO PNL UR: 20 MG/G (ref 0–30)
MONOCYTES # BLD AUTO: 0.74 K/UL (ref 0–0.8)
MONOCYTES NFR BLD AUTO: 8.5 % (ref 2–6)
MPC BLD CALC-MCNC: 13.3 FL (ref 9.4–12.4)
NEUTROPHILS # BLD AUTO: 4.39 K/UL (ref 1.8–7.7)
NEUTROPHILS NFR BLD AUTO: 50.5 % (ref 53–65)
NRBC # BLD AUTO: 0 X10E3/UL
NRBC, AUTO (.00): 0 %
PLATELET # BLD AUTO: 279 K/UL (ref 150–400)
PLATELET MORPHOLOGY: ABNORMAL
POTASSIUM SERPL-SCNC: 4.2 MMOL/L (ref 3.5–5.1)
PROT SERPL-MCNC: 7.7 G/DL (ref 5.8–7.6)
RBC # BLD AUTO: 4.62 M/UL (ref 4.2–5.4)
RBC MORPH BLD: NORMAL
SODIUM SERPL-SCNC: 138 MMOL/L (ref 136–145)
WBC # BLD AUTO: 8.7 K/UL (ref 4.5–11)

## 2025-02-03 PROCEDURE — 83036 HEMOGLOBIN GLYCOSYLATED A1C: CPT | Mod: ,,, | Performed by: CLINICAL MEDICAL LABORATORY

## 2025-02-03 PROCEDURE — 82043 UR ALBUMIN QUANTITATIVE: CPT | Mod: ,,, | Performed by: CLINICAL MEDICAL LABORATORY

## 2025-02-03 PROCEDURE — 85025 COMPLETE CBC W/AUTO DIFF WBC: CPT | Mod: ,,, | Performed by: CLINICAL MEDICAL LABORATORY

## 2025-02-03 PROCEDURE — 82570 ASSAY OF URINE CREATININE: CPT | Mod: ,,, | Performed by: CLINICAL MEDICAL LABORATORY

## 2025-02-03 PROCEDURE — 99214 OFFICE O/P EST MOD 30 MIN: CPT | Mod: ,,, | Performed by: NURSE PRACTITIONER

## 2025-02-03 PROCEDURE — 80053 COMPREHEN METABOLIC PANEL: CPT | Mod: ,,, | Performed by: CLINICAL MEDICAL LABORATORY

## 2025-02-03 PROCEDURE — 83735 ASSAY OF MAGNESIUM: CPT | Mod: ,,, | Performed by: CLINICAL MEDICAL LABORATORY

## 2025-02-03 RX ORDER — CYCLOBENZAPRINE HCL 10 MG
10 TABLET ORAL NIGHTLY PRN
Qty: 30 TABLET | Refills: 1 | Status: SHIPPED | OUTPATIENT
Start: 2025-02-03

## 2025-02-03 RX ORDER — VALACYCLOVIR HYDROCHLORIDE 500 MG/1
500 TABLET, FILM COATED ORAL DAILY
Qty: 90 TABLET | Refills: 1 | Status: SHIPPED | OUTPATIENT
Start: 2025-02-03

## 2025-02-03 RX ORDER — ATORVASTATIN CALCIUM 40 MG/1
40 TABLET, FILM COATED ORAL NIGHTLY
Qty: 90 TABLET | Refills: 1 | Status: SHIPPED | OUTPATIENT
Start: 2025-02-03

## 2025-02-03 RX ORDER — EZETIMIBE 10 MG/1
10 TABLET ORAL DAILY
Qty: 90 TABLET | Refills: 1 | Status: SHIPPED | OUTPATIENT
Start: 2025-02-03 | End: 2026-02-03

## 2025-02-03 RX ORDER — LOSARTAN POTASSIUM 50 MG/1
50 TABLET ORAL DAILY
Qty: 90 TABLET | Refills: 1 | Status: SHIPPED | OUTPATIENT
Start: 2025-02-03

## 2025-02-03 RX ORDER — PREGABALIN 75 MG/1
150 CAPSULE ORAL NIGHTLY
Qty: 90 CAPSULE | Refills: 1 | Status: SHIPPED | OUTPATIENT
Start: 2025-02-03

## 2025-02-03 RX ORDER — CLOPIDOGREL BISULFATE 75 MG/1
75 TABLET ORAL DAILY
Qty: 90 TABLET | Refills: 1 | Status: SHIPPED | OUTPATIENT
Start: 2025-02-03 | End: 2025-08-02

## 2025-02-03 RX ORDER — GLIPIZIDE 10 MG/1
10 TABLET, FILM COATED, EXTENDED RELEASE ORAL
Qty: 90 TABLET | Refills: 1 | Status: SHIPPED | OUTPATIENT
Start: 2025-02-03

## 2025-02-03 RX ORDER — ESTRADIOL 0.1 MG/D
1 FILM, EXTENDED RELEASE TRANSDERMAL
Qty: 24 PATCH | Refills: 3 | Status: CANCELLED | OUTPATIENT
Start: 2025-02-03 | End: 2026-01-05

## 2025-02-03 RX ORDER — FLUTICASONE PROPIONATE 50 MCG
1 SPRAY, SUSPENSION (ML) NASAL DAILY
Qty: 16 G | Refills: 3 | Status: SHIPPED | OUTPATIENT
Start: 2025-02-03

## 2025-02-03 RX ORDER — METOPROLOL SUCCINATE 25 MG/1
25 TABLET, EXTENDED RELEASE ORAL DAILY
Qty: 90 TABLET | Refills: 1 | Status: SHIPPED | OUTPATIENT
Start: 2025-02-03

## 2025-02-03 RX ORDER — PANTOPRAZOLE SODIUM 40 MG/1
40 TABLET, DELAYED RELEASE ORAL DAILY
Qty: 90 TABLET | Refills: 3 | Status: SHIPPED | OUTPATIENT
Start: 2025-02-03

## 2025-02-04 NOTE — PROGRESS NOTES
[facility]  Chief Complaint      Chief Complaint   Patient presents with    Follow-up     Pt presents to clinic for 3 month follow up.     Health Maintenance     She is fasting and brought medications.        History of Present Illness      Olga Stephenson is a 69 y.o. female with chronic conditions of  Gastritis, Colon/rectal polyp, CAD, Type 2 Diabetes Mellitus, Asthma, DVT, MI, Hyperlipidemia, HTN, TIA and Thyroid disease.  She presents today for a 6 month follow-up on HTN. Discussed pt's concerns regarding muscle aches in bilateral legs. Will hold statin drug for now.     Follow-up  Associated symptoms include myalgias. Pertinent negatives include no abdominal pain, arthralgias, chest pain, coughing, fatigue, fever, headaches, nausea or weakness.      Past Medical History:  Past Medical History:   Diagnosis Date    Acute superficial gastritis without hemorrhage 2022    Adenomatous polyp of cecum 06/15/2022    Adenomatous polyp of transverse colon 06/15/2022    Anticoagulant long-term use     Arthritis     Asthma     Coronary artery disease     Diabetes mellitus, type 2     Diabetic eye exam 2023    Dr. Navarro Tolliver    Diverticula, colon 06/15/2022    DVT (deep venous thrombosis)     Epigastric pain 2022    Heart attack     History of colon polyps 06/15/2022    Hyperlipidemia     Hypertension 2014    Polyp of hepatic flexure of colon 06/15/2022    Screening for colon cancer 06/15/2022    Thyroid disease        Past Surgical History:   has a past surgical history that includes Esophagogastroduodenoscopy (03/10/2021); Colonoscopy (2017); Hysterectomy;  section; Bilateral oophorectomy (Bilateral); Cholecystectomy; arthroplasty, knee, total, using computer-assisted navigation (Right, 2023); and Coronary artery bypass graft ().    Social History:  Social History     Tobacco Use    Smoking status: Former     Passive exposure: Past    Smokeless  tobacco: Never   Substance Use Topics    Alcohol use: Yes     Alcohol/week: 1.0 standard drink of alcohol     Types: 1 Glasses of wine per week     Comment: occasionally    Drug use: Never       I personally reviewed all past medical, surgical, and social.     Review of Systems   Constitutional:  Negative for appetite change, fatigue, fever and unexpected weight change.   Respiratory:  Negative for cough and shortness of breath.    Cardiovascular:  Negative for chest pain and leg swelling.   Gastrointestinal:  Negative for abdominal pain, constipation, diarrhea and nausea.   Genitourinary:  Negative for difficulty urinating and dysuria.   Musculoskeletal:  Positive for myalgias. Negative for arthralgias.   Neurological:  Negative for dizziness, syncope, weakness and headaches.   Psychiatric/Behavioral:  Negative for dysphoric mood.       Medications:  Outpatient Encounter Medications as of 2/3/2025   Medication Sig Dispense Refill    albuterol (PROVENTIL/VENTOLIN HFA) 90 mcg/actuation inhaler Inhale 2 puffs into the lungs every 4 (four) hours as needed for Wheezing or Shortness of Breath. Rescue 17 g 3    blood sugar diagnostic (FREESTYLE LITE STRIPS) Strp USE 1 STRIP DAILY TO MONITOR GLUCOSE 100 strip 3    dapagliflozin propanediol (FARXIGA) 10 mg tablet Take 1 tablet (10 mg total) by mouth Daily. 90 tablet 3    estradioL (VIVELLE-DOT) 0.1 mg/24 hr PTSW Place 1 patch onto the skin twice a week. 24 patch 3    insulin glargine U-100, Lantus, (LANTUS SOLOSTAR U-100 INSULIN) 100 unit/mL (3 mL) InPn pen Inject 10 Units into the skin once daily. 3 mL 5    multivitamin capsule Take 1 capsule by mouth once daily.      [DISCONTINUED] atorvastatin (LIPITOR) 40 MG tablet Take 1 tablet (40 mg total) by mouth every evening. 90 tablet 1    [DISCONTINUED] clopidogreL (PLAVIX) 75 mg tablet Take 1 tablet (75 mg total) by mouth once daily. 90 tablet 1    [DISCONTINUED] cyclobenzaprine (FLEXERIL) 10 MG tablet Take 1 tablet (10 mg  total) by mouth nightly as needed for Muscle spasms. 30 tablet 1    [DISCONTINUED] ezetimibe (ZETIA) 10 mg tablet Take 1 tablet (10 mg total) by mouth once daily. 90 tablet 1    [DISCONTINUED] fluticasone propionate (FLONASE) 50 mcg/actuation nasal spray 1 spray (50 mcg total) by Each Nostril route once daily. 16 g 3    [DISCONTINUED] glipiZIDE (GLUCOTROL) 10 MG TR24 Take 1 tablet (10 mg total) by mouth daily with breakfast. 90 tablet 1    [DISCONTINUED] hydrocortisone 2.5 % cream       [DISCONTINUED] losartan (COZAAR) 50 MG tablet Take 1 tablet (50 mg total) by mouth once daily. 90 tablet 1    [DISCONTINUED] metoprolol succinate (TOPROL-XL) 25 MG 24 hr tablet Take 1 tablet (25 mg total) by mouth once daily. 90 tablet 1    [DISCONTINUED] pantoprazole (PROTONIX) 40 MG tablet Take 1 tablet (40 mg total) by mouth once daily. 90 tablet 3    [DISCONTINUED] pregabalin (LYRICA) 75 MG capsule Take 2 capsules (150 mg total) by mouth nightly. 90 capsule 1    [DISCONTINUED] rivaroxaban (XARELTO) 20 mg Tab Take 1 tablet (20 mg total) by mouth daily with dinner or evening meal. 90 tablet 1    [DISCONTINUED] valACYclovir (VALTREX) 500 MG tablet Take 1 tablet (500 mg total) by mouth once daily. 90 tablet 1    atorvastatin (LIPITOR) 40 MG tablet Take 1 tablet (40 mg total) by mouth every evening. 90 tablet 1    clopidogreL (PLAVIX) 75 mg tablet Take 1 tablet (75 mg total) by mouth once daily. 90 tablet 1    cyclobenzaprine (FLEXERIL) 10 MG tablet Take 1 tablet (10 mg total) by mouth nightly as needed for Muscle spasms. 30 tablet 1    ezetimibe (ZETIA) 10 mg tablet Take 1 tablet (10 mg total) by mouth once daily. 90 tablet 1    fluticasone propionate (FLONASE) 50 mcg/actuation nasal spray 1 spray (50 mcg total) by Each Nostril route once daily. 16 g 3    glipiZIDE (GLUCOTROL) 10 MG TR24 Take 1 tablet (10 mg total) by mouth daily with breakfast. 90 tablet 1    losartan (COZAAR) 50 MG tablet Take 1 tablet (50 mg total) by mouth once  daily. 90 tablet 1    metoprolol succinate (TOPROL-XL) 25 MG 24 hr tablet Take 1 tablet (25 mg total) by mouth once daily. 90 tablet 1    pantoprazole (PROTONIX) 40 MG tablet Take 1 tablet (40 mg total) by mouth once daily. 90 tablet 3    [] polyethylene glycol (GOLYTELY) 236-22.74-6.74 -5.86 gram suspension Take 4,000 mLs (4 L total) by mouth once. for 1 dose 4000 mL 0    pregabalin (LYRICA) 75 MG capsule Take 2 capsules (150 mg total) by mouth nightly. 90 capsule 1    rivaroxaban (XARELTO) 20 mg Tab Take 1 tablet (20 mg total) by mouth daily with dinner or evening meal. 90 tablet 1    valACYclovir (VALTREX) 500 MG tablet Take 1 tablet (500 mg total) by mouth once daily. 90 tablet 1    [DISCONTINUED] HYDROcodone-acetaminophen (NORCO)  mg per tablet Take 1 tablet by mouth every 6 (six) hours as needed for Pain. (Patient not taking: Reported on 2/3/2025) 28 tablet 0    [DISCONTINUED] RYBELSUS 7 mg tablet Take 1 tablet (7 mg total) by mouth once daily. (Patient not taking: Reported on 2/3/2025) 90 tablet 1     No facility-administered encounter medications on file as of 2/3/2025.       Allergies:  Review of patient's allergies indicates:   Allergen Reactions    Jardiance [empagliflozin] Anaphylaxis     Headaches     Trulicity [dulaglutide]      Abdominal pain    Ozempic [semaglutide] Nausea And Vomiting       Health Maintenance:  Immunization History   Administered Date(s) Administered    COVID-19 MRNA, LN-S PF (MODERNA HALF 0.25 ML DOSE) 2021    COVID-19, MRNA, LN-S, PF (MODERNA FULL 0.5 ML DOSE) 2021, 2021    COVID-19, mRNA, LNP-S, PF, mena-sucrose, 30 mcg/0.3 mL (Pfizer Ages 12+) 2024    Influenza (FLUAD) - Quadrivalent - Adjuvanted - PF *Preferred* (65+) 2022, 2023    Influenza - Quadrivalent - High Dose - PF (65 years and older) 2021    Influenza - Trivalent - Fluad - Adjuvanted - PF (65 years and older 2024    Pneumococcal Conjugate - 20 Valent  "08/23/2023    Zoster Recombinant 10/25/2018, 11/03/2022, 01/23/2023        Health Maintenance   Topic Date Due    TETANUS VACCINE  Never done    RSV Vaccine (Age 60+ and Pregnant patients) (1 - Risk 60-74 years 1-dose series) Never done    Diabetic Eye Exam  07/19/2024    Foot Exam  08/23/2024    Colorectal Cancer Screening  02/01/2025    Lipid Panel  04/06/2025    Hemoglobin A1c  05/03/2025    Mammogram  11/05/2025    Diabetes Urine Screening  02/03/2026    DEXA Scan  09/01/2026    Hepatitis C Screening  Completed    Shingles Vaccine  Completed    Influenza Vaccine  Completed    COVID-19 Vaccine  Completed    Pneumococcal Vaccines (Age 50+)  Completed    High Dose Statin  Discontinued        Physical Exam      Vital Signs  Temp: 98.4 °F (36.9 °C)  Temp Source: Oral  Pulse: 82  SpO2: 95 %  BP: 116/77  BP Location: Left arm  Patient Position: Sitting  Pain Score:   7  Pain Loc: Back (and bilateral legs)  Height and Weight  Height: 5' 9" (175.3 cm)  Weight: 94.5 kg (208 lb 6.4 oz)  BSA (Calculated - sq m): 2.15 sq meters  BMI (Calculated): 30.8  Weight in (lb) to have BMI = 25: 168.9]    Physical Exam  Constitutional:       General: She is not in acute distress.     Appearance: Normal appearance. She is obese.   HENT:      Head: Normocephalic.      Mouth/Throat:      Mouth: Mucous membranes are moist.   Cardiovascular:      Rate and Rhythm: Normal rate and regular rhythm.      Pulses: Normal pulses.           Radial pulses are 2+ on the right side and 2+ on the left side.      Heart sounds: Normal heart sounds.   Pulmonary:      Effort: Pulmonary effort is normal. No respiratory distress.      Breath sounds: Normal breath sounds.   Abdominal:      Palpations: Abdomen is soft.      Tenderness: There is no abdominal tenderness.   Musculoskeletal:         General: Normal range of motion.      Right lower leg: No edema.      Left lower leg: No edema.   Skin:     General: Skin is warm and dry.   Neurological:      Mental " Status: She is alert and oriented to person, place, and time.   Psychiatric:         Mood and Affect: Mood normal.         Behavior: Behavior normal.        Laboratory:  CBC:  Recent Labs   Lab 11/06/23  1610 04/06/24  1131 02/03/25  1005   WBC 9.03 9.41 8.70   RBC 4.95 5.02 4.62   Hemoglobin 14.6 14.8 11.5 L   Hematocrit 45.6 45.8 39.9   Platelet Count 246 237 279   MCV 92.1 91.2 86.4   MCH 29.5 29.5 24.9 L   MCHC 32.0 32.3 28.8 L       LIPIDS:  Recent Labs   Lab 10/26/22  1143 02/10/23  2026 02/17/23  0613 03/28/23  1516 08/23/23  1136 04/06/24  1711   TSH 0.812 0.196 L 0.575 0.441  --   --    HDL  --   --   --   --   --  39 L   HDL Cholesterol 35 L  --   --   --  44  --    Cholesterol 178  --   --   --  167 171   Triglycerides 245 H  --   --   --  187 H 188 H   LDL Calculated  --   --   --   --  86 94   Cholesterol/HDL Ratio (Risk Factor) 5.1  --   --   --  3.8  --    Non-  --   --   --  123 132       TSH:  Recent Labs   Lab 02/10/23  2026 02/17/23  0613 03/28/23  1516   TSH 0.196 L 0.575 0.441       A1C:  Recent Labs   Lab 06/24/22  1548 10/26/22  1143 02/10/23  1849 05/09/23  1054 11/06/23  1216 02/12/24  1024 04/06/24  1711 08/26/24  1132 02/03/25  1005   Hemoglobin A1C 7.0 H 8.1 H 6.9 H 6.6 8.8 H 7.9 H 9.5 H 9.0 H 8.5 H       Lab Results   Component Value Date     (H) 02/03/2025     02/03/2025    K 4.2 02/03/2025     02/03/2025    CO2 24 02/03/2025    BUN 19 02/03/2025    CREATININE 0.94 02/03/2025    CALCIUM 9.6 02/03/2025    PROT 7.7 (H) 02/03/2025    ALBUMIN 4.0 02/03/2025    BILITOT 0.4 02/03/2025    ALKPHOS 95 02/03/2025    AST 21 02/03/2025    ALT 19 02/03/2025    ANIONGAP 15 02/03/2025    ESTGFRAFRICA 68 06/08/2021    EGFRNONAA 83 06/26/2022       Lab Results   Component Value Date    WBC 8.70 02/03/2025    RBC 4.62 02/03/2025    HGB 11.5 (L) 02/03/2025    HCT 39.9 02/03/2025    MCV 86.4 02/03/2025    RDW 14.2 02/03/2025     02/03/2025        Lab Results   Component  "Value Date    CHOL 171 04/06/2024    CHOL 137 01/30/2020    TRIG 188 (H) 04/06/2024    TRIG 165 01/30/2020    HDL 39 (L) 04/06/2024    HDL 37 01/30/2020    LDLCALC 94 04/06/2024       Lab Results   Component Value Date    TSH 0.441 03/28/2023       Lab Results   Component Value Date    HGBA1C 8.5 (H) 02/03/2025    HGBA1C 9.5 (H) 04/06/2024    ESTIMATEDAVG 197 02/03/2025        No components found for: "VITAMIND"    No results found for: "PSA"    Point Of Care Testing:  WBC, UA   Date Value Ref Range Status   11/06/2023 Negative  Final     Nitrites, UA   Date Value Ref Range Status   01/06/2025 Negative Negative Final     Urobilinogen, UA   Date Value Ref Range Status   01/06/2025 Normal 0.2, 1.0, Normal mg/dL Final     Protein, POC   Date Value Ref Range Status   11/06/2023 Trace  Final     pH, UA   Date Value Ref Range Status   01/06/2025 5.5 5.0 to 8.0 pH Units Final     Specific Gravity, UA   Date Value Ref Range Status   01/06/2025 1.031 (H) <=1.030 Final     Ketones, UA   Date Value Ref Range Status   01/06/2025 Negative Negative mg/dL Final     Bilirubin, POC   Date Value Ref Range Status   11/06/2023 Negative  Final     Glucose, UA   Date Value Ref Range Status   11/06/2023 Negative  Final       Lab Results   Component Value Date    [id number] Negative 07/08/2021    RAPFLUA Negative 07/08/2021    RAPFLUB Negative 07/08/2021         Assessment/Plan     1. Primary hypertension  -     Comprehensive Metabolic Panel; Future; Expected date: 02/03/2025  -     CBC Auto Differential; Future; Expected date: 02/03/2025  -     losartan (COZAAR) 50 MG tablet; Take 1 tablet (50 mg total) by mouth once daily.  Dispense: 90 tablet; Refill: 1  -     metoprolol succinate (TOPROL-XL) 25 MG 24 hr tablet; Take 1 tablet (25 mg total) by mouth once daily.  Dispense: 90 tablet; Refill: 1  -     Magnesium; Future; Expected date: 02/03/2025  -     CBC Morphology    2. Postmenopausal hormone therapy    3. Type 2 diabetes mellitus " without complication, without long-term current use of insulin  -     Microalbumin/Creatinine Ratio, Urine; Future; Expected date: 02/03/2025  -     Hemoglobin A1C; Future; Expected date: 02/03/2025  -     glipiZIDE (GLUCOTROL) 10 MG TR24; Take 1 tablet (10 mg total) by mouth daily with breakfast.  Dispense: 90 tablet; Refill: 1    4. Gastroesophageal reflux disease, unspecified whether esophagitis present  -     pantoprazole (PROTONIX) 40 MG tablet; Take 1 tablet (40 mg total) by mouth once daily.  Dispense: 90 tablet; Refill: 3    5. HSV infection  -     valACYclovir (VALTREX) 500 MG tablet; Take 1 tablet (500 mg total) by mouth once daily.  Dispense: 90 tablet; Refill: 1    6. Neuropathy  -     pregabalin (LYRICA) 75 MG capsule; Take 2 capsules (150 mg total) by mouth nightly.  Dispense: 90 capsule; Refill: 1    7. Hyperlipidemia, unspecified hyperlipidemia type  -     atorvastatin (LIPITOR) 40 MG tablet; Take 1 tablet (40 mg total) by mouth every evening.  Dispense: 90 tablet; Refill: 1  -     ezetimibe (ZETIA) 10 mg tablet; Take 1 tablet (10 mg total) by mouth once daily.  Dispense: 90 tablet; Refill: 1    Other orders  -     clopidogreL (PLAVIX) 75 mg tablet; Take 1 tablet (75 mg total) by mouth once daily.  Dispense: 90 tablet; Refill: 1  -     cyclobenzaprine (FLEXERIL) 10 MG tablet; Take 1 tablet (10 mg total) by mouth nightly as needed for Muscle spasms.  Dispense: 30 tablet; Refill: 1  -     fluticasone propionate (FLONASE) 50 mcg/actuation nasal spray; 1 spray (50 mcg total) by Each Nostril route once daily.  Dispense: 16 g; Refill: 3  -     rivaroxaban (XARELTO) 20 mg Tab; Take 1 tablet (20 mg total) by mouth daily with dinner or evening meal.  Dispense: 90 tablet; Refill: 1           Return to clinic in 3 months and as needed.    Questions answered to desired level of satisfaction    Verbalized understanding to all information and instructions provided      [facility] EllisonSaint [facility]  [facility]

## 2025-05-07 ENCOUNTER — HOSPITAL ENCOUNTER (OUTPATIENT)
Dept: RADIOLOGY | Facility: HOSPITAL | Age: 70
Discharge: HOME | End: 2025-05-07
Payer: MEDICARE

## 2025-05-07 DIAGNOSIS — R52 PAIN, GENERALIZED: ICD-10-CM

## 2025-05-07 PROCEDURE — 93922 UPR/L XTREMITY ART 2 LEVELS: CPT | Mod: TC

## 2025-07-24 DIAGNOSIS — B00.9 HSV INFECTION: ICD-10-CM

## 2025-07-25 RX ORDER — VALACYCLOVIR HYDROCHLORIDE 500 MG/1
500 TABLET, FILM COATED ORAL
Qty: 90 TABLET | Refills: 3 | Status: SHIPPED | OUTPATIENT
Start: 2025-07-25

## 2025-08-27 ENCOUNTER — OFFICE VISIT (OUTPATIENT)
Dept: FAMILY MEDICINE | Facility: CLINIC | Age: 70
End: 2025-08-27
Payer: MEDICARE

## 2025-08-27 VITALS
TEMPERATURE: 98 F | OXYGEN SATURATION: 97 % | DIASTOLIC BLOOD PRESSURE: 66 MMHG | SYSTOLIC BLOOD PRESSURE: 124 MMHG | HEART RATE: 78 BPM | BODY MASS INDEX: 30.78 KG/M2 | HEIGHT: 69 IN

## 2025-08-27 DIAGNOSIS — B00.9 HSV INFECTION: ICD-10-CM

## 2025-08-27 DIAGNOSIS — Z12.11 SCREENING FOR MALIGNANT NEOPLASM OF COLON: ICD-10-CM

## 2025-08-27 DIAGNOSIS — E78.2 MIXED HYPERLIPIDEMIA: ICD-10-CM

## 2025-08-27 DIAGNOSIS — E11.9 TYPE 2 DIABETES MELLITUS WITHOUT COMPLICATION, WITHOUT LONG-TERM CURRENT USE OF INSULIN: Primary | ICD-10-CM

## 2025-08-27 DIAGNOSIS — D64.9 ANEMIA, UNSPECIFIED TYPE: ICD-10-CM

## 2025-08-27 DIAGNOSIS — M19.90 OSTEOARTHRITIS, UNSPECIFIED OSTEOARTHRITIS TYPE, UNSPECIFIED SITE: ICD-10-CM

## 2025-08-27 DIAGNOSIS — D12.3 ADENOMATOUS POLYP OF TRANSVERSE COLON: ICD-10-CM

## 2025-08-27 DIAGNOSIS — E55.9 VITAMIN D DEFICIENCY: ICD-10-CM

## 2025-08-27 DIAGNOSIS — I10 PRIMARY HYPERTENSION: ICD-10-CM

## 2025-08-27 PROBLEM — M25.561 RIGHT KNEE PAIN: Status: RESOLVED | Noted: 2023-07-27 | Resolved: 2025-08-27

## 2025-08-27 PROBLEM — K31.84 GASTROPARESIS: Status: RESOLVED | Noted: 2021-04-16 | Resolved: 2025-08-27

## 2025-08-27 PROBLEM — Z79.899 ON DEEP VEIN THROMBOSIS (DVT) PROPHYLAXIS: Status: RESOLVED | Noted: 2022-06-15 | Resolved: 2025-08-27

## 2025-08-27 PROBLEM — R19.7 DIARRHEA: Status: RESOLVED | Noted: 2021-07-01 | Resolved: 2025-08-27

## 2025-08-27 PROBLEM — R10.32 LEFT LOWER QUADRANT ABDOMINAL PAIN: Status: RESOLVED | Noted: 2023-08-28 | Resolved: 2025-08-27

## 2025-08-27 PROBLEM — E87.6 HYPOKALEMIA: Status: RESOLVED | Noted: 2023-02-19 | Resolved: 2025-08-27

## 2025-08-27 PROBLEM — K52.9 COLITIS: Status: RESOLVED | Noted: 2024-02-01 | Resolved: 2025-08-27

## 2025-08-27 PROBLEM — R60.9 EDEMA: Status: RESOLVED | Noted: 2023-03-29 | Resolved: 2025-08-27

## 2025-08-27 PROBLEM — R60.0 LEG EDEMA: Status: RESOLVED | Noted: 2023-08-11 | Resolved: 2025-08-27

## 2025-08-27 PROBLEM — R74.8 ELEVATED LIVER ENZYMES: Status: RESOLVED | Noted: 2021-04-16 | Resolved: 2025-08-27

## 2025-08-27 PROBLEM — B37.31 VAGINAL YEAST INFECTION: Status: RESOLVED | Noted: 2023-10-02 | Resolved: 2025-08-27

## 2025-08-27 PROBLEM — K29.00 ACUTE SUPERFICIAL GASTRITIS WITHOUT HEMORRHAGE: Status: RESOLVED | Noted: 2022-06-08 | Resolved: 2025-08-27

## 2025-08-27 PROBLEM — I82.411 ACUTE DEEP VEIN THROMBOSIS (DVT) OF FEMORAL VEIN OF RIGHT LOWER EXTREMITY: Status: RESOLVED | Noted: 2023-09-11 | Resolved: 2025-08-27

## 2025-08-27 PROBLEM — M25.661 DECREASED RANGE OF MOTION (ROM) OF RIGHT KNEE: Status: RESOLVED | Noted: 2023-02-28 | Resolved: 2025-08-27

## 2025-08-27 PROBLEM — K57.92 DIVERTICULITIS: Status: RESOLVED | Noted: 2023-10-02 | Resolved: 2025-08-27

## 2025-08-27 PROBLEM — N76.0 ACUTE VAGINITIS: Status: RESOLVED | Noted: 2023-05-23 | Resolved: 2025-08-27

## 2025-08-27 PROBLEM — L03.90 CELLULITIS: Status: RESOLVED | Noted: 2023-03-29 | Resolved: 2025-08-27

## 2025-08-27 PROBLEM — R10.13 EPIGASTRIC PAIN: Status: RESOLVED | Noted: 2022-06-08 | Resolved: 2025-08-27

## 2025-08-27 PROBLEM — Z86.0100 HISTORY OF COLON POLYPS: Status: RESOLVED | Noted: 2022-06-15 | Resolved: 2025-08-27

## 2025-08-27 PROBLEM — R13.10 DYSPHAGIA: Status: RESOLVED | Noted: 2022-05-02 | Resolved: 2025-08-27

## 2025-08-27 PROBLEM — R93.3 ABNORMAL CT SCAN, COLON: Status: RESOLVED | Noted: 2023-10-02 | Resolved: 2025-08-27

## 2025-08-27 PROBLEM — M18.11 ARTHRITIS OF CARPOMETACARPAL (CMC) JOINT OF RIGHT THUMB: Status: RESOLVED | Noted: 2021-07-21 | Resolved: 2025-08-27

## 2025-08-27 PROBLEM — Z96.651 STATUS POST TOTAL RIGHT KNEE REPLACEMENT: Status: RESOLVED | Noted: 2023-02-12 | Resolved: 2025-08-27

## 2025-08-27 PROBLEM — R29.90 EPISODE OF TRANSIENT NEUROLOGIC SYMPTOMS: Status: RESOLVED | Noted: 2024-04-06 | Resolved: 2025-08-27

## 2025-08-27 PROBLEM — R25.2 LEG CRAMPS: Status: RESOLVED | Noted: 2024-08-26 | Resolved: 2025-08-27

## 2025-08-27 PROBLEM — K92.1 BLOOD IN STOOL: Status: RESOLVED | Noted: 2024-02-01 | Resolved: 2025-08-27

## 2025-08-27 PROBLEM — R22.41 LOCALIZED SWELLING OF RIGHT LOWER LEG: Status: RESOLVED | Noted: 2023-05-09 | Resolved: 2025-08-27

## 2025-08-27 LAB
25(OH)D3 SERPL-MCNC: 14.1 NG/ML (ref 30–80)
ALBUMIN SERPL BCP-MCNC: 3.4 G/DL (ref 3.4–4.8)
ALBUMIN/GLOB SERPL: 0.7 {RATIO}
ALP SERPL-CCNC: 97 U/L (ref 40–150)
ALT SERPL W P-5'-P-CCNC: 15 U/L
ANION GAP SERPL CALCULATED.3IONS-SCNC: 11 MMOL/L (ref 7–16)
ANISOCYTOSIS BLD QL SMEAR: ABNORMAL
AST SERPL W P-5'-P-CCNC: 23 U/L (ref 11–45)
BASOPHILS # BLD AUTO: 0.17 K/UL (ref 0–0.2)
BASOPHILS NFR BLD AUTO: 1.7 % (ref 0–1)
BILIRUB SERPL-MCNC: 0.3 MG/DL
BUN SERPL-MCNC: 22 MG/DL (ref 10–20)
BUN/CREAT SERPL: 24 (ref 6–20)
CALCIUM SERPL-MCNC: 9.1 MG/DL (ref 8.4–10.2)
CHLORIDE SERPL-SCNC: 106 MMOL/L (ref 98–107)
CHOLEST SERPL-MCNC: 137 MG/DL
CHOLEST/HDLC SERPL: 4 {RATIO}
CO2 SERPL-SCNC: 25 MMOL/L (ref 23–31)
CREAT SERPL-MCNC: 0.9 MG/DL (ref 0.55–1.02)
CREAT UR-MCNC: 75 MG/DL (ref 15–325)
EGFR (NO RACE VARIABLE) (RUSH/TITUS): 69 ML/MIN/1.73M2
EOSINOPHIL # BLD AUTO: 0.17 K/UL (ref 0–0.5)
EOSINOPHIL NFR BLD AUTO: 1.7 % (ref 1–4)
ERYTHROCYTE [DISTWIDTH] IN BLOOD BY AUTOMATED COUNT: 17.3 % (ref 11.5–14.5)
EST. AVERAGE GLUCOSE BLD GHB EST-MCNC: 223 MG/DL
GLOBULIN SER-MCNC: 5.1 G/DL (ref 2–4)
GLUCOSE SERPL-MCNC: 157 MG/DL (ref 82–115)
HBA1C MFR BLD HPLC: 9.4 %
HCT VFR BLD AUTO: 33.6 % (ref 38–47)
HDLC SERPL-MCNC: 34 MG/DL (ref 35–60)
HGB BLD-MCNC: 9.3 G/DL (ref 12–16)
HYPOCHROMIA BLD QL SMEAR: ABNORMAL
IMM GRANULOCYTES # BLD AUTO: 0.03 K/UL (ref 0–0.04)
IMM GRANULOCYTES NFR BLD: 0.3 % (ref 0–0.4)
LDLC SERPL CALC-MCNC: 73 MG/DL
LDLC/HDLC SERPL: 2.1 {RATIO}
LYMPHOCYTES # BLD AUTO: 3.93 K/UL (ref 1–4.8)
LYMPHOCYTES NFR BLD AUTO: 38.8 % (ref 27–41)
MCH RBC QN AUTO: 20 PG (ref 27–31)
MCHC RBC AUTO-ENTMCNC: 27.7 G/DL (ref 32–36)
MCV RBC AUTO: 72.3 FL (ref 80–96)
MICROALBUMIN UR-MCNC: 2.1 MG/DL
MICROALBUMIN/CREAT RATIO PNL UR: 28 MG/G (ref 0–30)
MICROCYTES BLD QL SMEAR: ABNORMAL
MONOCYTES # BLD AUTO: 1.04 K/UL (ref 0–0.8)
MONOCYTES NFR BLD AUTO: 10.3 % (ref 2–6)
MPC BLD CALC-MCNC: 12 FL (ref 9.4–12.4)
NEUTROPHILS # BLD AUTO: 4.78 K/UL (ref 1.8–7.7)
NEUTROPHILS NFR BLD AUTO: 47.2 % (ref 53–65)
NONHDLC SERPL-MCNC: 103 MG/DL
NRBC # BLD AUTO: 0 X10E3/UL
NRBC, AUTO (.00): 0 %
OVALOCYTES BLD QL SMEAR: ABNORMAL
PLATELET # BLD AUTO: 342 K/UL (ref 150–400)
PLATELET MORPHOLOGY: ABNORMAL
POTASSIUM SERPL-SCNC: 3.9 MMOL/L (ref 3.5–5.1)
PROT SERPL-MCNC: 8.5 G/DL (ref 5.8–7.6)
RBC # BLD AUTO: 4.65 M/UL (ref 4.2–5.4)
SODIUM SERPL-SCNC: 138 MMOL/L (ref 136–145)
TRIGL SERPL-MCNC: 149 MG/DL (ref 37–140)
VLDLC SERPL-MCNC: 30 MG/DL
WBC # BLD AUTO: 10.12 K/UL (ref 4.5–11)

## 2025-08-27 PROCEDURE — 80053 COMPREHEN METABOLIC PANEL: CPT | Mod: ,,, | Performed by: CLINICAL MEDICAL LABORATORY

## 2025-08-27 PROCEDURE — 82043 UR ALBUMIN QUANTITATIVE: CPT | Mod: ,,, | Performed by: CLINICAL MEDICAL LABORATORY

## 2025-08-27 PROCEDURE — 82570 ASSAY OF URINE CREATININE: CPT | Mod: ,,, | Performed by: CLINICAL MEDICAL LABORATORY

## 2025-08-27 PROCEDURE — 85025 COMPLETE CBC W/AUTO DIFF WBC: CPT | Mod: ,,, | Performed by: CLINICAL MEDICAL LABORATORY

## 2025-08-27 PROCEDURE — 80061 LIPID PANEL: CPT | Mod: ,,, | Performed by: CLINICAL MEDICAL LABORATORY

## 2025-08-27 PROCEDURE — 82306 VITAMIN D 25 HYDROXY: CPT | Mod: ,,, | Performed by: CLINICAL MEDICAL LABORATORY

## 2025-08-27 PROCEDURE — 83036 HEMOGLOBIN GLYCOSYLATED A1C: CPT | Mod: ,,, | Performed by: CLINICAL MEDICAL LABORATORY

## 2025-08-27 RX ORDER — ERGOCALCIFEROL 1.25 MG/1
50000 CAPSULE ORAL
Qty: 12 CAPSULE | Refills: 0 | Status: SHIPPED | OUTPATIENT
Start: 2025-08-27

## 2025-08-27 RX ORDER — VALACYCLOVIR HYDROCHLORIDE 500 MG/1
500 TABLET, FILM COATED ORAL DAILY
Qty: 90 TABLET | Refills: 3 | Status: SHIPPED | OUTPATIENT
Start: 2025-08-27

## 2025-08-27 RX ORDER — INSULIN ASPART 100 [IU]/ML
INJECTION, SOLUTION INTRAVENOUS; SUBCUTANEOUS
COMMUNITY
Start: 2025-07-31

## 2025-08-27 RX ORDER — GLIPIZIDE 10 MG/1
10 TABLET, FILM COATED, EXTENDED RELEASE ORAL
Qty: 90 TABLET | Refills: 1 | Status: SHIPPED | OUTPATIENT
Start: 2025-08-27

## 2025-08-28 ENCOUNTER — PATIENT MESSAGE (OUTPATIENT)
Dept: FAMILY MEDICINE | Facility: CLINIC | Age: 70
End: 2025-08-28
Payer: MEDICARE

## 2025-08-28 LAB
FERRITIN SERPL-MCNC: 12 NG/ML (ref 5–204)
IMM RETICS NFR: 19 % (ref 3–15.9)
IRON SATN MFR SERPL: 6 % (ref 20–50)
IRON SERPL-MCNC: 29 UG/DL (ref 50–170)
RETICS # AUTO: 0.07 X10E6/UL (ref 0.02–0.11)
RETICS/RBC NFR AUTO: 1.6 % (ref 0.4–2.2)
TIBC SERPL-MCNC: 429 UG/DL (ref 70–310)
TIBC SERPL-MCNC: 458 UG/DL (ref 250–450)
TRANSFERRIN SERPL-MCNC: 428 MG/DL (ref 173–360)

## 2025-08-28 PROCEDURE — 82728 ASSAY OF FERRITIN: CPT | Mod: ,,, | Performed by: CLINICAL MEDICAL LABORATORY

## 2025-08-28 PROCEDURE — 83550 IRON BINDING TEST: CPT | Mod: ,,, | Performed by: CLINICAL MEDICAL LABORATORY

## 2025-08-28 PROCEDURE — 85045 AUTOMATED RETICULOCYTE COUNT: CPT | Mod: ,,, | Performed by: CLINICAL MEDICAL LABORATORY

## 2025-08-28 PROCEDURE — 83540 ASSAY OF IRON: CPT | Mod: ,,, | Performed by: CLINICAL MEDICAL LABORATORY

## 2025-08-28 RX ORDER — FERROUS SULFATE 325(65) MG
325 TABLET ORAL DAILY
Qty: 90 TABLET | Refills: 0 | Status: SHIPPED | OUTPATIENT
Start: 2025-08-28

## 2025-08-29 ENCOUNTER — TELEPHONE (OUTPATIENT)
Dept: GASTROENTEROLOGY | Facility: CLINIC | Age: 70
End: 2025-08-29
Payer: MEDICARE

## 2025-08-29 DIAGNOSIS — D50.9 IRON DEFICIENCY ANEMIA, UNSPECIFIED IRON DEFICIENCY ANEMIA TYPE: Primary | ICD-10-CM

## 2025-09-03 ENCOUNTER — TELEPHONE (OUTPATIENT)
Dept: GASTROENTEROLOGY | Facility: HOSPITAL | Age: 70
End: 2025-09-03
Payer: MEDICARE

## (undated) DEVICE — OVERLAY MATTRESS WAFFLE

## (undated) DEVICE — APPLICATOR CHLORAPREP ORN 26ML

## (undated) DEVICE — DRESSING AQUACEL FOAM RECT 6X6

## (undated) DEVICE — KIT IRR SUCTION HND PIECE

## (undated) DEVICE — GLOVE 7.5 PROTEXIS PI BLUE

## (undated) DEVICE — GLOVE BIOGEL SKINSENSE PI 7.5

## (undated) DEVICE — SUT 2-0 VICRYL / CT-1

## (undated) DEVICE — PATELLA CUTTER 46MM

## (undated) DEVICE — SKIN STAPLER PMR35

## (undated) DEVICE — COMPR KNEE STRAIGHT STAY 20IN

## (undated) DEVICE — BATTERY INSTRUMENT

## (undated) DEVICE — GLOVE 8.5 PROTEXIS PI BLUE

## (undated) DEVICE — SOL NACL IRR 3000ML

## (undated) DEVICE — GLOVE BIOGEL SKINSENSE PI 6.5

## (undated) DEVICE — GAUZE SPONGE 4X4 12PLY

## (undated) DEVICE — DRAPE INCISE IOBAN 2 23X23IN

## (undated) DEVICE — GLOVE 6.5 PROTEXIS PI BLUE

## (undated) DEVICE — GOWN TOGA SYS PEELWY ZIP 2 XL

## (undated) DEVICE — SUT VICRYL CTD 1 27IN CP

## (undated) DEVICE — SYS REVOLUTION CEMENT MIXING

## (undated) DEVICE — BLADE PERFORMANCE SAG 21X90MM

## (undated) DEVICE — KIT EVACUATOR 3 SPR  DRN 400CC

## (undated) DEVICE — CARD UNIV KNEE NAVGTN SW-SCL L

## (undated) DEVICE — KIT TOTAL KNEE RUSH

## (undated) DEVICE — GLOVE BIOGEL SKINSENSE PI 8.0

## (undated) DEVICE — CANISTER SUCTION MEDI-VAC 12L

## (undated) DEVICE — PAD CAST SPECIALIST STRL 3

## (undated) DEVICE — SOL NACL IRR 1000ML BTL